# Patient Record
Sex: FEMALE | Race: WHITE | NOT HISPANIC OR LATINO
[De-identification: names, ages, dates, MRNs, and addresses within clinical notes are randomized per-mention and may not be internally consistent; named-entity substitution may affect disease eponyms.]

---

## 2017-04-19 ENCOUNTER — RESULT REVIEW (OUTPATIENT)
Age: 62
End: 2017-04-19

## 2017-05-31 ENCOUNTER — OUTPATIENT (OUTPATIENT)
Dept: OUTPATIENT SERVICES | Facility: HOSPITAL | Age: 62
LOS: 1 days | End: 2017-05-31
Payer: COMMERCIAL

## 2017-05-31 VITALS
SYSTOLIC BLOOD PRESSURE: 146 MMHG | HEART RATE: 90 BPM | DIASTOLIC BLOOD PRESSURE: 82 MMHG | HEIGHT: 60 IN | TEMPERATURE: 98 F | RESPIRATION RATE: 16 BRPM | WEIGHT: 147.05 LBS | OXYGEN SATURATION: 97 %

## 2017-05-31 DIAGNOSIS — Z90.722 ACQUIRED ABSENCE OF OVARIES, BILATERAL: Chronic | ICD-10-CM

## 2017-05-31 DIAGNOSIS — Z98.890 OTHER SPECIFIED POSTPROCEDURAL STATES: Chronic | ICD-10-CM

## 2017-05-31 DIAGNOSIS — Z90.89 ACQUIRED ABSENCE OF OTHER ORGANS: Chronic | ICD-10-CM

## 2017-05-31 DIAGNOSIS — Z90.49 ACQUIRED ABSENCE OF OTHER SPECIFIED PARTS OF DIGESTIVE TRACT: Chronic | ICD-10-CM

## 2017-05-31 DIAGNOSIS — M47.12 OTHER SPONDYLOSIS WITH MYELOPATHY, CERVICAL REGION: ICD-10-CM

## 2017-05-31 DIAGNOSIS — Z01.818 ENCOUNTER FOR OTHER PREPROCEDURAL EXAMINATION: ICD-10-CM

## 2017-05-31 DIAGNOSIS — Z90.710 ACQUIRED ABSENCE OF BOTH CERVIX AND UTERUS: Chronic | ICD-10-CM

## 2017-05-31 LAB
ANION GAP SERPL CALC-SCNC: 17 MMOL/L — SIGNIFICANT CHANGE UP (ref 5–17)
BLD GP AB SCN SERPL QL: NEGATIVE — SIGNIFICANT CHANGE UP
BUN SERPL-MCNC: 10 MG/DL — SIGNIFICANT CHANGE UP (ref 7–23)
CALCIUM SERPL-MCNC: 10.4 MG/DL — SIGNIFICANT CHANGE UP (ref 8.4–10.5)
CHLORIDE SERPL-SCNC: 95 MMOL/L — LOW (ref 96–108)
CO2 SERPL-SCNC: 23 MMOL/L — SIGNIFICANT CHANGE UP (ref 22–31)
CREAT SERPL-MCNC: 0.81 MG/DL — SIGNIFICANT CHANGE UP (ref 0.5–1.3)
GLUCOSE SERPL-MCNC: 98 MG/DL — SIGNIFICANT CHANGE UP (ref 70–99)
HCT VFR BLD CALC: 36 % — SIGNIFICANT CHANGE UP (ref 34.5–45)
HGB BLD-MCNC: 12.4 G/DL — SIGNIFICANT CHANGE UP (ref 11.5–15.5)
MCHC RBC-ENTMCNC: 30 PG — SIGNIFICANT CHANGE UP (ref 27–34)
MCHC RBC-ENTMCNC: 34.4 GM/DL — SIGNIFICANT CHANGE UP (ref 32–36)
MCV RBC AUTO: 87.2 FL — SIGNIFICANT CHANGE UP (ref 80–100)
PLATELET # BLD AUTO: 450 K/UL — HIGH (ref 150–400)
POTASSIUM SERPL-MCNC: 4.4 MMOL/L — SIGNIFICANT CHANGE UP (ref 3.5–5.3)
POTASSIUM SERPL-SCNC: 4.4 MMOL/L — SIGNIFICANT CHANGE UP (ref 3.5–5.3)
RBC # BLD: 4.13 M/UL — SIGNIFICANT CHANGE UP (ref 3.8–5.2)
RBC # FLD: 12.9 % — SIGNIFICANT CHANGE UP (ref 10.3–14.5)
RH IG SCN BLD-IMP: POSITIVE — SIGNIFICANT CHANGE UP
SODIUM SERPL-SCNC: 135 MMOL/L — SIGNIFICANT CHANGE UP (ref 135–145)
WBC # BLD: 10.04 K/UL — SIGNIFICANT CHANGE UP (ref 3.8–10.5)
WBC # FLD AUTO: 10.04 K/UL — SIGNIFICANT CHANGE UP (ref 3.8–10.5)

## 2017-05-31 PROCEDURE — 86901 BLOOD TYPING SEROLOGIC RH(D): CPT

## 2017-05-31 PROCEDURE — 36415 COLL VENOUS BLD VENIPUNCTURE: CPT

## 2017-05-31 PROCEDURE — G0463: CPT

## 2017-05-31 PROCEDURE — 80048 BASIC METABOLIC PNL TOTAL CA: CPT

## 2017-05-31 PROCEDURE — 86850 RBC ANTIBODY SCREEN: CPT

## 2017-05-31 PROCEDURE — 86900 BLOOD TYPING SEROLOGIC ABO: CPT

## 2017-05-31 PROCEDURE — 85027 COMPLETE CBC AUTOMATED: CPT

## 2017-05-31 RX ORDER — CEFAZOLIN SODIUM 1 G
2000 VIAL (EA) INJECTION ONCE
Qty: 0 | Refills: 0 | Status: DISCONTINUED | OUTPATIENT
Start: 2017-06-14 | End: 2017-06-16

## 2017-05-31 RX ORDER — TIMOLOL 0.5 %
1 DROPS OPHTHALMIC (EYE)
Qty: 0 | Refills: 0 | COMMUNITY

## 2017-05-31 NOTE — H&P PST ADULT - PAIN CHRONIC, PROFILE
h/o cervical herniated discs, cervical pain worsen over past few month, radiation to both arms and left leg/yes

## 2017-05-31 NOTE — H&P PST ADULT - HISTORY OF PRESENT ILLNESS
61 yr old female with HTN, HLD, GERD, cervical spine stenosis, cervical radiculopathy, worsening cervical pain over past few month, presents to PST for scheduled C3-7 anterior cervical discectomy  and fusion, possible C4-5 corpectomy, possible C3-7 posterior spinal  stabilization on 6/14/17.

## 2017-05-31 NOTE — H&P PST ADULT - PROBLEM SELECTOR PLAN 1
C3-7 anterior cervical discectomy  and fusion, possible C4-5 corpectomy, possible C3-7 posterior spinal  stabilization

## 2017-05-31 NOTE — H&P PST ADULT - NSANTHOSAYNRD_GEN_A_CORE
No. MURRAY screening performed.  STOP BANG Legend: 0-2 = LOW Risk; 3-4 = INTERMEDIATE Risk; 5-8 = HIGH Risk

## 2017-05-31 NOTE — H&P PST ADULT - PSH
History of appendectomy    History of arthroscopy of right knee    History of bilateral oophorectomy    History of carpal tunnel release    History of cholecystectomy    History of hysterectomy    History of tonsillectomy

## 2017-05-31 NOTE — H&P PST ADULT - PMH
History of iron deficiency anemia    History of migraine Cervical radiculopathy    Gastroesophageal reflux disease without esophagitis    Glaucoma  b/l  Herniated cervical disc    Hiatal hernia    History of diverticulitis    History of endometriosis    History of gastric ulcer    History of iron deficiency anemia    History of migraine    Hyperlipidemia    Hypertension    Osteoarthritis of cervical spine with myelopathy    Spinal stenosis in cervical region

## 2017-05-31 NOTE — H&P PST ADULT - NEUROLOGICAL COMMENTS
cervical radiculopathy, cervical pain radiating to both arms and left leg , pain progressively worsening

## 2017-05-31 NOTE — H&P PST ADULT - OTHER CARE PROVIDERS
cardiologist Nnamdi Cee follows as needed ( [facility] ) cardiologist Dr. Mannie Cee [phone], follows as needed

## 2017-06-02 ENCOUNTER — TRANSCRIPTION ENCOUNTER (OUTPATIENT)
Age: 62
End: 2017-06-02

## 2017-06-14 ENCOUNTER — TRANSCRIPTION ENCOUNTER (OUTPATIENT)
Age: 62
End: 2017-06-14

## 2017-06-14 ENCOUNTER — INPATIENT (INPATIENT)
Facility: HOSPITAL | Age: 62
LOS: 4 days | Discharge: HOME | DRG: 472 | End: 2017-06-19
Attending: NEUROLOGICAL SURGERY | Admitting: NEUROLOGICAL SURGERY
Payer: COMMERCIAL

## 2017-06-14 VITALS
HEART RATE: 84 BPM | DIASTOLIC BLOOD PRESSURE: 79 MMHG | WEIGHT: 147.05 LBS | RESPIRATION RATE: 18 BRPM | SYSTOLIC BLOOD PRESSURE: 142 MMHG | HEIGHT: 60 IN | OXYGEN SATURATION: 100 % | TEMPERATURE: 98 F

## 2017-06-14 DIAGNOSIS — Z90.710 ACQUIRED ABSENCE OF BOTH CERVIX AND UTERUS: Chronic | ICD-10-CM

## 2017-06-14 DIAGNOSIS — Z90.722 ACQUIRED ABSENCE OF OVARIES, BILATERAL: Chronic | ICD-10-CM

## 2017-06-14 DIAGNOSIS — Z90.89 ACQUIRED ABSENCE OF OTHER ORGANS: Chronic | ICD-10-CM

## 2017-06-14 DIAGNOSIS — M47.12 OTHER SPONDYLOSIS WITH MYELOPATHY, CERVICAL REGION: ICD-10-CM

## 2017-06-14 DIAGNOSIS — Z90.49 ACQUIRED ABSENCE OF OTHER SPECIFIED PARTS OF DIGESTIVE TRACT: Chronic | ICD-10-CM

## 2017-06-14 DIAGNOSIS — Z98.890 OTHER SPECIFIED POSTPROCEDURAL STATES: Chronic | ICD-10-CM

## 2017-06-14 LAB
ANION GAP SERPL CALC-SCNC: 14 MMOL/L — SIGNIFICANT CHANGE UP (ref 5–17)
BUN SERPL-MCNC: 9 MG/DL — SIGNIFICANT CHANGE UP (ref 7–23)
CALCIUM SERPL-MCNC: 8.8 MG/DL — SIGNIFICANT CHANGE UP (ref 8.4–10.5)
CHLORIDE SERPL-SCNC: 105 MMOL/L — SIGNIFICANT CHANGE UP (ref 96–108)
CO2 SERPL-SCNC: 23 MMOL/L — SIGNIFICANT CHANGE UP (ref 22–31)
CREAT SERPL-MCNC: 0.64 MG/DL — SIGNIFICANT CHANGE UP (ref 0.5–1.3)
GAS PNL BLDA: SIGNIFICANT CHANGE UP
GAS PNL BLDA: SIGNIFICANT CHANGE UP
GLUCOSE SERPL-MCNC: 138 MG/DL — HIGH (ref 70–99)
HCT VFR BLD CALC: 27.6 % — LOW (ref 34.5–45)
HGB BLD-MCNC: 9.8 G/DL — LOW (ref 11.5–15.5)
MAGNESIUM SERPL-MCNC: 2.1 MG/DL — SIGNIFICANT CHANGE UP (ref 1.6–2.6)
MCHC RBC-ENTMCNC: 32.4 PG — SIGNIFICANT CHANGE UP (ref 27–34)
MCHC RBC-ENTMCNC: 35.4 GM/DL — SIGNIFICANT CHANGE UP (ref 32–36)
MCV RBC AUTO: 91.4 FL — SIGNIFICANT CHANGE UP (ref 80–100)
PHOSPHATE SERPL-MCNC: 4.9 MG/DL — HIGH (ref 2.5–4.5)
PLATELET # BLD AUTO: 342 K/UL — SIGNIFICANT CHANGE UP (ref 150–400)
POTASSIUM SERPL-MCNC: 4.5 MMOL/L — SIGNIFICANT CHANGE UP (ref 3.5–5.3)
POTASSIUM SERPL-SCNC: 4.5 MMOL/L — SIGNIFICANT CHANGE UP (ref 3.5–5.3)
RBC # BLD: 3.02 M/UL — LOW (ref 3.8–5.2)
RBC # FLD: 11.6 % — SIGNIFICANT CHANGE UP (ref 10.3–14.5)
RH IG SCN BLD-IMP: POSITIVE — SIGNIFICANT CHANGE UP
SODIUM SERPL-SCNC: 142 MMOL/L — SIGNIFICANT CHANGE UP (ref 135–145)
WBC # BLD: 15.6 K/UL — HIGH (ref 3.8–10.5)
WBC # FLD AUTO: 15.6 K/UL — HIGH (ref 3.8–10.5)

## 2017-06-14 PROCEDURE — 99291 CRITICAL CARE FIRST HOUR: CPT | Mod: 24

## 2017-06-14 RX ORDER — ESTROGENS, CONJUGATED 0.625 MG
0.62 TABLET ORAL DAILY
Qty: 0 | Refills: 0 | Status: DISCONTINUED | OUTPATIENT
Start: 2017-06-14 | End: 2017-06-19

## 2017-06-14 RX ORDER — SERTRALINE 25 MG/1
50 TABLET, FILM COATED ORAL DAILY
Qty: 0 | Refills: 0 | Status: DISCONTINUED | OUTPATIENT
Start: 2017-06-14 | End: 2017-06-19

## 2017-06-14 RX ORDER — LABETALOL HCL 100 MG
10 TABLET ORAL ONCE
Qty: 0 | Refills: 0 | Status: COMPLETED | OUTPATIENT
Start: 2017-06-14 | End: 2017-06-14

## 2017-06-14 RX ORDER — HYDROMORPHONE HYDROCHLORIDE 2 MG/ML
0.5 INJECTION INTRAMUSCULAR; INTRAVENOUS; SUBCUTANEOUS
Qty: 0 | Refills: 0 | Status: DISCONTINUED | OUTPATIENT
Start: 2017-06-14 | End: 2017-06-16

## 2017-06-14 RX ORDER — AMLODIPINE BESYLATE 2.5 MG/1
5 TABLET ORAL AT BEDTIME
Qty: 0 | Refills: 0 | Status: DISCONTINUED | OUTPATIENT
Start: 2017-06-14 | End: 2017-06-19

## 2017-06-14 RX ORDER — DIAZEPAM 5 MG
5 TABLET ORAL EVERY 8 HOURS
Qty: 0 | Refills: 0 | Status: DISCONTINUED | OUTPATIENT
Start: 2017-06-14 | End: 2017-06-19

## 2017-06-14 RX ORDER — ACETAMINOPHEN 500 MG
650 TABLET ORAL EVERY 6 HOURS
Qty: 0 | Refills: 0 | Status: DISCONTINUED | OUTPATIENT
Start: 2017-06-14 | End: 2017-06-19

## 2017-06-14 RX ORDER — HYDROMORPHONE HYDROCHLORIDE 2 MG/ML
30 INJECTION INTRAMUSCULAR; INTRAVENOUS; SUBCUTANEOUS
Qty: 0 | Refills: 0 | Status: DISCONTINUED | OUTPATIENT
Start: 2017-06-14 | End: 2017-06-16

## 2017-06-14 RX ORDER — PANTOPRAZOLE SODIUM 20 MG/1
40 TABLET, DELAYED RELEASE ORAL
Qty: 0 | Refills: 0 | Status: DISCONTINUED | OUTPATIENT
Start: 2017-06-14 | End: 2017-06-19

## 2017-06-14 RX ORDER — DEXTROSE MONOHYDRATE, SODIUM CHLORIDE, AND POTASSIUM CHLORIDE 50; .745; 4.5 G/1000ML; G/1000ML; G/1000ML
1000 INJECTION, SOLUTION INTRAVENOUS
Qty: 0 | Refills: 0 | Status: DISCONTINUED | OUTPATIENT
Start: 2017-06-14 | End: 2017-06-15

## 2017-06-14 RX ORDER — ATORVASTATIN CALCIUM 80 MG/1
40 TABLET, FILM COATED ORAL AT BEDTIME
Qty: 0 | Refills: 0 | Status: DISCONTINUED | OUTPATIENT
Start: 2017-06-14 | End: 2017-06-19

## 2017-06-14 RX ORDER — DOCUSATE SODIUM 100 MG
100 CAPSULE ORAL THREE TIMES A DAY
Qty: 0 | Refills: 0 | Status: DISCONTINUED | OUTPATIENT
Start: 2017-06-14 | End: 2017-06-19

## 2017-06-14 RX ORDER — ONDANSETRON 8 MG/1
4 TABLET, FILM COATED ORAL EVERY 6 HOURS
Qty: 0 | Refills: 0 | Status: DISCONTINUED | OUTPATIENT
Start: 2017-06-14 | End: 2017-06-16

## 2017-06-14 RX ORDER — SODIUM CHLORIDE 9 MG/ML
3 INJECTION INTRAMUSCULAR; INTRAVENOUS; SUBCUTANEOUS EVERY 8 HOURS
Qty: 0 | Refills: 0 | Status: DISCONTINUED | OUTPATIENT
Start: 2017-06-14 | End: 2017-06-14

## 2017-06-14 RX ORDER — METHOCARBAMOL 500 MG/1
750 TABLET, FILM COATED ORAL THREE TIMES A DAY
Qty: 0 | Refills: 0 | Status: DISCONTINUED | OUTPATIENT
Start: 2017-06-14 | End: 2017-06-19

## 2017-06-14 RX ORDER — TIMOLOL 0.5 %
1 DROPS OPHTHALMIC (EYE) AT BEDTIME
Qty: 0 | Refills: 0 | Status: DISCONTINUED | OUTPATIENT
Start: 2017-06-14 | End: 2017-06-19

## 2017-06-14 RX ORDER — NALOXONE HYDROCHLORIDE 4 MG/.1ML
0.1 SPRAY NASAL
Qty: 0 | Refills: 0 | Status: DISCONTINUED | OUTPATIENT
Start: 2017-06-14 | End: 2017-06-19

## 2017-06-14 RX ORDER — SUCRALFATE 1 G
1 TABLET ORAL
Qty: 0 | Refills: 0 | Status: DISCONTINUED | OUTPATIENT
Start: 2017-06-14 | End: 2017-06-19

## 2017-06-14 RX ORDER — CEFAZOLIN SODIUM 1 G
2000 VIAL (EA) INJECTION EVERY 8 HOURS
Qty: 0 | Refills: 0 | Status: COMPLETED | OUTPATIENT
Start: 2017-06-14 | End: 2017-06-15

## 2017-06-14 RX ORDER — LOSARTAN POTASSIUM 100 MG/1
50 TABLET, FILM COATED ORAL DAILY
Qty: 0 | Refills: 0 | Status: DISCONTINUED | OUTPATIENT
Start: 2017-06-14 | End: 2017-06-19

## 2017-06-14 RX ORDER — SENNA PLUS 8.6 MG/1
2 TABLET ORAL AT BEDTIME
Qty: 0 | Refills: 0 | Status: DISCONTINUED | OUTPATIENT
Start: 2017-06-14 | End: 2017-06-19

## 2017-06-14 RX ADMIN — Medication 10 MILLIGRAM(S): at 16:55

## 2017-06-14 RX ADMIN — Medication 1 DROP(S): at 22:23

## 2017-06-14 RX ADMIN — HYDROMORPHONE HYDROCHLORIDE 30 MILLILITER(S): 2 INJECTION INTRAMUSCULAR; INTRAVENOUS; SUBCUTANEOUS at 16:56

## 2017-06-14 RX ADMIN — Medication 100 MILLIGRAM(S): at 22:23

## 2017-06-14 NOTE — BRIEF OPERATIVE NOTE - POST-OP DX
Detail Level: Detailed
Cervical stenosis of spine  06/14/2017    Active  Saumya Saavedra
Detail Level: Simple
Detail Level: Generalized

## 2017-06-14 NOTE — BRIEF OPERATIVE NOTE - PROCEDURE
Posterior cervical fusion with instrumentation  06/14/2017    Active  XSUN4  Anterior cervical discectomy between C6 and C7  06/14/2017    Active  XSUN4  Cervical corpectomy by anterior approach of additional segment following inital cervical corpectomy by anterior approach of single segment  06/14/2017    Active  XSUN4  Cervical corpectomy  06/14/2017    Active  XSUN4

## 2017-06-14 NOTE — PROGRESS NOTE ADULT - SUBJECTIVE AND OBJECTIVE BOX
NEUROCRITICAL CARE NOTE    61 yr old female with HTN, HLD, GERD, cervical spine stenosis, cervical radiculopathy, worsening cervical pain over past few month, presents to PST for scheduled C3-7 anterior cervical discectomy  and fusion, possible C4-5 corpectomy, possible C3-7 posterior spinal  stabilization on 6/14/17.     ASSESSMENT/PLAN: post-operative day 0 no complic but heavy blood loss need monitoring in ICU and numerous PMHx:  Cervical radiculopathy    Gastroesophageal reflux disease without esophagitis    Glaucoma  b/l  Herniated cervical disc    Hiatal hernia    History of diverticulitis    History of endometriosis    History of gastric ulcer    History of iron deficiency anemia    History of migraine    Hyperlipidemia    Hypertension    Osteoarthritis of cervical spine with myelopathy    Spinal stenosis in cervical region.    NEURO: Alert, attentive, fully oriented, obeys commands, fluent, cranial nerves intact, no facial asymmetry, pupils equal briskly reactive, muscle strength 5/5 in all extremities, no sensory deficit, no dysmetria.  acetaminophen for mild analgesia methocarbamol for moderate and hydromorphone for severe pain  sertraline for depression  diazepam for anxiolysis and muscle relaxation  Activity: Bedrest PT OT PM&R    PULM: normal auscultation     CV: normal auscultation   SBP goal w/ amlodipine and losartan for blood pressure control atorvastatin for cholesterol control     RENAL: intravenous fluids       GI: abdomen soft not distended not tender   Diet:  GI ulcer prophylaxis with PPI    Constipation prophylaxis with senna docusate    ENDO: estrogens from home  Goal euglycemia (glucose 120-180)    HEME/ONC:  VTE prophylaxis: intermittent pneumatic compression devices alone no anticoagulation as fresh postop    ID: afebrile cefazolin for soraya-operative antibiotic prophylaxis     SOCIAL/FAMILY:awaiting    CODE STATUS: Full Code     DISPOSITION:  ICU 24h    critical care time 35 minutes dedicated to this patient at risk of neurological deterioration or death due to risk of quadriplegia if hematoma at site    Damien Madison MD, MS, FASN NEUROCRITICAL CARE NOTE    61 yr old female with HTN, HLD, GERD, cervical spine stenosis, cervical radiculopathy, worsening cervical pain over past few month, presents to PST for scheduled C3-7 anterior cervical discectomy  and fusion, possible C4-5 corpectomy, possible C3-7 posterior spinal  stabilization on 6/14/17.     ASSESSMENT/PLAN: post-operative day 0 no complic but heavy blood loss need monitoring in ICU, risk of cervical hematoma with airway compression and numerous PMHx:  Cervical radiculopathy    Gastroesophageal reflux disease without esophagitis    Glaucoma  b/l  Herniated cervical disc    Hiatal hernia    History of diverticulitis    History of endometriosis    History of gastric ulcer    History of iron deficiency anemia    History of migraine    Hyperlipidemia    Hypertension    Osteoarthritis of cervical spine with myelopathy    Spinal stenosis in cervical region.    NEURO: Alert, attentive, fully oriented, obeys commands, fluent, cranial nerves intact, no facial asymmetry, pupils equal briskly reactive, muscle strength 5/5 in all extremities, no sensory deficit, no dysmetria. No cervical hematoma, 2 drains in.  acetaminophen for mild analgesia methocarbamol for moderate and hydromorphone PCA soon for severe pain  sertraline for depression  diazepam for anxiolysis and muscle relaxation  Activity: Bedrest PT OT PM&R    PULM: normal auscultation no airway compromise now    CV: normal auscultation   SBP goal w/ amlodipine and losartan for blood pressure control consider nicardipine if high after pain control, atorvastatin for cholesterol control     RENAL: intravenous fluids f/u chem    GI: abdomen soft not distended not tender   Diet:  GI ulcer prophylaxis with PPI    Constipation prophylaxis with senna docusate    ENDO: estrogens from home  Goal euglycemia (glucose 120-180)    HEME/ONC: f/u post-op CBC  VTE prophylaxis: intermittent pneumatic compression devices alone no anticoagulation as fresh postop    ID: afebrile cefazolin for soraya-operative antibiotic prophylaxis     SOCIAL/FAMILY:awaiting    CODE STATUS: Full Code     DISPOSITION:  ICU 24h    critical care time 35 minutes dedicated to this patient at risk of neurological deterioration or death due to risk of quadriplegia or airway compromise if hematoma develops at site    Damien Madison MD, MS, GOLD

## 2017-06-14 NOTE — BRIEF OPERATIVE NOTE - OPERATION/FINDINGS
C4-C5 corpectomy, C6-7 ACDF, C3-C7 anterior plate screw fixation, C3-C7 posterior screw-rehan fixation

## 2017-06-14 NOTE — PATIENT PROFILE ADULT. - PMH
Cervical radiculopathy    Gastroesophageal reflux disease without esophagitis    Glaucoma  b/l  Herniated cervical disc    Hiatal hernia    History of diverticulitis    History of endometriosis    History of gastric ulcer    History of iron deficiency anemia    History of migraine    Hyperlipidemia    Hypertension    Osteoarthritis of cervical spine with myelopathy    Spinal stenosis in cervical region

## 2017-06-15 DIAGNOSIS — M54.12 RADICULOPATHY, CERVICAL REGION: ICD-10-CM

## 2017-06-15 PROCEDURE — 99233 SBSQ HOSP IP/OBS HIGH 50: CPT

## 2017-06-15 RX ORDER — ENOXAPARIN SODIUM 100 MG/ML
40 INJECTION SUBCUTANEOUS
Qty: 0 | Refills: 0 | Status: DISCONTINUED | OUTPATIENT
Start: 2017-06-15 | End: 2017-06-19

## 2017-06-15 RX ORDER — METOCLOPRAMIDE HCL 10 MG
5 TABLET ORAL EVERY 4 HOURS
Qty: 0 | Refills: 0 | Status: DISCONTINUED | OUTPATIENT
Start: 2017-06-15 | End: 2017-06-19

## 2017-06-15 RX ORDER — SODIUM CHLORIDE 9 MG/ML
1000 INJECTION INTRAMUSCULAR; INTRAVENOUS; SUBCUTANEOUS
Qty: 0 | Refills: 0 | Status: DISCONTINUED | OUTPATIENT
Start: 2017-06-15 | End: 2017-06-15

## 2017-06-15 RX ADMIN — Medication 100 MILLIGRAM(S): at 06:00

## 2017-06-15 RX ADMIN — Medication 1 GRAM(S): at 18:05

## 2017-06-15 RX ADMIN — Medication 5 MILLIGRAM(S): at 14:22

## 2017-06-15 RX ADMIN — Medication 0.62 MILLIGRAM(S): at 12:50

## 2017-06-15 RX ADMIN — METHOCARBAMOL 750 MILLIGRAM(S): 500 TABLET, FILM COATED ORAL at 22:37

## 2017-06-15 RX ADMIN — Medication 1 GRAM(S): at 06:00

## 2017-06-15 RX ADMIN — ENOXAPARIN SODIUM 40 MILLIGRAM(S): 100 INJECTION SUBCUTANEOUS at 18:06

## 2017-06-15 RX ADMIN — HYDROMORPHONE HYDROCHLORIDE 0.5 MILLIGRAM(S): 2 INJECTION INTRAMUSCULAR; INTRAVENOUS; SUBCUTANEOUS at 13:06

## 2017-06-15 RX ADMIN — AMLODIPINE BESYLATE 5 MILLIGRAM(S): 2.5 TABLET ORAL at 22:36

## 2017-06-15 RX ADMIN — METHOCARBAMOL 750 MILLIGRAM(S): 500 TABLET, FILM COATED ORAL at 14:04

## 2017-06-15 RX ADMIN — Medication 5 MILLIGRAM(S): at 22:37

## 2017-06-15 RX ADMIN — Medication 1 GRAM(S): at 22:36

## 2017-06-15 RX ADMIN — Medication 5 MILLIGRAM(S): at 00:25

## 2017-06-15 RX ADMIN — SENNA PLUS 2 TABLET(S): 8.6 TABLET ORAL at 22:36

## 2017-06-15 RX ADMIN — ONDANSETRON 4 MILLIGRAM(S): 8 TABLET, FILM COATED ORAL at 19:35

## 2017-06-15 RX ADMIN — LOSARTAN POTASSIUM 50 MILLIGRAM(S): 100 TABLET, FILM COATED ORAL at 06:00

## 2017-06-15 RX ADMIN — SERTRALINE 50 MILLIGRAM(S): 25 TABLET, FILM COATED ORAL at 12:50

## 2017-06-15 RX ADMIN — Medication 100 MILLIGRAM(S): at 22:36

## 2017-06-15 RX ADMIN — PANTOPRAZOLE SODIUM 40 MILLIGRAM(S): 20 TABLET, DELAYED RELEASE ORAL at 06:00

## 2017-06-15 RX ADMIN — HYDROMORPHONE HYDROCHLORIDE 0.5 MILLIGRAM(S): 2 INJECTION INTRAMUSCULAR; INTRAVENOUS; SUBCUTANEOUS at 20:03

## 2017-06-15 RX ADMIN — Medication 100 MILLIGRAM(S): at 14:02

## 2017-06-15 RX ADMIN — Medication 1 GRAM(S): at 11:27

## 2017-06-15 RX ADMIN — PANTOPRAZOLE SODIUM 40 MILLIGRAM(S): 20 TABLET, DELAYED RELEASE ORAL at 18:05

## 2017-06-15 RX ADMIN — METHOCARBAMOL 750 MILLIGRAM(S): 500 TABLET, FILM COATED ORAL at 06:00

## 2017-06-15 NOTE — PROGRESS NOTE ADULT - SUBJECTIVE AND OBJECTIVE BOX
HPI:  61 yr old female with HTN, HLD, GERD, cervical spine stenosis, cervical radiculopathy, worsening cervical pain over past few month, presents to PST for scheduled C3-7 anterior cervical discectomy  and fusion, C4-5 corpectomy, C3-7 posterior spinal  stabilization on 6/14/17. (31 May 2017 09:50)    OVERNIGHT EVENTS:   No acute events overnight.    VITALS:  T(C): , Max: 37.1 (06-14 @ 23:00)  HR:  (78 - 102)  BP: --  ABP:  (126/63 - 172/79)  RR:  (6 - 25)  SpO2:  (93% - 100%)  Wt(kg): --    LABS:  Na: 142 (06-14 @ 22:59)  K: 4.5 (06-14 @ 22:59)  Cl: 105 (06-14 @ 22:59)  CO2: 23 (06-14 @ 22:59)  BUN: 9 (06-14 @ 22:59)  Cr: 0.64 (06-14 @ 22:59)    Hgb: 9.8 (06-14 @ 22:59)  Hct: 27.6 (06-14 @ 22:59)  WBC: 15.6 (06-14 @ 22:59)  Plt: 342 (06-14 @ 22:59)    IMAGING:   Recent imaging studies were reviewed.    MEDICATIONS:  ceFAZolin   IVPB 2000milliGRAM(s) IV Intermittent once  losartan 50milliGRAM(s) Oral daily  sertraline 50milliGRAM(s) Oral daily  atorvastatin 40milliGRAM(s) Oral at bedtime  amLODIPine   Tablet 5milliGRAM(s) Oral at bedtime  sucralfate suspension 1Gram(s) Oral Before meals and at bedtime  methocarbamol 750milliGRAM(s) Oral three times a day  pantoprazole    Tablet 40milliGRAM(s) Oral two times a day before meals  timolol 0.25% Solution 1Drop(s) Both EYES at bedtime  estrogens    conjugated 0.625milliGRAM(s) Oral daily  acetaminophen   Tablet 650milliGRAM(s) Oral every 6 hours PRN  acetaminophen   Tablet. 650milliGRAM(s) Oral every 6 hours PRN  diazepam    Tablet 5milliGRAM(s) Oral every 8 hours PRN  docusate sodium 100milliGRAM(s) Oral three times a day  senna 2Tablet(s) Oral at bedtime  HYDROmorphone PCA (1 mG/mL) 30milliLiter(s) PCA Continuous PCA Continuous  HYDROmorphone PCA (1 mG/mL) Rescue Clinician Bolus 0.5milliGRAM(s) IV Push every 15 minutes PRN  naloxone Injectable 0.1milliGRAM(s) IV Push every 3 minutes PRN  ondansetron Injectable 4milliGRAM(s) IV Push every 6 hours PRN  enoxaparin Injectable 40milliGRAM(s) SubCutaneous <User Schedule>    EXAMINATION:  General:  calm  HEENT:  MMM  Neuro:  awake, alert, oriented x 3, follows commands, moves all extremities  Cards:  RRR  Respiratory:  no respiratory distress  Adomen:  soft  Extremities:  moves all extremities  Skin:  warm/dry, incision clean/dry

## 2017-06-15 NOTE — PHYSICAL THERAPY INITIAL EVALUATION ADULT - PERTINENT HX OF CURRENT PROBLEM, REHAB EVAL
60 yo F with HTN, HLD, GERD, cervical spine stenosis, cervical radiculopathy, worsening cervical pain over past few month, presents to PST for scheduled C3-7 anterior cervical discectomy  and fusion, possible C4-5 corpectomy, possible C3-7 posterior spinal  stabilization on 6/14/17.

## 2017-06-15 NOTE — OCCUPATIONAL THERAPY INITIAL EVALUATION ADULT - PERTINENT HX OF CURRENT PROBLEM, REHAB EVAL
62 yo F with HTN, HLD, GERD, cervical spine stenosis, cervical radiculopathy, worsening cervical pain over past few month, presents to PST for scheduled C3-7 anterior cervical discectomy  and fusion, possible C4-5 corpectomy, possible C3-7 posterior spinal  stabilization on 6/14/17.

## 2017-06-15 NOTE — PROGRESS NOTE ADULT - SUBJECTIVE AND OBJECTIVE BOX
Neurosurgery Resident Note    Overnight Events: no acute events overnight. Patient has only elected to be on fluids overnight    Clinical Course: 61yof s/p C4 & C5 corpectomies, C6-7 ACDF, C3-C7 anterior plate-screw fixation, C3-C7 posterior screw-rehan fixation. POD # 1    VS: T(C): 36.6  HR: 102  BP: --  RR: 18  SpO2: 93%  Wt(kg): --    Exam:  AAOx3  5/5 throughout, no saenz, no clonus  Sensation to light touch intact throughout.  2+ reflexes throughout    HMV output: 45 cc overnight    Medications: Dilaudid PCA senna, colace,                           9.8    15.6  )-----------( 342      ( 14 Jun 2017 22:59 )             27.6     06-14    142  |  105  |  9   ----------------------------<  138<H>  4.5   |  23  |  0.64    Ca    8.8      14 Jun 2017 22:59  Phos  4.9     06-14  Mg     2.1     06-14 Neurosurgery Resident Note    Overnight Events: no acute events overnight. Patient has only elected to be on fluids overnight    Clinical Course: 61yof s/p C4 & C5 corpectomies, C6-7 ACDF, C3-C7 anterior plate-screw fixation, C3-C7 posterior screw-rehan fixation. POD # 1    VS: T(C): 36.6  HR: 102  BP: --  RR: 18  SpO2: 93%  Wt(kg): --    Exam:  AAOx3  5/5 throughout, no saenz, no clonus  Sensation to light touch intact throughout.  2+ reflexes throughout    HMV output: 70 cc overnight    Medications: Dilaudid PCA senna, colace,                           9.8    15.6  )-----------( 342      ( 14 Jun 2017 22:59 )             27.6     06-14    142  |  105  |  9   ----------------------------<  138<H>  4.5   |  23  |  0.64    Ca    8.8      14 Jun 2017 22:59  Phos  4.9     06-14  Mg     2.1     06-14

## 2017-06-15 NOTE — PHYSICAL THERAPY INITIAL EVALUATION ADULT - CRITERIA FOR SKILLED THERAPEUTIC INTERVENTIONS
therapy frequency/anticipated equipment needs at discharge/risk reduction/prevention/impairments found/rehab potential/anticipated discharge recommendation/predicted duration of therapy intervention/functional limitations in following categories

## 2017-06-15 NOTE — OCCUPATIONAL THERAPY INITIAL EVALUATION ADULT - LIVES WITH, PROFILE
children/spouse/Pt lives with her spouse, daughter, and son-in-law in a private home, 4 steps to enter with full flight to 2nd floor and unilateral handrail. Pt has a tub shower and was independent with ADLs, IADLs, driving, working prior to admission.

## 2017-06-15 NOTE — OCCUPATIONAL THERAPY INITIAL EVALUATION ADULT - DIAGNOSIS, OT EVAL
Pt with impaired strength, balance and pain impacting pt's ability to complete ADLs, IADLs and functional mobility.

## 2017-06-15 NOTE — PROGRESS NOTE ADULT - ASSESSMENT
61F p/w severe neck pain, B/L UE radicular pain, and loss of dexterity, s/p multilevel anterior cervical corpectomy and anterior & posterior fusion. Neurologically intact

## 2017-06-15 NOTE — PROGRESS NOTE ADULT - SUBJECTIVE AND OBJECTIVE BOX
HPI:  61 yr old female with HTN, HLD, GERD, cervical spine stenosis, cervical radiculopathy, worsening cervical pain over past few month, presents to PST for scheduled C3-7 anterior cervical discectomy  and fusion, possible C4-5 corpectomy, possible C3-7 posterior spinal  stabilization on 6/14/17. (31 May 2017 09:50)    s/p C4-5 corpectomy, C6-7 ACDF, C3-7 ant/post spinal stabilization, POD 1    OVERNIGHT EVENTS:  Vital Signs Last 24 Hrs  T(C): 36.7, Max: 37.1 (06-14 @ 23:00)  T(F): 98.1, Max: 98.8 (06-14 @ 23:00)  HR: 95 (78 - 102)  BP: 113/45 (113/45 - 113/45)  BP(mean): 64 (64 - 64)  RR: 12 (6 - 25)  SpO2: 98% (93% - 100%)    I&O's Detail  I & Os for 24h ending 15 Aly 2017 07:00  =============================================  IN:    sodium chloride 0.9% with potassium chloride 20 mEq/L: 750 ml    sodium chloride 0.9%: 450 ml    Solution: 100 ml    Total IN: 1300 ml  ---------------------------------------------  OUT:    Indwelling Catheter - Urethral: 925 ml    Accordian: 70 ml    Total OUT: 995 ml  ---------------------------------------------  Total NET: 305 ml    I & Os for current day (as of 15 Aly 2017 11:28)  =============================================  IN:    sodium chloride 0.9%: 225 ml    Total IN: 225 ml  ---------------------------------------------  OUT:    Indwelling Catheter - Urethral: 300 ml    Total OUT: 300 ml  ---------------------------------------------  Total NET: -75 ml    I&O's Summary  I & Os for 24h ending 15 Aly 2017 07:00  =============================================  IN: 1300 ml / OUT: 995 ml / NET: 305 ml    I & Os for current day (as of 15 Aly 2017 11:28)  =============================================  IN: 225 ml / OUT: 300 ml / NET: -75 ml      PHYSICAL EXAM:  Neurological:    Motor exam:         [] Upper extremity                 D             B          T          WE       WF      HI                                               R         5/5        5/5        5/5       5/5     5/5       5/5                                               L          5/5        5/5        5/5       5/5     5/5       5/5         [] Lower extremity                Ps          Ha        Quad    EHL        FHL                                               R        5/5        5/5        5/5       5/5         5/5                                               L         5/5        5/5       5/5       5/5          5/5                                                        [] warm well perfused; capillary refill <3 seconds     Sensation: [x] intact to light touch  [] decreased:     DTR's          [] Biceps                                                                     R         1/2                                               L         1/2          [] Triceps                                                                     R        1 /2                                                  L         1/2          [] Brachiradialis                                                        R         1/2                                               L         1 /2          [] Patellar                                                                   R         1/2                                               L         1 /2          [] Achilles                                                                   R        1 /2                                               L          1/2      Gait NT    Cardiovascular:  Respiratory:  Gastrointestinal:  Genitourinary:  Extremities:  Incision/Wound: Dressings dry    TUBES/LINES:  [] CVC  [] A-line  [] Lumbar Drain  [] Ventriculostomy  [] Other    DIET: Tolerating liquids  [] NPO  [] Mechanical  [] Tube feeds    LABS:                        9.8    15.6  )-----------( 342      ( 14 Jun 2017 22:59 )             27.6     06-14    142  |  105  |  9   ----------------------------<  138<H>  4.5   |  23  |  0.64    Ca    8.8      14 Jun 2017 22:59  Phos  4.9     06-14  Mg     2.1     06-14              CAPILLARY BLOOD GLUCOSE          Drug Levels: [] N/A    CSF Analysis: [] N/A      Allergies    No Known Allergies    Intolerances    morphine (Nausea; Vomiting)  Percocet 5/325 (Nausea; Vomiting)    MEDICATIONS:  Antibiotics:  ceFAZolin   IVPB 2000milliGRAM(s) IV Intermittent once    Neuro:  sertraline 50milliGRAM(s) Oral daily  methocarbamol 750milliGRAM(s) Oral three times a day  acetaminophen   Tablet 650milliGRAM(s) Oral every 6 hours PRN  acetaminophen   Tablet. 650milliGRAM(s) Oral every 6 hours PRN  diazepam    Tablet 5milliGRAM(s) Oral every 8 hours PRN  HYDROmorphone PCA (1 mG/mL) 30milliLiter(s) PCA Continuous PCA Continuous  HYDROmorphone PCA (1 mG/mL) Rescue Clinician Bolus 0.5milliGRAM(s) IV Push every 15 minutes PRN  ondansetron Injectable 4milliGRAM(s) IV Push every 6 hours PRN    Anticoagulation:  enoxaparin Injectable 40milliGRAM(s) SubCutaneous <User Schedule>    OTHER:  losartan 50milliGRAM(s) Oral daily  atorvastatin 40milliGRAM(s) Oral at bedtime  amLODIPine   Tablet 5milliGRAM(s) Oral at bedtime  sucralfate suspension 1Gram(s) Oral Before meals and at bedtime  pantoprazole    Tablet 40milliGRAM(s) Oral two times a day before meals  timolol 0.25% Solution 1Drop(s) Both EYES at bedtime  estrogens    conjugated 0.625milliGRAM(s) Oral daily  docusate sodium 100milliGRAM(s) Oral three times a day  senna 2Tablet(s) Oral at bedtime  naloxone Injectable 0.1milliGRAM(s) IV Push every 3 minutes PRN    IVF:    CULTURES:    RADIOLOGY & ADDITIONAL TESTS:      ASSESSMENT:  HPI:  61 yr old female with HTN, HLD, GERD, cervical spine stenosis, cervical radiculopathy, worsening cervical pain over past few month, presents to PST for scheduled C3-7 anterior cervical discectomy  and fusion, possible C4-5 corpectomy, possible C3-7 posterior spinal  stabilization on 6/14/17. (31 May 2017 09:50)    61y Female s/p    PLAN:  NEURO:  CARDIOVASCULAR:  PULMONARY:  RENAL:  GI:  HEME:  ID:  ENDO:    DVT PROPHYLAXIS:  [] Venodynes                                [] Heparin/Lovenox    FALL RISK:  [] Low Risk                                    [] Impulsive

## 2017-06-15 NOTE — OCCUPATIONAL THERAPY INITIAL EVALUATION ADULT - GENERAL OBSERVATIONS, REHAB EVAL
Pt found semi-supine in bed, +tele, +cardiac monitor, +PCA, +venodynes, +hemovac, +continous O2 monitor.

## 2017-06-15 NOTE — OCCUPATIONAL THERAPY INITIAL EVALUATION ADULT - PLANNED THERAPY INTERVENTIONS, OT EVAL
IADL retraining/bed mobility training/ADL retraining neuromuscular re-education/ADL retraining/strengthening/IADL retraining/ROM/transfer training/bed mobility training/balance training

## 2017-06-15 NOTE — OCCUPATIONAL THERAPY INITIAL EVALUATION ADULT - ADDITIONAL COMMENTS
Pt s/p C4-C5 corpectomy, C6-7 ACDF, C3-C7 anterior plate screw fixation, C3-C7 posterior screw-rehan fixation 6/14.

## 2017-06-15 NOTE — PROGRESS NOTE ADULT - SUBJECTIVE AND OBJECTIVE BOX
Visit Summary: 61y Female POD#1 s/p C4/5 corpectomy, C6-7 ACDF, and posterior C3-7 fusion. Patient is wide awake and moving everything well postoperatively w/ moderate incisional pain. She denies any swallowing or breathing difficulty and is protecting her airway well.    Overnight Events: No acute events o/n.    Exam:  T(C): 36.4, Max: 36.9 (06-14 @ 06:17)  HR: 85 (78 - 94)  BP: 142/79 (142/79 - 142/79)  RR: 20 (6 - 25)  SpO2: 98% (97% - 100%)  Wt(kg): --    PHYSICAL EXAM:    Constitutional: No Acute Distress     Neurological: AOx3, Following Commands, Moving all Extremities     Motor exam:          Upper extremity                         Delt     Bicep     Tricep    HG                                                 R         5/5        5/5        5/5       5/5                                               L          5/5        5/5        5/5       5/5          Lower extremity                        HF         KF        KE       DF         PF                                                  R        5/5        5/5        5/5       5/5         5/5                                               L         5/5        5/5       5/5       5/5          5/5                                                                           9.8    15.6  )-----------( 342      ( 14 Jun 2017 22:59 )             27.6     06-14    142  |  105  |  9   ----------------------------<  138<H>  4.5   |  23  |  0.64    Ca    8.8      14 Jun 2017 22:59  Phos  4.9     06-14  Mg     2.1     06-14        Plan:

## 2017-06-15 NOTE — PROGRESS NOTE ADULT - SUBJECTIVE AND OBJECTIVE BOX
Day 1 of Anesthesia Pain Management Service    SUBJECTIVE: I just had a lot of pain     Pain Scale Score	At rest: ___ 	With Activity: ___ 	[X ] Refer to charted pain scores    THERAPY:    [ ] IV PCA Morphine		[ ] 5 mg/mL	[ ] 1 mg/mL  [X ] IV PCA Hydromorphone	[ ] 5 mg/mL	[X ] 1 mg/mL  [ ] IV PCA Fentanyl		[ ] 50 micrograms/mL    Demand dose 0.2mg    lockout  6  minutes 0  Continuous Rate        MEDICATIONS  (STANDING):  ceFAZolin   IVPB 2000milliGRAM(s) IV Intermittent once  losartan 50milliGRAM(s) Oral daily  sertraline 50milliGRAM(s) Oral daily  atorvastatin 40milliGRAM(s) Oral at bedtime  amLODIPine   Tablet 5milliGRAM(s) Oral at bedtime  sucralfate suspension 1Gram(s) Oral Before meals and at bedtime  methocarbamol 750milliGRAM(s) Oral three times a day  pantoprazole    Tablet 40milliGRAM(s) Oral two times a day before meals  timolol 0.25% Solution 1Drop(s) Both EYES at bedtime  estrogens    conjugated 0.625milliGRAM(s) Oral daily  docusate sodium 100milliGRAM(s) Oral three times a day  senna 2Tablet(s) Oral at bedtime  HYDROmorphone PCA (1 mG/mL) 30milliLiter(s) PCA Continuous PCA Continuous  enoxaparin Injectable 40milliGRAM(s) SubCutaneous <User Schedule>    MEDICATIONS  (PRN):  acetaminophen   Tablet 650milliGRAM(s) Oral every 6 hours PRN For Temp greater than 38 C (100.4 F)  acetaminophen   Tablet. 650milliGRAM(s) Oral every 6 hours PRN Mild Pain (1 - 3)  diazepam    Tablet 5milliGRAM(s) Oral every 8 hours PRN muscle spasms  HYDROmorphone PCA (1 mG/mL) Rescue Clinician Bolus 0.5milliGRAM(s) IV Push every 15 minutes PRN for Pain Scale GREATER THAN 6  naloxone Injectable 0.1milliGRAM(s) IV Push every 3 minutes PRN For ANY of the following changes in patient status:  A. RR LESS THAN 10 breaths per minute, B. Oxygen saturation LESS THAN 90%, C. Sedation score of 6  ondansetron Injectable 4milliGRAM(s) IV Push every 6 hours PRN Nausea      OBJECTIVE:    Sedation Score:	[x ] Alert	[ ] Drowsy 	[ ] Arousable	[ ] Asleep	[ ] Unresponsive    Side Effects:	[x ] None	[ ] Nausea	[ ] Vomiting	[ ] Pruritus  		[ ] Other:    Vital Signs Last 24 Hrs  T(C): 36.7, Max: 37.1 (06-14 @ 23:00)  T(F): 98.1, Max: 98.8 (06-14 @ 23:00)  HR: 95 (78 - 102)  BP: 113/45 (113/45 - 113/45)  BP(mean): 64 (64 - 64)  RR: 12 (6 - 25)  SpO2: 98% (93% - 100%)    ASSESSMENT/ PLAN    Therapy to  be:	[x ] Continue   [ ] Discontinued   [ ] Change to prn Analgesics    Documentation and Verification of current medications:   [X] Done	[ ] Not done, not elligible    Comments: Valium prn spams. Continue PCA

## 2017-06-15 NOTE — PHYSICAL THERAPY INITIAL EVALUATION ADULT - DID THE PATIENT HAVE SURGERY?
Pt s/p C4-C5 corpectomy, C6-7 ACDF, C3-C7 anterior plate screw fixation, C3-C7 posterior screw-rehan fixation 6/14./yes

## 2017-06-15 NOTE — PHYSICAL THERAPY INITIAL EVALUATION ADULT - ADDITIONAL COMMENTS
Pt lives with her spouse, daughter, and son-in-law in a private home, 4 steps to enter with full flight to 2nd floor and unilateral handrail. PTA pt was independent in all functional mobility No AD, able to take care of herself, able to drive a car, and walk community distances.

## 2017-06-15 NOTE — PROGRESS NOTE ADULT - ASSESSMENT
Summary:     Neuro:  q4h neuro checks, no post-op imaging    CV:  -160    Pulm:  sat > 92%    Renal:  IVL    GI:  advance as tolerated  ---> Stress ulcer prophylaxis:  PPI    Heme:  monitor H/H    ---> DVT prophylaxis:  start SQL tonight, SCDs    Endo:  euglycemia    ID:  afebrile    Code status:  Full code  Disposition:  full    Time spent:  30 minutes

## 2017-06-15 NOTE — OCCUPATIONAL THERAPY INITIAL EVALUATION ADULT - ANTICIPATED DISCHARGE DISPOSITION, OT EVAL
TBD pending completion of functional evaluation Home OT to increase independence in ADLs, IADLs, and assess safety in the home environment

## 2017-06-15 NOTE — PHYSICAL THERAPY INITIAL EVALUATION ADULT - GENERAL OBSERVATIONS, REHAB EVAL
Pt encountered supine in bed, + surgical bandage at anterior cervical spine. + A line, + cardiac moitor, PCA pump PIV, hernandez

## 2017-06-16 DIAGNOSIS — M48.02 SPINAL STENOSIS, CERVICAL REGION: ICD-10-CM

## 2017-06-16 LAB
ANION GAP SERPL CALC-SCNC: 11 MMOL/L — SIGNIFICANT CHANGE UP (ref 5–17)
BUN SERPL-MCNC: 6 MG/DL — LOW (ref 7–23)
CALCIUM SERPL-MCNC: 8.9 MG/DL — SIGNIFICANT CHANGE UP (ref 8.4–10.5)
CHLORIDE SERPL-SCNC: 93 MMOL/L — LOW (ref 96–108)
CO2 SERPL-SCNC: 27 MMOL/L — SIGNIFICANT CHANGE UP (ref 22–31)
CREAT SERPL-MCNC: 0.47 MG/DL — LOW (ref 0.5–1.3)
GLUCOSE SERPL-MCNC: 190 MG/DL — HIGH (ref 70–99)
HCT VFR BLD CALC: 27.2 % — LOW (ref 34.5–45)
HGB BLD-MCNC: 9.2 G/DL — LOW (ref 11.5–15.5)
MCHC RBC-ENTMCNC: 30.7 PG — SIGNIFICANT CHANGE UP (ref 27–34)
MCHC RBC-ENTMCNC: 33.7 GM/DL — SIGNIFICANT CHANGE UP (ref 32–36)
MCV RBC AUTO: 91.2 FL — SIGNIFICANT CHANGE UP (ref 80–100)
PLATELET # BLD AUTO: 305 K/UL — SIGNIFICANT CHANGE UP (ref 150–400)
POTASSIUM SERPL-MCNC: 3.5 MMOL/L — SIGNIFICANT CHANGE UP (ref 3.5–5.3)
POTASSIUM SERPL-SCNC: 3.5 MMOL/L — SIGNIFICANT CHANGE UP (ref 3.5–5.3)
RBC # BLD: 2.98 M/UL — LOW (ref 3.8–5.2)
RBC # FLD: 11.5 % — SIGNIFICANT CHANGE UP (ref 10.3–14.5)
SODIUM SERPL-SCNC: 131 MMOL/L — LOW (ref 135–145)
WBC # BLD: 18.7 K/UL — HIGH (ref 3.8–10.5)
WBC # FLD AUTO: 18.7 K/UL — HIGH (ref 3.8–10.5)

## 2017-06-16 RX ORDER — HYDROMORPHONE HYDROCHLORIDE 2 MG/ML
4 INJECTION INTRAMUSCULAR; INTRAVENOUS; SUBCUTANEOUS EVERY 4 HOURS
Qty: 0 | Refills: 0 | Status: DISCONTINUED | OUTPATIENT
Start: 2017-06-16 | End: 2017-06-19

## 2017-06-16 RX ORDER — HYDROMORPHONE HYDROCHLORIDE 2 MG/ML
2 INJECTION INTRAMUSCULAR; INTRAVENOUS; SUBCUTANEOUS EVERY 4 HOURS
Qty: 0 | Refills: 0 | Status: DISCONTINUED | OUTPATIENT
Start: 2017-06-16 | End: 2017-06-19

## 2017-06-16 RX ORDER — HYDROMORPHONE HYDROCHLORIDE 2 MG/ML
0.5 INJECTION INTRAMUSCULAR; INTRAVENOUS; SUBCUTANEOUS
Qty: 0 | Refills: 0 | Status: DISCONTINUED | OUTPATIENT
Start: 2017-06-16 | End: 2017-06-19

## 2017-06-16 RX ADMIN — HYDROMORPHONE HYDROCHLORIDE 2 MILLIGRAM(S): 2 INJECTION INTRAMUSCULAR; INTRAVENOUS; SUBCUTANEOUS at 13:54

## 2017-06-16 RX ADMIN — ENOXAPARIN SODIUM 40 MILLIGRAM(S): 100 INJECTION SUBCUTANEOUS at 17:15

## 2017-06-16 RX ADMIN — HYDROMORPHONE HYDROCHLORIDE 0.5 MILLIGRAM(S): 2 INJECTION INTRAMUSCULAR; INTRAVENOUS; SUBCUTANEOUS at 21:26

## 2017-06-16 RX ADMIN — Medication 1 GRAM(S): at 11:43

## 2017-06-16 RX ADMIN — SERTRALINE 50 MILLIGRAM(S): 25 TABLET, FILM COATED ORAL at 11:43

## 2017-06-16 RX ADMIN — Medication 100 MILLIGRAM(S): at 14:26

## 2017-06-16 RX ADMIN — METHOCARBAMOL 750 MILLIGRAM(S): 500 TABLET, FILM COATED ORAL at 16:06

## 2017-06-16 RX ADMIN — Medication 5 MILLIGRAM(S): at 13:54

## 2017-06-16 RX ADMIN — METHOCARBAMOL 750 MILLIGRAM(S): 500 TABLET, FILM COATED ORAL at 05:37

## 2017-06-16 RX ADMIN — Medication 0.62 MILLIGRAM(S): at 11:43

## 2017-06-16 RX ADMIN — AMLODIPINE BESYLATE 5 MILLIGRAM(S): 2.5 TABLET ORAL at 21:25

## 2017-06-16 RX ADMIN — LOSARTAN POTASSIUM 50 MILLIGRAM(S): 100 TABLET, FILM COATED ORAL at 05:37

## 2017-06-16 RX ADMIN — HYDROMORPHONE HYDROCHLORIDE 4 MILLIGRAM(S): 2 INJECTION INTRAMUSCULAR; INTRAVENOUS; SUBCUTANEOUS at 12:13

## 2017-06-16 RX ADMIN — HYDROMORPHONE HYDROCHLORIDE 4 MILLIGRAM(S): 2 INJECTION INTRAMUSCULAR; INTRAVENOUS; SUBCUTANEOUS at 20:01

## 2017-06-16 RX ADMIN — PANTOPRAZOLE SODIUM 40 MILLIGRAM(S): 20 TABLET, DELAYED RELEASE ORAL at 05:36

## 2017-06-16 RX ADMIN — Medication 1 GRAM(S): at 05:36

## 2017-06-16 RX ADMIN — HYDROMORPHONE HYDROCHLORIDE 0.5 MILLIGRAM(S): 2 INJECTION INTRAMUSCULAR; INTRAVENOUS; SUBCUTANEOUS at 21:40

## 2017-06-16 RX ADMIN — HYDROMORPHONE HYDROCHLORIDE 4 MILLIGRAM(S): 2 INJECTION INTRAMUSCULAR; INTRAVENOUS; SUBCUTANEOUS at 11:43

## 2017-06-16 RX ADMIN — HYDROMORPHONE HYDROCHLORIDE 4 MILLIGRAM(S): 2 INJECTION INTRAMUSCULAR; INTRAVENOUS; SUBCUTANEOUS at 19:27

## 2017-06-16 RX ADMIN — Medication 1 GRAM(S): at 16:06

## 2017-06-16 RX ADMIN — METHOCARBAMOL 750 MILLIGRAM(S): 500 TABLET, FILM COATED ORAL at 21:25

## 2017-06-16 RX ADMIN — Medication 100 MILLIGRAM(S): at 05:37

## 2017-06-16 RX ADMIN — Medication 1 DROP(S): at 21:25

## 2017-06-16 RX ADMIN — PANTOPRAZOLE SODIUM 40 MILLIGRAM(S): 20 TABLET, DELAYED RELEASE ORAL at 16:06

## 2017-06-16 RX ADMIN — Medication 1 GRAM(S): at 21:25

## 2017-06-16 NOTE — PROGRESS NOTE ADULT - SUBJECTIVE AND OBJECTIVE BOX
Day 2 of Anesthesia Pain Management Service    SUBJECTIVE: I'm still having spasm    Pain Scale Score	At rest: ___ 	With Activity: ___ 	[x ] Refer to charted pain scores    THERAPY:    [ ] IV PCA Morphine		[ ] 5 mg/mL	[ ] 1 mg/mL  [X ] IV PCA Hydromorphone	[ ] 5 mg/mL	[X ] 1 mg/mL  [ ] IV PCA Fentanyl		[ ] 50 micrograms/mL    Demand dose 0.2mg    lockout  6  minutes 0  Continuous Rate        MEDICATIONS  (STANDING):  ceFAZolin   IVPB 2000milliGRAM(s) IV Intermittent once  losartan 50milliGRAM(s) Oral daily  sertraline 50milliGRAM(s) Oral daily  atorvastatin 40milliGRAM(s) Oral at bedtime  amLODIPine   Tablet 5milliGRAM(s) Oral at bedtime  sucralfate suspension 1Gram(s) Oral Before meals and at bedtime  methocarbamol 750milliGRAM(s) Oral three times a day  pantoprazole    Tablet 40milliGRAM(s) Oral two times a day before meals  timolol 0.25% Solution 1Drop(s) Both EYES at bedtime  estrogens    conjugated 0.625milliGRAM(s) Oral daily  docusate sodium 100milliGRAM(s) Oral three times a day  senna 2Tablet(s) Oral at bedtime  HYDROmorphone PCA (1 mG/mL) 30milliLiter(s) PCA Continuous PCA Continuous  enoxaparin Injectable 40milliGRAM(s) SubCutaneous <User Schedule>    MEDICATIONS  (PRN):  acetaminophen   Tablet 650milliGRAM(s) Oral every 6 hours PRN For Temp greater than 38 C (100.4 F)  acetaminophen   Tablet. 650milliGRAM(s) Oral every 6 hours PRN Mild Pain (1 - 3)  diazepam    Tablet 5milliGRAM(s) Oral every 8 hours PRN muscle spasms  HYDROmorphone PCA (1 mG/mL) Rescue Clinician Bolus 0.5milliGRAM(s) IV Push every 15 minutes PRN for Pain Scale GREATER THAN 6  naloxone Injectable 0.1milliGRAM(s) IV Push every 3 minutes PRN For ANY of the following changes in patient status:  A. RR LESS THAN 10 breaths per minute, B. Oxygen saturation LESS THAN 90%, C. Sedation score of 6  ondansetron Injectable 4milliGRAM(s) IV Push every 6 hours PRN Nausea  metoclopramide 5milliGRAM(s) Oral every 4 hours PRN nausea      OBJECTIVE:    Sedation Score:	[X ] Alert	[ ] Drowsy 	[ ] Arousable	[ ] Asleep	[ ] Unresponsive    Side Effects:	[ X] None	[ ] Nausea	[ ] Vomiting	[ ] Pruritus  		[ ] Other:    Vital Signs Last 24 Hrs  T(C): 36.9, Max: 37.4 (06-15 @ 15:00)  T(F): 98.5, Max: 99.4 (06-15 @ 15:00)  HR: 117 (95 - 122)  BP: 131/80 (104/72 - 142/77)  BP(mean): 68 (64 - 70)  RR: 18 (12 - 19)  SpO2: 95% (93% - 98%)    ASSESSMENT/ PLAN    Therapy to  be:	[X ] Continue   [ ] Discontinued   [ ] Change to prn Analgesics    Documentation and Verification of current medications:   [X] Done	[ ] Not done, not elligible    Comments:

## 2017-06-16 NOTE — PROGRESS NOTE ADULT - SUBJECTIVE AND OBJECTIVE BOX
SUBJECTIVE: Comfortable, without complaints. NAD    OVERNIGHT EVENTS: None    Vital Signs Last 24 Hrs  T(C): 36.9, Max: 37.4 (06-15 @ 15:00)  T(F): 98.5, Max: 99.4 (06-15 @ 15:00)  HR: 117 (104 - 122)  BP: 131/80 (104/72 - 142/77)  BP(mean): 68 (68 - 70)  RR: 18 (17 - 19)  SpO2: 95% (93% - 98%)  IVF: [ X] IVL [ ] NS+K@   DIET: [ ] Regular [ ] CCD [ ] Renal [ ] Puree [ ] Dysphagia [ ] Tube Feeds:   PCA: [X] YES [ ] NO   BOWERS: [X ] YES [ ] NO [ ] VOID   BM: [ ] YES [ ] NO     DRAINS: HMV 24cc>DC HMV now    PHYSICAL EXAM:    Constitutional: No Acute Distress     Neurological: AOx3, Following Commands, Moving all Extremities. No dysphagia or dysphonia    Motor exam:          Upper extremity                         Delt     Bicep     Tricep    HG                                                 R         5/5        5/5        5/5       5/5                                               L          5/5        5/5        5/5       5/5          Lower extremity                        HF         KF        KE       DF         PF                                                  R        5/5        5/5        5/5       5/5         5/5                                               L         5/5        5/5       5/5       5/5          5/5                                                 Sensation: [X] intact to light touch  [] decreased:     Pulmonary: Clear to Auscultation, No rales, No rhonchi, No wheezes     Cardiovascular: S1, S2, Regular rate and rhythm     Gastrointestinal: Soft, Non-tender, Non-distended     Extremities: No calf tenderness     Incision: Ant neck HMV DC at this time, + Steri-CDI, No hematoma, Post neck-CDI, Flat    LABS:                        9.2    18.7  )-----------( 305      ( 16 Jun 2017 10:16 )             27.2    06-16    131<L>  |  93<L>  |  6<L>  ----------------------------<  190<H>  3.5   |  27  |  0.47<L>    Ca    8.9      16 Jun 2017 10:16  Phos  4.9     06-14  Mg     2.1     06-14      IMAGING:     MEDICATIONS  (STANDING):  losartan 50milliGRAM(s) Oral daily  sertraline 50milliGRAM(s) Oral daily  atorvastatin 40milliGRAM(s) Oral at bedtime  amLODIPine   Tablet 5milliGRAM(s) Oral at bedtime  sucralfate suspension 1Gram(s) Oral Before meals and at bedtime  methocarbamol 750milliGRAM(s) Oral three times a day  pantoprazole    Tablet 40milliGRAM(s) Oral two times a day before meals  timolol 0.25% Solution 1Drop(s) Both EYES at bedtime  estrogens    conjugated 0.625milliGRAM(s) Oral daily  docusate sodium 100milliGRAM(s) Oral three times a day  senna 2Tablet(s) Oral at bedtime  enoxaparin Injectable 40milliGRAM(s) SubCutaneous <User Schedule>    MEDICATIONS  (PRN):  acetaminophen   Tablet 650milliGRAM(s) Oral every 6 hours PRN For Temp greater than 38 C (100.4 F)  acetaminophen   Tablet. 650milliGRAM(s) Oral every 6 hours PRN Mild Pain (1 - 3)  diazepam    Tablet 5milliGRAM(s) Oral every 8 hours PRN muscle spasms  naloxone Injectable 0.1milliGRAM(s) IV Push every 3 minutes PRN For ANY of the following changes in patient status:  A. RR LESS THAN 10 breaths per minute, B. Oxygen saturation LESS THAN 90%, C. Sedation score of 6  metoclopramide 5milliGRAM(s) Oral every 4 hours PRN nausea  HYDROmorphone   Tablet 2milliGRAM(s) Oral every 4 hours PRN Moderate Pain (4 - 6)  HYDROmorphone   Tablet 4milliGRAM(s) Oral every 4 hours PRN Severe Pain (7 - 10)  HYDROmorphone  Injectable 0.5milliGRAM(s) IV Push every 3 hours PRN breakthrough pain

## 2017-06-16 NOTE — PROGRESS NOTE ADULT - SUBJECTIVE AND OBJECTIVE BOX
Pain Management Attending Addendum    SUBJECTIVE:    Therapy:	  [x ] IV PCA	   [ ] Epidural           [ ] s/p Spinal Opoid              [ ] Postpartum infusion	  [ ] Patient controlled regional anesthesia (PCRA)    [ ] prn Analgesics    OBJECTIVE:   [x ] No new signs     [ ] Other:    Side Effects:  [x ] None			[ ] Other:    Assessment of Catheter Site:		[ x] Intact		[ ] Other:    ASSESSMENT/PLAN  [x ] Continue current therapy    [ ] Therapy changed to:    [ ] IV PCA       [ ] Epidural     [ ] prn Analgesics     [ ] post partum infusion    Comments:

## 2017-06-16 NOTE — PROGRESS NOTE ADULT - ASSESSMENT
61 yr old female with HTN, HLD, GERD, cervical spine stenosis, cervical radiculopathy, worsening cervical pain over past few month, presents to PST for scheduled C3-7 anterior cervical discectomy  and fusion, possible C4-5 corpectomy, possible C3-7 posterior spinal  stabilization on 6/14/17. (31 May 2017 09:50)    PROCEDURE: Adm 6/14 s/p C4-5 Corpectomy, ACDF C6-7, C3-7 post fusion with instrumentation     POD#2    PLAN:  Neuro: DC HMV now, DC PCA, DC Buenrostro-ck void. Leukocytosis from steroids-follow clinically. FU Hyponatremia (131) am. No C-collar needed.  Respiratory: Patient instructed to use incentive spirometer [X ] YES [ ] NO             DVT ppx: [X ] SQL [ ] SQH and Venodynes [ ] Left [ ] Right [X ] Bilateral     Discharge planning: PT/OT-p

## 2017-06-17 LAB
ANION GAP SERPL CALC-SCNC: 17 MMOL/L — SIGNIFICANT CHANGE UP (ref 5–17)
BUN SERPL-MCNC: 6 MG/DL — LOW (ref 7–23)
CALCIUM SERPL-MCNC: 9 MG/DL — SIGNIFICANT CHANGE UP (ref 8.4–10.5)
CHLORIDE SERPL-SCNC: 88 MMOL/L — LOW (ref 96–108)
CO2 SERPL-SCNC: 26 MMOL/L — SIGNIFICANT CHANGE UP (ref 22–31)
CREAT SERPL-MCNC: 0.45 MG/DL — LOW (ref 0.5–1.3)
GLUCOSE SERPL-MCNC: 109 MG/DL — HIGH (ref 70–99)
HCT VFR BLD CALC: 26.3 % — LOW (ref 34.5–45)
HGB BLD-MCNC: 9.2 G/DL — LOW (ref 11.5–15.5)
MCHC RBC-ENTMCNC: 30.6 PG — SIGNIFICANT CHANGE UP (ref 27–34)
MCHC RBC-ENTMCNC: 35 GM/DL — SIGNIFICANT CHANGE UP (ref 32–36)
MCV RBC AUTO: 87.4 FL — SIGNIFICANT CHANGE UP (ref 80–100)
PLATELET # BLD AUTO: 358 K/UL — SIGNIFICANT CHANGE UP (ref 150–400)
POTASSIUM SERPL-MCNC: 3.7 MMOL/L — SIGNIFICANT CHANGE UP (ref 3.5–5.3)
POTASSIUM SERPL-SCNC: 3.7 MMOL/L — SIGNIFICANT CHANGE UP (ref 3.5–5.3)
RBC # BLD: 3.01 M/UL — LOW (ref 3.8–5.2)
RBC # FLD: 12.6 % — SIGNIFICANT CHANGE UP (ref 10.3–14.5)
SODIUM SERPL-SCNC: 131 MMOL/L — LOW (ref 135–145)
WBC # BLD: 17.22 K/UL — HIGH (ref 3.8–10.5)
WBC # FLD AUTO: 17.22 K/UL — HIGH (ref 3.8–10.5)

## 2017-06-17 RX ORDER — SODIUM CHLORIDE 0.65 %
1 AEROSOL, SPRAY (ML) NASAL
Qty: 0 | Refills: 0 | Status: DISCONTINUED | OUTPATIENT
Start: 2017-06-17 | End: 2017-06-19

## 2017-06-17 RX ADMIN — HYDROMORPHONE HYDROCHLORIDE 4 MILLIGRAM(S): 2 INJECTION INTRAMUSCULAR; INTRAVENOUS; SUBCUTANEOUS at 04:08

## 2017-06-17 RX ADMIN — METHOCARBAMOL 750 MILLIGRAM(S): 500 TABLET, FILM COATED ORAL at 05:06

## 2017-06-17 RX ADMIN — AMLODIPINE BESYLATE 5 MILLIGRAM(S): 2.5 TABLET ORAL at 22:12

## 2017-06-17 RX ADMIN — Medication 1 DROP(S): at 22:12

## 2017-06-17 RX ADMIN — Medication 1 GRAM(S): at 22:12

## 2017-06-17 RX ADMIN — SERTRALINE 50 MILLIGRAM(S): 25 TABLET, FILM COATED ORAL at 18:12

## 2017-06-17 RX ADMIN — HYDROMORPHONE HYDROCHLORIDE 4 MILLIGRAM(S): 2 INJECTION INTRAMUSCULAR; INTRAVENOUS; SUBCUTANEOUS at 04:38

## 2017-06-17 RX ADMIN — Medication 1 GRAM(S): at 05:06

## 2017-06-17 RX ADMIN — ATORVASTATIN CALCIUM 40 MILLIGRAM(S): 80 TABLET, FILM COATED ORAL at 22:12

## 2017-06-17 RX ADMIN — METHOCARBAMOL 750 MILLIGRAM(S): 500 TABLET, FILM COATED ORAL at 22:12

## 2017-06-17 RX ADMIN — PANTOPRAZOLE SODIUM 40 MILLIGRAM(S): 20 TABLET, DELAYED RELEASE ORAL at 05:06

## 2017-06-17 RX ADMIN — ENOXAPARIN SODIUM 40 MILLIGRAM(S): 100 INJECTION SUBCUTANEOUS at 18:12

## 2017-06-17 RX ADMIN — Medication 100 MILLIGRAM(S): at 14:17

## 2017-06-17 RX ADMIN — HYDROMORPHONE HYDROCHLORIDE 4 MILLIGRAM(S): 2 INJECTION INTRAMUSCULAR; INTRAVENOUS; SUBCUTANEOUS at 22:42

## 2017-06-17 RX ADMIN — Medication 0.62 MILLIGRAM(S): at 12:32

## 2017-06-17 RX ADMIN — Medication 1 GRAM(S): at 18:13

## 2017-06-17 RX ADMIN — HYDROMORPHONE HYDROCHLORIDE 4 MILLIGRAM(S): 2 INJECTION INTRAMUSCULAR; INTRAVENOUS; SUBCUTANEOUS at 14:45

## 2017-06-17 RX ADMIN — PANTOPRAZOLE SODIUM 40 MILLIGRAM(S): 20 TABLET, DELAYED RELEASE ORAL at 18:12

## 2017-06-17 RX ADMIN — HYDROMORPHONE HYDROCHLORIDE 4 MILLIGRAM(S): 2 INJECTION INTRAMUSCULAR; INTRAVENOUS; SUBCUTANEOUS at 18:30

## 2017-06-17 RX ADMIN — Medication 5 MILLIGRAM(S): at 11:11

## 2017-06-17 RX ADMIN — HYDROMORPHONE HYDROCHLORIDE 0.5 MILLIGRAM(S): 2 INJECTION INTRAMUSCULAR; INTRAVENOUS; SUBCUTANEOUS at 11:15

## 2017-06-17 RX ADMIN — HYDROMORPHONE HYDROCHLORIDE 4 MILLIGRAM(S): 2 INJECTION INTRAMUSCULAR; INTRAVENOUS; SUBCUTANEOUS at 14:15

## 2017-06-17 RX ADMIN — HYDROMORPHONE HYDROCHLORIDE 4 MILLIGRAM(S): 2 INJECTION INTRAMUSCULAR; INTRAVENOUS; SUBCUTANEOUS at 22:12

## 2017-06-17 RX ADMIN — HYDROMORPHONE HYDROCHLORIDE 4 MILLIGRAM(S): 2 INJECTION INTRAMUSCULAR; INTRAVENOUS; SUBCUTANEOUS at 18:00

## 2017-06-17 RX ADMIN — LOSARTAN POTASSIUM 50 MILLIGRAM(S): 100 TABLET, FILM COATED ORAL at 05:06

## 2017-06-17 RX ADMIN — Medication 1 GRAM(S): at 12:38

## 2017-06-17 RX ADMIN — Medication 1 SPRAY(S): at 12:38

## 2017-06-17 RX ADMIN — HYDROMORPHONE HYDROCHLORIDE 0.5 MILLIGRAM(S): 2 INJECTION INTRAMUSCULAR; INTRAVENOUS; SUBCUTANEOUS at 11:45

## 2017-06-17 NOTE — PROGRESS NOTE ADULT - ASSESSMENT
61 yr old female with HTN, HLD, GERD, cervical spine stenosis, cervical radiculopathy, worsening cervical pain over past few month, presents to PST for scheduled C3-7 anterior cervical discectomy  and fusion, possible C4-5 corpectomy, possible C3-7 posterior spinal  stabilization on 6/14/17. (31 May 2017 09:50)    PROCEDURE: Adm 6/14 s/p C4-5 Corpectomy, ACDF C6-7, C3-7 post fusion with instrumentation     POD#3  Plan:  Neuro:  Leukocytosis from steroids-decreasing-follow clinically. Hyponatremia -stable at131 FU am. No C-collar needed    Respiratory: Patient instructed to use incentive spirometer [X ] YES [ ] NO                 DVT ppx: [X ] SQL [ ] SQH and Venodynes [ ] Left [ ] Right [X ] Bilateral    Discharge planning: PT/OT-Home Ot/PT w/RW-(script-written). DC plan for 6/18 if independent.

## 2017-06-17 NOTE — PROGRESS NOTE ADULT - SUBJECTIVE AND OBJECTIVE BOX
SUBJECTIVE: Pain better control, no complaints    OVERNIGHT EVENTS: None    Vital Signs Last 24 Hrs  T(C): 37.1, Max: 37.1 (06-17 @ 07:48)  T(F): 98.7, Max: 98.7 (06-17 @ 07:48)  HR: 112 (64 - 117)  BP: 127/7 (124/76 - 135/85)  BP(mean): --  RR: 18 (18 - 19)  SpO2: 98% (94% - 99%)  IVF: [X] IVL [ ] NS+K@   DIET: [ X] Regular [ ] CCD [ ] Renal [ ] Puree [ ] Dysphagia [ ] Tube Feeds:   PCA: [ ] YES [ X] NO   BOWERS: [ ] YES [ ] NO [X ] VOID   BM: [ ] YES [ X] NO     DRAINS: None    PHYSICAL EXAM:    Constitutional: No Acute Distress     Neurological: AOx3, Following Commands, Moving all Extremities     Motor exam:          Upper extremity                         Delt     Bicep     Tricep    HG                                                 R         5/5        5/5        5/5       5/5                                               L          5/5        5/5        5/5       5/5          Lower extremity                        HF         KF        KE       DF         PF                                                  R        5/5        5/5        5/5       5/5         5/5                                               L         5/5        5/5       5/5       5/5          5/5                                                 Sensation: [X] intact to light touch  [] decreased:     Pulmonary: Clear to Auscultation, No rales, No rhonchi, No wheezes     Cardiovascular: S1, S2, Regular rate and rhythm     Gastrointestinal: Soft, Non-tender, Non-distended     Extremities: No calf tenderness     Incision: Steri ant neck and staples CASSIDY, CDI, Flat. No dysphagia or dysphonia    LABS:                        9.2    17.22 )-----------( 358      ( 17 Jun 2017 08:33 )             26.3    06-17    131<L>  |  88<L>  |  6<L>  ----------------------------<  109<H>  3.7   |  26  |  0.45<L>    Ca    9.0      17 Jun 2017 08:18      IMAGING:    MEDICATIONS  (STANDING):  losartan 50milliGRAM(s) Oral daily  sertraline 50milliGRAM(s) Oral daily  atorvastatin 40milliGRAM(s) Oral at bedtime  amLODIPine   Tablet 5milliGRAM(s) Oral at bedtime  sucralfate suspension 1Gram(s) Oral Before meals and at bedtime  methocarbamol 750milliGRAM(s) Oral three times a day  pantoprazole    Tablet 40milliGRAM(s) Oral two times a day before meals  timolol 0.25% Solution 1Drop(s) Both EYES at bedtime  estrogens    conjugated 0.625milliGRAM(s) Oral daily  docusate sodium 100milliGRAM(s) Oral three times a day  senna 2Tablet(s) Oral at bedtime  enoxaparin Injectable 40milliGRAM(s) SubCutaneous <User Schedule>    MEDICATIONS  (PRN):  acetaminophen   Tablet 650milliGRAM(s) Oral every 6 hours PRN For Temp greater than 38 C (100.4 F)  acetaminophen   Tablet. 650milliGRAM(s) Oral every 6 hours PRN Mild Pain (1 - 3)  diazepam    Tablet 5milliGRAM(s) Oral every 8 hours PRN muscle spasms  naloxone Injectable 0.1milliGRAM(s) IV Push every 3 minutes PRN For ANY of the following changes in patient status:  A. RR LESS THAN 10 breaths per minute, B. Oxygen saturation LESS THAN 90%, C. Sedation score of 6  metoclopramide 5milliGRAM(s) Oral every 4 hours PRN nausea  HYDROmorphone   Tablet 2milliGRAM(s) Oral every 4 hours PRN Moderate Pain (4 - 6)  HYDROmorphone   Tablet 4milliGRAM(s) Oral every 4 hours PRN Severe Pain (7 - 10)  HYDROmorphone  Injectable 0.5milliGRAM(s) IV Push every 3 hours PRN breakthrough pain

## 2017-06-18 ENCOUNTER — TRANSCRIPTION ENCOUNTER (OUTPATIENT)
Age: 62
End: 2017-06-18

## 2017-06-18 LAB
ANION GAP SERPL CALC-SCNC: 13 MMOL/L — SIGNIFICANT CHANGE UP (ref 5–17)
BUN SERPL-MCNC: 10 MG/DL — SIGNIFICANT CHANGE UP (ref 7–23)
CALCIUM SERPL-MCNC: 8.8 MG/DL — SIGNIFICANT CHANGE UP (ref 8.4–10.5)
CHLORIDE SERPL-SCNC: 87 MMOL/L — LOW (ref 96–108)
CO2 SERPL-SCNC: 29 MMOL/L — SIGNIFICANT CHANGE UP (ref 22–31)
CREAT SERPL-MCNC: 0.4 MG/DL — LOW (ref 0.5–1.3)
GLUCOSE SERPL-MCNC: 114 MG/DL — HIGH (ref 70–99)
HCT VFR BLD CALC: 27.6 % — LOW (ref 34.5–45)
HGB BLD-MCNC: 9.8 G/DL — LOW (ref 11.5–15.5)
MCHC RBC-ENTMCNC: 32.1 PG — SIGNIFICANT CHANGE UP (ref 27–34)
MCHC RBC-ENTMCNC: 35.6 GM/DL — SIGNIFICANT CHANGE UP (ref 32–36)
MCV RBC AUTO: 90.1 FL — SIGNIFICANT CHANGE UP (ref 80–100)
PLATELET # BLD AUTO: 376 K/UL — SIGNIFICANT CHANGE UP (ref 150–400)
POTASSIUM SERPL-MCNC: 3.5 MMOL/L — SIGNIFICANT CHANGE UP (ref 3.5–5.3)
POTASSIUM SERPL-SCNC: 3.5 MMOL/L — SIGNIFICANT CHANGE UP (ref 3.5–5.3)
RBC # BLD: 3.06 M/UL — LOW (ref 3.8–5.2)
RBC # FLD: 11.4 % — SIGNIFICANT CHANGE UP (ref 10.3–14.5)
SODIUM SERPL-SCNC: 129 MMOL/L — LOW (ref 135–145)
WBC # BLD: 13.8 K/UL — HIGH (ref 3.8–10.5)
WBC # FLD AUTO: 13.8 K/UL — HIGH (ref 3.8–10.5)

## 2017-06-18 RX ADMIN — HYDROMORPHONE HYDROCHLORIDE 4 MILLIGRAM(S): 2 INJECTION INTRAMUSCULAR; INTRAVENOUS; SUBCUTANEOUS at 18:28

## 2017-06-18 RX ADMIN — Medication 1 GRAM(S): at 08:41

## 2017-06-18 RX ADMIN — METHOCARBAMOL 750 MILLIGRAM(S): 500 TABLET, FILM COATED ORAL at 04:43

## 2017-06-18 RX ADMIN — HYDROMORPHONE HYDROCHLORIDE 4 MILLIGRAM(S): 2 INJECTION INTRAMUSCULAR; INTRAVENOUS; SUBCUTANEOUS at 18:52

## 2017-06-18 RX ADMIN — HYDROMORPHONE HYDROCHLORIDE 0.5 MILLIGRAM(S): 2 INJECTION INTRAMUSCULAR; INTRAVENOUS; SUBCUTANEOUS at 01:57

## 2017-06-18 RX ADMIN — HYDROMORPHONE HYDROCHLORIDE 4 MILLIGRAM(S): 2 INJECTION INTRAMUSCULAR; INTRAVENOUS; SUBCUTANEOUS at 04:43

## 2017-06-18 RX ADMIN — HYDROMORPHONE HYDROCHLORIDE 4 MILLIGRAM(S): 2 INJECTION INTRAMUSCULAR; INTRAVENOUS; SUBCUTANEOUS at 08:41

## 2017-06-18 RX ADMIN — HYDROMORPHONE HYDROCHLORIDE 4 MILLIGRAM(S): 2 INJECTION INTRAMUSCULAR; INTRAVENOUS; SUBCUTANEOUS at 22:40

## 2017-06-18 RX ADMIN — ENOXAPARIN SODIUM 40 MILLIGRAM(S): 100 INJECTION SUBCUTANEOUS at 18:28

## 2017-06-18 RX ADMIN — Medication 1 GRAM(S): at 18:27

## 2017-06-18 RX ADMIN — Medication 1 GRAM(S): at 12:54

## 2017-06-18 RX ADMIN — Medication 1 DROP(S): at 22:40

## 2017-06-18 RX ADMIN — HYDROMORPHONE HYDROCHLORIDE 4 MILLIGRAM(S): 2 INJECTION INTRAMUSCULAR; INTRAVENOUS; SUBCUTANEOUS at 09:00

## 2017-06-18 RX ADMIN — Medication 0.62 MILLIGRAM(S): at 12:55

## 2017-06-18 RX ADMIN — HYDROMORPHONE HYDROCHLORIDE 4 MILLIGRAM(S): 2 INJECTION INTRAMUSCULAR; INTRAVENOUS; SUBCUTANEOUS at 05:13

## 2017-06-18 RX ADMIN — PANTOPRAZOLE SODIUM 40 MILLIGRAM(S): 20 TABLET, DELAYED RELEASE ORAL at 18:28

## 2017-06-18 RX ADMIN — Medication 100 MILLIGRAM(S): at 22:40

## 2017-06-18 RX ADMIN — Medication 1 GRAM(S): at 22:40

## 2017-06-18 RX ADMIN — LOSARTAN POTASSIUM 50 MILLIGRAM(S): 100 TABLET, FILM COATED ORAL at 04:43

## 2017-06-18 RX ADMIN — HYDROMORPHONE HYDROCHLORIDE 4 MILLIGRAM(S): 2 INJECTION INTRAMUSCULAR; INTRAVENOUS; SUBCUTANEOUS at 13:51

## 2017-06-18 RX ADMIN — METHOCARBAMOL 750 MILLIGRAM(S): 500 TABLET, FILM COATED ORAL at 18:28

## 2017-06-18 RX ADMIN — HYDROMORPHONE HYDROCHLORIDE 4 MILLIGRAM(S): 2 INJECTION INTRAMUSCULAR; INTRAVENOUS; SUBCUTANEOUS at 12:54

## 2017-06-18 RX ADMIN — Medication 100 MILLIGRAM(S): at 18:27

## 2017-06-18 RX ADMIN — HYDROMORPHONE HYDROCHLORIDE 0.5 MILLIGRAM(S): 2 INJECTION INTRAMUSCULAR; INTRAVENOUS; SUBCUTANEOUS at 02:12

## 2017-06-18 RX ADMIN — PANTOPRAZOLE SODIUM 40 MILLIGRAM(S): 20 TABLET, DELAYED RELEASE ORAL at 04:42

## 2017-06-18 RX ADMIN — METHOCARBAMOL 750 MILLIGRAM(S): 500 TABLET, FILM COATED ORAL at 22:40

## 2017-06-18 RX ADMIN — SERTRALINE 50 MILLIGRAM(S): 25 TABLET, FILM COATED ORAL at 12:55

## 2017-06-18 NOTE — PROGRESS NOTE ADULT - SUBJECTIVE AND OBJECTIVE BOX
SUBJECTIVE: Much better today-more awake and active. NAD. Wants to be DC home Mon as she will have more help at home then    OVERNIGHT EVENTS: None    Vital Signs Last 24 Hrs  T(C): 36.6, Max: 37.2 (06-17 @ 16:00)  T(F): 97.9, Max: 98.9 (06-17 @ 16:00)  HR: 104 (93 - 120)  BP: 137/81 (104/67 - 141/76)  BP(mean): --  RR: 18 (18 - 18)  SpO2: 96% (96% - 98%)  IVF: [X ] IVL [ ] NS+K@   DIET: [X ] Regular [ ] CCD [ ] Renal [ ] Puree [ ] Dysphagia [ ] Tube Feeds:   PCA: [ ] YES [X ] NO   BOWERS: [ ] YES [ ] NO [X ] VOID   BM: [ ] YES [ ] NO     DRAINS: DC 6/16    PHYSICAL EXAM:    Constitutional: No Acute Distress     Neurological: AOx3, Following Commands, Moving all Extremities. No dysphagia or dysphonia    Motor exam:          Upper extremity                         Delt     Bicep     Tricep    HG                                                 R         5/5        5/5        5/5       5/5                                               L          5/5        5/5        5/5       5/5          Lower extremity                        HF         KF        KE       DF         PF                                                  R        5/5        5/5        5/5       5/5         5/5                                               L         5/5        5/5       5/5       5/5          5/5                                                 Sensation: [X] intact to light touch  [] decreased:     Pulmonary: Clear to Auscultation, No rales, No rhonchi, No wheezes     Cardiovascular: S1, S2, Regular rate and rhythm     Gastrointestinal: Soft, Non-tender, Non-distended     Extremities: No calf tenderness     Incision: Steri Ant-CDI, flat, Post neck-staples, CDI, Flat    LABS:                        9.8    13.8  )-----------( 376      ( 18 Jun 2017 06:10 )             27.6    06-18    129<L>  |  87<L>  |  10  ----------------------------<  114<H>  3.5   |  29  |  0.40<L>    Ca    8.8      18 Jun 2017 06:10      IMAGING:    MEDICATIONS  (STANDING):  losartan 50milliGRAM(s) Oral daily  sertraline 50milliGRAM(s) Oral daily  atorvastatin 40milliGRAM(s) Oral at bedtime  amLODIPine   Tablet 5milliGRAM(s) Oral at bedtime  sucralfate suspension 1Gram(s) Oral Before meals and at bedtime  methocarbamol 750milliGRAM(s) Oral three times a day  pantoprazole    Tablet 40milliGRAM(s) Oral two times a day before meals  timolol 0.25% Solution 1Drop(s) Both EYES at bedtime  estrogens    conjugated 0.625milliGRAM(s) Oral daily  docusate sodium 100milliGRAM(s) Oral three times a day  senna 2Tablet(s) Oral at bedtime  enoxaparin Injectable 40milliGRAM(s) SubCutaneous <User Schedule>    MEDICATIONS  (PRN):  acetaminophen   Tablet 650milliGRAM(s) Oral every 6 hours PRN For Temp greater than 38 C (100.4 F)  acetaminophen   Tablet. 650milliGRAM(s) Oral every 6 hours PRN Mild Pain (1 - 3)  diazepam    Tablet 5milliGRAM(s) Oral every 8 hours PRN muscle spasms  naloxone Injectable 0.1milliGRAM(s) IV Push every 3 minutes PRN For ANY of the following changes in patient status:  A. RR LESS THAN 10 breaths per minute, B. Oxygen saturation LESS THAN 90%, C. Sedation score of 6  metoclopramide 5milliGRAM(s) Oral every 4 hours PRN nausea  HYDROmorphone   Tablet 2milliGRAM(s) Oral every 4 hours PRN Moderate Pain (4 - 6)  HYDROmorphone   Tablet 4milliGRAM(s) Oral every 4 hours PRN Severe Pain (7 - 10)  HYDROmorphone  Injectable 0.5milliGRAM(s) IV Push every 3 hours PRN breakthrough pain  sodium chloride 0.65% Nasal 1Spray(s) Both Nostrils every 3 hours PRN Nasal Congestion

## 2017-06-18 NOTE — DISCHARGE NOTE ADULT - NS AS ACTIVITY OBS
Bathing allowed/Do not drive or operate machinery/Showering allowed/No Heavy lifting/straining/Walking-Outdoors allowed/Do not make important decisions/Stairs allowed/Walking-Indoors allowed

## 2017-06-18 NOTE — DISCHARGE NOTE ADULT - CARE PLAN
Principal Discharge DX:	Spinal stenosis in cervical region  Goal:	Strengthening  Instructions for follow-up, activity and diet:	Call Neurosurgery Dr SAMI Mcgovern for appointment in 1 week for wound check and staple removal.  Followup with your Private MD in 1-2 weeks for hyponatremia  Return to Emergency Department or contact your Neurosurgeon if any changes in mental status, weakness, numbness or tingling of extremities; difficulty swallowing; drainage or redness of wound, fever; pain in legs; difficulty urinating or constipation.  Donot restart your Aspirin or take any Motrin/NSAIDS until checking with your Neurosurgeon.  Instructions for follow-up, activity and diet:	No strenous activity. No heavy lifting. Do not return to work until cleared by physician. No driving until cleared by physician.  You may shower on the 3rd day after you surgery.  No soaking in tub,  Donot remove steri strips. Donot apply any ointements to incision.  Instructions for follow-up, activity and diet:	No strenous activity. No heavy lifting. Do not return to work until cleared by physician. No driving until cleared by physician.  You may shower on the 3rd day after you surgery.  No soaking in tub,  Donot remove steri strips. Donot apply any ointements to incision. Principal Discharge DX:	Spinal stenosis in cervical region  Goal:	Strengthening  Instructions for follow-up, activity and diet:	Call Neurosurgery Dr SAMI Mcgovern for appointment in 1 week for wound check and staple removal.  Followup with your Private MD in 1-2 weeks for [id number] on 6/19  Return to Emergency Department or contact your Neurosurgeon if any changes in mental status, weakness, numbness or tingling of extremities; difficulty swallowing; drainage or redness of wound, fever; pain in legs; difficulty urinating or constipation.  Donot restart your Aspirin or take any Motrin/NSAIDS until checking with your Neurosurgeon.  Instructions for follow-up, activity and diet:	No strenous activity. No heavy lifting. Do not return to work until cleared by physician. No driving until cleared by physician.  You may shower on the 3rd day after you surgery.  No soaking in tub,  Donot remove steri strips. Donot apply any ointements to incision. Principal Discharge DX:	Spinal stenosis in cervical region  Goal:	Strengthening  Instructions for follow-up, activity and diet:	Call Neurosurgery Dr SAMI Mcgovern for appointment in 1 week for wound check and staple removal.  Followup with your Private MD in 1-2 weeks for [id number] on 6/19  Return to Emergency Department or contact your Neurosurgeon if any changes in mental status, weakness, numbness or tingling of extremities; difficulty swallowing; drainage or redness of wound, fever; pain in legs; difficulty urinating or constipation.  Donot restart your Aspirin or take any Motrin/NSAIDS until checking with your Neurosurgeon.  Instructions for follow-up, activity and diet:	No strenous activity. No heavy lifting. Do not return to work until cleared by physician. No driving until cleared by physician.  You may shower on the 3rd day after you surgery.  No soaking in tub,  Donot remove steri strips. Donot apply any ointements to incision.

## 2017-06-18 NOTE — DISCHARGE NOTE ADULT - REASON FOR ADMISSION
cervical pain and radiculopathy.  61 yr old female with HTN, HLD, GERD, cervical spine stenosis, cervical radiculopathy, worsening cervical pain over past few month, presents to PST for scheduled C3-7 anterior cervical discectomy  and fusion, possible C4-5 corpectomy, possible C3-7 posterior spinal  stabilization on 6/14/17.

## 2017-06-18 NOTE — PROGRESS NOTE ADULT - ASSESSMENT
61 yr old female with HTN, HLD, GERD, cervical spine stenosis, cervical radiculopathy, worsening cervical pain over past few month, presents to PST for scheduled C3-7 anterior cervical discectomy  and fusion, possible C4-5 corpectomy, possible C3-7 posterior spinal  stabilization on 6/14/17. (31 May 2017 09:     PROCEDURE: Adm 6/14 s/p C4-5 Corpectomy, ACDF C6-7, C3-7 post fusion with instrumentation     POD#4    PLAN:  Neuro: Leukocytosis from steroids-decreasing-follow clinically. Hyponatremia 129<t131< Fluid Restrict 800cc/d, FU BMP am. No C-collar needed  Respiratory: Patient instructed to use incentive spirometer [X ] YES [ ] NO:               DVT ppx: [X ] SQL [ ] SQH and Venodynes [ ] Left [ ] Right [ X] Bilateral  Discharge planning: PT/OT-Home PT/OT w/RW-(script-written). DC plan for 6/19 as pt will have more help at home then. 61 yr old female with HTN, HLD, GERD, cervical spine stenosis, cervical radiculopathy, worsening cervical pain over past few month, presents to PST for scheduled C3-7 anterior cervical discectomy  and fusion, possible C4-5 corpectomy, possible C3-7 posterior spinal  stabilization on 6/14/17. (31 May 2017 09:     PROCEDURE: Adm 6/14 s/p C4-5 Corpectomy, ACDF C6-7, C3-7 post fusion with instrumentation     POD#4    PLAN:  Neuro: Leukocytosis from steroids-decreasing-follow clinically. Hyponatremia 129<131< Fluid Restrict 800cc/d, FU BMP am. No C-collar needed  Respiratory: Patient instructed to use incentive spirometer [X ] YES [ ] NO:               DVT ppx: [X ] SQL [ ] SQH and Venodynes [ ] Left [ ] Right [ X] Bilateral  Discharge planning: PT/OT-Home PT/OT w/RW-(script-written). DC plan for 6/19 as pt will have more help at home then.

## 2017-06-18 NOTE — DISCHARGE NOTE ADULT - PLAN OF CARE
Strengthening Call Neurosurgery Dr SAMI Mcgovern for appointment in 1 week for wound check and staple removal.  Followup with your Private MD in 1-2 weeks for hyponatremia  Return to Emergency Department or contact your Neurosurgeon if any changes in mental status, weakness, numbness or tingling of extremities; difficulty swallowing; drainage or redness of wound, fever; pain in legs; difficulty urinating or constipation.  Donot restart your Aspirin or take any Motrin/NSAIDS until checking with your Neurosurgeon. No strenous activity. No heavy lifting. Do not return to work until cleared by physician. No driving until cleared by physician.  You may shower on the 3rd day after you surgery.  No soaking in tub,  Donot remove steri strips. Donot apply any ointements to incision. Call Neurosurgery Dr SAMI Mcgovern for appointment in 1 week for wound check and staple removal.  Followup with your Private MD in 1-2 weeks for [id number] on 6/19  Return to Emergency Department or contact your Neurosurgeon if any changes in mental status, weakness, numbness or tingling of extremities; difficulty swallowing; drainage or redness of wound, fever; pain in legs; difficulty urinating or constipation.  Donot restart your Aspirin or take any Motrin/NSAIDS until checking with your Neurosurgeon.

## 2017-06-18 NOTE — DISCHARGE NOTE ADULT - MEDICATION SUMMARY - MEDICATIONS TO STOP TAKING
I will STOP taking the medications listed below when I get home from the hospital:    Vicodin ES 7.5 mg-300 mg oral tablet  -- 1 tab(s) by mouth once a day (at bedtime), As Needed

## 2017-06-18 NOTE — DISCHARGE NOTE ADULT - HOME CARE AGENCY
[place] [phone] for visiting RN & home P/T    Services to start after patient is d/c home.

## 2017-06-18 NOTE — DISCHARGE NOTE ADULT - CARE PROVIDER_API CALL
David Mcgovern (MD), Neurological Surgery  [address] [place] [address]  Phone: [phone]  Fax: [phone]

## 2017-06-18 NOTE — DISCHARGE NOTE ADULT - PATIENT PORTAL LINK FT
“You can access the [facility] Patient Portal, offered by [facility], by registering with the following website: [email or web address]”

## 2017-06-18 NOTE — DISCHARGE NOTE ADULT - MEDICATION SUMMARY - MEDICATIONS TO TAKE
I will START or STAY ON the medications listed below when I get home from the hospital:    Rolling Walker  -- S/P C4-5 Corpectomy, ACDF C6-7, C3-7 Post Fusion  -- Indication: For aid in walking    Medrol 4 mg oral tablet  -- Medrol Dose Pkg #1 Take as directed  -- It is very important that you take or use this exactly as directed.  Do not skip doses or discontinue unless directed by your doctor.  Obtain medical advice before taking any non-prescription drugs as some may affect the action of this medication.  Take with food or milk.    -- Indication: For Swelling in neck    HYDROmorphone 2 mg oral tablet  -- 1 to 2tab(s) by mouth every 4 hours, As needed, Moderate to severe Pain MDD:5  -- Indication: For pain    Avapro 150 mg oral tablet  -- 1 tab(s) by mouth once a day  -- Indication: For HTN    Zoloft 50 mg oral tablet  -- 2 tab(s) by mouth once a day (at bedtime)  -- Indication: For undefined    Lipitor 40 mg oral tablet  -- 1 tab(s) by mouth once a day (at bedtime)  -- Indication: For HLD    Norvasc 5 mg oral tablet  -- 1 tab(s) by mouth once a day (at bedtime)  -- Indication: For HTN    docusate sodium 100 mg oral capsule  -- 1 cap(s) by mouth 3 times a day  -- Indication: For Stool softener    senna oral tablet  -- 2 tab(s) by mouth once a day (at bedtime)  -- Indication: For Constipation    Carafate 1 g/10 mL oral suspension  -- 10 milliliter(s) by mouth 4 times a day (before meals and at bedtime)  -- Indication: For Gastroesophageal reflux disease without esophagitis    Robaxin-750 oral tablet  -- 1 tab(s) by mouth 3 times a day  -- Indication: For muscle relaxer    timolol ophthalmic  --  to each affected eye once a day (at bedtime) both eyes   -- Indication: For Glaucoma    NexIUM 40 mg oral delayed release capsule  -- 1 cap(s) by mouth 2 times a day  -- Indication: For Gastroesophageal reflux disease without esophagitis    Premarin 0.625 mg oral tablet  -- 1 tab(s) by mouth once a day  -- Indication: For Hormones

## 2017-06-18 NOTE — DISCHARGE NOTE ADULT - HOSPITAL COURSE
The patient was seen in PST on 5/31/2017 and admitted via SDA on 6/14/2017 and taken to the OR this same day.  Postop course was uneventful.  The patient was on Ancef, SQL and PCA Dilaudid for routine postop management. Her Sodium on 6/17 131 and FU on 6/18 was 129 and the pt was fluid restricted. FU Na on 6/19 AM.     The patient was evaluated by PT/OT and recommended Home PT/OT w/RW..   She was subsequently DC on 6/19/2017 in stable condition. The patient was seen in PST on 5/31/2017 and admitted via SDA on 6/14/2017 and taken to the OR this same day.  Postop course was uneventful.  The patient was on Ancef, SQL and PCA Dilaudid for routine postop management. Her Sodium on 6/17 131 and FU on 6/18 was 129 and the pt was fluid restricted. FU Na on 6/19 =130.     The patient was evaluated by PT/OT and recommended Home PT/OT w/RW..   She was subsequently DC on 6/19/2017 in stable condition.

## 2017-06-19 VITALS
HEART RATE: 91 BPM | OXYGEN SATURATION: 95 % | DIASTOLIC BLOOD PRESSURE: 83 MMHG | TEMPERATURE: 98 F | RESPIRATION RATE: 18 BRPM | SYSTOLIC BLOOD PRESSURE: 146 MMHG

## 2017-06-19 LAB
ANION GAP SERPL CALC-SCNC: 13 MMOL/L — SIGNIFICANT CHANGE UP (ref 5–17)
BUN SERPL-MCNC: 10 MG/DL — SIGNIFICANT CHANGE UP (ref 7–23)
CALCIUM SERPL-MCNC: 9.2 MG/DL — SIGNIFICANT CHANGE UP (ref 8.4–10.5)
CHLORIDE SERPL-SCNC: 88 MMOL/L — LOW (ref 96–108)
CO2 SERPL-SCNC: 29 MMOL/L — SIGNIFICANT CHANGE UP (ref 22–31)
CREAT SERPL-MCNC: 0.45 MG/DL — LOW (ref 0.5–1.3)
GLUCOSE SERPL-MCNC: 107 MG/DL — HIGH (ref 70–99)
POTASSIUM SERPL-MCNC: 3.1 MMOL/L — LOW (ref 3.5–5.3)
POTASSIUM SERPL-SCNC: 3.1 MMOL/L — LOW (ref 3.5–5.3)
SODIUM SERPL-SCNC: 130 MMOL/L — LOW (ref 135–145)

## 2017-06-19 PROCEDURE — C1889: CPT

## 2017-06-19 PROCEDURE — 97535 SELF CARE MNGMENT TRAINING: CPT

## 2017-06-19 PROCEDURE — 85027 COMPLETE CBC AUTOMATED: CPT

## 2017-06-19 PROCEDURE — 82330 ASSAY OF CALCIUM: CPT

## 2017-06-19 PROCEDURE — 84295 ASSAY OF SERUM SODIUM: CPT

## 2017-06-19 PROCEDURE — 76000 FLUOROSCOPY <1 HR PHYS/QHP: CPT

## 2017-06-19 PROCEDURE — 86900 BLOOD TYPING SEROLOGIC ABO: CPT

## 2017-06-19 PROCEDURE — 82435 ASSAY OF BLOOD CHLORIDE: CPT

## 2017-06-19 PROCEDURE — 97530 THERAPEUTIC ACTIVITIES: CPT

## 2017-06-19 PROCEDURE — 85014 HEMATOCRIT: CPT

## 2017-06-19 PROCEDURE — 72125 CT NECK SPINE W/O DYE: CPT | Mod: 26

## 2017-06-19 PROCEDURE — 84132 ASSAY OF SERUM POTASSIUM: CPT

## 2017-06-19 PROCEDURE — 82947 ASSAY GLUCOSE BLOOD QUANT: CPT

## 2017-06-19 PROCEDURE — 97165 OT EVAL LOW COMPLEX 30 MIN: CPT

## 2017-06-19 PROCEDURE — 86901 BLOOD TYPING SEROLOGIC RH(D): CPT

## 2017-06-19 PROCEDURE — 82803 BLOOD GASES ANY COMBINATION: CPT

## 2017-06-19 PROCEDURE — 97162 PT EVAL MOD COMPLEX 30 MIN: CPT

## 2017-06-19 PROCEDURE — 83605 ASSAY OF LACTIC ACID: CPT

## 2017-06-19 PROCEDURE — 80048 BASIC METABOLIC PNL TOTAL CA: CPT

## 2017-06-19 PROCEDURE — 97116 GAIT TRAINING THERAPY: CPT

## 2017-06-19 PROCEDURE — 84100 ASSAY OF PHOSPHORUS: CPT

## 2017-06-19 PROCEDURE — 82565 ASSAY OF CREATININE: CPT

## 2017-06-19 PROCEDURE — C1769: CPT

## 2017-06-19 PROCEDURE — C1713: CPT

## 2017-06-19 PROCEDURE — 72125 CT NECK SPINE W/O DYE: CPT

## 2017-06-19 PROCEDURE — 83735 ASSAY OF MAGNESIUM: CPT

## 2017-06-19 RX ORDER — HYDROMORPHONE HYDROCHLORIDE 2 MG/ML
1 INJECTION INTRAMUSCULAR; INTRAVENOUS; SUBCUTANEOUS
Qty: 42 | Refills: 0 | OUTPATIENT
Start: 2017-06-19 | End: 2017-06-26

## 2017-06-19 RX ORDER — SENNA PLUS 8.6 MG/1
2 TABLET ORAL
Qty: 0 | Refills: 0 | COMMUNITY
Start: 2017-06-19

## 2017-06-19 RX ORDER — DOCUSATE SODIUM 100 MG
1 CAPSULE ORAL
Qty: 0 | Refills: 0 | COMMUNITY
Start: 2017-06-19

## 2017-06-19 RX ORDER — POTASSIUM CHLORIDE 20 MEQ
20 PACKET (EA) ORAL
Qty: 0 | Refills: 0 | Status: COMPLETED | OUTPATIENT
Start: 2017-06-19 | End: 2017-06-19

## 2017-06-19 RX ORDER — DEXAMETHASONE 0.5 MG/5ML
4 ELIXIR ORAL EVERY 6 HOURS
Qty: 0 | Refills: 0 | Status: DISCONTINUED | OUTPATIENT
Start: 2017-06-19 | End: 2017-06-19

## 2017-06-19 RX ADMIN — Medication 4 MILLIGRAM(S): at 12:42

## 2017-06-19 RX ADMIN — Medication 0.62 MILLIGRAM(S): at 12:42

## 2017-06-19 RX ADMIN — HYDROMORPHONE HYDROCHLORIDE 4 MILLIGRAM(S): 2 INJECTION INTRAMUSCULAR; INTRAVENOUS; SUBCUTANEOUS at 00:00

## 2017-06-19 RX ADMIN — HYDROMORPHONE HYDROCHLORIDE 4 MILLIGRAM(S): 2 INJECTION INTRAMUSCULAR; INTRAVENOUS; SUBCUTANEOUS at 11:00

## 2017-06-19 RX ADMIN — HYDROMORPHONE HYDROCHLORIDE 4 MILLIGRAM(S): 2 INJECTION INTRAMUSCULAR; INTRAVENOUS; SUBCUTANEOUS at 05:15

## 2017-06-19 RX ADMIN — HYDROMORPHONE HYDROCHLORIDE 4 MILLIGRAM(S): 2 INJECTION INTRAMUSCULAR; INTRAVENOUS; SUBCUTANEOUS at 04:13

## 2017-06-19 RX ADMIN — Medication 1 GRAM(S): at 10:25

## 2017-06-19 RX ADMIN — Medication 1 GRAM(S): at 12:42

## 2017-06-19 RX ADMIN — SERTRALINE 50 MILLIGRAM(S): 25 TABLET, FILM COATED ORAL at 12:42

## 2017-06-19 RX ADMIN — HYDROMORPHONE HYDROCHLORIDE 4 MILLIGRAM(S): 2 INJECTION INTRAMUSCULAR; INTRAVENOUS; SUBCUTANEOUS at 10:25

## 2017-06-19 RX ADMIN — METHOCARBAMOL 750 MILLIGRAM(S): 500 TABLET, FILM COATED ORAL at 05:37

## 2017-06-19 RX ADMIN — LOSARTAN POTASSIUM 50 MILLIGRAM(S): 100 TABLET, FILM COATED ORAL at 05:37

## 2017-06-19 RX ADMIN — Medication 20 MILLIEQUIVALENT(S): at 12:42

## 2017-06-19 RX ADMIN — METHOCARBAMOL 750 MILLIGRAM(S): 500 TABLET, FILM COATED ORAL at 12:43

## 2017-06-19 RX ADMIN — Medication 100 MILLIGRAM(S): at 05:37

## 2017-06-19 RX ADMIN — PANTOPRAZOLE SODIUM 40 MILLIGRAM(S): 20 TABLET, DELAYED RELEASE ORAL at 05:37

## 2017-06-19 RX ADMIN — Medication 20 MILLIEQUIVALENT(S): at 10:25

## 2017-06-19 NOTE — PROGRESS NOTE ADULT - SUBJECTIVE AND OBJECTIVE BOX
HPI:  61 yr old female with HTN, HLD, GERD, cervical spine stenosis, cervical radiculopathy, worsening cervical pain over past few month, presents to PST for scheduled C3-7 anterior cervical discectomy  and fusion, possible C4-5 corpectomy, possible C3-7 posterior spinal  stabilization on 6/14/17. (31 May 2017 09:50)    Pt having mild dysphagia this morning.    OVERNIGHT EVENTS:  Vital Signs Last 24 Hrs  T(C): 36.8, Max: 36.8 (06-18 @ 23:56)  T(F): 98.2, Max: 98.2 (06-18 @ 23:56)  HR: 102 (88 - 106)  BP: 150/69 (102/67 - 150/69)  BP(mean): --  RR: 18 (18 - 18)  SpO2: 95% (95% - 97%)    I&O's Detail    I & Os for current day (as of 19 Jun 2017 07:51)  =============================================  IN:    Oral Fluid: 270 ml    Total IN: 270 ml  ---------------------------------------------  OUT:    Total OUT: 0 ml  ---------------------------------------------  Total NET: 270 ml    I&O's Summary    I & Os for current day (as of 19 Jun 2017 07:51)  =============================================  IN: 270 ml / OUT: 0 ml / NET: 270 ml      PHYSICAL EXAM:  Neurological:    Motor exam:         [] Upper extremity                 D             B          T          WE       WF      HI                                               R         5/5        5/5        5/5       5/5     5/5       5/5                                               L          5/5        5/5        5/5       5/5     5/5       5/5         [] Lower extremity                Ps          Ha        Quad    EHL        FHL                                               R        5/5        5/5        5/5       5/5         5/5                                               L         5/5        5/5       5/5       5/5          5/5                                                        [] warm well perfused; capillary refill <3 seconds     Sensation: [x] intact to light touch  [] decreased:     Cardiovascular:  Respiratory:  Gastrointestinal:  Genitourinary:  Extremities:  Incision/Wound: Incision clean and dry. No dysphonia. Fullness at incisional site.    TUBES/LINES:  [] CVC  [] A-line  [] Lumbar Drain  [] Ventriculostomy  [] Other    DIET: Tolerating liquids and apple sauce.  [] NPO  [] Mechanical  [] Tube feeds    LABS:                        9.8    13.8  )-----------( 376      ( 18 Jun 2017 06:10 )             27.6     06-19    130<L>  |  88<L>  |  10  ----------------------------<  107<H>  3.1<L>   |  29  |  0.45<L>    Ca    9.2      19 Jun 2017 06:26              CAPILLARY BLOOD GLUCOSE          Drug Levels: [] N/A    CSF Analysis: [] N/A      Allergies    No Known Allergies    Intolerances    morphine (Nausea; Vomiting)  Percocet 5/325 (Nausea; Vomiting)    MEDICATIONS:  Antibiotics:    Neuro:  sertraline 50milliGRAM(s) Oral daily  methocarbamol 750milliGRAM(s) Oral three times a day  acetaminophen   Tablet 650milliGRAM(s) Oral every 6 hours PRN  acetaminophen   Tablet. 650milliGRAM(s) Oral every 6 hours PRN  diazepam    Tablet 5milliGRAM(s) Oral every 8 hours PRN  HYDROmorphone   Tablet 2milliGRAM(s) Oral every 4 hours PRN  HYDROmorphone   Tablet 4milliGRAM(s) Oral every 4 hours PRN  HYDROmorphone  Injectable 0.5milliGRAM(s) IV Push every 3 hours PRN    Anticoagulation:  enoxaparin Injectable 40milliGRAM(s) SubCutaneous <User Schedule>    OTHER:  losartan 50milliGRAM(s) Oral daily  atorvastatin 40milliGRAM(s) Oral at bedtime  amLODIPine   Tablet 5milliGRAM(s) Oral at bedtime  sucralfate suspension 1Gram(s) Oral Before meals and at bedtime  pantoprazole    Tablet 40milliGRAM(s) Oral two times a day before meals  timolol 0.25% Solution 1Drop(s) Both EYES at bedtime  estrogens    conjugated 0.625milliGRAM(s) Oral daily  docusate sodium 100milliGRAM(s) Oral three times a day  senna 2Tablet(s) Oral at bedtime  naloxone Injectable 0.1milliGRAM(s) IV Push every 3 minutes PRN  metoclopramide 5milliGRAM(s) Oral every 4 hours PRN  sodium chloride 0.65% Nasal 1Spray(s) Both Nostrils every 3 hours PRN    IVF:    CULTURES:    RADIOLOGY & ADDITIONAL TESTS:      ASSESSMENT:  HPI:  61 yr old female with HTN, HLD, GERD, cervical spine stenosis, cervical radiculopathy, worsening cervical pain over past few month, presents to PST for scheduled C3-7 anterior cervical discectomy  and fusion, possible C4-5 corpectomy, possible C3-7 posterior spinal  stabilization on 6/14/17. (31 May 2017 09:50)    61y Female s/p C4-5 corpectomy, C6-7 ACDF, C3-7 ant/post stabilization, POD 5    PLAN:  NEURO:  CARDIOVASCULAR:  PULMONARY:  RENAL:  GI:  HEME:  ID:  ENDO:    DVT PROPHYLAXIS:  [] Venodynes                                [] Heparin/Lovenox    FALL RISK:  [] Low Risk                                    [] Impulsive

## 2017-06-19 NOTE — PROGRESS NOTE ADULT - SUBJECTIVE AND OBJECTIVE BOX
SUBJECTIVE: Some swelling over anterior incision and sl diff swallowing pills    OVERNIGHT EVENTS: Diffulty swallowing pills    Vital Signs Last 24 Hrs  T(C): 36.8, Max: 36.8 (06-18 @ 23:56)  T(F): 98.2, Max: 98.2 (06-18 @ 23:56)  HR: 102 (88 - 106)  BP: 150/69 (102/67 - 150/69)  BP(mean): --  RR: 18 (18 - 18)  SpO2: 95% (95% - 97%)  IVF: [X ] IVL [ ] NS+K@   DIET: [X] Regular [ ] CCD [ ] Renal [ ] Puree [ ] Dysphagia [ ] Tube Feeds:   PCA: [ ] YES [X ] NO   BOWERS: [ ] YES [ ] NO [X ] VOID   BM: [ ] YES [X ] NO     DRAINS: none    PHYSICAL EXAM:    Constitutional: No Acute Distress     Neurological: AOx3, Following Commands, Moving all Extremities     Motor exam:          Upper extremity                         Delt     Bicep     Tricep    HG                                                 R         5/5        5/5        5/5       5/5                                               L          5/5        5/5        5/5       5/5          Lower extremity                        HF         KF        KE       DF         PF                                                  R        5/5        5/5        5/5       5/5         5/5                                               L         5/5        5/5       5/5       5/5          5/5                                                 Sensation: [] intact to light touch  [] decreased:     Pulmonary: Clear to Auscultation, No rales, No rhonchi, No wheezes     Cardiovascular: S1, S2, Regular rate and rhythm     Gastrointestinal: Soft, Non-tender, Non-distended     Extremities: No calf tenderness     Incision:     LABS:                        9.8    13.8  )-----------( 376      ( 18 Jun 2017 06:10 )             27.6    06-19    130<L>  |  88<L>  |  10  ----------------------------<  107<H>  3.1<L>   |  29  |  0.45<L>    Ca    9.2      19 Jun 2017 06:26      IMAGING:    MEDICATIONS  (STANDING):  losartan 50milliGRAM(s) Oral daily  sertraline 50milliGRAM(s) Oral daily  atorvastatin 40milliGRAM(s) Oral at bedtime  amLODIPine   Tablet 5milliGRAM(s) Oral at bedtime  sucralfate suspension 1Gram(s) Oral Before meals and at bedtime  methocarbamol 750milliGRAM(s) Oral three times a day  pantoprazole    Tablet 40milliGRAM(s) Oral two times a day before meals  timolol 0.25% Solution 1Drop(s) Both EYES at bedtime  estrogens    conjugated 0.625milliGRAM(s) Oral daily  docusate sodium 100milliGRAM(s) Oral three times a day  senna 2Tablet(s) Oral at bedtime  enoxaparin Injectable 40milliGRAM(s) SubCutaneous <User Schedule>  dexamethasone  Injectable 4milliGRAM(s) IV Push every 6 hours  potassium chloride    Tablet ER 20milliEquivalent(s) Oral every 2 hours    MEDICATIONS  (PRN):  acetaminophen   Tablet 650milliGRAM(s) Oral every 6 hours PRN For Temp greater than 38 C (100.4 F)  acetaminophen   Tablet. 650milliGRAM(s) Oral every 6 hours PRN Mild Pain (1 - 3)  diazepam    Tablet 5milliGRAM(s) Oral every 8 hours PRN muscle spasms  naloxone Injectable 0.1milliGRAM(s) IV Push every 3 minutes PRN For ANY of the following changes in patient status:  A. RR LESS THAN 10 breaths per minute, B. Oxygen saturation LESS THAN 90%, C. Sedation score of 6  metoclopramide 5milliGRAM(s) Oral every 4 hours PRN nausea  HYDROmorphone   Tablet 2milliGRAM(s) Oral every 4 hours PRN Moderate Pain (4 - 6)  HYDROmorphone   Tablet 4milliGRAM(s) Oral every 4 hours PRN Severe Pain (7 - 10)  HYDROmorphone  Injectable 0.5milliGRAM(s) IV Push every 3 hours PRN breakthrough pain  sodium chloride 0.65% Nasal 1Spray(s) Both Nostrils every 3 hours PRN Nasal Congestion SUBJECTIVE: Some swelling over anterior incision and sl diff swallowing pills    OVERNIGHT EVENTS: Diffulty swallowing pills    Vital Signs Last 24 Hrs  T(C): 36.8, Max: 36.8 (06-18 @ 23:56)  T(F): 98.2, Max: 98.2 (06-18 @ 23:56)  HR: 102 (88 - 106)  BP: 150/69 (102/67 - 150/69)  BP(mean): --  RR: 18 (18 - 18)  SpO2: 95% (95% - 97%)  IVF: [X ] IVL [ ] NS+K@   DIET: [X] Regular [ ] CCD [ ] Renal [ ] Puree [ ] Dysphagia [ ] Tube Feeds:   PCA: [ ] YES [X ] NO   BOWERS: [ ] YES [ ] NO [X ] VOID   BM: [ ] YES [X ] NO     DRAINS: none    PHYSICAL EXAM:    Constitutional: No Acute Distress     Neurological: AOx3, Following Commands, Moving all Extremities     Motor exam:          Upper extremity                         Delt     Bicep     Tricep    HG                                                 R         5/5        5/5        5/5       5/5                                               L          5/5        5/5        5/5       5/5          Lower extremity                        HF         KF        KE       DF         PF                                                  R        5/5        5/5        5/5       5/5         5/5                                               L         5/5        5/5       5/5       5/5          5/5                                                 Sensation: [x] intact to light touch  [] decreased:     Pulmonary: Clear to Auscultation, No rales, No rhonchi, No wheezes     Cardiovascular: S1, S2, Regular rate and rhythm     Gastrointestinal: Soft, Non-tender, Non-distended     Extremities: No calf tenderness     Incision:     LABS:                        9.8    13.8  )-----------( 376      ( 18 Jun 2017 06:10 )             27.6    06-19    130<L>  |  88<L>  |  10  ----------------------------<  107<H>  3.1<L>   |  29  |  0.45<L>    Ca    9.2      19 Jun 2017 06:26      IMAGING:    MEDICATIONS  (STANDING):  losartan 50milliGRAM(s) Oral daily  sertraline 50milliGRAM(s) Oral daily  atorvastatin 40milliGRAM(s) Oral at bedtime  amLODIPine   Tablet 5milliGRAM(s) Oral at bedtime  sucralfate suspension 1Gram(s) Oral Before meals and at bedtime  methocarbamol 750milliGRAM(s) Oral three times a day  pantoprazole    Tablet 40milliGRAM(s) Oral two times a day before meals  timolol 0.25% Solution 1Drop(s) Both EYES at bedtime  estrogens    conjugated 0.625milliGRAM(s) Oral daily  docusate sodium 100milliGRAM(s) Oral three times a day  senna 2Tablet(s) Oral at bedtime  enoxaparin Injectable 40milliGRAM(s) SubCutaneous <User Schedule>  dexamethasone  Injectable 4milliGRAM(s) IV Push every 6 hours  potassium chloride    Tablet ER 20milliEquivalent(s) Oral every 2 hours    MEDICATIONS  (PRN):  acetaminophen   Tablet 650milliGRAM(s) Oral every 6 hours PRN For Temp greater than 38 C (100.4 F)  acetaminophen   Tablet. 650milliGRAM(s) Oral every 6 hours PRN Mild Pain (1 - 3)  diazepam    Tablet 5milliGRAM(s) Oral every 8 hours PRN muscle spasms  naloxone Injectable 0.1milliGRAM(s) IV Push every 3 minutes PRN For ANY of the following changes in patient status:  A. RR LESS THAN 10 breaths per minute, B. Oxygen saturation LESS THAN 90%, C. Sedation score of 6  metoclopramide 5milliGRAM(s) Oral every 4 hours PRN nausea  HYDROmorphone   Tablet 2milliGRAM(s) Oral every 4 hours PRN Moderate Pain (4 - 6)  HYDROmorphone   Tablet 4milliGRAM(s) Oral every 4 hours PRN Severe Pain (7 - 10)  HYDROmorphone  Injectable 0.5milliGRAM(s) IV Push every 3 hours PRN breakthrough pain  sodium chloride 0.65% Nasal 1Spray(s) Both Nostrils every 3 hours PRN Nasal Congestion SUBJECTIVE: Some swelling over anterior incision and sl diff swallowing pills    OVERNIGHT EVENTS: Diffulty swallowing pills    Vital Signs Last 24 Hrs  T(C): 36.8, Max: 36.8 ( @ 23:56)  T(F): 98.2, Max: 98.2 ( @ 23:56)  HR: 102 (88 - 106)  BP: 150/69 (102/67 - 150/69)  BP(mean): --  RR: 18 (18 - 18)  SpO2: 95% (95% - 97%)  IVF: [X ] IVL [ ] NS+K@   DIET: [X] Regular [ ] CCD [ ] Renal [ ] Puree [ ] Dysphagia [ ] Tube Feeds:   PCA: [ ] YES [X ] NO   BOWERS: [ ] YES [ ] NO [X ] VOID   BM: [ ] YES [X ] NO     DRAINS: none    PHYSICAL EXAM:    Constitutional: No Acute Distress     Neurological: AOx3, Following Commands, Moving all Extremities     Motor exam:          Upper extremity                         Delt     Bicep     Tricep    HG                                                 R         5/5        5/5        5/5       5/5                                               L          5/5        5/5        5/5       5/5          Lower extremity                        HF         KF        KE       DF         PF                                                  R        5/5        5/5        5/5       5/5         5/5                                               L         5/5        5/5       5/5       5/5          5/5                                                 Sensation: [x] intact to light touch  [] decreased:     Pulmonary: Clear to Auscultation, No rales, No rhonchi, No wheezes     Cardiovascular: S1, S2, Regular rate and rhythm     Gastrointestinal: Soft, Non-tender, Non-distended     Extremities: No calf tenderness     Incision:     LABS:                        9.8    13.8  )-----------( 376      ( 2017 06:10 )             27.6        130<L>  |  88<L>  |  10  ----------------------------<  107<H>  3.1<L>   |  29  |  0.45<L>    Ca    9.2      2017 06:26      IMAGIN/19 CT C-spine: Anterior and posterior fusion C3-T1 with C4-5 corpectomy. No evidence for   large epidural hematoma. Study is limited by extensive metallic artifact.    MEDICATIONS  (STANDING):  losartan 50milliGRAM(s) Oral daily  sertraline 50milliGRAM(s) Oral daily  atorvastatin 40milliGRAM(s) Oral at bedtime  amLODIPine   Tablet 5milliGRAM(s) Oral at bedtime  sucralfate suspension 1Gram(s) Oral Before meals and at bedtime  methocarbamol 750milliGRAM(s) Oral three times a day  pantoprazole    Tablet 40milliGRAM(s) Oral two times a day before meals  timolol 0.25% Solution 1Drop(s) Both EYES at bedtime  estrogens    conjugated 0.625milliGRAM(s) Oral daily  docusate sodium 100milliGRAM(s) Oral three times a day  senna 2Tablet(s) Oral at bedtime  enoxaparin Injectable 40milliGRAM(s) SubCutaneous <User Schedule>  dexamethasone  Injectable 4milliGRAM(s) IV Push every 6 hours  potassium chloride    Tablet ER 20milliEquivalent(s) Oral every 2 hours    MEDICATIONS  (PRN):  acetaminophen   Tablet 650milliGRAM(s) Oral every 6 hours PRN For Temp greater than 38 C (100.4 F)  acetaminophen   Tablet. 650milliGRAM(s) Oral every 6 hours PRN Mild Pain (1 - 3)  diazepam    Tablet 5milliGRAM(s) Oral every 8 hours PRN muscle spasms  naloxone Injectable 0.1milliGRAM(s) IV Push every 3 minutes PRN For ANY of the following changes in patient status:  A. RR LESS THAN 10 breaths per minute, B. Oxygen saturation LESS THAN 90%, C. Sedation score of 6  metoclopramide 5milliGRAM(s) Oral every 4 hours PRN nausea  HYDROmorphone   Tablet 2milliGRAM(s) Oral every 4 hours PRN Moderate Pain (4 - 6)  HYDROmorphone   Tablet 4milliGRAM(s) Oral every 4 hours PRN Severe Pain (7 - 10)  HYDROmorphone  Injectable 0.5milliGRAM(s) IV Push every 3 hours PRN breakthrough pain  sodium chloride 0.65% Nasal 1Spray(s) Both Nostrils every 3 hours PRN Nasal Congestion

## 2017-06-19 NOTE — PROGRESS NOTE ADULT - ATTENDING COMMENTS
Pt doing well. Improved strength / sensation / dexterity in hands.  Continue Hemovac.  Advance diet and activity. PT consult.  May transfer to floor.
Will obtain CT of neck. Consider brief course of steroids after CT.
Doing well.  Continue hemovacs.  PT consult.  May transfer to floor.
Doing well. Ambulating w/ PT/RW, including up/down stairs. Possible DC home tomorrow.
Pt doing well. Anticipate DC home 6/19/17.
Pt doing well. Tolerating PO's. Hemovac DC'ed. Advance activity.
Doing well. Working w/ PT. Amb w/ RW. Likely DC home tomorrow.

## 2017-06-19 NOTE — PROGRESS NOTE ADULT - ASSESSMENT
61 yr old female with HTN, HLD, GERD, cervical spine stenosis, cervical radiculopathy, worsening cervical pain over past few month, presents to PST for scheduled C3-7 anterior cervical discectomy  and fusion, possible C4-5 corpectomy, possible C3-7 posterior spinal  stabilization on 6/14/17. (31 May 2017 09:50)    PROCEDURE: Adm 6/14 s/p C4-5 Corpectomy, ACDF C6-7, C3-7 post fusion with instrumentation     POD#5    PLAN:  Neuro: Leukocytosis from steroids-decreasing-follow clinically. Hyponatremia 130<129<131< Fluid Restrict 800cc/d, Supp Hypokalemia x2 doses. CT Neck to eval diffulty swallowing. Start decadron 4mg IV Q6hr.  DC on medrol dose pk today if CT OK. No C-collar needed  Respiratory: Patient instructed to use incentive spirometer [X ] YES [ ] NO:               DVT ppx: [X ] SQL [ ] SQH and Venodynes [ ] Left [ ] Right [ X] Bilateral  Discharge planning: PT/OT-Home PT/OT w/RW-(script-written).

## 2017-06-19 NOTE — PROGRESS NOTE ADULT - PROVIDER SPECIALTY LIST ADULT
Anesthesia
NSICU
NSICU
Neurosurgery
Anesthesia

## 2017-09-12 ENCOUNTER — OUTPATIENT (OUTPATIENT)
Dept: OUTPATIENT SERVICES | Facility: HOSPITAL | Age: 62
LOS: 1 days | End: 2017-09-12
Payer: COMMERCIAL

## 2017-09-12 VITALS
WEIGHT: 147.93 LBS | OXYGEN SATURATION: 97 % | HEART RATE: 86 BPM | DIASTOLIC BLOOD PRESSURE: 84 MMHG | SYSTOLIC BLOOD PRESSURE: 130 MMHG | TEMPERATURE: 98 F | RESPIRATION RATE: 16 BRPM | HEIGHT: 60 IN

## 2017-09-12 DIAGNOSIS — Z90.89 ACQUIRED ABSENCE OF OTHER ORGANS: Chronic | ICD-10-CM

## 2017-09-12 DIAGNOSIS — M47.12 OTHER SPONDYLOSIS WITH MYELOPATHY, CERVICAL REGION: ICD-10-CM

## 2017-09-12 DIAGNOSIS — Z98.890 OTHER SPECIFIED POSTPROCEDURAL STATES: Chronic | ICD-10-CM

## 2017-09-12 DIAGNOSIS — M54.12 RADICULOPATHY, CERVICAL REGION: ICD-10-CM

## 2017-09-12 DIAGNOSIS — Z01.818 ENCOUNTER FOR OTHER PREPROCEDURAL EXAMINATION: ICD-10-CM

## 2017-09-12 DIAGNOSIS — Z90.49 ACQUIRED ABSENCE OF OTHER SPECIFIED PARTS OF DIGESTIVE TRACT: Chronic | ICD-10-CM

## 2017-09-12 DIAGNOSIS — Z90.710 ACQUIRED ABSENCE OF BOTH CERVIX AND UTERUS: Chronic | ICD-10-CM

## 2017-09-12 DIAGNOSIS — Z90.722 ACQUIRED ABSENCE OF OVARIES, BILATERAL: Chronic | ICD-10-CM

## 2017-09-12 LAB
ANION GAP SERPL CALC-SCNC: 16 MMOL/L — SIGNIFICANT CHANGE UP (ref 5–17)
BLD GP AB SCN SERPL QL: NEGATIVE — SIGNIFICANT CHANGE UP
BUN SERPL-MCNC: 9 MG/DL — SIGNIFICANT CHANGE UP (ref 7–23)
CALCIUM SERPL-MCNC: 10.3 MG/DL — SIGNIFICANT CHANGE UP (ref 8.4–10.5)
CHLORIDE SERPL-SCNC: 100 MMOL/L — SIGNIFICANT CHANGE UP (ref 96–108)
CO2 SERPL-SCNC: 23 MMOL/L — SIGNIFICANT CHANGE UP (ref 22–31)
CREAT SERPL-MCNC: 0.68 MG/DL — SIGNIFICANT CHANGE UP (ref 0.5–1.3)
GLUCOSE SERPL-MCNC: 91 MG/DL — SIGNIFICANT CHANGE UP (ref 70–99)
HCT VFR BLD CALC: 34.3 % — LOW (ref 34.5–45)
HGB BLD-MCNC: 11.4 G/DL — LOW (ref 11.5–15.5)
MCHC RBC-ENTMCNC: 28.1 PG — SIGNIFICANT CHANGE UP (ref 27–34)
MCHC RBC-ENTMCNC: 33.2 GM/DL — SIGNIFICANT CHANGE UP (ref 32–36)
MCV RBC AUTO: 84.5 FL — SIGNIFICANT CHANGE UP (ref 80–100)
PLATELET # BLD AUTO: 398 K/UL — SIGNIFICANT CHANGE UP (ref 150–400)
POTASSIUM SERPL-MCNC: 4.5 MMOL/L — SIGNIFICANT CHANGE UP (ref 3.5–5.3)
POTASSIUM SERPL-SCNC: 4.5 MMOL/L — SIGNIFICANT CHANGE UP (ref 3.5–5.3)
RBC # BLD: 4.06 M/UL — SIGNIFICANT CHANGE UP (ref 3.8–5.2)
RBC # FLD: 12.9 % — SIGNIFICANT CHANGE UP (ref 10.3–14.5)
RH IG SCN BLD-IMP: POSITIVE — SIGNIFICANT CHANGE UP
SODIUM SERPL-SCNC: 139 MMOL/L — SIGNIFICANT CHANGE UP (ref 135–145)
WBC # BLD: 7.9 K/UL — SIGNIFICANT CHANGE UP (ref 3.8–10.5)
WBC # FLD AUTO: 7.9 K/UL — SIGNIFICANT CHANGE UP (ref 3.8–10.5)

## 2017-09-12 PROCEDURE — 80048 BASIC METABOLIC PNL TOTAL CA: CPT

## 2017-09-12 PROCEDURE — 86900 BLOOD TYPING SEROLOGIC ABO: CPT

## 2017-09-12 PROCEDURE — 86901 BLOOD TYPING SEROLOGIC RH(D): CPT

## 2017-09-12 PROCEDURE — G0463: CPT

## 2017-09-12 PROCEDURE — 85027 COMPLETE CBC AUTOMATED: CPT

## 2017-09-12 PROCEDURE — 86850 RBC ANTIBODY SCREEN: CPT

## 2017-09-12 RX ORDER — CYCLOBENZAPRINE HYDROCHLORIDE 10 MG/1
1 TABLET, FILM COATED ORAL
Qty: 0 | Refills: 0 | COMMUNITY

## 2017-09-12 RX ORDER — SERTRALINE 25 MG/1
2 TABLET, FILM COATED ORAL
Qty: 0 | Refills: 0 | COMMUNITY

## 2017-09-12 NOTE — H&P PST ADULT - PROBLEM SELECTOR PLAN 1
Revision bilateral C7 lateral mass screws   PST instructions provided, soap given, patient verbalized understanding. Revision bilateral C7 lateral mass screws   PST instructions provided, soap given, patient verbalized understanding.  PCP note in the chart.

## 2017-09-12 NOTE — H&P PST ADULT - HISTORY OF PRESENT ILLNESS
61 yr old female with HTN, HLD, GERD, cervical spine stenosis, cervical radiculopathy, worsening cervical pain over past few month, presents to PST for scheduled C3-7 anterior cervical discectomy  and fusion, possible C4-5 corpectomy, possible C3-7 posterior spinal  stabilization on 6/14/17. 61 yr old female with HTN, HLD, GERD, cervical spine stenosis, underwent cervical spine surgery 7/17/17, reports severe neck pain a month after procedure, electrical pain shooting down the right arm and presents to PST for scheduled  revision b/l C7 lateral mass screws on 9/18/17. Denies fever, chills, pain 8/10.

## 2017-09-12 NOTE — H&P PST ADULT - NEGATIVE NEUROLOGICAL SYMPTOMS
no difficulty walking/no headache/no loss of consciousness/no syncope/no tremors/no vertigo/no confusion

## 2017-09-12 NOTE — H&P PST ADULT - ACTIVITY
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

## 2017-09-12 NOTE — H&P PST ADULT - OTHER CARE PROVIDERS
cardiologist Dr. Mannie Cee [phone], follows as needed cardiologist Dr. Mannie Cee [phone], last seen a week ago

## 2017-09-12 NOTE — H&P PST ADULT - REASON FOR ADMISSION
cervical pain and radiculopathy, one of the screws whent throught het bone , pins, electrical shots , shortly after month after revision of cervical spine fusion

## 2017-09-12 NOTE — H&P PST ADULT - HEALTH CARE MAINTENANCE
Flu vaccine - fall 2016  endoscopy 4/2017 Flu vaccine - fall 2016  endoscopy 4/2017  Regular medical follow up

## 2017-09-12 NOTE — H&P PST ADULT - ANESTHESIA, PREVIOUS REACTION, PROFILE
usually holding BP meds prior procedures/hypotension hypotension/usually holding BP meds prior procedures, due to prior episode of anesthesia  related  hypotension

## 2017-09-12 NOTE — H&P PST ADULT - PSH
History of appendectomy    History of arthroscopy of right knee    History of bilateral oophorectomy    History of carpal tunnel release    History of cholecystectomy    History of hysterectomy    History of tonsillectomy History of appendectomy    History of arthroscopy of right knee    History of bilateral oophorectomy    History of carpal tunnel release    History of cholecystectomy    History of hysterectomy    History of tonsillectomy    S/P spinal surgery  7/17/17 - cervical region with hardware

## 2017-09-18 ENCOUNTER — TRANSCRIPTION ENCOUNTER (OUTPATIENT)
Age: 62
End: 2017-09-18

## 2017-09-18 ENCOUNTER — INPATIENT (INPATIENT)
Facility: HOSPITAL | Age: 62
LOS: 2 days | Discharge: HOME | DRG: 497 | End: 2017-09-21
Attending: NEUROLOGICAL SURGERY | Admitting: NEUROLOGICAL SURGERY
Payer: COMMERCIAL

## 2017-09-18 VITALS
SYSTOLIC BLOOD PRESSURE: 114 MMHG | RESPIRATION RATE: 20 BRPM | WEIGHT: 147.93 LBS | DIASTOLIC BLOOD PRESSURE: 78 MMHG | TEMPERATURE: 98 F | HEART RATE: 98 BPM | OXYGEN SATURATION: 98 %

## 2017-09-18 DIAGNOSIS — M47.12 OTHER SPONDYLOSIS WITH MYELOPATHY, CERVICAL REGION: ICD-10-CM

## 2017-09-18 DIAGNOSIS — Z90.89 ACQUIRED ABSENCE OF OTHER ORGANS: Chronic | ICD-10-CM

## 2017-09-18 DIAGNOSIS — Z90.49 ACQUIRED ABSENCE OF OTHER SPECIFIED PARTS OF DIGESTIVE TRACT: Chronic | ICD-10-CM

## 2017-09-18 DIAGNOSIS — Z90.722 ACQUIRED ABSENCE OF OVARIES, BILATERAL: Chronic | ICD-10-CM

## 2017-09-18 DIAGNOSIS — Z98.890 OTHER SPECIFIED POSTPROCEDURAL STATES: Chronic | ICD-10-CM

## 2017-09-18 DIAGNOSIS — Z90.710 ACQUIRED ABSENCE OF BOTH CERVIX AND UTERUS: Chronic | ICD-10-CM

## 2017-09-18 LAB
ANION GAP SERPL CALC-SCNC: 14 MMOL/L — SIGNIFICANT CHANGE UP (ref 5–17)
BASOPHILS # BLD AUTO: 0 K/UL — SIGNIFICANT CHANGE UP (ref 0–0.2)
BASOPHILS NFR BLD AUTO: 0.1 % — SIGNIFICANT CHANGE UP (ref 0–2)
BUN SERPL-MCNC: 10 MG/DL — SIGNIFICANT CHANGE UP (ref 7–23)
CALCIUM SERPL-MCNC: 8.3 MG/DL — LOW (ref 8.4–10.5)
CHLORIDE SERPL-SCNC: 103 MMOL/L — SIGNIFICANT CHANGE UP (ref 96–108)
CO2 SERPL-SCNC: 22 MMOL/L — SIGNIFICANT CHANGE UP (ref 22–31)
CREAT SERPL-MCNC: 0.69 MG/DL — SIGNIFICANT CHANGE UP (ref 0.5–1.3)
EOSINOPHIL # BLD AUTO: 0 K/UL — SIGNIFICANT CHANGE UP (ref 0–0.5)
EOSINOPHIL NFR BLD AUTO: 0 % — SIGNIFICANT CHANGE UP (ref 0–6)
GLUCOSE SERPL-MCNC: 162 MG/DL — HIGH (ref 70–99)
HCT VFR BLD CALC: 31.6 % — LOW (ref 34.5–45)
HGB BLD-MCNC: 11.2 G/DL — LOW (ref 11.5–15.5)
LYMPHOCYTES # BLD AUTO: 1.4 K/UL — SIGNIFICANT CHANGE UP (ref 1–3.3)
LYMPHOCYTES # BLD AUTO: 8.5 % — LOW (ref 13–44)
MCHC RBC-ENTMCNC: 30.9 PG — SIGNIFICANT CHANGE UP (ref 27–34)
MCHC RBC-ENTMCNC: 35.3 GM/DL — SIGNIFICANT CHANGE UP (ref 32–36)
MCV RBC AUTO: 87.4 FL — SIGNIFICANT CHANGE UP (ref 80–100)
MONOCYTES # BLD AUTO: 0.1 K/UL — SIGNIFICANT CHANGE UP (ref 0–0.9)
MONOCYTES NFR BLD AUTO: 0.8 % — LOW (ref 2–14)
NEUTROPHILS # BLD AUTO: 14.6 K/UL — HIGH (ref 1.8–7.4)
NEUTROPHILS NFR BLD AUTO: 90.7 % — HIGH (ref 43–77)
PLATELET # BLD AUTO: 323 K/UL — SIGNIFICANT CHANGE UP (ref 150–400)
POTASSIUM SERPL-MCNC: 4.1 MMOL/L — SIGNIFICANT CHANGE UP (ref 3.5–5.3)
POTASSIUM SERPL-SCNC: 4.1 MMOL/L — SIGNIFICANT CHANGE UP (ref 3.5–5.3)
RBC # BLD: 3.61 M/UL — LOW (ref 3.8–5.2)
RBC # FLD: 12.1 % — SIGNIFICANT CHANGE UP (ref 10.3–14.5)
SODIUM SERPL-SCNC: 139 MMOL/L — SIGNIFICANT CHANGE UP (ref 135–145)
WBC # BLD: 16.1 K/UL — HIGH (ref 3.8–10.5)
WBC # FLD AUTO: 16.1 K/UL — HIGH (ref 3.8–10.5)

## 2017-09-18 RX ORDER — PANTOPRAZOLE SODIUM 20 MG/1
40 TABLET, DELAYED RELEASE ORAL
Qty: 0 | Refills: 0 | Status: DISCONTINUED | OUTPATIENT
Start: 2017-09-18 | End: 2017-09-21

## 2017-09-18 RX ORDER — GABAPENTIN 400 MG/1
300 CAPSULE ORAL THREE TIMES A DAY
Qty: 0 | Refills: 0 | Status: DISCONTINUED | OUTPATIENT
Start: 2017-09-18 | End: 2017-09-19

## 2017-09-18 RX ORDER — ENOXAPARIN SODIUM 100 MG/ML
40 INJECTION SUBCUTANEOUS EVERY 24 HOURS
Qty: 0 | Refills: 0 | Status: DISCONTINUED | OUTPATIENT
Start: 2017-09-19 | End: 2017-09-21

## 2017-09-18 RX ORDER — DOCUSATE SODIUM 100 MG
100 CAPSULE ORAL THREE TIMES A DAY
Qty: 0 | Refills: 0 | Status: DISCONTINUED | OUTPATIENT
Start: 2017-09-18 | End: 2017-09-21

## 2017-09-18 RX ORDER — ACETAMINOPHEN 500 MG
650 TABLET ORAL EVERY 6 HOURS
Qty: 0 | Refills: 0 | Status: DISCONTINUED | OUTPATIENT
Start: 2017-09-18 | End: 2017-09-21

## 2017-09-18 RX ORDER — HYDROMORPHONE HYDROCHLORIDE 2 MG/ML
2 INJECTION INTRAMUSCULAR; INTRAVENOUS; SUBCUTANEOUS EVERY 6 HOURS
Qty: 0 | Refills: 0 | Status: DISCONTINUED | OUTPATIENT
Start: 2017-09-18 | End: 2017-09-21

## 2017-09-18 RX ORDER — DEXTROSE MONOHYDRATE, SODIUM CHLORIDE, AND POTASSIUM CHLORIDE 50; .745; 4.5 G/1000ML; G/1000ML; G/1000ML
1000 INJECTION, SOLUTION INTRAVENOUS
Qty: 0 | Refills: 0 | Status: DISCONTINUED | OUTPATIENT
Start: 2017-09-18 | End: 2017-09-18

## 2017-09-18 RX ORDER — SENNA PLUS 8.6 MG/1
2 TABLET ORAL AT BEDTIME
Qty: 0 | Refills: 0 | Status: DISCONTINUED | OUTPATIENT
Start: 2017-09-18 | End: 2017-09-21

## 2017-09-18 RX ORDER — METHOCARBAMOL 500 MG/1
750 TABLET, FILM COATED ORAL THREE TIMES A DAY
Qty: 0 | Refills: 0 | Status: DISCONTINUED | OUTPATIENT
Start: 2017-09-18 | End: 2017-09-21

## 2017-09-18 RX ORDER — HYDROMORPHONE HYDROCHLORIDE 2 MG/ML
0.5 INJECTION INTRAMUSCULAR; INTRAVENOUS; SUBCUTANEOUS
Qty: 0 | Refills: 0 | Status: DISCONTINUED | OUTPATIENT
Start: 2017-09-18 | End: 2017-09-19

## 2017-09-18 RX ORDER — TIMOLOL 0.5 %
1 DROPS OPHTHALMIC (EYE) DAILY
Qty: 0 | Refills: 0 | Status: DISCONTINUED | OUTPATIENT
Start: 2017-09-18 | End: 2017-09-21

## 2017-09-18 RX ORDER — AMLODIPINE BESYLATE 2.5 MG/1
5 TABLET ORAL AT BEDTIME
Qty: 0 | Refills: 0 | Status: DISCONTINUED | OUTPATIENT
Start: 2017-09-18 | End: 2017-09-21

## 2017-09-18 RX ORDER — CYCLOBENZAPRINE HYDROCHLORIDE 10 MG/1
10 TABLET, FILM COATED ORAL THREE TIMES A DAY
Qty: 0 | Refills: 0 | Status: DISCONTINUED | OUTPATIENT
Start: 2017-09-18 | End: 2017-09-21

## 2017-09-18 RX ORDER — SERTRALINE 25 MG/1
50 TABLET, FILM COATED ORAL DAILY
Qty: 0 | Refills: 0 | Status: DISCONTINUED | OUTPATIENT
Start: 2017-09-18 | End: 2017-09-21

## 2017-09-18 RX ORDER — ESTROGENS, CONJUGATED 0.625 MG
0.62 TABLET ORAL DAILY
Qty: 0 | Refills: 0 | Status: DISCONTINUED | OUTPATIENT
Start: 2017-09-18 | End: 2017-09-21

## 2017-09-18 RX ORDER — ONDANSETRON 8 MG/1
4 TABLET, FILM COATED ORAL EVERY 4 HOURS
Qty: 0 | Refills: 0 | Status: DISCONTINUED | OUTPATIENT
Start: 2017-09-18 | End: 2017-09-21

## 2017-09-18 RX ORDER — DEXTROSE MONOHYDRATE, SODIUM CHLORIDE, AND POTASSIUM CHLORIDE 50; .745; 4.5 G/1000ML; G/1000ML; G/1000ML
1000 INJECTION, SOLUTION INTRAVENOUS
Qty: 0 | Refills: 0 | Status: DISCONTINUED | OUTPATIENT
Start: 2017-09-18 | End: 2017-09-20

## 2017-09-18 RX ORDER — ATORVASTATIN CALCIUM 80 MG/1
40 TABLET, FILM COATED ORAL AT BEDTIME
Qty: 0 | Refills: 0 | Status: DISCONTINUED | OUTPATIENT
Start: 2017-09-18 | End: 2017-09-21

## 2017-09-18 RX ORDER — SUCRALFATE 1 G
1 TABLET ORAL
Qty: 0 | Refills: 0 | Status: DISCONTINUED | OUTPATIENT
Start: 2017-09-18 | End: 2017-09-21

## 2017-09-18 RX ORDER — HYDROMORPHONE HYDROCHLORIDE 2 MG/ML
4 INJECTION INTRAMUSCULAR; INTRAVENOUS; SUBCUTANEOUS EVERY 4 HOURS
Qty: 0 | Refills: 0 | Status: DISCONTINUED | OUTPATIENT
Start: 2017-09-18 | End: 2017-09-21

## 2017-09-18 RX ORDER — LOSARTAN POTASSIUM 100 MG/1
50 TABLET, FILM COATED ORAL DAILY
Qty: 0 | Refills: 0 | Status: DISCONTINUED | OUTPATIENT
Start: 2017-09-18 | End: 2017-09-21

## 2017-09-18 RX ADMIN — DEXTROSE MONOHYDRATE, SODIUM CHLORIDE, AND POTASSIUM CHLORIDE 75 MILLILITER(S): 50; .745; 4.5 INJECTION, SOLUTION INTRAVENOUS at 19:35

## 2017-09-18 RX ADMIN — HYDROMORPHONE HYDROCHLORIDE 0.5 MILLIGRAM(S): 2 INJECTION INTRAMUSCULAR; INTRAVENOUS; SUBCUTANEOUS at 19:45

## 2017-09-18 RX ADMIN — ATORVASTATIN CALCIUM 40 MILLIGRAM(S): 80 TABLET, FILM COATED ORAL at 22:30

## 2017-09-18 RX ADMIN — HYDROMORPHONE HYDROCHLORIDE 0.5 MILLIGRAM(S): 2 INJECTION INTRAMUSCULAR; INTRAVENOUS; SUBCUTANEOUS at 19:31

## 2017-09-18 RX ADMIN — HYDROMORPHONE HYDROCHLORIDE 0.5 MILLIGRAM(S): 2 INJECTION INTRAMUSCULAR; INTRAVENOUS; SUBCUTANEOUS at 16:15

## 2017-09-18 RX ADMIN — HYDROMORPHONE HYDROCHLORIDE 0.5 MILLIGRAM(S): 2 INJECTION INTRAMUSCULAR; INTRAVENOUS; SUBCUTANEOUS at 16:30

## 2017-09-18 RX ADMIN — GABAPENTIN 300 MILLIGRAM(S): 400 CAPSULE ORAL at 22:30

## 2017-09-18 NOTE — PROGRESS NOTE ADULT - ASSESSMENT
61F s/p removal of C7 lateral mass screws.   - PACU until 8PM and then transfer to Floor  - Pain control  - PT  - Advance diet as tolerated

## 2017-09-18 NOTE — PROGRESS NOTE ADULT - SUBJECTIVE AND OBJECTIVE BOX
Patient seen and examined at bedside.    T(C): 36.5 (09-18-17 @ 17:00), Max: 36.8 (09-18-17 @ 09:55)  HR: 109 (09-18-17 @ 19:00) (98 - 115)  BP: 134/69 (09-18-17 @ 19:00) (114/78 - 171/67)  RR: 16 (09-18-17 @ 19:00) (14 - 20)  SpO2: 97% (09-18-17 @ 19:00) (96% - 100%)  Wt(kg): --    Exam:    Awake, Alert, AOX3  5/5 throughout, no drift, no hoffmans, no clonus   SILT

## 2017-09-19 LAB
ANION GAP SERPL CALC-SCNC: 13 MMOL/L — SIGNIFICANT CHANGE UP (ref 5–17)
BASOPHILS # BLD AUTO: 0.03 K/UL — SIGNIFICANT CHANGE UP (ref 0–0.2)
BASOPHILS NFR BLD AUTO: 0.2 % — SIGNIFICANT CHANGE UP (ref 0–2)
BUN SERPL-MCNC: 8 MG/DL — SIGNIFICANT CHANGE UP (ref 7–23)
CALCIUM SERPL-MCNC: 8.7 MG/DL — SIGNIFICANT CHANGE UP (ref 8.4–10.5)
CHLORIDE SERPL-SCNC: 106 MMOL/L — SIGNIFICANT CHANGE UP (ref 96–108)
CO2 SERPL-SCNC: 23 MMOL/L — SIGNIFICANT CHANGE UP (ref 22–31)
CREAT SERPL-MCNC: 0.62 MG/DL — SIGNIFICANT CHANGE UP (ref 0.5–1.3)
EOSINOPHIL # BLD AUTO: 0 K/UL — SIGNIFICANT CHANGE UP (ref 0–0.5)
EOSINOPHIL NFR BLD AUTO: 0 % — SIGNIFICANT CHANGE UP (ref 0–6)
GLUCOSE SERPL-MCNC: 103 MG/DL — HIGH (ref 70–99)
HCT VFR BLD CALC: 29.4 % — LOW (ref 34.5–45)
HGB BLD-MCNC: 9.3 G/DL — LOW (ref 11.5–15.5)
IMM GRANULOCYTES NFR BLD AUTO: 0.3 % — SIGNIFICANT CHANGE UP (ref 0–1.5)
LYMPHOCYTES # BLD AUTO: 17.7 % — SIGNIFICANT CHANGE UP (ref 13–44)
LYMPHOCYTES # BLD AUTO: 2.56 K/UL — SIGNIFICANT CHANGE UP (ref 1–3.3)
MCHC RBC-ENTMCNC: 27.9 PG — SIGNIFICANT CHANGE UP (ref 27–34)
MCHC RBC-ENTMCNC: 31.6 GM/DL — LOW (ref 32–36)
MCV RBC AUTO: 88.3 FL — SIGNIFICANT CHANGE UP (ref 80–100)
MONOCYTES # BLD AUTO: 1.14 K/UL — HIGH (ref 0–0.9)
MONOCYTES NFR BLD AUTO: 7.9 % — SIGNIFICANT CHANGE UP (ref 2–14)
NEUTROPHILS # BLD AUTO: 10.71 K/UL — HIGH (ref 1.8–7.4)
NEUTROPHILS NFR BLD AUTO: 73.9 % — SIGNIFICANT CHANGE UP (ref 43–77)
PLATELET # BLD AUTO: 309 K/UL — SIGNIFICANT CHANGE UP (ref 150–400)
POTASSIUM SERPL-MCNC: 5.6 MMOL/L — HIGH (ref 3.5–5.3)
POTASSIUM SERPL-SCNC: 5.6 MMOL/L — HIGH (ref 3.5–5.3)
RBC # BLD: 3.33 M/UL — LOW (ref 3.8–5.2)
RBC # FLD: 13.3 % — SIGNIFICANT CHANGE UP (ref 10.3–14.5)
SODIUM SERPL-SCNC: 142 MMOL/L — SIGNIFICANT CHANGE UP (ref 135–145)
WBC # BLD: 14.48 K/UL — HIGH (ref 3.8–10.5)
WBC # FLD AUTO: 14.48 K/UL — HIGH (ref 3.8–10.5)

## 2017-09-19 RX ORDER — ACETAMINOPHEN 500 MG
1000 TABLET ORAL ONCE
Qty: 0 | Refills: 0 | Status: COMPLETED | OUTPATIENT
Start: 2017-09-19 | End: 2017-09-19

## 2017-09-19 RX ORDER — GABAPENTIN 400 MG/1
600 CAPSULE ORAL THREE TIMES A DAY
Qty: 0 | Refills: 0 | Status: DISCONTINUED | OUTPATIENT
Start: 2017-09-19 | End: 2017-09-21

## 2017-09-19 RX ADMIN — METHOCARBAMOL 750 MILLIGRAM(S): 500 TABLET, FILM COATED ORAL at 09:37

## 2017-09-19 RX ADMIN — HYDROMORPHONE HYDROCHLORIDE 4 MILLIGRAM(S): 2 INJECTION INTRAMUSCULAR; INTRAVENOUS; SUBCUTANEOUS at 23:07

## 2017-09-19 RX ADMIN — Medication 100 MILLIGRAM(S): at 13:18

## 2017-09-19 RX ADMIN — SERTRALINE 50 MILLIGRAM(S): 25 TABLET, FILM COATED ORAL at 23:09

## 2017-09-19 RX ADMIN — AMLODIPINE BESYLATE 5 MILLIGRAM(S): 2.5 TABLET ORAL at 23:06

## 2017-09-19 RX ADMIN — CYCLOBENZAPRINE HYDROCHLORIDE 10 MILLIGRAM(S): 10 TABLET, FILM COATED ORAL at 17:59

## 2017-09-19 RX ADMIN — Medication 1 GRAM(S): at 06:07

## 2017-09-19 RX ADMIN — Medication 1 GRAM(S): at 12:41

## 2017-09-19 RX ADMIN — HYDROMORPHONE HYDROCHLORIDE 4 MILLIGRAM(S): 2 INJECTION INTRAMUSCULAR; INTRAVENOUS; SUBCUTANEOUS at 13:20

## 2017-09-19 RX ADMIN — HYDROMORPHONE HYDROCHLORIDE 4 MILLIGRAM(S): 2 INJECTION INTRAMUSCULAR; INTRAVENOUS; SUBCUTANEOUS at 03:10

## 2017-09-19 RX ADMIN — CYCLOBENZAPRINE HYDROCHLORIDE 10 MILLIGRAM(S): 10 TABLET, FILM COATED ORAL at 23:07

## 2017-09-19 RX ADMIN — HYDROMORPHONE HYDROCHLORIDE 4 MILLIGRAM(S): 2 INJECTION INTRAMUSCULAR; INTRAVENOUS; SUBCUTANEOUS at 18:30

## 2017-09-19 RX ADMIN — Medication 1 DROP(S): at 23:08

## 2017-09-19 RX ADMIN — HYDROMORPHONE HYDROCHLORIDE 4 MILLIGRAM(S): 2 INJECTION INTRAMUSCULAR; INTRAVENOUS; SUBCUTANEOUS at 09:37

## 2017-09-19 RX ADMIN — HYDROMORPHONE HYDROCHLORIDE 4 MILLIGRAM(S): 2 INJECTION INTRAMUSCULAR; INTRAVENOUS; SUBCUTANEOUS at 13:50

## 2017-09-19 RX ADMIN — SENNA PLUS 2 TABLET(S): 8.6 TABLET ORAL at 23:06

## 2017-09-19 RX ADMIN — HYDROMORPHONE HYDROCHLORIDE 4 MILLIGRAM(S): 2 INJECTION INTRAMUSCULAR; INTRAVENOUS; SUBCUTANEOUS at 17:59

## 2017-09-19 RX ADMIN — Medication 100 MILLIGRAM(S): at 23:06

## 2017-09-19 RX ADMIN — Medication 100 MILLIGRAM(S): at 06:07

## 2017-09-19 RX ADMIN — HYDROMORPHONE HYDROCHLORIDE 4 MILLIGRAM(S): 2 INJECTION INTRAMUSCULAR; INTRAVENOUS; SUBCUTANEOUS at 02:53

## 2017-09-19 RX ADMIN — Medication 0.62 MILLIGRAM(S): at 12:41

## 2017-09-19 RX ADMIN — LOSARTAN POTASSIUM 50 MILLIGRAM(S): 100 TABLET, FILM COATED ORAL at 06:07

## 2017-09-19 RX ADMIN — GABAPENTIN 300 MILLIGRAM(S): 400 CAPSULE ORAL at 06:07

## 2017-09-19 RX ADMIN — CYCLOBENZAPRINE HYDROCHLORIDE 10 MILLIGRAM(S): 10 TABLET, FILM COATED ORAL at 06:07

## 2017-09-19 RX ADMIN — GABAPENTIN 300 MILLIGRAM(S): 400 CAPSULE ORAL at 13:18

## 2017-09-19 RX ADMIN — HYDROMORPHONE HYDROCHLORIDE 4 MILLIGRAM(S): 2 INJECTION INTRAMUSCULAR; INTRAVENOUS; SUBCUTANEOUS at 10:10

## 2017-09-19 RX ADMIN — ENOXAPARIN SODIUM 40 MILLIGRAM(S): 100 INJECTION SUBCUTANEOUS at 18:00

## 2017-09-19 RX ADMIN — Medication 1000 MILLIGRAM(S): at 20:30

## 2017-09-19 RX ADMIN — ATORVASTATIN CALCIUM 40 MILLIGRAM(S): 80 TABLET, FILM COATED ORAL at 23:06

## 2017-09-19 RX ADMIN — PANTOPRAZOLE SODIUM 40 MILLIGRAM(S): 20 TABLET, DELAYED RELEASE ORAL at 06:07

## 2017-09-19 RX ADMIN — HYDROMORPHONE HYDROCHLORIDE 4 MILLIGRAM(S): 2 INJECTION INTRAMUSCULAR; INTRAVENOUS; SUBCUTANEOUS at 23:38

## 2017-09-19 RX ADMIN — Medication 400 MILLIGRAM(S): at 20:04

## 2017-09-19 RX ADMIN — GABAPENTIN 600 MILLIGRAM(S): 400 CAPSULE ORAL at 23:07

## 2017-09-19 RX ADMIN — Medication 1 GRAM(S): at 23:09

## 2017-09-19 NOTE — PHYSICAL THERAPY INITIAL EVALUATION ADULT - ACTIVE RANGE OF MOTION EXAMINATION, REHAB EVAL
B shoulder flex to 150 due to pain/bilateral upper extremity Active ROM was WFL (within functional limits)/bilateral  lower extremity Active ROM was WFL (within functional limits)

## 2017-09-19 NOTE — PROGRESS NOTE ADULT - SUBJECTIVE AND OBJECTIVE BOX
HPI:    OVERNIGHT EVENTS:  Vital Signs Last 24 Hrs  T(C): 36.6 (19 Sep 2017 05:14), Max: 36.8 (18 Sep 2017 09:55)  T(F): 97.8 (19 Sep 2017 05:14), Max: 98.2 (18 Sep 2017 09:55)  HR: 86 (19 Sep 2017 05:14) (84 - 115)  BP: 105/66 (19 Sep 2017 05:14) (97/55 - 171/67)  BP(mean): 80 (18 Sep 2017 22:12) (64 - 109)  RR: 16 (19 Sep 2017 05:14) (14 - 20)  SpO2: 97% (19 Sep 2017 05:14) (95% - 100%)    I&O's Detail    18 Sep 2017 07:01  -  19 Sep 2017 07:00  --------------------------------------------------------  IN:    Oral Fluid: 470 mL    sodium chloride 0.45% with potassium chloride 20 mEq/L: 150 mL    sodium chloride 0.9% with potassium chloride 20 mEq/L: 375 mL  Total IN: 995 mL    OUT:    Indwelling Catheter - Urethral: 990 mL  Total OUT: 990 mL    Total NET: 5 mL        I&O's Summary    18 Sep 2017 07:01  -  19 Sep 2017 07:00  --------------------------------------------------------  IN: 995 mL / OUT: 990 mL / NET: 5 mL        PHYSICAL EXAM:  Neurological:    Motor exam:         [x] Upper extremity                 D             B          T          WE       WF      HI                                               R         5/5        5/5        5/5       5/5     5/5       5/5                                               L          5/5        5/5        5/5       5/5     5/5       5/5         [x] Lower extremity                Ps          Ha        Quad    EHL        FHL                                               R        5/5        5/5        5/5       5/5         5/5                                               L         5/5        5/5       5/5       5/5          5/5                                                        [] warm well perfused; capillary refill <3 seconds     Sensation: [x] intact to light touch  [] decreased:     Gait NT    Cardiovascular:  Respiratory:  Gastrointestinal:  Genitourinary:  Extremities:  Incision/Wound: Clean and dry    TUBES/LINES:  [] CVC  [] A-line  [] Lumbar Drain  [] Ventriculostomy  [] Other    DIET:  [] NPO  [] Mechanical  [] Tube feeds    LABS:                        11.2   16.1  )-----------( 323      ( 18 Sep 2017 18:17 )             31.6     09-18    139  |  103  |  10  ----------------------------<  162<H>  4.1   |  22  |  0.69    Ca    8.3<L>      18 Sep 2017 18:17              CAPILLARY BLOOD GLUCOSE              Drug Levels: [] N/A    CSF Analysis: [] N/A      Allergies    No Known Allergies    Intolerances    morphine (Nausea; Vomiting)  Percocet 5/325 (Nausea; Vomiting)    MEDICATIONS:  Antibiotics:    Neuro:  gabapentin 300 milliGRAM(s) Oral three times a day  sertraline 50 milliGRAM(s) Oral daily  cyclobenzaprine 10 milliGRAM(s) Oral three times a day PRN  methocarbamol 750 milliGRAM(s) Oral three times a day PRN  acetaminophen   Tablet 650 milliGRAM(s) Oral every 6 hours PRN  acetaminophen   Tablet. 650 milliGRAM(s) Oral every 6 hours PRN  HYDROmorphone   Tablet 2 milliGRAM(s) Oral every 6 hours PRN  ondansetron   Disintegrating Tablet 4 milliGRAM(s) Oral every 4 hours PRN  HYDROmorphone   Tablet 4 milliGRAM(s) Oral every 4 hours PRN    Anticoagulation:  enoxaparin Injectable 40 milliGRAM(s) SubCutaneous every 24 hours    OTHER:  losartan 50 milliGRAM(s) Oral daily  atorvastatin 40 milliGRAM(s) Oral at bedtime  amLODIPine   Tablet 5 milliGRAM(s) Oral at bedtime  docusate sodium 100 milliGRAM(s) Oral three times a day  sucralfate suspension 1 Gram(s) Oral Before meals and at bedtime  timolol 0.25% Solution 1 Drop(s) Both EYES daily  pantoprazole    Tablet 40 milliGRAM(s) Oral before breakfast  estrogens    conjugated 0.625 milliGRAM(s) Oral daily  senna 2 Tablet(s) Oral at bedtime    IVF:  sodium chloride 0.9% with potassium chloride 20 mEq/L 1000 milliLiter(s) IV Continuous <Continuous>    CULTURES:    RADIOLOGY & ADDITIONAL TESTS:      ASSESSMENT:  HPI:    61y Female s/p    PLAN:  NEURO:  CARDIOVASCULAR:  PULMONARY:  RENAL:  GI:  HEME:  ID:  ENDO:    DVT PROPHYLAXIS:  [] Venodynes                                [] Heparin/Lovenox    FALL RISK:  [] Low Risk                                    [] Impulsive

## 2017-09-19 NOTE — PHYSICAL THERAPY INITIAL EVALUATION ADULT - PERTINENT HX OF CURRENT PROBLEM, REHAB EVAL
Pt is a 61 y.o female hx hiatal hernia, endometriosis, anemia, gastric ulcer, HTN, migraines, C/S sx has been having increased BUE radiculopathy. Pt now presents s/p sx as noted above.

## 2017-09-19 NOTE — PROGRESS NOTE ADULT - ASSESSMENT
62 y/o female PMHx of HTN, HLD p/w b/l hand pain underwent bilateral C7 removal of lateral screws on 9/18/17.     - Buenrostro d'aspen -> TOV pending.   - Con't gabapentin,cyclobenzaprine, methocarbamol, acetaminophen, HYDROmorphone for pain control   - Con't Sertraline 50 qd   - Con't losartan 50 milliGRAM(s) Oral daily and amLODIPine   Tablet 5 milliGRAM(s) for HTN   - Con't atorvastatin 40 milliGRAM(s) Oral at bedtime for HLD   - Con't GI/ DVT ppx  - PT: pending.     Will discuss with attending Dr. Shaniqua Raya PAC  [phone]

## 2017-09-19 NOTE — PROGRESS NOTE ADULT - SUBJECTIVE AND OBJECTIVE BOX
SUBJECTIVE/OBJECTIVE:    SUBJECTIVE: Patient seen and examined at bedside. No acute overnight events. No complaints this morning. PT pending. Buenrostro to be d'aspen -> TOV pending.     Vital Signs Last 24 Hrs  T(C): 36.7 (19 Sep 2017 08:57), Max: 36.7 (19 Sep 2017 08:57)  T(F): 98 (19 Sep 2017 08:57), Max: 98 (19 Sep 2017 08:57)  HR: 96 (19 Sep 2017 08:57) (84 - 115)  BP: 110/60 (19 Sep 2017 08:57) (97/55 - 171/67)  BP(mean): 80 (18 Sep 2017 22:12) (64 - 109)  RR: 18 (19 Sep 2017 08:57) (14 - 19)  SpO2: 96% (19 Sep 2017 08:57) (95% - 100%)    DRAINS: None    PHYSICAL EXAM:    Constitutional: No Acute Distress     Neurological: AAO x3, speech clear EOMI, following commands, no drift, strength 5/5 throughout Sensationintact to light touch    Pulmonary: Clear to Auscultation, No rales, No rhonchi, No wheezes     Cardiovascular: S1, S2, Regular rate and rhythm     Gastrointestinal: Soft, Non-tender, Non-distended     Extremities: No calf tenderness     Incision: c/d/i     LABS:                        9.3    14.48 )-----------( 309      ( 19 Sep 2017 08:51 )             29.4    09-19    142  |  106  |  8   ----------------------------<  103<H>  5.6<H>   |  23  |  0.62    Ca    8.7      19 Sep 2017 08:41        MEDICATIONS:  Antibiotics:    Neuro:  gabapentin 300 milliGRAM(s) Oral three times a day  sertraline 50 milliGRAM(s) Oral daily  cyclobenzaprine 10 milliGRAM(s) Oral three times a day PRN Muscle Spasm  methocarbamol 750 milliGRAM(s) Oral three times a day PRN spasm  acetaminophen   Tablet 650 milliGRAM(s) Oral every 6 hours PRN For Temp greater than 38 C (100.4 F)  acetaminophen   Tablet. 650 milliGRAM(s) Oral every 6 hours PRN Mild Pain (1 - 3)  HYDROmorphone   Tablet 2 milliGRAM(s) Oral every 6 hours PRN Moderate Pain  ondansetron   Disintegrating Tablet 4 milliGRAM(s) Oral every 4 hours PRN Nausea  HYDROmorphone   Tablet 4 milliGRAM(s) Oral every 4 hours PRN Severe Pain (7 - 10)    Cardiac:  losartan 50 milliGRAM(s) Oral daily  amLODIPine   Tablet 5 milliGRAM(s) Oral at bedtime      GI/:  docusate sodium 100 milliGRAM(s) Oral three times a day  sucralfate suspension 1 Gram(s) Oral Before meals and at bedtime  pantoprazole    Tablet 40 milliGRAM(s) Oral before breakfast  senna 2 Tablet(s) Oral at bedtime    Other:   atorvastatin 40 milliGRAM(s) Oral at bedtime  timolol 0.25% Solution 1 Drop(s) Both EYES daily  estrogens    conjugated 0.625 milliGRAM(s) Oral daily  enoxaparin Injectable 40 milliGRAM(s) SubCutaneous every 24 hours  sodium chloride 0.9% with potassium chloride 20 mEq/L 1000 milliLiter(s) IV Continuous <Continuous>    DIET: [x] Regular     IMAGING:

## 2017-09-19 NOTE — PHYSICAL THERAPY INITIAL EVALUATION ADULT - GAIT DEVIATIONS NOTED, PT EVAL
decreased asia/decreased velocity of limb motion/decreased stride length/decreased weight-shifting ability

## 2017-09-19 NOTE — PHYSICAL THERAPY INITIAL EVALUATION ADULT - ADDITIONAL COMMENTS
PTA pt lived in pvt home with  and daughter (both work full time), 4 steps to enter no HR, flight to bedroom +HR, pt has RW from prior sx, was not using AD.

## 2017-09-20 LAB
ANION GAP SERPL CALC-SCNC: 12 MMOL/L — SIGNIFICANT CHANGE UP (ref 5–17)
BASOPHILS # BLD AUTO: 0.05 K/UL — SIGNIFICANT CHANGE UP (ref 0–0.2)
BASOPHILS NFR BLD AUTO: 0.5 % — SIGNIFICANT CHANGE UP (ref 0–2)
BUN SERPL-MCNC: 5 MG/DL — LOW (ref 7–23)
CALCIUM SERPL-MCNC: 9.1 MG/DL — SIGNIFICANT CHANGE UP (ref 8.4–10.5)
CHLORIDE SERPL-SCNC: 102 MMOL/L — SIGNIFICANT CHANGE UP (ref 96–108)
CO2 SERPL-SCNC: 27 MMOL/L — SIGNIFICANT CHANGE UP (ref 22–31)
CREAT SERPL-MCNC: 0.91 MG/DL — SIGNIFICANT CHANGE UP (ref 0.5–1.3)
EOSINOPHIL # BLD AUTO: 0.25 K/UL — SIGNIFICANT CHANGE UP (ref 0–0.5)
EOSINOPHIL NFR BLD AUTO: 2.5 % — SIGNIFICANT CHANGE UP (ref 0–6)
GLUCOSE SERPL-MCNC: 106 MG/DL — HIGH (ref 70–99)
HCT VFR BLD CALC: 31.6 % — LOW (ref 34.5–45)
HGB BLD-MCNC: 10.5 G/DL — LOW (ref 11.5–15.5)
IMM GRANULOCYTES NFR BLD AUTO: 0.2 % — SIGNIFICANT CHANGE UP (ref 0–1.5)
LYMPHOCYTES # BLD AUTO: 3.82 K/UL — HIGH (ref 1–3.3)
LYMPHOCYTES # BLD AUTO: 37.7 % — SIGNIFICANT CHANGE UP (ref 13–44)
MCHC RBC-ENTMCNC: 29.2 PG — SIGNIFICANT CHANGE UP (ref 27–34)
MCHC RBC-ENTMCNC: 33.2 GM/DL — SIGNIFICANT CHANGE UP (ref 32–36)
MCV RBC AUTO: 88 FL — SIGNIFICANT CHANGE UP (ref 80–100)
MONOCYTES # BLD AUTO: 0.78 K/UL — SIGNIFICANT CHANGE UP (ref 0–0.9)
MONOCYTES NFR BLD AUTO: 7.7 % — SIGNIFICANT CHANGE UP (ref 2–14)
NEUTROPHILS # BLD AUTO: 5.2 K/UL — SIGNIFICANT CHANGE UP (ref 1.8–7.4)
NEUTROPHILS NFR BLD AUTO: 51.4 % — SIGNIFICANT CHANGE UP (ref 43–77)
PLATELET # BLD AUTO: 307 K/UL — SIGNIFICANT CHANGE UP (ref 150–400)
POTASSIUM SERPL-MCNC: 4.9 MMOL/L — SIGNIFICANT CHANGE UP (ref 3.5–5.3)
POTASSIUM SERPL-SCNC: 4.9 MMOL/L — SIGNIFICANT CHANGE UP (ref 3.5–5.3)
RBC # BLD: 3.59 M/UL — LOW (ref 3.8–5.2)
RBC # FLD: 13.7 % — SIGNIFICANT CHANGE UP (ref 10.3–14.5)
SODIUM SERPL-SCNC: 141 MMOL/L — SIGNIFICANT CHANGE UP (ref 135–145)
WBC # BLD: 10.12 K/UL — SIGNIFICANT CHANGE UP (ref 3.8–10.5)
WBC # FLD AUTO: 10.12 K/UL — SIGNIFICANT CHANGE UP (ref 3.8–10.5)

## 2017-09-20 RX ORDER — INFLUENZA VIRUS VACCINE 15; 15; 15; 15 UG/.5ML; UG/.5ML; UG/.5ML; UG/.5ML
0.5 SUSPENSION INTRAMUSCULAR ONCE
Qty: 0 | Refills: 0 | Status: COMPLETED | OUTPATIENT
Start: 2017-09-20 | End: 2017-09-21

## 2017-09-20 RX ADMIN — Medication 100 MILLIGRAM(S): at 13:16

## 2017-09-20 RX ADMIN — Medication 1 GRAM(S): at 21:47

## 2017-09-20 RX ADMIN — Medication 100 MILLIGRAM(S): at 05:58

## 2017-09-20 RX ADMIN — ATORVASTATIN CALCIUM 40 MILLIGRAM(S): 80 TABLET, FILM COATED ORAL at 21:47

## 2017-09-20 RX ADMIN — HYDROMORPHONE HYDROCHLORIDE 4 MILLIGRAM(S): 2 INJECTION INTRAMUSCULAR; INTRAVENOUS; SUBCUTANEOUS at 17:31

## 2017-09-20 RX ADMIN — GABAPENTIN 600 MILLIGRAM(S): 400 CAPSULE ORAL at 21:47

## 2017-09-20 RX ADMIN — GABAPENTIN 600 MILLIGRAM(S): 400 CAPSULE ORAL at 05:58

## 2017-09-20 RX ADMIN — Medication 1 DROP(S): at 21:47

## 2017-09-20 RX ADMIN — GABAPENTIN 600 MILLIGRAM(S): 400 CAPSULE ORAL at 13:16

## 2017-09-20 RX ADMIN — HYDROMORPHONE HYDROCHLORIDE 4 MILLIGRAM(S): 2 INJECTION INTRAMUSCULAR; INTRAVENOUS; SUBCUTANEOUS at 22:18

## 2017-09-20 RX ADMIN — HYDROMORPHONE HYDROCHLORIDE 4 MILLIGRAM(S): 2 INJECTION INTRAMUSCULAR; INTRAVENOUS; SUBCUTANEOUS at 21:48

## 2017-09-20 RX ADMIN — HYDROMORPHONE HYDROCHLORIDE 4 MILLIGRAM(S): 2 INJECTION INTRAMUSCULAR; INTRAVENOUS; SUBCUTANEOUS at 03:24

## 2017-09-20 RX ADMIN — HYDROMORPHONE HYDROCHLORIDE 4 MILLIGRAM(S): 2 INJECTION INTRAMUSCULAR; INTRAVENOUS; SUBCUTANEOUS at 04:00

## 2017-09-20 RX ADMIN — Medication 1 GRAM(S): at 05:58

## 2017-09-20 RX ADMIN — HYDROMORPHONE HYDROCHLORIDE 4 MILLIGRAM(S): 2 INJECTION INTRAMUSCULAR; INTRAVENOUS; SUBCUTANEOUS at 08:07

## 2017-09-20 RX ADMIN — HYDROMORPHONE HYDROCHLORIDE 4 MILLIGRAM(S): 2 INJECTION INTRAMUSCULAR; INTRAVENOUS; SUBCUTANEOUS at 17:01

## 2017-09-20 RX ADMIN — LOSARTAN POTASSIUM 50 MILLIGRAM(S): 100 TABLET, FILM COATED ORAL at 05:58

## 2017-09-20 RX ADMIN — Medication 100 MILLIGRAM(S): at 21:48

## 2017-09-20 RX ADMIN — ENOXAPARIN SODIUM 40 MILLIGRAM(S): 100 INJECTION SUBCUTANEOUS at 17:01

## 2017-09-20 RX ADMIN — Medication 1 GRAM(S): at 17:01

## 2017-09-20 RX ADMIN — Medication 0.62 MILLIGRAM(S): at 13:16

## 2017-09-20 RX ADMIN — SENNA PLUS 2 TABLET(S): 8.6 TABLET ORAL at 21:47

## 2017-09-20 RX ADMIN — HYDROMORPHONE HYDROCHLORIDE 4 MILLIGRAM(S): 2 INJECTION INTRAMUSCULAR; INTRAVENOUS; SUBCUTANEOUS at 13:37

## 2017-09-20 RX ADMIN — HYDROMORPHONE HYDROCHLORIDE 4 MILLIGRAM(S): 2 INJECTION INTRAMUSCULAR; INTRAVENOUS; SUBCUTANEOUS at 08:37

## 2017-09-20 RX ADMIN — Medication 1 GRAM(S): at 13:16

## 2017-09-20 RX ADMIN — HYDROMORPHONE HYDROCHLORIDE 4 MILLIGRAM(S): 2 INJECTION INTRAMUSCULAR; INTRAVENOUS; SUBCUTANEOUS at 13:07

## 2017-09-20 RX ADMIN — SERTRALINE 50 MILLIGRAM(S): 25 TABLET, FILM COATED ORAL at 21:48

## 2017-09-20 RX ADMIN — PANTOPRAZOLE SODIUM 40 MILLIGRAM(S): 20 TABLET, DELAYED RELEASE ORAL at 05:58

## 2017-09-20 NOTE — PROGRESS NOTE ADULT - SUBJECTIVE AND OBJECTIVE BOX
SUBJECTIVE: Pt seen and examined, reports posterior neck pain    OVERNIGHT EVENTS: none    Vital Signs Last 24 Hrs  T(C): 36.7 (20 Sep 2017 08:48), Max: 36.7 (19 Sep 2017 13:42)  T(F): 98 (20 Sep 2017 08:48), Max: 98.1 (19 Sep 2017 13:42)  HR: 90 (20 Sep 2017 08:48) (89 - 114)  BP: 120/65 (20 Sep 2017 08:48) (120/65 - 129/63)  BP(mean): --  RR: 18 (20 Sep 2017 08:48) (18 - 18)  SpO2: 96% (20 Sep 2017 08:48) (95% - 98%)    PHYSICAL EXAM:    General: No Acute Distress     Neurological: Awake, alert oriented to person, place and time, Following Commands, Speech Fluent, Moving all extremities, Muscle Strength normal in all four extremities, No Drift, Sensation to Light Touch Intact    Pulmonary: Clear to Auscultation, No Rales, No Rhonchi, No Wheezes     Cardiovascular: S1, S2, Regular Rate and Rhythm     Gastrointestinal: Soft, Nontender, Nondistended     Extremities: No calf tenderness     Incision: c/d/i    LABS:                        10.5   10.12 )-----------( 307      ( 20 Sep 2017 09:06 )             31.6    09-20    141  |  102  |  5<L>  ----------------------------<  106<H>  4.9   |  27  |  0.91    Ca    9.1      20 Sep 2017 09:06          09-19 @ 07:01 - 09-20 @ 07:00  --------------------------------------------------------  IN: 1110 mL / OUT: 2900 mL / NET: -1790 mL    09-20 @ 07:01  - 09-20 @ 12:12  --------------------------------------------------------  IN: 260 mL / OUT: 0 mL / NET: 260 mL      DRAINS: none    MEDICATIONS:  Antibiotics:    Neuro:  sertraline 50 milliGRAM(s) Oral daily  cyclobenzaprine 10 milliGRAM(s) Oral three times a day PRN Muscle Spasm  methocarbamol 750 milliGRAM(s) Oral three times a day PRN spasm  acetaminophen   Tablet 650 milliGRAM(s) Oral every 6 hours PRN For Temp greater than 38 C (100.4 F)  acetaminophen   Tablet. 650 milliGRAM(s) Oral every 6 hours PRN Mild Pain (1 - 3)  HYDROmorphone   Tablet 2 milliGRAM(s) Oral every 6 hours PRN Moderate Pain  ondansetron   Disintegrating Tablet 4 milliGRAM(s) Oral every 4 hours PRN Nausea  HYDROmorphone   Tablet 4 milliGRAM(s) Oral every 4 hours PRN Severe Pain (7 - 10)  gabapentin 600 milliGRAM(s) Oral three times a day    Cardiac:  losartan 50 milliGRAM(s) Oral daily  amLODIPine   Tablet 5 milliGRAM(s) Oral at bedtime    Pulm:    GI/:  docusate sodium 100 milliGRAM(s) Oral three times a day  sucralfate suspension 1 Gram(s) Oral Before meals and at bedtime  pantoprazole    Tablet 40 milliGRAM(s) Oral before breakfast  senna 2 Tablet(s) Oral at bedtime    Other:   atorvastatin 40 milliGRAM(s) Oral at bedtime  timolol 0.25% Solution 1 Drop(s) Both EYES daily  estrogens    conjugated 0.625 milliGRAM(s) Oral daily  enoxaparin Injectable 40 milliGRAM(s) SubCutaneous every 24 hours  sodium chloride 0.9% with potassium chloride 20 mEq/L 1000 milliLiter(s) IV Continuous <Continuous>    DIET: [x] Regular [] CCD [] Renal [] Puree [] Dysphagia [] Tube Feeds:     IMAGING:

## 2017-09-20 NOTE — PROGRESS NOTE ADULT - ASSESSMENT
61 yr old female with HTN, HLD, GERD, cervical spine stenosis, underwent cervical spine surgery 7/17/17, reports severe neck pain a month after procedure, electrical pain shooting down the right arm and presents to PST for scheduled  revision b/l C7 lateral mass screws on 9/18/17. Denies fever, chills, pain 8/10.      PROCEDURE: 9/18/17 s/p bilateral removal C7 lateral mass screws      POD#2    PLAN:  Neuro: Pain control- continue dilaudid prn, gabapentin, flexeril prn spasm,   -depression- continue sertraline  Respiratory: Encouraged incentive spirometry  CV:HTN- continue losartan, amlodipine  DVT ppx: venodynes while in bed, SQ lovenox  PT/OT: outpatient PT      Spectralink # [id number]

## 2017-09-20 NOTE — PROGRESS NOTE ADULT - SUBJECTIVE AND OBJECTIVE BOX
HPI:    OVERNIGHT EVENTS:  Vital Signs Last 24 Hrs  T(C): 36.7 (20 Sep 2017 08:48), Max: 36.7 (19 Sep 2017 13:42)  T(F): 98 (20 Sep 2017 08:48), Max: 98.1 (19 Sep 2017 13:42)  HR: 90 (20 Sep 2017 08:48) (89 - 114)  BP: 120/65 (20 Sep 2017 08:48) (120/65 - 129/63)  BP(mean): --  RR: 18 (20 Sep 2017 08:48) (18 - 18)  SpO2: 96% (20 Sep 2017 08:48) (95% - 98%)    I&O's Detail    19 Sep 2017 07:01  -  20 Sep 2017 07:00  --------------------------------------------------------  IN:    Oral Fluid: 600 mL    sodium chloride 0.9% with potassium chloride 20 mEq/L: 510 mL  Total IN: 1110 mL    OUT:    Indwelling Catheter - Urethral: 2000 mL    Voided: 900 mL  Total OUT: 2900 mL    Total NET: -1790 mL      20 Sep 2017 07:01  -  20 Sep 2017 10:07  --------------------------------------------------------  IN:    Oral Fluid: 260 mL  Total IN: 260 mL    OUT:  Total OUT: 0 mL    Total NET: 260 mL        I&O's Summary    19 Sep 2017 07:01  -  20 Sep 2017 07:00  --------------------------------------------------------  IN: 1110 mL / OUT: 2900 mL / NET: -1790 mL    20 Sep 2017 07:01  -  20 Sep 2017 10:07  --------------------------------------------------------  IN: 260 mL / OUT: 0 mL / NET: 260 mL        PHYSICAL EXAM:  Neurological:    Motor exam:         [x] Upper extremity                 D             B          T          WE       WF      HI                                               R         5/5        5/5        5/5       5/5     5/5       5/5                                               L          5/5        5/5        5/5       5/5     5/5       5/5         [x] Lower extremity                Ps          Ha        Quad    EHL        FHL                                               R        5/5        5/5        5/5       5/5         5/5                                               L         5/5        5/5       5/5       5/5          5/5                                                        [] warm well perfused; capillary refill <3 seconds     Sensation: [x] intact to light touch  [] decreased:     Gait NT    Cardiovascular:  Respiratory:  Gastrointestinal:  Genitourinary:  Extremities:  Incision/Wound: Clean and dry    TUBES/LINES:  [] CVC  [] A-line  [] Lumbar Drain  [] Ventriculostomy  [] Other    DIET:  [] NPO  [] Mechanical  [] Tube feeds    LABS:                        10.5   10.12 )-----------( 307      ( 20 Sep 2017 09:06 )             31.6     09-20    141  |  102  |  5<L>  ----------------------------<  106<H>  4.9   |  27  |  0.91    Ca    9.1      20 Sep 2017 09:06              CAPILLARY BLOOD GLUCOSE              Drug Levels: [] N/A    CSF Analysis: [] N/A      Allergies    No Known Allergies    Intolerances    morphine (Nausea; Vomiting)  Percocet 5/325 (Nausea; Vomiting)    MEDICATIONS:  Antibiotics:    Neuro:  sertraline 50 milliGRAM(s) Oral daily  cyclobenzaprine 10 milliGRAM(s) Oral three times a day PRN  methocarbamol 750 milliGRAM(s) Oral three times a day PRN  acetaminophen   Tablet 650 milliGRAM(s) Oral every 6 hours PRN  acetaminophen   Tablet. 650 milliGRAM(s) Oral every 6 hours PRN  HYDROmorphone   Tablet 2 milliGRAM(s) Oral every 6 hours PRN  ondansetron   Disintegrating Tablet 4 milliGRAM(s) Oral every 4 hours PRN  HYDROmorphone   Tablet 4 milliGRAM(s) Oral every 4 hours PRN  gabapentin 600 milliGRAM(s) Oral three times a day    Anticoagulation:  enoxaparin Injectable 40 milliGRAM(s) SubCutaneous every 24 hours    OTHER:  losartan 50 milliGRAM(s) Oral daily  atorvastatin 40 milliGRAM(s) Oral at bedtime  amLODIPine   Tablet 5 milliGRAM(s) Oral at bedtime  docusate sodium 100 milliGRAM(s) Oral three times a day  sucralfate suspension 1 Gram(s) Oral Before meals and at bedtime  timolol 0.25% Solution 1 Drop(s) Both EYES daily  pantoprazole    Tablet 40 milliGRAM(s) Oral before breakfast  estrogens    conjugated 0.625 milliGRAM(s) Oral daily  senna 2 Tablet(s) Oral at bedtime    IVF:  sodium chloride 0.9% with potassium chloride 20 mEq/L 1000 milliLiter(s) IV Continuous <Continuous>    CULTURES:    RADIOLOGY & ADDITIONAL TESTS:      ASSESSMENT:  HPI:    61y Female s/p    PLAN:  NEURO:  CARDIOVASCULAR:  PULMONARY:  RENAL:  GI:  HEME:  ID:  ENDO:    DVT PROPHYLAXIS:  [x] Venodynes                                [x] Heparin/Lovenox    FALL RISK:  [] Low Risk                                    [] Impulsive

## 2017-09-21 ENCOUNTER — TRANSCRIPTION ENCOUNTER (OUTPATIENT)
Age: 62
End: 2017-09-21

## 2017-09-21 VITALS
RESPIRATION RATE: 18 BRPM | TEMPERATURE: 98 F | HEART RATE: 117 BPM | SYSTOLIC BLOOD PRESSURE: 101 MMHG | OXYGEN SATURATION: 95 % | DIASTOLIC BLOOD PRESSURE: 60 MMHG

## 2017-09-21 DIAGNOSIS — M47.12 OTHER SPONDYLOSIS WITH MYELOPATHY, CERVICAL REGION: ICD-10-CM

## 2017-09-21 LAB
ANION GAP SERPL CALC-SCNC: 13 MMOL/L — SIGNIFICANT CHANGE UP (ref 5–17)
BASOPHILS # BLD AUTO: 0.08 K/UL — SIGNIFICANT CHANGE UP (ref 0–0.2)
BASOPHILS NFR BLD AUTO: 0.9 % — SIGNIFICANT CHANGE UP (ref 0–2)
BUN SERPL-MCNC: 7 MG/DL — SIGNIFICANT CHANGE UP (ref 7–23)
CALCIUM SERPL-MCNC: 9.9 MG/DL — SIGNIFICANT CHANGE UP (ref 8.4–10.5)
CHLORIDE SERPL-SCNC: 101 MMOL/L — SIGNIFICANT CHANGE UP (ref 96–108)
CO2 SERPL-SCNC: 27 MMOL/L — SIGNIFICANT CHANGE UP (ref 22–31)
CREAT SERPL-MCNC: 0.73 MG/DL — SIGNIFICANT CHANGE UP (ref 0.5–1.3)
EOSINOPHIL # BLD AUTO: 0.54 K/UL — HIGH (ref 0–0.5)
EOSINOPHIL NFR BLD AUTO: 6 % — SIGNIFICANT CHANGE UP (ref 0–6)
GLUCOSE SERPL-MCNC: 114 MG/DL — HIGH (ref 70–99)
HCT VFR BLD CALC: 29.3 % — LOW (ref 34.5–45)
HGB BLD-MCNC: 9.8 G/DL — LOW (ref 11.5–15.5)
LYMPHOCYTES # BLD AUTO: 3.23 K/UL — SIGNIFICANT CHANGE UP (ref 1–3.3)
LYMPHOCYTES # BLD AUTO: 36.2 % — SIGNIFICANT CHANGE UP (ref 13–44)
MCHC RBC-ENTMCNC: 28.7 PG — SIGNIFICANT CHANGE UP (ref 27–34)
MCHC RBC-ENTMCNC: 33.4 GM/DL — SIGNIFICANT CHANGE UP (ref 32–36)
MCV RBC AUTO: 85.7 FL — SIGNIFICANT CHANGE UP (ref 80–100)
MONOCYTES # BLD AUTO: 0.23 K/UL — SIGNIFICANT CHANGE UP (ref 0–0.9)
MONOCYTES NFR BLD AUTO: 2.6 % — SIGNIFICANT CHANGE UP (ref 2–14)
NEUTROPHILS # BLD AUTO: 4.77 K/UL — SIGNIFICANT CHANGE UP (ref 1.8–7.4)
NEUTROPHILS NFR BLD AUTO: 53.4 % — SIGNIFICANT CHANGE UP (ref 43–77)
PLATELET # BLD AUTO: 313 K/UL — SIGNIFICANT CHANGE UP (ref 150–400)
POTASSIUM SERPL-MCNC: 4.7 MMOL/L — SIGNIFICANT CHANGE UP (ref 3.5–5.3)
POTASSIUM SERPL-SCNC: 4.7 MMOL/L — SIGNIFICANT CHANGE UP (ref 3.5–5.3)
RBC # BLD: 3.42 M/UL — LOW (ref 3.8–5.2)
RBC # FLD: 13.1 % — SIGNIFICANT CHANGE UP (ref 10.3–14.5)
SODIUM SERPL-SCNC: 141 MMOL/L — SIGNIFICANT CHANGE UP (ref 135–145)
WBC # BLD: 8.93 K/UL — SIGNIFICANT CHANGE UP (ref 3.8–10.5)
WBC # FLD AUTO: 8.93 K/UL — SIGNIFICANT CHANGE UP (ref 3.8–10.5)

## 2017-09-21 PROCEDURE — 80048 BASIC METABOLIC PNL TOTAL CA: CPT

## 2017-09-21 PROCEDURE — 76000 FLUOROSCOPY <1 HR PHYS/QHP: CPT

## 2017-09-21 PROCEDURE — 97161 PT EVAL LOW COMPLEX 20 MIN: CPT

## 2017-09-21 PROCEDURE — 90686 IIV4 VACC NO PRSV 0.5 ML IM: CPT

## 2017-09-21 PROCEDURE — 85027 COMPLETE CBC AUTOMATED: CPT

## 2017-09-21 PROCEDURE — C1713: CPT

## 2017-09-21 PROCEDURE — C1889: CPT

## 2017-09-21 PROCEDURE — C1769: CPT

## 2017-09-21 PROCEDURE — 97116 GAIT TRAINING THERAPY: CPT

## 2017-09-21 RX ORDER — GABAPENTIN 400 MG/1
1 CAPSULE ORAL
Qty: 0 | Refills: 0 | COMMUNITY

## 2017-09-21 RX ORDER — HYDROMORPHONE HYDROCHLORIDE 2 MG/ML
2 INJECTION INTRAMUSCULAR; INTRAVENOUS; SUBCUTANEOUS
Qty: 60 | Refills: 0 | OUTPATIENT
Start: 2017-09-21 | End: 2017-09-26

## 2017-09-21 RX ORDER — SUCRALFATE 1 G
10 TABLET ORAL
Qty: 0 | Refills: 0 | DISCHARGE
Start: 2017-09-21

## 2017-09-21 RX ORDER — GABAPENTIN 400 MG/1
1 CAPSULE ORAL
Qty: 0 | Refills: 0 | COMMUNITY
Start: 2017-09-21

## 2017-09-21 RX ORDER — ATORVASTATIN CALCIUM 80 MG/1
1 TABLET, FILM COATED ORAL
Qty: 0 | Refills: 0 | COMMUNITY

## 2017-09-21 RX ORDER — SENNA PLUS 8.6 MG/1
2 TABLET ORAL
Qty: 0 | Refills: 0 | COMMUNITY
Start: 2017-09-21

## 2017-09-21 RX ORDER — CYCLOBENZAPRINE HYDROCHLORIDE 10 MG/1
1 TABLET, FILM COATED ORAL
Qty: 45 | Refills: 0 | OUTPATIENT
Start: 2017-09-21 | End: 2017-10-06

## 2017-09-21 RX ORDER — ACETAMINOPHEN 500 MG
2 TABLET ORAL
Qty: 0 | Refills: 0 | DISCHARGE
Start: 2017-09-21

## 2017-09-21 RX ORDER — AMLODIPINE BESYLATE 2.5 MG/1
1 TABLET ORAL
Qty: 0 | Refills: 0 | COMMUNITY

## 2017-09-21 RX ORDER — ATORVASTATIN CALCIUM 80 MG/1
1 TABLET, FILM COATED ORAL
Qty: 0 | Refills: 0 | DISCHARGE
Start: 2017-09-21

## 2017-09-21 RX ORDER — METHOCARBAMOL 500 MG/1
1 TABLET, FILM COATED ORAL
Qty: 0 | Refills: 0 | COMMUNITY

## 2017-09-21 RX ORDER — ESTROGENS, CONJUGATED 0.625 MG
1 TABLET ORAL
Qty: 0 | Refills: 0 | COMMUNITY

## 2017-09-21 RX ORDER — AMLODIPINE BESYLATE 2.5 MG/1
1 TABLET ORAL
Qty: 0 | Refills: 0 | DISCHARGE
Start: 2017-09-21

## 2017-09-21 RX ORDER — ACETAMINOPHEN 500 MG
2 TABLET ORAL
Qty: 0 | Refills: 0 | COMMUNITY
Start: 2017-09-21

## 2017-09-21 RX ORDER — ESTROGENS, CONJUGATED 0.625 MG
1 TABLET ORAL
Qty: 0 | Refills: 0 | DISCHARGE
Start: 2017-09-21

## 2017-09-21 RX ORDER — DOCUSATE SODIUM 100 MG
1 CAPSULE ORAL
Qty: 0 | Refills: 0 | COMMUNITY
Start: 2017-09-21

## 2017-09-21 RX ORDER — SERTRALINE 25 MG/1
1 TABLET, FILM COATED ORAL
Qty: 0 | Refills: 0 | COMMUNITY

## 2017-09-21 RX ORDER — TIMOLOL 0.5 %
1 DROPS OPHTHALMIC (EYE)
Qty: 0 | Refills: 0 | DISCHARGE
Start: 2017-09-21

## 2017-09-21 RX ORDER — METHOCARBAMOL 500 MG/1
1 TABLET, FILM COATED ORAL
Qty: 0 | Refills: 0 | COMMUNITY
Start: 2017-09-21

## 2017-09-21 RX ORDER — TIMOLOL 0.5 %
0 DROPS OPHTHALMIC (EYE)
Qty: 0 | Refills: 0 | COMMUNITY

## 2017-09-21 RX ORDER — SERTRALINE 25 MG/1
1 TABLET, FILM COATED ORAL
Qty: 0 | Refills: 0 | DISCHARGE
Start: 2017-09-21

## 2017-09-21 RX ORDER — SUCRALFATE 1 G
10 TABLET ORAL
Qty: 0 | Refills: 0 | COMMUNITY

## 2017-09-21 RX ORDER — CYCLOBENZAPRINE HYDROCHLORIDE 10 MG/1
1 TABLET, FILM COATED ORAL
Qty: 0 | Refills: 0 | COMMUNITY

## 2017-09-21 RX ADMIN — METHOCARBAMOL 750 MILLIGRAM(S): 500 TABLET, FILM COATED ORAL at 11:47

## 2017-09-21 RX ADMIN — LOSARTAN POTASSIUM 50 MILLIGRAM(S): 100 TABLET, FILM COATED ORAL at 06:25

## 2017-09-21 RX ADMIN — PANTOPRAZOLE SODIUM 40 MILLIGRAM(S): 20 TABLET, DELAYED RELEASE ORAL at 06:25

## 2017-09-21 RX ADMIN — HYDROMORPHONE HYDROCHLORIDE 4 MILLIGRAM(S): 2 INJECTION INTRAMUSCULAR; INTRAVENOUS; SUBCUTANEOUS at 10:37

## 2017-09-21 RX ADMIN — Medication 1 GRAM(S): at 11:23

## 2017-09-21 RX ADMIN — Medication 0.62 MILLIGRAM(S): at 11:23

## 2017-09-21 RX ADMIN — HYDROMORPHONE HYDROCHLORIDE 4 MILLIGRAM(S): 2 INJECTION INTRAMUSCULAR; INTRAVENOUS; SUBCUTANEOUS at 03:15

## 2017-09-21 RX ADMIN — Medication 1 GRAM(S): at 06:25

## 2017-09-21 RX ADMIN — HYDROMORPHONE HYDROCHLORIDE 4 MILLIGRAM(S): 2 INJECTION INTRAMUSCULAR; INTRAVENOUS; SUBCUTANEOUS at 06:25

## 2017-09-21 RX ADMIN — GABAPENTIN 600 MILLIGRAM(S): 400 CAPSULE ORAL at 06:25

## 2017-09-21 RX ADMIN — HYDROMORPHONE HYDROCHLORIDE 4 MILLIGRAM(S): 2 INJECTION INTRAMUSCULAR; INTRAVENOUS; SUBCUTANEOUS at 10:12

## 2017-09-21 RX ADMIN — Medication 100 MILLIGRAM(S): at 06:25

## 2017-09-21 RX ADMIN — HYDROMORPHONE HYDROCHLORIDE 4 MILLIGRAM(S): 2 INJECTION INTRAMUSCULAR; INTRAVENOUS; SUBCUTANEOUS at 06:55

## 2017-09-21 RX ADMIN — INFLUENZA VIRUS VACCINE 0.5 MILLILITER(S): 15; 15; 15; 15 SUSPENSION INTRAMUSCULAR at 11:24

## 2017-09-21 RX ADMIN — HYDROMORPHONE HYDROCHLORIDE 4 MILLIGRAM(S): 2 INJECTION INTRAMUSCULAR; INTRAVENOUS; SUBCUTANEOUS at 02:44

## 2017-09-21 NOTE — DISCHARGE NOTE ADULT - CARE PLAN
Principal Discharge DX:	Osteoarthritis of cervical spine with myelopathy  Goal:	Removal of bilateral C7 Lateral mass screws  Instructions for follow-up, activity and diet:	Regular  Activity as tolerated

## 2017-09-21 NOTE — DISCHARGE NOTE ADULT - HOSPITAL COURSE
61 yr old female with HTN, HLD, GERD, cervical spine stenosis, underwent cervical spine surgery 7/17/17, reports severe neck pain a month after procedure, electrical pain shooting down the right arm. Patient underwent  revision b/l C7 lateral mass screws removal on 9/18/17. Post-operative course was non complicated. Patient feels better. She was evaluated by physical therapy and out patient treament was recommended

## 2017-09-21 NOTE — DISCHARGE NOTE ADULT - MEDICATION SUMMARY - MEDICATIONS TO TAKE
I will START or STAY ON the medications listed below when I get home from the hospital:    Vicodin 5 mg-300 mg oral tablet  -- 1 tab(s) by mouth every 6 hours  -- Indication: For Other spondylosis with myelopathy, cervical region    acetaminophen 325 mg oral tablet  -- 2 tab(s) by mouth every 6 hours, As needed, For Temp greater than 38 C (100.4 F)  -- Indication: For Fever    acetaminophen 325 mg oral tablet  -- 2 tab(s) by mouth every 6 hours, As needed, Mild Pain (1 - 3)  -- Indication: For Mild pain    HYDROmorphone 2 mg oral tablet  -- 2 tab(s) by mouth every 4 to 6 hours, As Needed -Moderate Pain  to Severe pain MDD:12  -- Indication: For Moderate to savere pain    Avapro 150 mg oral tablet  -- 1 tab(s) by mouth once a day  -- Indication: For HTH    gabapentin 600 mg oral tablet  -- 1 tab(s) by mouth 3 times a day  -- Indication: For NEUROPATHIC PAIN    sertraline 50 mg oral tablet  -- 1 tab(s) by mouth once a day  -- Indication: For Antidepressant    atorvastatin 40 mg oral tablet  -- 1 tab(s) by mouth once a day (at bedtime)  -- Indication: For Hyperlipidemia    amLODIPine 5 mg oral tablet  -- 1 tab(s) by mouth once a day (at bedtime)  -- Indication: For HTN    docusate sodium 100 mg oral capsule  -- 1 cap(s) by mouth 3 times a day  -- Indication: For Stool softener    senna oral tablet  -- 2 tab(s) by mouth once a day (at bedtime)  -- Indication: For Stool softener    sucralfate 1 g/10 mL oral suspension  -- 10 milliliter(s) by mouth 4 times a day (before meals and at bedtime)  -- Indication: For Gastritis    methocarbamol 750 mg oral tablet  -- 1 tab(s) by mouth 3 times a day, As needed, spasm  -- Indication: For Muscles relaxant    cyclobenzaprine 10 mg oral tablet  -- 1 tab(s) by mouth 3 times a day, As needed, Muscle Spasm MDD:3  -- Indication: For Muscles relaxant    timolol maleate 0.25% ophthalmic solution  -- 1 drop(s) to each affected eye once a day  -- Indication: For Ophthalmic gtt    NexIUM 40 mg oral delayed release capsule  -- 1 cap(s) by mouth 2 times a day  -- Indication: For Gastritis    conjugated estrogens 0.625 mg oral tablet  -- 1 tab(s) by mouth once a day  -- Indication: For Hormone replacement

## 2017-09-21 NOTE — PROGRESS NOTE ADULT - SUBJECTIVE AND OBJECTIVE BOX
Post-op day 3 s/p bilateral removal of C7 lateral mass screws    Overnight event: None     Vital Signs Last 24 Hrs  T(C): 36.7 (21 Sep 2017 09:00), Max: 36.9 (20 Sep 2017 13:35)  T(F): 98 (21 Sep 2017 09:00), Max: 98.5 (20 Sep 2017 13:35)  HR: 117 (21 Sep 2017 09:00) (84 - 117)  BP: 101/60 (21 Sep 2017 09:00) (101/60 - 138/76)  BP(mean): --  RR: 18 (21 Sep 2017 09:00) (18 - 18)  SpO2: 95% (21 Sep 2017 09:00) (95% - 98%)                          9.8    8.93  )-----------( 313      ( 21 Sep 2017 08:29 )             29.3    09-21    141  |  101  |  7   ----------------------------<  114<H>  4.7   |  27  |  0.73    Ca    9.9      21 Sep 2017 08:16       Stroke Core Measures      DRAIN OUTPUT:     NEUROIMAGING:     PHYSICAL EXAM:    General: No Acute Distress     Neurological: Awake, alert oriented x3, Moving all extremities, Muscle Strength normal in all four extremities, No Drift, Sensation to Light Touch Intact    Pulmonary: Clear to Auscultation, No Rales, No Rhonchi, No Wheezes     Cardiovascular: S1, S2, Regular Rate and Rhythm     Gastrointestinal: Soft, Nontender, Nondistended     Extremities: No calf tenderness     Incision: posterior cervical incision clean and intact    MEDICATIONS:   Antibiotics:    Neuro:  sertraline 50 milliGRAM(s) Oral daily  cyclobenzaprine 10 milliGRAM(s) Oral three times a day PRN Muscle Spasm  methocarbamol 750 milliGRAM(s) Oral three times a day PRN spasm  acetaminophen   Tablet 650 milliGRAM(s) Oral every 6 hours PRN For Temp greater than 38 C (100.4 F)  acetaminophen   Tablet. 650 milliGRAM(s) Oral every 6 hours PRN Mild Pain (1 - 3)  HYDROmorphone   Tablet 2 milliGRAM(s) Oral every 6 hours PRN Moderate Pain  ondansetron   Disintegrating Tablet 4 milliGRAM(s) Oral every 4 hours PRN Nausea  HYDROmorphone   Tablet 4 milliGRAM(s) Oral every 4 hours PRN Severe Pain (7 - 10)  gabapentin 600 milliGRAM(s) Oral three times a day    Anticoagulation:  enoxaparin Injectable 40 milliGRAM(s) SubCutaneous every 24 hours    Cardiology:  losartan 50 milliGRAM(s) Oral daily  amLODIPine   Tablet 5 milliGRAM(s) Oral at bedtime    Endo:   atorvastatin 40 milliGRAM(s) Oral at bedtime  estrogens    conjugated 0.625 milliGRAM(s) Oral daily    Pulm:    GI/:  docusate sodium 100 milliGRAM(s) Oral three times a day  sucralfate suspension 1 Gram(s) Oral Before meals and at bedtime  pantoprazole    Tablet 40 milliGRAM(s) Oral before breakfast  senna 2 Tablet(s) Oral at bedtime    Other:  timolol 0.25% Solution 1 Drop(s) Both EYES daily  influenza   Vaccine 0.5 milliLiter(s) IntraMuscular once

## 2017-09-21 NOTE — PROGRESS NOTE ADULT - PROBLEM SELECTOR PLAN 1
Post-op day 3 s/p bilateral removal of C7 lateral mass screws  Neurologically stable  Will d/c home today

## 2017-09-21 NOTE — DISCHARGE NOTE ADULT - PATIENT PORTAL LINK FT
“You can access the [facility] Patient Portal, offered by [facility], by registering with the following website: [email or web address]”

## 2017-09-21 NOTE — DISCHARGE NOTE ADULT - CARE PROVIDER_API CALL
David Mcgovern (MD), Neurological Surgery  [address] [place] [address]  Phone: [phone]  Fax: [phone]

## 2017-09-21 NOTE — DISCHARGE NOTE ADULT - REASON FOR ADMISSION
61 yr old female with HTN, HLD, GERD, cervical spine stenosis, underwent cervical spine surgery 7/17/17, reports severe neck pain a month after procedure, electrical pain shooting down the right arm and presents to PST for scheduled  revision b/l C7 lateral mass screws on 9/18/17. Denies fever, chills, pain 8/1

## 2017-10-21 RX ADMIN — HYDROMORPHONE HYDROCHLORIDE 1 MILLIGRAM(S): 2 INJECTION INTRAMUSCULAR; INTRAVENOUS; SUBCUTANEOUS at 22:46

## 2017-10-28 ENCOUNTER — INPATIENT (INPATIENT)
Facility: HOSPITAL | Age: 62
LOS: 10 days | Discharge: HOME | DRG: 30 | End: 2017-11-08
Attending: NEUROLOGICAL SURGERY | Admitting: EMERGENCY MEDICINE
Payer: COMMERCIAL

## 2017-10-28 VITALS
TEMPERATURE: 99 F | RESPIRATION RATE: 18 BRPM | OXYGEN SATURATION: 99 % | SYSTOLIC BLOOD PRESSURE: 131 MMHG | HEART RATE: 113 BPM | DIASTOLIC BLOOD PRESSURE: 58 MMHG

## 2017-10-28 DIAGNOSIS — Z98.890 OTHER SPECIFIED POSTPROCEDURAL STATES: Chronic | ICD-10-CM

## 2017-10-28 DIAGNOSIS — Z90.722 ACQUIRED ABSENCE OF OVARIES, BILATERAL: Chronic | ICD-10-CM

## 2017-10-28 DIAGNOSIS — M54.2 CERVICALGIA: ICD-10-CM

## 2017-10-28 DIAGNOSIS — Z90.89 ACQUIRED ABSENCE OF OTHER ORGANS: Chronic | ICD-10-CM

## 2017-10-28 DIAGNOSIS — Z90.49 ACQUIRED ABSENCE OF OTHER SPECIFIED PARTS OF DIGESTIVE TRACT: Chronic | ICD-10-CM

## 2017-10-28 DIAGNOSIS — Z90.710 ACQUIRED ABSENCE OF BOTH CERVIX AND UTERUS: Chronic | ICD-10-CM

## 2017-10-28 LAB
ALBUMIN SERPL ELPH-MCNC: 4.2 G/DL — SIGNIFICANT CHANGE UP (ref 3.3–5)
ALP SERPL-CCNC: 81 U/L — SIGNIFICANT CHANGE UP (ref 40–120)
ALT FLD-CCNC: 14 U/L RC — SIGNIFICANT CHANGE UP (ref 10–45)
ANION GAP SERPL CALC-SCNC: 11 MMOL/L — SIGNIFICANT CHANGE UP (ref 5–17)
APTT BLD: 27.6 SEC — SIGNIFICANT CHANGE UP (ref 27.5–37.4)
AST SERPL-CCNC: 20 U/L — SIGNIFICANT CHANGE UP (ref 10–40)
BASOPHILS # BLD AUTO: 0.1 K/UL — SIGNIFICANT CHANGE UP (ref 0–0.2)
BASOPHILS NFR BLD AUTO: 0.9 % — SIGNIFICANT CHANGE UP (ref 0–2)
BILIRUB SERPL-MCNC: <0.1 MG/DL — LOW (ref 0.2–1.2)
BUN SERPL-MCNC: 7 MG/DL — SIGNIFICANT CHANGE UP (ref 7–23)
CALCIUM SERPL-MCNC: 9.3 MG/DL — SIGNIFICANT CHANGE UP (ref 8.4–10.5)
CHLORIDE SERPL-SCNC: 101 MMOL/L — SIGNIFICANT CHANGE UP (ref 96–108)
CO2 SERPL-SCNC: 25 MMOL/L — SIGNIFICANT CHANGE UP (ref 22–31)
CREAT SERPL-MCNC: 0.69 MG/DL — SIGNIFICANT CHANGE UP (ref 0.5–1.3)
EOSINOPHIL # BLD AUTO: 0.2 K/UL — SIGNIFICANT CHANGE UP (ref 0–0.5)
EOSINOPHIL NFR BLD AUTO: 2.8 % — SIGNIFICANT CHANGE UP (ref 0–6)
GLUCOSE SERPL-MCNC: 117 MG/DL — HIGH (ref 70–99)
HCT VFR BLD CALC: 34.1 % — LOW (ref 34.5–45)
HGB BLD-MCNC: 11.1 G/DL — LOW (ref 11.5–15.5)
INR BLD: 1.03 RATIO — SIGNIFICANT CHANGE UP (ref 0.88–1.16)
LYMPHOCYTES # BLD AUTO: 3 K/UL — SIGNIFICANT CHANGE UP (ref 1–3.3)
LYMPHOCYTES # BLD AUTO: 37.2 % — SIGNIFICANT CHANGE UP (ref 13–44)
MCHC RBC-ENTMCNC: 27.9 PG — SIGNIFICANT CHANGE UP (ref 27–34)
MCHC RBC-ENTMCNC: 32.4 GM/DL — SIGNIFICANT CHANGE UP (ref 32–36)
MCV RBC AUTO: 86.2 FL — SIGNIFICANT CHANGE UP (ref 80–100)
MONOCYTES # BLD AUTO: 0.6 K/UL — SIGNIFICANT CHANGE UP (ref 0–0.9)
MONOCYTES NFR BLD AUTO: 7.3 % — SIGNIFICANT CHANGE UP (ref 2–14)
NEUTROPHILS # BLD AUTO: 4.2 K/UL — SIGNIFICANT CHANGE UP (ref 1.8–7.4)
NEUTROPHILS NFR BLD AUTO: 51.8 % — SIGNIFICANT CHANGE UP (ref 43–77)
PLATELET # BLD AUTO: 405 K/UL — HIGH (ref 150–400)
POTASSIUM SERPL-MCNC: 4.3 MMOL/L — SIGNIFICANT CHANGE UP (ref 3.5–5.3)
POTASSIUM SERPL-SCNC: 4.3 MMOL/L — SIGNIFICANT CHANGE UP (ref 3.5–5.3)
PROT SERPL-MCNC: 7.6 G/DL — SIGNIFICANT CHANGE UP (ref 6–8.3)
PROTHROM AB SERPL-ACNC: 11.2 SEC — SIGNIFICANT CHANGE UP (ref 9.8–12.7)
RBC # BLD: 3.96 M/UL — SIGNIFICANT CHANGE UP (ref 3.8–5.2)
RBC # FLD: 12.7 % — SIGNIFICANT CHANGE UP (ref 10.3–14.5)
SODIUM SERPL-SCNC: 137 MMOL/L — SIGNIFICANT CHANGE UP (ref 135–145)
WBC # BLD: 8.2 K/UL — SIGNIFICANT CHANGE UP (ref 3.8–10.5)
WBC # FLD AUTO: 8.2 K/UL — SIGNIFICANT CHANGE UP (ref 3.8–10.5)

## 2017-10-28 PROCEDURE — 99285 EMERGENCY DEPT VISIT HI MDM: CPT

## 2017-10-28 PROCEDURE — 72125 CT NECK SPINE W/O DYE: CPT | Mod: 26

## 2017-10-28 RX ORDER — AMLODIPINE BESYLATE 2.5 MG/1
5 TABLET ORAL AT BEDTIME
Qty: 0 | Refills: 0 | Status: DISCONTINUED | OUTPATIENT
Start: 2017-10-28 | End: 2017-11-01

## 2017-10-28 RX ORDER — ESTROGENS, CONJUGATED 0.625 MG
0.62 TABLET ORAL DAILY
Qty: 0 | Refills: 0 | Status: DISCONTINUED | OUTPATIENT
Start: 2017-10-28 | End: 2017-11-04

## 2017-10-28 RX ORDER — DEXAMETHASONE 0.5 MG/5ML
10 ELIXIR ORAL ONCE
Qty: 0 | Refills: 0 | Status: COMPLETED | OUTPATIENT
Start: 2017-10-28 | End: 2017-10-28

## 2017-10-28 RX ORDER — DIAZEPAM 5 MG
5 TABLET ORAL ONCE
Qty: 0 | Refills: 0 | Status: DISCONTINUED | OUTPATIENT
Start: 2017-10-28 | End: 2017-10-28

## 2017-10-28 RX ORDER — HYDROMORPHONE HYDROCHLORIDE 2 MG/ML
0.5 INJECTION INTRAMUSCULAR; INTRAVENOUS; SUBCUTANEOUS ONCE
Qty: 0 | Refills: 0 | Status: DISCONTINUED | OUTPATIENT
Start: 2017-10-28 | End: 2017-10-28

## 2017-10-28 RX ORDER — SUCRALFATE 1 G
1 TABLET ORAL
Qty: 0 | Refills: 0 | Status: DISCONTINUED | OUTPATIENT
Start: 2017-10-28 | End: 2017-11-04

## 2017-10-28 RX ORDER — SERTRALINE 25 MG/1
50 TABLET, FILM COATED ORAL DAILY
Qty: 0 | Refills: 0 | Status: DISCONTINUED | OUTPATIENT
Start: 2017-10-28 | End: 2017-11-04

## 2017-10-28 RX ORDER — ACETAMINOPHEN 500 MG
1000 TABLET ORAL ONCE
Qty: 0 | Refills: 0 | Status: COMPLETED | OUTPATIENT
Start: 2017-10-28 | End: 2017-10-28

## 2017-10-28 RX ORDER — METHOCARBAMOL 500 MG/1
750 TABLET, FILM COATED ORAL THREE TIMES A DAY
Qty: 0 | Refills: 0 | Status: DISCONTINUED | OUTPATIENT
Start: 2017-10-28 | End: 2017-11-04

## 2017-10-28 RX ORDER — SENNA PLUS 8.6 MG/1
2 TABLET ORAL AT BEDTIME
Qty: 0 | Refills: 0 | Status: DISCONTINUED | OUTPATIENT
Start: 2017-10-28 | End: 2017-11-04

## 2017-10-28 RX ORDER — CYCLOBENZAPRINE HYDROCHLORIDE 10 MG/1
10 TABLET, FILM COATED ORAL THREE TIMES A DAY
Qty: 0 | Refills: 0 | Status: DISCONTINUED | OUTPATIENT
Start: 2017-10-28 | End: 2017-11-04

## 2017-10-28 RX ORDER — DOCUSATE SODIUM 100 MG
100 CAPSULE ORAL THREE TIMES A DAY
Qty: 0 | Refills: 0 | Status: DISCONTINUED | OUTPATIENT
Start: 2017-10-28 | End: 2017-11-04

## 2017-10-28 RX ORDER — HYDROMORPHONE HYDROCHLORIDE 2 MG/ML
2 INJECTION INTRAMUSCULAR; INTRAVENOUS; SUBCUTANEOUS EVERY 4 HOURS
Qty: 0 | Refills: 0 | Status: DISCONTINUED | OUTPATIENT
Start: 2017-10-28 | End: 2017-11-04

## 2017-10-28 RX ORDER — LOSARTAN POTASSIUM 100 MG/1
50 TABLET, FILM COATED ORAL DAILY
Qty: 0 | Refills: 0 | Status: DISCONTINUED | OUTPATIENT
Start: 2017-10-28 | End: 2017-11-01

## 2017-10-28 RX ORDER — TIMOLOL 0.5 %
1 DROPS OPHTHALMIC (EYE) DAILY
Qty: 0 | Refills: 0 | Status: DISCONTINUED | OUTPATIENT
Start: 2017-10-28 | End: 2017-11-04

## 2017-10-28 RX ORDER — GABAPENTIN 400 MG/1
600 CAPSULE ORAL THREE TIMES A DAY
Qty: 0 | Refills: 0 | Status: DISCONTINUED | OUTPATIENT
Start: 2017-10-28 | End: 2017-11-04

## 2017-10-28 RX ORDER — PANTOPRAZOLE SODIUM 20 MG/1
40 TABLET, DELAYED RELEASE ORAL
Qty: 0 | Refills: 0 | Status: DISCONTINUED | OUTPATIENT
Start: 2017-10-28 | End: 2017-11-04

## 2017-10-28 RX ORDER — ATORVASTATIN CALCIUM 80 MG/1
40 TABLET, FILM COATED ORAL AT BEDTIME
Qty: 0 | Refills: 0 | Status: DISCONTINUED | OUTPATIENT
Start: 2017-10-28 | End: 2017-11-04

## 2017-10-28 RX ADMIN — HYDROMORPHONE HYDROCHLORIDE 0.5 MILLIGRAM(S): 2 INJECTION INTRAMUSCULAR; INTRAVENOUS; SUBCUTANEOUS at 20:33

## 2017-10-28 RX ADMIN — Medication 400 MILLIGRAM(S): at 23:25

## 2017-10-28 RX ADMIN — HYDROMORPHONE HYDROCHLORIDE 0.5 MILLIGRAM(S): 2 INJECTION INTRAMUSCULAR; INTRAVENOUS; SUBCUTANEOUS at 21:35

## 2017-10-28 RX ADMIN — Medication 5 MILLIGRAM(S): at 20:34

## 2017-10-28 RX ADMIN — HYDROMORPHONE HYDROCHLORIDE 0.5 MILLIGRAM(S): 2 INJECTION INTRAMUSCULAR; INTRAVENOUS; SUBCUTANEOUS at 18:29

## 2017-10-28 RX ADMIN — Medication 102 MILLIGRAM(S): at 21:32

## 2017-10-28 RX ADMIN — HYDROMORPHONE HYDROCHLORIDE 0.5 MILLIGRAM(S): 2 INJECTION INTRAMUSCULAR; INTRAVENOUS; SUBCUTANEOUS at 19:36

## 2017-10-28 NOTE — ED PROVIDER NOTE - OBJECTIVE STATEMENT
Patient is 61 y F with PMH GERD, hld, htn, herniated cervical disc s/p surgery, PUD presenting with left neck pain radiating down left arm for 3 -4 days upon waking, gradually worsening, a/w numbness and weakness in the LUE. Before injury had been doing laundry and had a new refrigerator delivered, may have aggravated neck. Had revision of c spine surgery 9/18. No saddle anesthesia, urinary/bowel incontinence, fevers, or unintentional weight loss. Called surgeons office, was told to go to ED.    PMD: Austyn Zavala  Surg: David Mcgovern   ROS: Denies fever, palpitations, chills, recent sickness, HA, vision changes, cough, SOB, chest pain, abdominal pain, n/v/d/c, dysuria, hematuria, rash, new joint aches, sick contacts, and recent travel.

## 2017-10-28 NOTE — ED PROVIDER NOTE - MEDICAL DECISION MAKING DETAILS
Increased pain with LUE weakness on exam, concern for cervical radiculopathy vs cord compression, could also be muscle spasm. Plan: pain control, nsg consult, labs, ct c spine

## 2017-10-28 NOTE — ED ADULT NURSE NOTE - PSH
History of appendectomy    History of arthroscopy of right knee    History of bilateral oophorectomy    History of carpal tunnel release    History of cholecystectomy    History of hysterectomy    History of tonsillectomy    S/P spinal surgery  7/17/17 - cervical region with hardware

## 2017-10-28 NOTE — ED ADULT NURSE NOTE - OBJECTIVE STATEMENT
60 y/o female presenting to ED c/o back pain radiating down her left arm since Wednesday morning.  Pt states she is s/p cervical spinal surgery in June with a revision in September. Pt. states pain is sharp, and "spasm" like with numbness and tingling down her left arm. Denies chest pain, nausea, vomiting, weakness, dizziness, diarrhea, fevers, chills. Pt states she feels short of breath when she has spasms. A&Ox4 gross neuro intact, lungs clear and equal bilateral, s1s2 heart sounds heard, pulses x4, abdomen soft nontender nondistended, skin intact. Pt has healed surgical incisions on cervical spine, and left side of neck. Safety and comfort maintained. Daughter at the bedside.

## 2017-10-28 NOTE — ED PROVIDER NOTE - PHYSICAL EXAMINATION
Gen: NAD, AOx3  Head: NCAT  HEENT: PERRL, oral mucosa moist, normal conjunctiva  Lung: CTAB, no respiratory distress  CV: rrr, no murmurs, Normal perfusion  Abd: soft, NTND, no CVA tenderness  MSK: No edema, no visible deformities, entire spine without midline tenderness and with full ROM, +left sided muscular neck tenderness, no stepoffs or masses  Neuro: No focal neurologic deficits, CN intact, motor LUE 4/5 RUE 5/5 RLE 5/5 LLE 5/5, and sensation intact, no cerebellar signs   Skin: No rash   Psych: normal affect

## 2017-10-28 NOTE — ED PROVIDER NOTE - ATTENDING CONTRIBUTION TO CARE
ATTENDING MD:  Kyle SHERIFF, personally have seen and examined this patient.  I have discussed all aspects of care with the resident physician. Resident note reviewed and agree on plan of care and except where noted.  See HPI, PE, and MDM for details.    GEN: uncomfortable, AOx4  HEENT: NCAT, pupils equal round and reactive to light, extra-occular movements are intact, mucus membranes are moist, oropharynx is within normal limits, well-healed anterior and posterior neck surgical scars  CHEST: nontender, lungs clear to auscultation bilaterally, equal breath sounds bilaterally  CV: normal rate, regular rhythm, 2+ radial pulses bilaterally  ABD: soft, nontneder  SKIN; no rash  MSK: no midline neck tenderness, L-trap tenderness and L-upper arm diffuse tenderness, no swelling, no assymetry, full neck ROM but with pain  NEURO: CNII-XII intact, 5/5 strenght in UEs and UEs, sensation intact to light touch in UEs and UEs, coordination intact  PSYCH: appropriate affect    MDM: acute on chronic pain with radiation down arm, no objective deficits, neurosurg consult. dispo per neurosurgery.

## 2017-10-28 NOTE — ED PROVIDER NOTE - PROGRESS NOTE DETAILS
paged neuro surgery to discuss the case -[facility] neurosurgery says no obvious finding son CT will admit for MRI. Expect <48h stay.

## 2017-10-29 LAB
CK MB BLD-MCNC: 4.1 % — HIGH (ref 0–3.5)
CK MB CFR SERPL CALC: 1.2 NG/ML — SIGNIFICANT CHANGE UP (ref 0–3.8)
CK MB CFR SERPL CALC: 1.6 NG/ML — SIGNIFICANT CHANGE UP (ref 0–3.8)
CK SERPL-CCNC: 34 U/L — SIGNIFICANT CHANGE UP (ref 25–170)
CK SERPL-CCNC: 39 U/L — SIGNIFICANT CHANGE UP (ref 25–170)
TROPONIN T SERPL-MCNC: <0.01 NG/ML — SIGNIFICANT CHANGE UP (ref 0–0.06)
TROPONIN T SERPL-MCNC: <0.01 NG/ML — SIGNIFICANT CHANGE UP (ref 0–0.06)

## 2017-10-29 PROCEDURE — 71550 MRI CHEST W/O DYE: CPT | Mod: 26,LT

## 2017-10-29 PROCEDURE — 93010 ELECTROCARDIOGRAM REPORT: CPT

## 2017-10-29 PROCEDURE — 72141 MRI NECK SPINE W/O DYE: CPT | Mod: 26

## 2017-10-29 RX ORDER — DEXAMETHASONE 0.5 MG/5ML
4 ELIXIR ORAL EVERY 6 HOURS
Qty: 0 | Refills: 0 | Status: DISCONTINUED | OUTPATIENT
Start: 2017-10-29 | End: 2017-11-04

## 2017-10-29 RX ORDER — ONDANSETRON 8 MG/1
4 TABLET, FILM COATED ORAL EVERY 6 HOURS
Qty: 0 | Refills: 0 | Status: DISCONTINUED | OUTPATIENT
Start: 2017-10-29 | End: 2017-11-04

## 2017-10-29 RX ORDER — ENOXAPARIN SODIUM 100 MG/ML
40 INJECTION SUBCUTANEOUS AT BEDTIME
Qty: 0 | Refills: 0 | Status: DISCONTINUED | OUTPATIENT
Start: 2017-10-29 | End: 2017-11-03

## 2017-10-29 RX ORDER — HYDROMORPHONE HYDROCHLORIDE 2 MG/ML
0.5 INJECTION INTRAMUSCULAR; INTRAVENOUS; SUBCUTANEOUS
Qty: 0 | Refills: 0 | Status: DISCONTINUED | OUTPATIENT
Start: 2017-10-29 | End: 2017-10-30

## 2017-10-29 RX ORDER — PANTOPRAZOLE SODIUM 20 MG/1
40 TABLET, DELAYED RELEASE ORAL DAILY
Qty: 0 | Refills: 0 | Status: DISCONTINUED | OUTPATIENT
Start: 2017-10-29 | End: 2017-11-04

## 2017-10-29 RX ADMIN — HYDROMORPHONE HYDROCHLORIDE 2 MILLIGRAM(S): 2 INJECTION INTRAMUSCULAR; INTRAVENOUS; SUBCUTANEOUS at 13:40

## 2017-10-29 RX ADMIN — HYDROMORPHONE HYDROCHLORIDE 2 MILLIGRAM(S): 2 INJECTION INTRAMUSCULAR; INTRAVENOUS; SUBCUTANEOUS at 06:54

## 2017-10-29 RX ADMIN — AMLODIPINE BESYLATE 5 MILLIGRAM(S): 2.5 TABLET ORAL at 22:05

## 2017-10-29 RX ADMIN — Medication 100 MILLIGRAM(S): at 06:56

## 2017-10-29 RX ADMIN — GABAPENTIN 600 MILLIGRAM(S): 400 CAPSULE ORAL at 22:05

## 2017-10-29 RX ADMIN — HYDROMORPHONE HYDROCHLORIDE 0.5 MILLIGRAM(S): 2 INJECTION INTRAMUSCULAR; INTRAVENOUS; SUBCUTANEOUS at 05:00

## 2017-10-29 RX ADMIN — Medication 1 GRAM(S): at 17:18

## 2017-10-29 RX ADMIN — Medication 100 MILLIGRAM(S): at 13:10

## 2017-10-29 RX ADMIN — Medication 1 GRAM(S): at 13:10

## 2017-10-29 RX ADMIN — SENNA PLUS 2 TABLET(S): 8.6 TABLET ORAL at 22:05

## 2017-10-29 RX ADMIN — HYDROMORPHONE HYDROCHLORIDE 2 MILLIGRAM(S): 2 INJECTION INTRAMUSCULAR; INTRAVENOUS; SUBCUTANEOUS at 22:05

## 2017-10-29 RX ADMIN — Medication 1 GRAM(S): at 06:56

## 2017-10-29 RX ADMIN — Medication 4 MILLIGRAM(S): at 23:30

## 2017-10-29 RX ADMIN — HYDROMORPHONE HYDROCHLORIDE 2 MILLIGRAM(S): 2 INJECTION INTRAMUSCULAR; INTRAVENOUS; SUBCUTANEOUS at 13:10

## 2017-10-29 RX ADMIN — SERTRALINE 50 MILLIGRAM(S): 25 TABLET, FILM COATED ORAL at 22:05

## 2017-10-29 RX ADMIN — HYDROMORPHONE HYDROCHLORIDE 2 MILLIGRAM(S): 2 INJECTION INTRAMUSCULAR; INTRAVENOUS; SUBCUTANEOUS at 17:50

## 2017-10-29 RX ADMIN — GABAPENTIN 600 MILLIGRAM(S): 400 CAPSULE ORAL at 13:10

## 2017-10-29 RX ADMIN — Medication 0.62 MILLIGRAM(S): at 13:10

## 2017-10-29 RX ADMIN — HYDROMORPHONE HYDROCHLORIDE 2 MILLIGRAM(S): 2 INJECTION INTRAMUSCULAR; INTRAVENOUS; SUBCUTANEOUS at 17:18

## 2017-10-29 RX ADMIN — Medication 4 MILLIGRAM(S): at 06:54

## 2017-10-29 RX ADMIN — Medication 4 MILLIGRAM(S): at 13:10

## 2017-10-29 RX ADMIN — PANTOPRAZOLE SODIUM 40 MILLIGRAM(S): 20 TABLET, DELAYED RELEASE ORAL at 13:10

## 2017-10-29 RX ADMIN — LOSARTAN POTASSIUM 50 MILLIGRAM(S): 100 TABLET, FILM COATED ORAL at 06:54

## 2017-10-29 RX ADMIN — Medication 1 GRAM(S): at 22:05

## 2017-10-29 RX ADMIN — HYDROMORPHONE HYDROCHLORIDE 2 MILLIGRAM(S): 2 INJECTION INTRAMUSCULAR; INTRAVENOUS; SUBCUTANEOUS at 22:35

## 2017-10-29 RX ADMIN — Medication 4 MILLIGRAM(S): at 17:18

## 2017-10-29 RX ADMIN — ENOXAPARIN SODIUM 40 MILLIGRAM(S): 100 INJECTION SUBCUTANEOUS at 22:05

## 2017-10-29 RX ADMIN — CYCLOBENZAPRINE HYDROCHLORIDE 10 MILLIGRAM(S): 10 TABLET, FILM COATED ORAL at 10:12

## 2017-10-29 RX ADMIN — HYDROMORPHONE HYDROCHLORIDE 0.5 MILLIGRAM(S): 2 INJECTION INTRAMUSCULAR; INTRAVENOUS; SUBCUTANEOUS at 04:23

## 2017-10-29 RX ADMIN — GABAPENTIN 600 MILLIGRAM(S): 400 CAPSULE ORAL at 06:54

## 2017-10-29 RX ADMIN — ATORVASTATIN CALCIUM 40 MILLIGRAM(S): 80 TABLET, FILM COATED ORAL at 22:05

## 2017-10-29 RX ADMIN — CYCLOBENZAPRINE HYDROCHLORIDE 10 MILLIGRAM(S): 10 TABLET, FILM COATED ORAL at 23:30

## 2017-10-29 RX ADMIN — HYDROMORPHONE HYDROCHLORIDE 2 MILLIGRAM(S): 2 INJECTION INTRAMUSCULAR; INTRAVENOUS; SUBCUTANEOUS at 01:06

## 2017-10-29 RX ADMIN — Medication 1 DROP(S): at 13:10

## 2017-10-29 RX ADMIN — Medication 100 MILLIGRAM(S): at 22:05

## 2017-10-29 NOTE — H&P ADULT - HISTORY OF PRESENT ILLNESS
Emma Patel is a 61-year old woman who had a C4-C5 corpectomy, C6-C7 ACDF, C3-C7 anterior stabilization and fusion, and C2-C7 posterior stabilization and fusion on 06/14/2017. On 09/18/2017 she had the bilateral C7 screws removed for radicular pain and evidence that the screws were compressing the bilateral C7 nerve roots on her imaging.    Today she presents with worsening pain and weakness in her left upper extremity that started on Wednesday. She denies any trauma and has not heard any pops or clicks. She is having painful spasms in the left shoulder and pain in the scapula as well. CT cervical spine shows that her hardware is in good placement. Emma Patel is a 61-year old woman who had a C4-C5 corpectomy, C6-C7 ACDF, C3-C7 anterior stabilization and fusion, and C2-C7 posterior stabilization and fusion on 06/14/2017. On 09/18/2017 she had the bilateral C7 screws removed for radicular pain and evidence that the screws were compressing the bilateral C7 nerve roots on her imaging.    Today she presents with worsening pain and weakness in her left upper extremity that started on Wednesday. She denies any trauma and has not heard any pops or clicks. She is having painful spasms in the left shoulder and pain in the scapula as well. She has intermittent sharp, stabbing pains from the elbow down to the lateral left hand (C8 and T1 dermatomes).    CT cervical spine shows that her hardware is in good placement. Emma Patel is a 61-year old woman who had a C4-C5 corpectomy, C6-C7 ACDF, C3-C7 anterior stabilization and fusion, and C2-C7 posterior stabilization and fusion on 06/14/2017. On 09/18/2017 she had the bilateral C7 screws removed for radicular pain and evidence that the screws were compressing the bilateral C7 nerve roots on her imaging.    Today she presents with acute onset of pain in her left upper extremity that started on 10/25/17. On 10/28/17, she started to develop weakness in the entire left arm. She denies any trauma and has not heard any pops or clicks. She is having painful spasms in the left shoulder and pain in the scapula as well. She has intermittent sharp, stabbing pains from the elbow down to the lateral left hand (C8 and T1 dermatomes).    CT cervical spine shows that her hardware is in good placement.

## 2017-10-29 NOTE — CONSULT NOTE ADULT - ASSESSMENT
atypical chest pain   likely neurologic / neuropathic  obtain ekg and cardiac enzymes     HTN  stable    HLD  on statin

## 2017-10-29 NOTE — PROGRESS NOTE ADULT - ASSESSMENT
HPI:  Patient is a 61 year old female with prior C4-C5 corpectomy, C6-C7 ACDF, and C3-C7 anterior stabilization/fusion, and C2-C7 posterior stabilization/fusion on 6/14/2017.  Returned for removal of bilateral C7 screws for radicular pain on 9/18/2017.  Now presents with acute onset of left upper extremity/shoulder pain.  Admitted for further management.      PROCEDURE: s/p C4-C5 corpectomy, C6-C7 ACDF, and C3-C7 anterior stabilization/fusion, and C2-C7 posterior stabilization/fusion on 6/14/2017, s/p removal of bilateral C7 screws on 9/18/2017  POD#[id number], #[id number]    PLAN:  -mri brachial plexus and mri cervical spine pending  -lovenox and SCDs for DVT prophylaxis  -continue decadron  -dilaudid for pain control  -robaxin and flexeril for muscle spasms  -senna and colace for bowel regimen  -ekg reviewed w/ cardiology, no acute or concerning changes as per Dr. Patiño  -trend cardiac enzymes x3, negative x1  -regular diet  -out of bed with assistance  -pt eval pending  -am labs    Spectra #[id number]

## 2017-10-29 NOTE — H&P ADULT - ATTENDING COMMENTS
Pt seen and examined. Onset of left shoulder pain 4 days ago and left arm weakness 2 days ago. PEx notable for left shoulder tenderness to palpation, 4/5 weakness involving left deltoid, biceps, triceps, and HI (L C5-C8 dermatomes). Pt feels she may have had an upper respiratory infection after her recent cervical spine surgery.  CT Cervical spine demonstrates post-op changes.    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 Will obtain MRI of Cervical spine and left brachial plexus. Pt on IV steroids.

## 2017-10-29 NOTE — H&P ADULT - NSHPLABSRESULTS_GEN_ALL_CORE
CT Cervical Spine  IMPRESSION:  Status  post  anterior  and  posterior  fusion  from  C3  through  T1  with  colectomy  is  at  C4/C5.  No  evidence  of  a  periprosthetic  fracture  or  traumatic  malalignment.  Suboptimal  assessment  of  the  intraspinal  canal  contents  due  to  streak  artifact.  Consider  MRI  for  further  evaluation  if  patient's  symptoms  persist  or  worsen.

## 2017-10-29 NOTE — H&P ADULT - ASSESSMENT
61-year old woman s/p front-back cervical spine surgery with instrumentation, hardware, now w/ sudden onset debilitating LEFT shoulder pain of unclear etiology. Hardware in good placement on the CT.   -will get MRI cervical spine and MRI brachial plexus. Concern for Parsonage-Wallace syndrome  -IV decadron   -d/w Dr. Mcgovern. 61-year old woman s/p front-back cervical spine surgery with instrumentation, hardware, now w/ sudden onset debilitating LEFT shoulder pain of unclear etiology. Hardware in good placement on the CT.   -will get MRI cervical spine and MRI brachial plexus. Concern for Parsonage-Wallace syndrome  -IV decadron   -Pain control  -d/w Dr. Mcgovern who is in agreement w/ the plan

## 2017-10-29 NOTE — H&P ADULT - NSHPPHYSICALEXAM_GEN_ALL_CORE
AAOx3   FC  PERRL, EOMI, face symmetric, tongue midline  RUE 5/5  LUE cannot raise shoulder past 90 degrees, shoulder shrug 4-/5, remainder of LUE 4/5   Lowers 5/5  sensation intact to light touch throughout except in L distal fingertips, decreased, baseline  Shoulder is TTP on the LEFT   No hyperreflexia   No Eugene's sign or Babinski sign present   No ankle clonus   Incisions clean, dry, intact, well healed

## 2017-10-30 LAB
ANION GAP SERPL CALC-SCNC: 13 MMOL/L — SIGNIFICANT CHANGE UP (ref 5–17)
BUN SERPL-MCNC: 13 MG/DL — SIGNIFICANT CHANGE UP (ref 7–23)
CALCIUM SERPL-MCNC: 9.9 MG/DL — SIGNIFICANT CHANGE UP (ref 8.4–10.5)
CHLORIDE SERPL-SCNC: 100 MMOL/L — SIGNIFICANT CHANGE UP (ref 96–108)
CK MB BLD-MCNC: 5.1 % — HIGH (ref 0–3.5)
CK MB CFR SERPL CALC: 2.3 NG/ML — SIGNIFICANT CHANGE UP (ref 0–3.8)
CK SERPL-CCNC: 45 U/L — SIGNIFICANT CHANGE UP (ref 25–170)
CO2 SERPL-SCNC: 24 MMOL/L — SIGNIFICANT CHANGE UP (ref 22–31)
CREAT SERPL-MCNC: 0.69 MG/DL — SIGNIFICANT CHANGE UP (ref 0.5–1.3)
GLUCOSE SERPL-MCNC: 130 MG/DL — HIGH (ref 70–99)
HCT VFR BLD CALC: 32.4 % — LOW (ref 34.5–45)
HGB BLD-MCNC: 11.2 G/DL — LOW (ref 11.5–15.5)
MCHC RBC-ENTMCNC: 29.6 PG — SIGNIFICANT CHANGE UP (ref 27–34)
MCHC RBC-ENTMCNC: 34.5 GM/DL — SIGNIFICANT CHANGE UP (ref 32–36)
MCV RBC AUTO: 85.8 FL — SIGNIFICANT CHANGE UP (ref 80–100)
PLATELET # BLD AUTO: 413 K/UL — HIGH (ref 150–400)
POTASSIUM SERPL-MCNC: 4.4 MMOL/L — SIGNIFICANT CHANGE UP (ref 3.5–5.3)
POTASSIUM SERPL-SCNC: 4.4 MMOL/L — SIGNIFICANT CHANGE UP (ref 3.5–5.3)
RBC # BLD: 3.78 M/UL — LOW (ref 3.8–5.2)
RBC # FLD: 12.6 % — SIGNIFICANT CHANGE UP (ref 10.3–14.5)
SODIUM SERPL-SCNC: 137 MMOL/L — SIGNIFICANT CHANGE UP (ref 135–145)
TROPONIN T SERPL-MCNC: <0.01 NG/ML — SIGNIFICANT CHANGE UP (ref 0–0.06)
WBC # BLD: 12.4 K/UL — HIGH (ref 3.8–10.5)
WBC # FLD AUTO: 12.4 K/UL — HIGH (ref 3.8–10.5)

## 2017-10-30 RX ADMIN — Medication 100 MILLIGRAM(S): at 06:13

## 2017-10-30 RX ADMIN — GABAPENTIN 600 MILLIGRAM(S): 400 CAPSULE ORAL at 21:41

## 2017-10-30 RX ADMIN — ENOXAPARIN SODIUM 40 MILLIGRAM(S): 100 INJECTION SUBCUTANEOUS at 21:41

## 2017-10-30 RX ADMIN — HYDROMORPHONE HYDROCHLORIDE 2 MILLIGRAM(S): 2 INJECTION INTRAMUSCULAR; INTRAVENOUS; SUBCUTANEOUS at 22:10

## 2017-10-30 RX ADMIN — Medication 1 GRAM(S): at 17:38

## 2017-10-30 RX ADMIN — HYDROMORPHONE HYDROCHLORIDE 2 MILLIGRAM(S): 2 INJECTION INTRAMUSCULAR; INTRAVENOUS; SUBCUTANEOUS at 21:41

## 2017-10-30 RX ADMIN — SENNA PLUS 2 TABLET(S): 8.6 TABLET ORAL at 21:41

## 2017-10-30 RX ADMIN — HYDROMORPHONE HYDROCHLORIDE 2 MILLIGRAM(S): 2 INJECTION INTRAMUSCULAR; INTRAVENOUS; SUBCUTANEOUS at 18:08

## 2017-10-30 RX ADMIN — Medication 0.62 MILLIGRAM(S): at 12:02

## 2017-10-30 RX ADMIN — PANTOPRAZOLE SODIUM 40 MILLIGRAM(S): 20 TABLET, DELAYED RELEASE ORAL at 06:13

## 2017-10-30 RX ADMIN — HYDROMORPHONE HYDROCHLORIDE 0.5 MILLIGRAM(S): 2 INJECTION INTRAMUSCULAR; INTRAVENOUS; SUBCUTANEOUS at 01:15

## 2017-10-30 RX ADMIN — LOSARTAN POTASSIUM 50 MILLIGRAM(S): 100 TABLET, FILM COATED ORAL at 06:13

## 2017-10-30 RX ADMIN — Medication 4 MILLIGRAM(S): at 12:02

## 2017-10-30 RX ADMIN — HYDROMORPHONE HYDROCHLORIDE 2 MILLIGRAM(S): 2 INJECTION INTRAMUSCULAR; INTRAVENOUS; SUBCUTANEOUS at 17:38

## 2017-10-30 RX ADMIN — SERTRALINE 50 MILLIGRAM(S): 25 TABLET, FILM COATED ORAL at 21:41

## 2017-10-30 RX ADMIN — Medication 1 DROP(S): at 12:02

## 2017-10-30 RX ADMIN — Medication 4 MILLIGRAM(S): at 17:38

## 2017-10-30 RX ADMIN — AMLODIPINE BESYLATE 5 MILLIGRAM(S): 2.5 TABLET ORAL at 21:41

## 2017-10-30 RX ADMIN — HYDROMORPHONE HYDROCHLORIDE 2 MILLIGRAM(S): 2 INJECTION INTRAMUSCULAR; INTRAVENOUS; SUBCUTANEOUS at 02:43

## 2017-10-30 RX ADMIN — GABAPENTIN 600 MILLIGRAM(S): 400 CAPSULE ORAL at 06:13

## 2017-10-30 RX ADMIN — HYDROMORPHONE HYDROCHLORIDE 0.5 MILLIGRAM(S): 2 INJECTION INTRAMUSCULAR; INTRAVENOUS; SUBCUTANEOUS at 08:51

## 2017-10-30 RX ADMIN — METHOCARBAMOL 750 MILLIGRAM(S): 500 TABLET, FILM COATED ORAL at 14:48

## 2017-10-30 RX ADMIN — HYDROMORPHONE HYDROCHLORIDE 0.5 MILLIGRAM(S): 2 INJECTION INTRAMUSCULAR; INTRAVENOUS; SUBCUTANEOUS at 09:12

## 2017-10-30 RX ADMIN — Medication 1 GRAM(S): at 12:02

## 2017-10-30 RX ADMIN — Medication 100 MILLIGRAM(S): at 14:49

## 2017-10-30 RX ADMIN — ATORVASTATIN CALCIUM 40 MILLIGRAM(S): 80 TABLET, FILM COATED ORAL at 21:41

## 2017-10-30 RX ADMIN — Medication 4 MILLIGRAM(S): at 23:18

## 2017-10-30 RX ADMIN — HYDROMORPHONE HYDROCHLORIDE 2 MILLIGRAM(S): 2 INJECTION INTRAMUSCULAR; INTRAVENOUS; SUBCUTANEOUS at 03:15

## 2017-10-30 RX ADMIN — HYDROMORPHONE HYDROCHLORIDE 0.5 MILLIGRAM(S): 2 INJECTION INTRAMUSCULAR; INTRAVENOUS; SUBCUTANEOUS at 00:43

## 2017-10-30 RX ADMIN — Medication 1 GRAM(S): at 06:13

## 2017-10-30 RX ADMIN — Medication 100 MILLIGRAM(S): at 21:41

## 2017-10-30 RX ADMIN — Medication 1 GRAM(S): at 21:41

## 2017-10-30 RX ADMIN — HYDROMORPHONE HYDROCHLORIDE 2 MILLIGRAM(S): 2 INJECTION INTRAMUSCULAR; INTRAVENOUS; SUBCUTANEOUS at 07:36

## 2017-10-30 RX ADMIN — Medication 4 MILLIGRAM(S): at 06:13

## 2017-10-30 RX ADMIN — HYDROMORPHONE HYDROCHLORIDE 2 MILLIGRAM(S): 2 INJECTION INTRAMUSCULAR; INTRAVENOUS; SUBCUTANEOUS at 12:40

## 2017-10-30 RX ADMIN — GABAPENTIN 600 MILLIGRAM(S): 400 CAPSULE ORAL at 14:49

## 2017-10-30 RX ADMIN — CYCLOBENZAPRINE HYDROCHLORIDE 10 MILLIGRAM(S): 10 TABLET, FILM COATED ORAL at 12:11

## 2017-10-30 RX ADMIN — HYDROMORPHONE HYDROCHLORIDE 2 MILLIGRAM(S): 2 INJECTION INTRAMUSCULAR; INTRAVENOUS; SUBCUTANEOUS at 07:02

## 2017-10-30 RX ADMIN — HYDROMORPHONE HYDROCHLORIDE 2 MILLIGRAM(S): 2 INJECTION INTRAMUSCULAR; INTRAVENOUS; SUBCUTANEOUS at 12:10

## 2017-10-30 NOTE — PROGRESS NOTE ADULT - ASSESSMENT
atypical chest pain   likely neurologic / neuropathic  neg cardiac enzymes     HTN  stable    HLD  on statin

## 2017-10-30 NOTE — PHYSICAL THERAPY INITIAL EVALUATION ADULT - PERTINENT HX OF CURRENT PROBLEM, REHAB EVAL
Pt is a 61-year old woman who had a C4-C5 corpectomy, C6-C7 ACDF, C3-C7 anterior stabilization and fusion, and C2-C7 posterior stabilization and fusion on 06/14/2017. On 09/18/2017 she had the bilateral C7 screws removed for radicular pain and evidence that the screws were compressing the bilateral C7 nerve roots on her imaging.

## 2017-10-30 NOTE — PROGRESS NOTE ADULT - ASSESSMENT
HPI:  Patient is a 61 year old female with prior C4-C5 corpectomy, C6-C7 ACDF, and C3-C7 anterior stabilization/fusion, and C2-C7 posterior stabilization/fusion on 6/14/2017.  Returned for removal of bilateral C7 screws for radicular pain on 9/18/2017.  Now presents with acute onset of left upper extremity/shoulder pain.  Admitted for further management.      PROCEDURE: s/p C4-C5 corpectomy, C6-C7 ACDF, and C3-C7 anterior stabilization/fusion, and C2-C7 posterior stabilization/fusion on 6/14/2017, s/p removal of bilateral C7 screws on 9/18/2017  POD#[id number], #42    PLAN:    -continue [id number]   -dilaudid for pain control  -robaxin and flexeril for muscle spasms  -senna and colace for bowel regimen  -Pt has some atypical chest pain previously (EKG and ce neg x3). Dr. Patiño following. Continue to monitor   -DVT ppx: venodynes and sql   -PT: home with outpt PT     -Will discuss above with Dr. Mcgovern      Spectra #[id number]

## 2017-10-31 RX ORDER — HYDROMORPHONE HYDROCHLORIDE 2 MG/ML
4 INJECTION INTRAMUSCULAR; INTRAVENOUS; SUBCUTANEOUS EVERY 6 HOURS
Qty: 0 | Refills: 0 | Status: DISCONTINUED | OUTPATIENT
Start: 2017-10-31 | End: 2017-11-01

## 2017-10-31 RX ORDER — HYDROMORPHONE HYDROCHLORIDE 2 MG/ML
1 INJECTION INTRAMUSCULAR; INTRAVENOUS; SUBCUTANEOUS EVERY 4 HOURS
Qty: 0 | Refills: 0 | Status: DISCONTINUED | OUTPATIENT
Start: 2017-10-31 | End: 2017-11-04

## 2017-10-31 RX ADMIN — PANTOPRAZOLE SODIUM 40 MILLIGRAM(S): 20 TABLET, DELAYED RELEASE ORAL at 05:59

## 2017-10-31 RX ADMIN — SENNA PLUS 2 TABLET(S): 8.6 TABLET ORAL at 22:17

## 2017-10-31 RX ADMIN — Medication 1 GRAM(S): at 22:17

## 2017-10-31 RX ADMIN — Medication 1 GRAM(S): at 17:29

## 2017-10-31 RX ADMIN — HYDROMORPHONE HYDROCHLORIDE 4 MILLIGRAM(S): 2 INJECTION INTRAMUSCULAR; INTRAVENOUS; SUBCUTANEOUS at 20:04

## 2017-10-31 RX ADMIN — CYCLOBENZAPRINE HYDROCHLORIDE 10 MILLIGRAM(S): 10 TABLET, FILM COATED ORAL at 22:17

## 2017-10-31 RX ADMIN — Medication 4 MILLIGRAM(S): at 17:29

## 2017-10-31 RX ADMIN — GABAPENTIN 600 MILLIGRAM(S): 400 CAPSULE ORAL at 22:17

## 2017-10-31 RX ADMIN — HYDROMORPHONE HYDROCHLORIDE 4 MILLIGRAM(S): 2 INJECTION INTRAMUSCULAR; INTRAVENOUS; SUBCUTANEOUS at 13:04

## 2017-10-31 RX ADMIN — PANTOPRAZOLE SODIUM 40 MILLIGRAM(S): 20 TABLET, DELAYED RELEASE ORAL at 12:55

## 2017-10-31 RX ADMIN — Medication 1 GRAM(S): at 05:59

## 2017-10-31 RX ADMIN — Medication 1 GRAM(S): at 12:55

## 2017-10-31 RX ADMIN — HYDROMORPHONE HYDROCHLORIDE 2 MILLIGRAM(S): 2 INJECTION INTRAMUSCULAR; INTRAVENOUS; SUBCUTANEOUS at 02:26

## 2017-10-31 RX ADMIN — GABAPENTIN 600 MILLIGRAM(S): 400 CAPSULE ORAL at 05:59

## 2017-10-31 RX ADMIN — GABAPENTIN 600 MILLIGRAM(S): 400 CAPSULE ORAL at 13:06

## 2017-10-31 RX ADMIN — Medication 100 MILLIGRAM(S): at 05:59

## 2017-10-31 RX ADMIN — HYDROMORPHONE HYDROCHLORIDE 4 MILLIGRAM(S): 2 INJECTION INTRAMUSCULAR; INTRAVENOUS; SUBCUTANEOUS at 20:34

## 2017-10-31 RX ADMIN — HYDROMORPHONE HYDROCHLORIDE 2 MILLIGRAM(S): 2 INJECTION INTRAMUSCULAR; INTRAVENOUS; SUBCUTANEOUS at 08:34

## 2017-10-31 RX ADMIN — HYDROMORPHONE HYDROCHLORIDE 1 MILLIGRAM(S): 2 INJECTION INTRAMUSCULAR; INTRAVENOUS; SUBCUTANEOUS at 05:00

## 2017-10-31 RX ADMIN — CYCLOBENZAPRINE HYDROCHLORIDE 10 MILLIGRAM(S): 10 TABLET, FILM COATED ORAL at 08:38

## 2017-10-31 RX ADMIN — HYDROMORPHONE HYDROCHLORIDE 2 MILLIGRAM(S): 2 INJECTION INTRAMUSCULAR; INTRAVENOUS; SUBCUTANEOUS at 03:00

## 2017-10-31 RX ADMIN — SERTRALINE 50 MILLIGRAM(S): 25 TABLET, FILM COATED ORAL at 22:17

## 2017-10-31 RX ADMIN — ENOXAPARIN SODIUM 40 MILLIGRAM(S): 100 INJECTION SUBCUTANEOUS at 22:22

## 2017-10-31 RX ADMIN — Medication 4 MILLIGRAM(S): at 12:55

## 2017-10-31 RX ADMIN — HYDROMORPHONE HYDROCHLORIDE 4 MILLIGRAM(S): 2 INJECTION INTRAMUSCULAR; INTRAVENOUS; SUBCUTANEOUS at 15:46

## 2017-10-31 RX ADMIN — HYDROMORPHONE HYDROCHLORIDE 1 MILLIGRAM(S): 2 INJECTION INTRAMUSCULAR; INTRAVENOUS; SUBCUTANEOUS at 22:16

## 2017-10-31 RX ADMIN — HYDROMORPHONE HYDROCHLORIDE 1 MILLIGRAM(S): 2 INJECTION INTRAMUSCULAR; INTRAVENOUS; SUBCUTANEOUS at 04:24

## 2017-10-31 RX ADMIN — LOSARTAN POTASSIUM 50 MILLIGRAM(S): 100 TABLET, FILM COATED ORAL at 05:59

## 2017-10-31 RX ADMIN — Medication 25 MILLIGRAM(S): at 17:29

## 2017-10-31 RX ADMIN — HYDROMORPHONE HYDROCHLORIDE 2 MILLIGRAM(S): 2 INJECTION INTRAMUSCULAR; INTRAVENOUS; SUBCUTANEOUS at 06:34

## 2017-10-31 RX ADMIN — ATORVASTATIN CALCIUM 40 MILLIGRAM(S): 80 TABLET, FILM COATED ORAL at 22:17

## 2017-10-31 RX ADMIN — Medication 100 MILLIGRAM(S): at 13:06

## 2017-10-31 RX ADMIN — AMLODIPINE BESYLATE 5 MILLIGRAM(S): 2.5 TABLET ORAL at 22:17

## 2017-10-31 RX ADMIN — Medication 100 MILLIGRAM(S): at 22:17

## 2017-10-31 RX ADMIN — Medication 0.62 MILLIGRAM(S): at 12:55

## 2017-10-31 RX ADMIN — CYCLOBENZAPRINE HYDROCHLORIDE 10 MILLIGRAM(S): 10 TABLET, FILM COATED ORAL at 01:21

## 2017-10-31 RX ADMIN — METHOCARBAMOL 750 MILLIGRAM(S): 500 TABLET, FILM COATED ORAL at 20:27

## 2017-10-31 RX ADMIN — Medication 1 DROP(S): at 12:54

## 2017-10-31 RX ADMIN — Medication 4 MILLIGRAM(S): at 05:59

## 2017-10-31 NOTE — PROGRESS NOTE ADULT - ASSESSMENT
HPI:  Patient is a 61 year old female with prior C4-C5 corpectomy, C6-C7 ACDF, and C3-C7 anterior stabilization/fusion, and C2-C7 posterior stabilization/fusion on 6/14/2017.  Returned for removal of bilateral C7 screws for radicular pain on 9/18/2017.  Now presents with acute onset of left upper extremity/shoulder pain.  Admitted for further management.      PROCEDURE: s/p C4-C5 corpectomy, C6-C7 ACDF, and C3-C7 anterior stabilization/fusion, and C2-C7 posterior stabilization/fusion on 6/14/2017, s/p removal of bilateral C7 screws on 9/18/2017  POD#[id number], #43    PLAN:    -Neuro recommendations: Decadron 4 q6, Add lyrica 25 mg BID, Continue gabapentin 600 TID, Flexeril 10 TID, Topical heat for muscle spasm  Avoidance compressive postures at left elbow regarding superimposed left ulnar, Outpatient EMG  -Neuroradiology addendum suggestive of left C7-T1 HNP. Plans as per Dr. Mcgovern   -dilaudid for pain control  -robaxin and flexeril for muscle spasms  -senna and colace for bowel regimen  -Pt has some atypical chest pain previously (EKG and ce neg x3). Dr. Patiño following. Continue to monitor   -DVT ppx: venodynes and sql   -PT: home with outpt PT     -Will discuss above with Dr. Mcgovern      Spectra #[id number]

## 2017-10-31 NOTE — PROGRESS NOTE ADULT - ASSESSMENT
atypical chest pain   likely neurologic / neuropathic  neg cardiac enzymes     HTN  elevated  ? pain related  for now cont current meds  will follow up clinically     HLD  on statin

## 2017-10-31 NOTE — CONSULT NOTE ADULT - ASSESSMENT
A/P: 61 year old woman 61 year old woman with past medical history cervical disc disease, C4-C5 corpectomy, C6-C7 ACDF, C3-C7 anterior stabilization and fusion, and C2-C7 posterior stabilization and fusion on 06/14/2017. On 09/18/2017 she had bilateral C7 screws removed for radicular pain and evidence screws were compressing bilateral C7 nerve roots. Reports she was gradually feeling better but over the past 3 days has developed severe left sided posterior scapular, lateral neck pain radiating to her anterior chest, medial aspect of the left arm with sharp pains from the elbow to lateral left hand. Describes weakness in the arm and numbness in the 4-5th left digits.    Presentation and exam suggest left C7-8 radiculopathy, doubt brachial plexopathy    MRI of the cervical spine discloses post operative changes limiting evaluation for significant foraminal stenosis.   Reviewed with neuroradiology  ?small fragment, far lateral left disc herniation C7-T1  Brachial plexus MRI is negative.     Recc:  Continue pain control  Decadron 4 q6  Add lyrica 25 mg BID (daughter says gabapentin caused delirum in past)  Continue gabapentin 600 TID  Flexeril 10 TID  Topical heat for muscle spasm  Avoidance compressive postures at left elbow regarding superimposed left ulnar  Outpatient EMG  PT  Will follow.    Plan reviewed with pt and daughter by phone, who agree.

## 2017-11-01 DIAGNOSIS — M50.20 OTHER CERVICAL DISC DISPLACEMENT, UNSPECIFIED CERVICAL REGION: ICD-10-CM

## 2017-11-01 DIAGNOSIS — M54.2 CERVICALGIA: ICD-10-CM

## 2017-11-01 LAB
GLUCOSE BLDC GLUCOMTR-MCNC: 144 MG/DL — HIGH (ref 70–99)
GLUCOSE BLDC GLUCOMTR-MCNC: 161 MG/DL — HIGH (ref 70–99)

## 2017-11-01 PROCEDURE — 99254 IP/OBS CNSLTJ NEW/EST MOD 60: CPT | Mod: 25

## 2017-11-01 PROCEDURE — 31575 DIAGNOSTIC LARYNGOSCOPY: CPT

## 2017-11-01 RX ORDER — HYDROMORPHONE HYDROCHLORIDE 2 MG/ML
4 INJECTION INTRAMUSCULAR; INTRAVENOUS; SUBCUTANEOUS EVERY 4 HOURS
Qty: 0 | Refills: 0 | Status: DISCONTINUED | OUTPATIENT
Start: 2017-11-01 | End: 2017-11-04

## 2017-11-01 RX ORDER — AMLODIPINE BESYLATE 2.5 MG/1
10 TABLET ORAL AT BEDTIME
Qty: 0 | Refills: 0 | Status: DISCONTINUED | OUTPATIENT
Start: 2017-11-01 | End: 2017-11-04

## 2017-11-01 RX ORDER — INSULIN LISPRO 100/ML
VIAL (ML) SUBCUTANEOUS
Qty: 0 | Refills: 0 | Status: DISCONTINUED | OUTPATIENT
Start: 2017-11-01 | End: 2017-11-04

## 2017-11-01 RX ORDER — LOSARTAN POTASSIUM 100 MG/1
100 TABLET, FILM COATED ORAL DAILY
Qty: 0 | Refills: 0 | Status: DISCONTINUED | OUTPATIENT
Start: 2017-11-01 | End: 2017-11-04

## 2017-11-01 RX ADMIN — HYDROMORPHONE HYDROCHLORIDE 1 MILLIGRAM(S): 2 INJECTION INTRAMUSCULAR; INTRAVENOUS; SUBCUTANEOUS at 13:29

## 2017-11-01 RX ADMIN — Medication 100 MILLIGRAM(S): at 21:32

## 2017-11-01 RX ADMIN — LOSARTAN POTASSIUM 100 MILLIGRAM(S): 100 TABLET, FILM COATED ORAL at 12:23

## 2017-11-01 RX ADMIN — CYCLOBENZAPRINE HYDROCHLORIDE 10 MILLIGRAM(S): 10 TABLET, FILM COATED ORAL at 18:24

## 2017-11-01 RX ADMIN — Medication 4 MILLIGRAM(S): at 12:21

## 2017-11-01 RX ADMIN — GABAPENTIN 600 MILLIGRAM(S): 400 CAPSULE ORAL at 21:31

## 2017-11-01 RX ADMIN — Medication 1 GRAM(S): at 12:21

## 2017-11-01 RX ADMIN — ATORVASTATIN CALCIUM 40 MILLIGRAM(S): 80 TABLET, FILM COATED ORAL at 21:31

## 2017-11-01 RX ADMIN — METHOCARBAMOL 750 MILLIGRAM(S): 500 TABLET, FILM COATED ORAL at 12:26

## 2017-11-01 RX ADMIN — Medication 4 MILLIGRAM(S): at 05:47

## 2017-11-01 RX ADMIN — HYDROMORPHONE HYDROCHLORIDE 4 MILLIGRAM(S): 2 INJECTION INTRAMUSCULAR; INTRAVENOUS; SUBCUTANEOUS at 05:50

## 2017-11-01 RX ADMIN — Medication 100 MILLIGRAM(S): at 13:34

## 2017-11-01 RX ADMIN — Medication 25 MILLIGRAM(S): at 05:50

## 2017-11-01 RX ADMIN — Medication 2: at 22:25

## 2017-11-01 RX ADMIN — SERTRALINE 50 MILLIGRAM(S): 25 TABLET, FILM COATED ORAL at 21:31

## 2017-11-01 RX ADMIN — ENOXAPARIN SODIUM 40 MILLIGRAM(S): 100 INJECTION SUBCUTANEOUS at 21:32

## 2017-11-01 RX ADMIN — HYDROMORPHONE HYDROCHLORIDE 4 MILLIGRAM(S): 2 INJECTION INTRAMUSCULAR; INTRAVENOUS; SUBCUTANEOUS at 15:43

## 2017-11-01 RX ADMIN — Medication 1 GRAM(S): at 17:15

## 2017-11-01 RX ADMIN — GABAPENTIN 600 MILLIGRAM(S): 400 CAPSULE ORAL at 05:49

## 2017-11-01 RX ADMIN — Medication 25 MILLIGRAM(S): at 17:15

## 2017-11-01 RX ADMIN — HYDROMORPHONE HYDROCHLORIDE 4 MILLIGRAM(S): 2 INJECTION INTRAMUSCULAR; INTRAVENOUS; SUBCUTANEOUS at 06:20

## 2017-11-01 RX ADMIN — Medication 4 MILLIGRAM(S): at 17:15

## 2017-11-01 RX ADMIN — Medication 1 DROP(S): at 12:21

## 2017-11-01 RX ADMIN — AMLODIPINE BESYLATE 10 MILLIGRAM(S): 2.5 TABLET ORAL at 21:31

## 2017-11-01 RX ADMIN — SENNA PLUS 2 TABLET(S): 8.6 TABLET ORAL at 21:31

## 2017-11-01 RX ADMIN — LOSARTAN POTASSIUM 50 MILLIGRAM(S): 100 TABLET, FILM COATED ORAL at 05:49

## 2017-11-01 RX ADMIN — Medication 4 MILLIGRAM(S): at 00:40

## 2017-11-01 RX ADMIN — Medication 100 MILLIGRAM(S): at 05:49

## 2017-11-01 RX ADMIN — HYDROMORPHONE HYDROCHLORIDE 1 MILLIGRAM(S): 2 INJECTION INTRAMUSCULAR; INTRAVENOUS; SUBCUTANEOUS at 13:45

## 2017-11-01 RX ADMIN — GABAPENTIN 600 MILLIGRAM(S): 400 CAPSULE ORAL at 13:34

## 2017-11-01 RX ADMIN — Medication 1 GRAM(S): at 21:31

## 2017-11-01 RX ADMIN — HYDROMORPHONE HYDROCHLORIDE 4 MILLIGRAM(S): 2 INJECTION INTRAMUSCULAR; INTRAVENOUS; SUBCUTANEOUS at 20:44

## 2017-11-01 RX ADMIN — Medication 0.62 MILLIGRAM(S): at 12:21

## 2017-11-01 RX ADMIN — PANTOPRAZOLE SODIUM 40 MILLIGRAM(S): 20 TABLET, DELAYED RELEASE ORAL at 05:49

## 2017-11-01 RX ADMIN — PANTOPRAZOLE SODIUM 40 MILLIGRAM(S): 20 TABLET, DELAYED RELEASE ORAL at 12:21

## 2017-11-01 RX ADMIN — HYDROMORPHONE HYDROCHLORIDE 4 MILLIGRAM(S): 2 INJECTION INTRAMUSCULAR; INTRAVENOUS; SUBCUTANEOUS at 21:15

## 2017-11-01 RX ADMIN — HYDROMORPHONE HYDROCHLORIDE 4 MILLIGRAM(S): 2 INJECTION INTRAMUSCULAR; INTRAVENOUS; SUBCUTANEOUS at 16:15

## 2017-11-01 RX ADMIN — Medication 1 GRAM(S): at 05:47

## 2017-11-01 NOTE — CONSULT NOTE ADULT - ASSESSMENT
61y s/p spine surgery x3 with no ENT complaints at this time. Indirect laryngoscopy was b/l vocal cord mobility and base of tongue fullness. Pt is cleared for approach on either side. Since the patient is not maskable, glidescope vs LMA vs fiberoptic may be used. 61y s/p spine surgery x3 with with dysphagia post op since improved. Indirect laryngoscopy was b/l vocal cord mobility and base of tongue fullness. Pt is cleared for approach on either side. Since the patient is not maskable per history may want to consider awake fiberoptic. pt would likely not be a complicated glidescope intubation	.

## 2017-11-01 NOTE — CONSULT NOTE ADULT - SUBJECTIVE AND OBJECTIVE BOX
CHIEF COMPLAINT:Patient is a 61y old  Female who presents with a chief complaint of worsening left shoulder pain and arm weakness (29 Oct 2017 02:44)      HISTORY OF PRESENT ILLNESS:  this is a pleasant woman with history as below s/p cervical spine surgery presented with left arm weakness , pain radiating from shoulder neck down to arm and chest  no exacerbating factors  no sob  no dizziness     PAST MEDICAL & SURGICAL HISTORY:  Cervical radiculopathy  Glaucoma: b/l  Gastroesophageal reflux disease without esophagitis  Hyperlipidemia  Osteoarthritis of cervical spine with myelopathy  Spinal stenosis in cervical region  Herniated cervical disc  History of endometriosis  Hiatal hernia  History of diverticulitis  History of gastric ulcer  Hypertension  History of iron deficiency anemia  History of migraine  S/P spinal surgery: 7/17/17 - cervical region with hardware  History of tonsillectomy  History of arthroscopy of right knee  History of carpal tunnel release  History of cholecystectomy  History of appendectomy  History of bilateral oophorectomy  History of hysterectomy          MEDICATIONS:  amLODIPine   Tablet 5 milliGRAM(s) Oral at bedtime  enoxaparin Injectable 40 milliGRAM(s) SubCutaneous at bedtime  losartan 50 milliGRAM(s) Oral daily        cyclobenzaprine 10 milliGRAM(s) Oral three times a day PRN  gabapentin 600 milliGRAM(s) Oral three times a day  HYDROmorphone   Tablet 2 milliGRAM(s) Oral every 4 hours PRN  HYDROmorphone  Injectable 0.5 milliGRAM(s) IV Push every 1 hour PRN  methocarbamol 750 milliGRAM(s) Oral three times a day PRN  ondansetron   Disintegrating Tablet 4 milliGRAM(s) Oral every 6 hours PRN  sertraline 50 milliGRAM(s) Oral daily    docusate sodium 100 milliGRAM(s) Oral three times a day  pantoprazole    Tablet 40 milliGRAM(s) Oral daily  pantoprazole    Tablet 40 milliGRAM(s) Oral before breakfast  senna 2 Tablet(s) Oral at bedtime  sucralfate suspension 1 Gram(s) Oral Before meals and at bedtime    atorvastatin 40 milliGRAM(s) Oral at bedtime  dexamethasone  Injectable 4 milliGRAM(s) IV Push every 6 hours  estrogens    conjugated 0.625 milliGRAM(s) Oral daily    timolol 0.25% Solution 1 Drop(s) Both EYES daily      FAMILY HISTORY:  Family history of kidney disease (Grandparent)      Non-contributory    SOCIAL HISTORY:    No tobacco, drugs or etoh    Allergies    No Known Allergies    Intolerances    morphine (Nausea; Vomiting)  Percocet 5/325 (Nausea; Vomiting)  	    REVIEW OF SYSTEMS:  as above  The rest of the 14 points ROS reviewed and except above they are unremarkable.        PHYSICAL EXAM:  T(C): 36.6 (10-29-17 @ 05:04), Max: 37.1 (10-28-17 @ 16:39)  HR: 85 (10-29-17 @ 01:02) (85 - 113)  BP: 118/69 (10-29-17 @ 05:04) (116/69 - 135/90)  RR: 18 (10-29-17 @ 05:04) (18 - 18)  SpO2: 95% (10-29-17 @ 05:04) (95% - 100%)  Wt(kg): --  I&O's Summary      Appearance: Normal	  HEENT:   Normal oral mucosa, PERRL, EOMI	  Cardiovascular: Normal S1 S2,    Murmur:   Neck: JVP normal  Respiratory: Lungs clear to auscultation  Gastrointestinal:  Soft, Non-tender, + BS	  Skin: normal   Neuro: No gross deficits.   Psychiatry:  Mood & affect appropriate  Ext: No edema    LABS/DATA:    TELEMETRY: 	    ECG:  	   	  CARDIAC MARKERS:  Troponin T, Serum: <0.01 ng/mL (10-29 @ 10:16)                                  11.1   8.2   )-----------( 405      ( 28 Oct 2017 18:21 )             34.1     10-28    137  |  101  |  7   ----------------------------<  117<H>  4.3   |  25  |  0.69    Ca    9.3      28 Oct 2017 18:21    TPro  7.6  /  Alb  4.2  /  TBili  <0.1<L>  /  DBili  x   /  AST  20  /  ALT  14  /  AlkPhos  81  10-28    proBNP:   Lipid Profile:   HgA1c:   TSH:
CC: upper airway eval	    HPI:  Pt is a 61-year old woman who had a C4-C5 corpectomy, C6-C7 ACDF, C3-C7 anterior stabilization and fusion, and C2-C7 posterior stabilization and fusion on 06/14/2017. On 09/18/2017 she had the bilateral C7 screws removed for radicular pain and evidence that the screws were compressing the bilateral C7 nerve roots on her imaging. ENT was consulted for upper airway eval. Pt states that she had difficulty swallowing for a month after her first surgery but her swallow was fine after her last two surgeries. She denies any voice change, shortness of breath, sore throat, cough, fever/chills.     PAST MEDICAL & SURGICAL HISTORY:  Cervical radiculopathy  Glaucoma: b/l  Gastroesophageal reflux disease without esophagitis  Hyperlipidemia  Osteoarthritis of cervical spine with myelopathy  Spinal stenosis in cervical region  Herniated cervical disc  History of endometriosis  Hiatal hernia  History of diverticulitis  History of gastric ulcer  Hypertension  History of iron deficiency anemia  History of migraine  S/P spinal surgery: 7/17/17 - cervical region with hardware  History of tonsillectomy  History of arthroscopy of right knee  History of carpal tunnel release  History of cholecystectomy  History of appendectomy  History of bilateral oophorectomy  History of hysterectomy    Allergies    No Known Allergies    Intolerances    morphine (Nausea; Vomiting)  Percocet 5/325 (Nausea; Vomiting)    MEDICATIONS  (STANDING):  amLODIPine   Tablet 10 milliGRAM(s) Oral at bedtime  atorvastatin 40 milliGRAM(s) Oral at bedtime  dexamethasone  Injectable 4 milliGRAM(s) IV Push every 6 hours  docusate sodium 100 milliGRAM(s) Oral three times a day  enoxaparin Injectable 40 milliGRAM(s) SubCutaneous at bedtime  estrogens    conjugated 0.625 milliGRAM(s) Oral daily  gabapentin 600 milliGRAM(s) Oral three times a day  insulin lispro (HumaLOG) corrective regimen sliding scale   SubCutaneous Before meals and at bedtime  losartan 100 milliGRAM(s) Oral daily  pantoprazole    Tablet 40 milliGRAM(s) Oral daily  pantoprazole    Tablet 40 milliGRAM(s) Oral before breakfast  pregabalin 25 milliGRAM(s) Oral two times a day  senna 2 Tablet(s) Oral at bedtime  sertraline 50 milliGRAM(s) Oral daily  sucralfate suspension 1 Gram(s) Oral Before meals and at bedtime  timolol 0.25% Solution 1 Drop(s) Both EYES daily    MEDICATIONS  (PRN):  cyclobenzaprine 10 milliGRAM(s) Oral three times a day PRN Muscle Spasm  HYDROmorphone   Tablet 2 milliGRAM(s) Oral every 4 hours PRN Moderate Pain  HYDROmorphone   Tablet 4 milliGRAM(s) Oral every 6 hours PRN Severe Pain (7 - 10)  HYDROmorphone  Injectable 1 milliGRAM(s) IV Push every 4 hours PRN breakthrough  methocarbamol 750 milliGRAM(s) Oral three times a day PRN spasm  ondansetron   Disintegrating Tablet 4 milliGRAM(s) Oral every 6 hours PRN Nausea      Social History: no tobacco no etoh     ROS: ENT, GI, , CV, Pulm, Neuro, Psych, MS, Heme, Endo, Constitutional; all negative except as noted in HPI    Vital Signs Last 24 Hrs  T(C): 36.7 (01 Nov 2017 16:30), Max: 36.9 (31 Oct 2017 21:40)  T(F): 98.1 (01 Nov 2017 16:30), Max: 98.5 (31 Oct 2017 21:40)  HR: 106 (01 Nov 2017 16:30) (91 - 106)  BP: 155/95 (01 Nov 2017 16:30) (155/95 - 178/93)  BP(mean): --  RR: 18 (01 Nov 2017 16:30) (18 - 18)  SpO2: 98% (01 Nov 2017 16:30) (95% - 98%)              PHYSICAL EXAM:  Gen: NAD, well-developed  Head: Normocephalic, Atraumatic  Face: no edema/erythema/fluctuance, parotid glands soft without mass  Eyes: PERRL, EOMI, no scleral injection  Nose: Nares bilaterally patent, no discharge  Mouth: No Stridor / Drooling / Trismus.  Mucosa moist, tongue/uvula midline, oropharynx clear  Neck: Flat, supple, no lymphadenopathy, trachea midline, no masses  Resp: breathing easily, no stridor  CV: no peripheral edema/cyanosis    Fiberoptic Indirect laryngoscopy:  (With Scope #2) Base of tongue fullness.  No bleeding in nasal pharynx or Oral pharynx.  No foreign body or pooling of secretions.  No glottic / supraglottic swelling.  Vocal cords mobile in intact b/l.  Airway patent.
HPI: 61 year old woman with past medical history cervical disc disease, C4-C5 corpectomy, C6-C7 ACDF, C3-C7 anterior stabilization and fusion, and C2-C7 posterior stabilization and fusion on 06/14/2017. On 09/18/2017 she had bilateral C7 screws removed for radicular pain and evidence screws were compressing bilateral C7 nerve roots. Reports she was gradually feeling better but over the past 3 days has developed severe left sided posterior scapular, lateral neck pain radiating to her anterior chest, medial aspect of the left arm with sharp pains from the elbow to lateral left hand. Describes weakness in the arm and numbness in the 4-5th left digits.    MRI of the cervical spine discloses post operative changes limited evaluation but no clear foraminal stenosis. Brachial plexus MRI is negative. She is currently on steroids, dilaudid, gabapentin 600 TID and flexeril 10 mg TID.     Medications:  amLODIPine   Tablet 5 milliGRAM(s) Oral at bedtime  atorvastatin 40 milliGRAM(s) Oral at bedtime  cyclobenzaprine 10 milliGRAM(s) Oral three times a day PRN  dexamethasone  Injectable 4 milliGRAM(s) IV Push every 6 hours  docusate sodium 100 milliGRAM(s) Oral three times a day  enoxaparin Injectable 40 milliGRAM(s) SubCutaneous at bedtime  estrogens    conjugated 0.625 milliGRAM(s) Oral daily  gabapentin 600 milliGRAM(s) Oral three times a day  HYDROmorphone   Tablet 2 milliGRAM(s) Oral every 4 hours PRN  HYDROmorphone   Tablet 4 milliGRAM(s) Oral every 6 hours PRN  HYDROmorphone  Injectable 1 milliGRAM(s) IV Push every 4 hours PRN  losartan 50 milliGRAM(s) Oral daily  methocarbamol 750 milliGRAM(s) Oral three times a day PRN  ondansetron   Disintegrating Tablet 4 milliGRAM(s) Oral every 6 hours PRN  pantoprazole    Tablet 40 milliGRAM(s) Oral daily  pantoprazole    Tablet 40 milliGRAM(s) Oral before breakfast  senna 2 Tablet(s) Oral at bedtime  sertraline 50 milliGRAM(s) Oral daily  sucralfate suspension 1 Gram(s) Oral Before meals and at bedtime  timolol 0.25% Solution 1 Drop(s) Both EYES daily    REVIEW OF SYSTEMS: as per HPI    Allergies  No Known Allergies  Intolerances  morphine (Nausea; Vomiting)  Percocet 5/325 (Nausea; Vomiting)    MEDICATIONS  (STANDING):  amLODIPine   Tablet 5 milliGRAM(s) Oral at bedtime  atorvastatin 40 milliGRAM(s) Oral at bedtime  dexamethasone  Injectable 4 milliGRAM(s) IV Push every 6 hours  docusate sodium 100 milliGRAM(s) Oral three times a day  enoxaparin Injectable 40 milliGRAM(s) SubCutaneous at bedtime  estrogens    conjugated 0.625 milliGRAM(s) Oral daily  gabapentin 600 milliGRAM(s) Oral three times a day  losartan 50 milliGRAM(s) Oral daily  pantoprazole    Tablet 40 milliGRAM(s) Oral daily  pantoprazole    Tablet 40 milliGRAM(s) Oral before breakfast  pregabalin 25 milliGRAM(s) Oral two times a day  senna 2 Tablet(s) Oral at bedtime  sertraline 50 milliGRAM(s) Oral daily  sucralfate suspension 1 Gram(s) Oral Before meals and at bedtime  timolol 0.25% Solution 1 Drop(s) Both EYES daily    MEDICATIONS  (PRN):  cyclobenzaprine 10 milliGRAM(s) Oral three times a day PRN Muscle Spasm  HYDROmorphone   Tablet 2 milliGRAM(s) Oral every 4 hours PRN Moderate Pain  HYDROmorphone   Tablet 4 milliGRAM(s) Oral every 6 hours PRN Severe Pain (7 - 10)  HYDROmorphone  Injectable 1 milliGRAM(s) IV Push every 4 hours PRN breakthrough  methocarbamol 750 milliGRAM(s) Oral three times a day PRN spasm  ondansetron   Disintegrating Tablet 4 milliGRAM(s) Oral every 6 hours PRN Nausea    PAST MEDICAL & SURGICAL HISTORY:  Cervical radiculopathy  Glaucoma: b/l  Gastroesophageal reflux disease without esophagitis  Hyperlipidemia  Osteoarthritis of cervical spine with myelopathy  Spinal stenosis in cervical region  Herniated cervical disc  History of endometriosis  Hiatal hernia  History of diverticulitis  History of gastric ulcer  Hypertension  History of iron deficiency anemia  History of migraine  S/P spinal surgery: 7/17/17 - cervical region with hardware  History of tonsillectomy  History of arthroscopy of right knee  History of carpal tunnel release  History of cholecystectomy  History of appendectomy  History of bilateral oophorectomy  History of hysterectomy    Social Hx: No toxic habits.    FAMILY HISTORY:  Family history of kidney disease (Grandparent)    Advanced care planning paperwork in chart    Vitals:  Vital Signs Last 24 Hrs  T(C): 36.6 (31 Oct 2017 13:39), Max: 36.8 (31 Oct 2017 00:26)  T(F): 97.9 (31 Oct 2017 13:39), Max: 98.3 (31 Oct 2017 00:26)  HR: 90 (31 Oct 2017 13:39) (84 - 99)  BP: 150/75 (31 Oct 2017 13:39) (123/65 - 156/77)  BP(mean): --  RR: 18 (31 Oct 2017 13:39) (18 - 18)  SpO2: 96% (31 Oct 2017 13:39) (95% - 99%)    NEUROLOGICAL EXAM:    General: marked left cervical paraspinal.     Mental status: Awake, alert, and in no apparent distress. Oriented to person, place and time. Language function is normal. Recent memory, digit span and concentration were normal.     Cranial Nerves: Pupils were equal, round, reactive to light. Extraocular movements were intact. Visual field were full. Fundoscopic exam was deferred. Facial sensation was intact to light touch. There was no facial asymmetry. The palate was upgoing symmetrically and tongue was midline. Hearing acuity was intact to finger rub AU. Shoulder shrug was full bilaterally    Motor exam: Bulk and tone were normal. Strength was 5/5, right UE and B LE. Left UE pain limited giveway proximally but no clear weakness, distally finger abduction, extensor 4+ otherwise 5/5.     Reflexes: 2+ in the bilateral upper extremities apart from depressed left biceps, 2+ in the bilateral lower extremities. Toes were downgoing bilaterally.     Sensation: Intact to light touch, temperature. +left tinel's at elbow.    Coordination: left FTN limited by pain.    Gait: Narrow base and steady. 	    < from: MRI Cervical Spine w/o Cont (10.29.17 @ 16:18) >    Comparison is made with the prior CT of 6/19/2017.    No significant interval change is identified.    There has been an anterior corpectomy of C4 and C5 with an interposition   metallic graft extending from C3 through C6. Additionally there is been   an anterior discectomy at C6-7. Anterior metallic plating extends from C3   through C7 and there are screws in the C3, C6 and C7 vertebral bodies.   Posteriorly there are posterior element screws, connecting rods and bony   fusion mass. There is no cord compression. All osteophyte is present on   the left at C4-5 with narrowing of the spinal canal. There is mild volume   loss of the cord on the left at this level. No disc herniations or spinal   stenosis are identified.    IMPRESSION: No change from 6/19/2017. DAVON is anterior cervical discectomy   and corpectomy with postoperative changes extending from C3 through C7.   There is mild volume loss of the cord on the left at the C4-5 level to an   osteophyte and prior cord compression. There is no current cord   compression.    Labs:  CBC Full  -  ( 30 Oct 2017 06:36 )  WBC Count : 12.4 K/uL  Hemoglobin : 11.2 g/dL  Hematocrit : 32.4 %  Platelet Count - Automated : 413 K/uL  Mean Cell Volume : 85.8 fl  Mean Cell Hemoglobin : 29.6 pg  Mean Cell Hemoglobin Concentration : 34.5 gm/dL  Auto Neutrophil # : x  Auto Lymphocyte # : x  Auto Monocyte # : x  Auto Eosinophil # : x  Auto Basophil # : x  Auto Neutrophil % : x  Auto Lymphocyte % : x  Auto Monocyte % : x  Auto Eosinophil % : x  Auto Basophil % : x    137  |  100  |  13  ----------------------------<  130<H>  4.4   |  24  |  0.69    Ca    9.9      30 Oct 2017 06:36

## 2017-11-01 NOTE — PROGRESS NOTE ADULT - ASSESSMENT
atypical chest pain   likely neurologic / neuropathic  neg cardiac enzymes     HTN  elevated  ? pain related  increase norvasc and losartan ( Done )    HLD  on statin

## 2017-11-01 NOTE — PROGRESS NOTE ADULT - ASSESSMENT
A/P: 61 year old woman 61 year old woman with past medical history cervical disc disease, C4-C5 corpectomy, C6-C7 ACDF, C3-C7 anterior stabilization and fusion, and C2-C7 posterior stabilization and fusion on 06/14/2017. On 09/18/2017 she had bilateral C7 screws removed for radicular pain and evidence screws were compressing bilateral C7 nerve roots. Reports she was gradually feeling better but over the past 3 days has developed severe left sided posterior scapular, lateral neck pain radiating to her anterior chest, medial aspect of the left arm with sharp pains from the elbow to lateral left hand. Describes weakness in the arm and numbness in the 4-5th left digits.    Presentation and exam indicate left C8 radiculopathy, doubt brachial plexopathy    MRI of the cervical spine discloses post operative changes limiting evaluation for significant foraminal stenosis.   Reviewed with neuroradiology  ?small fragment, far lateral left disc herniation C7-T1  Brachial plexus MRI is negative.     Recc:  follow up Dr. Mcgovern input re ? far lateral left disc at C7/T1 with C8 radiculopathy clinical presentation  Continue pain control  Decadron 4 q6  Add lyrica 25 mg BID (daughter says gabapentin caused delirum in past)  Continue gabapentin 600 TID  Flexeril 10 TID  Topical heat for muscle spasm  Avoidance compressive postures at left elbow regarding superimposed left ulnar  eventual Outpatient EMG  PT  Will follow.

## 2017-11-01 NOTE — PROGRESS NOTE ADULT - ASSESSMENT
Emma Patel is a 61-year old woman who had a C4-C5 corpectomy, C6-C7 ACDF, C3-C7 anterior stabilization and fusion, and C2-C7 posterior stabilization and fusion on 06/14/2017. On 09/18/2017 she had the bilateral C7 screws removed for radicular pain and evidence that the screws were compressing the bilateral C7 nerve roots on her imaging.    Today she presents with acute onset of pain in her left upper extremity that started on 10/25/17. On 10/28/17, she started to develop weakness in the entire left arm. She denies any trauma and has not heard any pops or clicks. She is having painful spasms in the left shoulder and pain in the scapula as well. She has intermittent sharp, stabbing pains from the elbow down to the lateral left hand (C8 and T1 dermatomes).    CT cervical spine shows that her hardware is in good placement. (29 Oct 2017 02:44)    PROCEDURE:  Adm 10/28 with LUE shoulder pain and weakness found to have C7-T1 disc herniation.  POD#n/a    PLAN:  Neuro: Cont Decadron 4 Q6h, Pain and BP control. Leukocytosis from steroids.  Preop for OR Saturday.  ENT cons called to check Vocal Cords-FU.    Neuro/Dr Malone-Reviewed with neuroradiology?small fragment, far lateral left disc herniation C7-T1. Brachial plexus MRI is negative.   follow up Dr. Mcgovern input re ? far lateral left disc at C7/T1 with C8 radiculopathy clinical presentation. Add lyrica 25 mg BID (daughter says gabapentin caused delirum in past)  Continue gabapentin 600 TID. Flexeril 10 TID, Topical heat for muscle spasm. Avoidance compressive postures at left elbow regarding superimposed left ulnar, eventual Outpatient EMG    Cardio/Dr Patiño-atypical chest pain likely neurologic / neuropathic, neg cardiac enzymes. HTN elevated, ? pain related. increase norvasc and losartan ( Done ). HLD-on statin     Respiratory: Patient instructed to use incentive spirometer [ ] YES [ X] NO                 DVT ppx: [ X] SQL [ ] SQH and Venodynes [ ] Left [ ] Right [ X] Bilateral    Discharge planning: PT-outpt PT, PT FU postop.

## 2017-11-02 LAB
GLUCOSE BLDC GLUCOMTR-MCNC: 118 MG/DL — HIGH (ref 70–99)
GLUCOSE BLDC GLUCOMTR-MCNC: 124 MG/DL — HIGH (ref 70–99)
GLUCOSE BLDC GLUCOMTR-MCNC: 143 MG/DL — HIGH (ref 70–99)
GLUCOSE BLDC GLUCOMTR-MCNC: 147 MG/DL — HIGH (ref 70–99)

## 2017-11-02 RX ADMIN — ATORVASTATIN CALCIUM 40 MILLIGRAM(S): 80 TABLET, FILM COATED ORAL at 22:24

## 2017-11-02 RX ADMIN — Medication 4 MILLIGRAM(S): at 23:52

## 2017-11-02 RX ADMIN — Medication 1 GRAM(S): at 17:40

## 2017-11-02 RX ADMIN — Medication 100 MILLIGRAM(S): at 22:24

## 2017-11-02 RX ADMIN — Medication 25 MILLIGRAM(S): at 17:39

## 2017-11-02 RX ADMIN — Medication 1 GRAM(S): at 06:06

## 2017-11-02 RX ADMIN — PANTOPRAZOLE SODIUM 40 MILLIGRAM(S): 20 TABLET, DELAYED RELEASE ORAL at 06:06

## 2017-11-02 RX ADMIN — CYCLOBENZAPRINE HYDROCHLORIDE 10 MILLIGRAM(S): 10 TABLET, FILM COATED ORAL at 11:28

## 2017-11-02 RX ADMIN — Medication 1 DROP(S): at 11:28

## 2017-11-02 RX ADMIN — HYDROMORPHONE HYDROCHLORIDE 4 MILLIGRAM(S): 2 INJECTION INTRAMUSCULAR; INTRAVENOUS; SUBCUTANEOUS at 14:20

## 2017-11-02 RX ADMIN — Medication 4 MILLIGRAM(S): at 11:28

## 2017-11-02 RX ADMIN — Medication 100 MILLIGRAM(S): at 06:06

## 2017-11-02 RX ADMIN — HYDROMORPHONE HYDROCHLORIDE 4 MILLIGRAM(S): 2 INJECTION INTRAMUSCULAR; INTRAVENOUS; SUBCUTANEOUS at 10:14

## 2017-11-02 RX ADMIN — LOSARTAN POTASSIUM 100 MILLIGRAM(S): 100 TABLET, FILM COATED ORAL at 06:06

## 2017-11-02 RX ADMIN — ENOXAPARIN SODIUM 40 MILLIGRAM(S): 100 INJECTION SUBCUTANEOUS at 22:24

## 2017-11-02 RX ADMIN — Medication 25 MILLIGRAM(S): at 06:06

## 2017-11-02 RX ADMIN — PANTOPRAZOLE SODIUM 40 MILLIGRAM(S): 20 TABLET, DELAYED RELEASE ORAL at 11:28

## 2017-11-02 RX ADMIN — GABAPENTIN 600 MILLIGRAM(S): 400 CAPSULE ORAL at 13:13

## 2017-11-02 RX ADMIN — HYDROMORPHONE HYDROCHLORIDE 4 MILLIGRAM(S): 2 INJECTION INTRAMUSCULAR; INTRAVENOUS; SUBCUTANEOUS at 06:06

## 2017-11-02 RX ADMIN — METHOCARBAMOL 750 MILLIGRAM(S): 500 TABLET, FILM COATED ORAL at 13:13

## 2017-11-02 RX ADMIN — HYDROMORPHONE HYDROCHLORIDE 4 MILLIGRAM(S): 2 INJECTION INTRAMUSCULAR; INTRAVENOUS; SUBCUTANEOUS at 06:36

## 2017-11-02 RX ADMIN — HYDROMORPHONE HYDROCHLORIDE 4 MILLIGRAM(S): 2 INJECTION INTRAMUSCULAR; INTRAVENOUS; SUBCUTANEOUS at 22:23

## 2017-11-02 RX ADMIN — GABAPENTIN 600 MILLIGRAM(S): 400 CAPSULE ORAL at 06:06

## 2017-11-02 RX ADMIN — Medication 1 GRAM(S): at 22:24

## 2017-11-02 RX ADMIN — AMLODIPINE BESYLATE 10 MILLIGRAM(S): 2.5 TABLET ORAL at 22:24

## 2017-11-02 RX ADMIN — Medication 4 MILLIGRAM(S): at 06:06

## 2017-11-02 RX ADMIN — Medication 0.62 MILLIGRAM(S): at 11:28

## 2017-11-02 RX ADMIN — Medication 4 MILLIGRAM(S): at 17:40

## 2017-11-02 RX ADMIN — HYDROMORPHONE HYDROCHLORIDE 4 MILLIGRAM(S): 2 INJECTION INTRAMUSCULAR; INTRAVENOUS; SUBCUTANEOUS at 14:50

## 2017-11-02 RX ADMIN — HYDROMORPHONE HYDROCHLORIDE 4 MILLIGRAM(S): 2 INJECTION INTRAMUSCULAR; INTRAVENOUS; SUBCUTANEOUS at 01:20

## 2017-11-02 RX ADMIN — Medication 1 GRAM(S): at 11:28

## 2017-11-02 RX ADMIN — GABAPENTIN 600 MILLIGRAM(S): 400 CAPSULE ORAL at 22:24

## 2017-11-02 RX ADMIN — SERTRALINE 50 MILLIGRAM(S): 25 TABLET, FILM COATED ORAL at 22:24

## 2017-11-02 RX ADMIN — HYDROMORPHONE HYDROCHLORIDE 4 MILLIGRAM(S): 2 INJECTION INTRAMUSCULAR; INTRAVENOUS; SUBCUTANEOUS at 01:50

## 2017-11-02 RX ADMIN — Medication 100 MILLIGRAM(S): at 13:13

## 2017-11-02 RX ADMIN — HYDROMORPHONE HYDROCHLORIDE 4 MILLIGRAM(S): 2 INJECTION INTRAMUSCULAR; INTRAVENOUS; SUBCUTANEOUS at 22:53

## 2017-11-02 RX ADMIN — HYDROMORPHONE HYDROCHLORIDE 4 MILLIGRAM(S): 2 INJECTION INTRAMUSCULAR; INTRAVENOUS; SUBCUTANEOUS at 18:01

## 2017-11-02 RX ADMIN — Medication 4 MILLIGRAM(S): at 01:20

## 2017-11-02 RX ADMIN — HYDROMORPHONE HYDROCHLORIDE 4 MILLIGRAM(S): 2 INJECTION INTRAMUSCULAR; INTRAVENOUS; SUBCUTANEOUS at 18:30

## 2017-11-02 RX ADMIN — HYDROMORPHONE HYDROCHLORIDE 4 MILLIGRAM(S): 2 INJECTION INTRAMUSCULAR; INTRAVENOUS; SUBCUTANEOUS at 10:45

## 2017-11-02 RX ADMIN — SENNA PLUS 2 TABLET(S): 8.6 TABLET ORAL at 22:24

## 2017-11-02 NOTE — PROGRESS NOTE ADULT - ASSESSMENT
atypical chest pain   likely neurologic / neuropathic  neg cardiac enzymes     HTN  better controlled     HLD  on statin

## 2017-11-02 NOTE — PROGRESS NOTE ADULT - ASSESSMENT
Emma Patel is a 61-year old woman who had a C4-C5 corpectomy, C6-C7 ACDF, C3-C7 anterior stabilization and fusion, and C2-C7 posterior stabilization and fusion on 06/14/2017. On 09/18/2017 she had the bilateral C7 screws removed for radicular pain and evidence that the screws were compressing the bilateral C7 nerve roots on her imaging. She presents with acute onset of pain in her left upper extremity that started on 10/25/17. Presentation and exam indicate left C8 radiculopathy, doubt brachial plexopathy    ?small fragment, far lateral left disc herniation C7-T1 on cervical spine MRI  neurologically stable on steroids  Continue pain control  Decadron 4 q6  Add lyrica 25 mg BID (daughter says gabapentin caused delirum in past)  Continue gabapentin 600 TID  Flexeril 10 TID  Topical heat for muscle spasm  Pre op for saturday  Neurology and cardiology following  ENT saw and cleared her for intubation  DVT prophylaxis: venodynes/SQL

## 2017-11-02 NOTE — PROGRESS NOTE ADULT - ASSESSMENT
A/P: 61 year old woman 61 year old woman with past medical history cervical disc disease, C4-C5 corpectomy, C6-C7 ACDF, C3-C7 anterior stabilization and fusion, and C2-C7 posterior stabilization and fusion on 06/14/2017. On 09/18/2017 she had bilateral C7 screws removed for radicular pain and evidence screws were compressing bilateral C7 nerve roots. Reports she was gradually feeling better but over the past 3 days has developed severe left sided posterior scapular, lateral neck pain radiating to her anterior chest, medial aspect of the left arm with sharp pains from the elbow to lateral left hand. Describes weakness in the arm and numbness in the 4-5th left digits.    Presentation and exam indicate left C8 radiculopathy secondary to lateral left disc at C7/T1  Brachial plexus MRI is negative.     Recc:    Planned for left C7-T1 ACDF with Dr. Mcgovern saturday  Continue pain control  Decadron 4 q6  lyrica 25 mg BID (daughter says gabapentin caused delirum in past)  gabapentin 600 TID  Flexeril 10 TID  On dilaudid prn  Topical heat for muscle spasm  Avoidance compressive postures at left elbow regarding superimposed left ulnar  Eventual outpatient EMG  Will follow.    Reviewed plan with pt.

## 2017-11-03 LAB
ANION GAP SERPL CALC-SCNC: 17 MMOL/L — SIGNIFICANT CHANGE UP (ref 5–17)
APPEARANCE UR: CLEAR — SIGNIFICANT CHANGE UP
APTT BLD: 24.4 SEC — LOW (ref 27.5–37.4)
BILIRUB UR-MCNC: NEGATIVE — SIGNIFICANT CHANGE UP
BLD GP AB SCN SERPL QL: NEGATIVE — SIGNIFICANT CHANGE UP
BUN SERPL-MCNC: 16 MG/DL — SIGNIFICANT CHANGE UP (ref 7–23)
CALCIUM SERPL-MCNC: 9.8 MG/DL — SIGNIFICANT CHANGE UP (ref 8.4–10.5)
CHLORIDE SERPL-SCNC: 97 MMOL/L — SIGNIFICANT CHANGE UP (ref 96–108)
CO2 SERPL-SCNC: 22 MMOL/L — SIGNIFICANT CHANGE UP (ref 22–31)
COLOR SPEC: YELLOW — SIGNIFICANT CHANGE UP
CREAT SERPL-MCNC: 0.78 MG/DL — SIGNIFICANT CHANGE UP (ref 0.5–1.3)
DIFF PNL FLD: NEGATIVE — SIGNIFICANT CHANGE UP
GLUCOSE BLDC GLUCOMTR-MCNC: 102 MG/DL — HIGH (ref 70–99)
GLUCOSE BLDC GLUCOMTR-MCNC: 146 MG/DL — HIGH (ref 70–99)
GLUCOSE BLDC GLUCOMTR-MCNC: 155 MG/DL — HIGH (ref 70–99)
GLUCOSE BLDC GLUCOMTR-MCNC: 163 MG/DL — HIGH (ref 70–99)
GLUCOSE SERPL-MCNC: 117 MG/DL — HIGH (ref 70–99)
GLUCOSE UR QL: NEGATIVE MG/DL — SIGNIFICANT CHANGE UP
HCG UR QL: NEGATIVE — SIGNIFICANT CHANGE UP
HCT VFR BLD CALC: 34.6 % — SIGNIFICANT CHANGE UP (ref 34.5–45)
HGB BLD-MCNC: 11.5 G/DL — SIGNIFICANT CHANGE UP (ref 11.5–15.5)
INR BLD: 0.98 RATIO — SIGNIFICANT CHANGE UP (ref 0.88–1.16)
KETONES UR-MCNC: NEGATIVE — SIGNIFICANT CHANGE UP
LEUKOCYTE ESTERASE UR-ACNC: NEGATIVE — SIGNIFICANT CHANGE UP
MCHC RBC-ENTMCNC: 27.6 PG — SIGNIFICANT CHANGE UP (ref 27–34)
MCHC RBC-ENTMCNC: 33.2 GM/DL — SIGNIFICANT CHANGE UP (ref 32–36)
MCV RBC AUTO: 83.2 FL — SIGNIFICANT CHANGE UP (ref 80–100)
NITRITE UR-MCNC: NEGATIVE — SIGNIFICANT CHANGE UP
PH UR: 6.5 — SIGNIFICANT CHANGE UP (ref 5–8)
PLATELET # BLD AUTO: 551 K/UL — HIGH (ref 150–400)
POTASSIUM SERPL-MCNC: 4.4 MMOL/L — SIGNIFICANT CHANGE UP (ref 3.5–5.3)
POTASSIUM SERPL-SCNC: 4.4 MMOL/L — SIGNIFICANT CHANGE UP (ref 3.5–5.3)
PROT UR-MCNC: NEGATIVE MG/DL — SIGNIFICANT CHANGE UP
PROTHROM AB SERPL-ACNC: 10.6 SEC — SIGNIFICANT CHANGE UP (ref 9.8–12.7)
RBC # BLD: 4.16 M/UL — SIGNIFICANT CHANGE UP (ref 3.8–5.2)
RBC # FLD: 13.8 % — SIGNIFICANT CHANGE UP (ref 10.3–14.5)
RH IG SCN BLD-IMP: POSITIVE — SIGNIFICANT CHANGE UP
SODIUM SERPL-SCNC: 136 MMOL/L — SIGNIFICANT CHANGE UP (ref 135–145)
SP GR SPEC: 1.01 — LOW (ref 1.01–1.02)
UROBILINOGEN FLD QL: NEGATIVE MG/DL — SIGNIFICANT CHANGE UP
WBC # BLD: 18.44 K/UL — HIGH (ref 3.8–10.5)
WBC # FLD AUTO: 18.44 K/UL — HIGH (ref 3.8–10.5)

## 2017-11-03 RX ADMIN — Medication 4 MILLIGRAM(S): at 05:44

## 2017-11-03 RX ADMIN — PANTOPRAZOLE SODIUM 40 MILLIGRAM(S): 20 TABLET, DELAYED RELEASE ORAL at 11:49

## 2017-11-03 RX ADMIN — Medication 1 GRAM(S): at 16:13

## 2017-11-03 RX ADMIN — Medication 25 MILLIGRAM(S): at 17:54

## 2017-11-03 RX ADMIN — HYDROMORPHONE HYDROCHLORIDE 4 MILLIGRAM(S): 2 INJECTION INTRAMUSCULAR; INTRAVENOUS; SUBCUTANEOUS at 23:00

## 2017-11-03 RX ADMIN — CYCLOBENZAPRINE HYDROCHLORIDE 10 MILLIGRAM(S): 10 TABLET, FILM COATED ORAL at 22:22

## 2017-11-03 RX ADMIN — Medication 1 DROP(S): at 11:48

## 2017-11-03 RX ADMIN — AMLODIPINE BESYLATE 10 MILLIGRAM(S): 2.5 TABLET ORAL at 22:11

## 2017-11-03 RX ADMIN — Medication 4 MILLIGRAM(S): at 12:00

## 2017-11-03 RX ADMIN — HYDROMORPHONE HYDROCHLORIDE 4 MILLIGRAM(S): 2 INJECTION INTRAMUSCULAR; INTRAVENOUS; SUBCUTANEOUS at 02:56

## 2017-11-03 RX ADMIN — Medication 100 MILLIGRAM(S): at 14:03

## 2017-11-03 RX ADMIN — LOSARTAN POTASSIUM 100 MILLIGRAM(S): 100 TABLET, FILM COATED ORAL at 05:44

## 2017-11-03 RX ADMIN — GABAPENTIN 600 MILLIGRAM(S): 400 CAPSULE ORAL at 22:11

## 2017-11-03 RX ADMIN — Medication 4 MILLIGRAM(S): at 17:55

## 2017-11-03 RX ADMIN — PANTOPRAZOLE SODIUM 40 MILLIGRAM(S): 20 TABLET, DELAYED RELEASE ORAL at 05:44

## 2017-11-03 RX ADMIN — Medication 1 GRAM(S): at 22:11

## 2017-11-03 RX ADMIN — HYDROMORPHONE HYDROCHLORIDE 4 MILLIGRAM(S): 2 INJECTION INTRAMUSCULAR; INTRAVENOUS; SUBCUTANEOUS at 12:30

## 2017-11-03 RX ADMIN — HYDROMORPHONE HYDROCHLORIDE 4 MILLIGRAM(S): 2 INJECTION INTRAMUSCULAR; INTRAVENOUS; SUBCUTANEOUS at 13:00

## 2017-11-03 RX ADMIN — HYDROMORPHONE HYDROCHLORIDE 4 MILLIGRAM(S): 2 INJECTION INTRAMUSCULAR; INTRAVENOUS; SUBCUTANEOUS at 02:26

## 2017-11-03 RX ADMIN — SERTRALINE 50 MILLIGRAM(S): 25 TABLET, FILM COATED ORAL at 22:11

## 2017-11-03 RX ADMIN — Medication 100 MILLIGRAM(S): at 22:11

## 2017-11-03 RX ADMIN — HYDROMORPHONE HYDROCHLORIDE 4 MILLIGRAM(S): 2 INJECTION INTRAMUSCULAR; INTRAVENOUS; SUBCUTANEOUS at 22:22

## 2017-11-03 RX ADMIN — Medication 2: at 08:15

## 2017-11-03 RX ADMIN — Medication 2: at 22:15

## 2017-11-03 RX ADMIN — GABAPENTIN 600 MILLIGRAM(S): 400 CAPSULE ORAL at 14:03

## 2017-11-03 RX ADMIN — HYDROMORPHONE HYDROCHLORIDE 4 MILLIGRAM(S): 2 INJECTION INTRAMUSCULAR; INTRAVENOUS; SUBCUTANEOUS at 18:25

## 2017-11-03 RX ADMIN — METHOCARBAMOL 750 MILLIGRAM(S): 500 TABLET, FILM COATED ORAL at 09:39

## 2017-11-03 RX ADMIN — SENNA PLUS 2 TABLET(S): 8.6 TABLET ORAL at 22:11

## 2017-11-03 RX ADMIN — Medication 1 GRAM(S): at 10:58

## 2017-11-03 RX ADMIN — Medication 100 MILLIGRAM(S): at 05:44

## 2017-11-03 RX ADMIN — ATORVASTATIN CALCIUM 40 MILLIGRAM(S): 80 TABLET, FILM COATED ORAL at 22:11

## 2017-11-03 RX ADMIN — Medication 25 MILLIGRAM(S): at 05:44

## 2017-11-03 RX ADMIN — HYDROMORPHONE HYDROCHLORIDE 4 MILLIGRAM(S): 2 INJECTION INTRAMUSCULAR; INTRAVENOUS; SUBCUTANEOUS at 17:55

## 2017-11-03 RX ADMIN — HYDROMORPHONE HYDROCHLORIDE 4 MILLIGRAM(S): 2 INJECTION INTRAMUSCULAR; INTRAVENOUS; SUBCUTANEOUS at 08:20

## 2017-11-03 RX ADMIN — Medication 1 GRAM(S): at 05:44

## 2017-11-03 RX ADMIN — GABAPENTIN 600 MILLIGRAM(S): 400 CAPSULE ORAL at 05:46

## 2017-11-03 RX ADMIN — Medication 0.62 MILLIGRAM(S): at 11:49

## 2017-11-03 RX ADMIN — HYDROMORPHONE HYDROCHLORIDE 4 MILLIGRAM(S): 2 INJECTION INTRAMUSCULAR; INTRAVENOUS; SUBCUTANEOUS at 08:50

## 2017-11-03 NOTE — PROGRESS NOTE ADULT - ASSESSMENT
A 61-year old woman who had a C4-C5 corpectomy, C6-C7 ACDF, C3-C7 anterior stabilization and fusion, and C2-C7 posterior stabilization and fusion on 06/14/2017 now with cervical pain and LUE weakness  ?small fragment, far lateral left disc herniation C7-T1 on cervical spine MRI  Neurologically stable on steroids  Continue pain control  Decadron 4 q6  Pre-op in am  Npo after MN  SQL on hold

## 2017-11-03 NOTE — DIETITIAN INITIAL EVALUATION ADULT. - ENERGY NEEDS
Ht: 60“, Wt: 150 lbs, BMI: 29.3kg/m2, IBW: 100 lbs (+/-10%), 150% of IBW  Pertinent Information: Pt S/P front-back cervical spine surgery with instrumentation, hardware, now admitted after sudden onset debilitating LEFT shoulder pain, Plan for C7-T1 ACDF 11/4.   no edema or pressure injury

## 2017-11-03 NOTE — DIETITIAN INITIAL EVALUATION ADULT. - FACTORS AFF FOOD INTAKE
plan for C7-T1 ACDF tomorrow, pt reports when having previous cervical surgery her po intake decreased after- will continue to monitor

## 2017-11-03 NOTE — DIETITIAN INITIAL EVALUATION ADULT. - OTHER INFO
Nutrition assessment for length of stay. Pt reports having good po intake current but notes after previous surgery her po intake decreased. Pt declined supplements at this time, RD left business card with pt and pt is aware RD and supplements remains available upon request. No GI distress at this time. Pt reports having a bowel movement this morning. No chewing or swallowing difficulties at this time. No known food allergies.

## 2017-11-03 NOTE — DIETITIAN INITIAL EVALUATION ADULT. - NS AS NUTRI INTERV MEALS SNACK
Encourage po intake with nutrient dense foods. Provide food preferences as able. Monitor weight, lab values, po intake and GI tolerance. RD to remain available for further nutrition interventions as indicated./General/healthful diet

## 2017-11-03 NOTE — PROGRESS NOTE ADULT - ASSESSMENT
atypical chest pain   likely neurologic / neuropathic  neg cardiac enzymes     HTN  better controlled     HLD  on statin     plan for OR tomorrow  CV stable

## 2017-11-03 NOTE — DIETITIAN INITIAL EVALUATION ADULT. - ORAL INTAKE PTA
Typical intake: cereal, banana or yogurt for breakfast; yogurt or apple for lunch, pt reports being used to not having much for lunch as she used to work though it when she was working; grilled chicken, baked potato with sour cream or other home cooked meals for dinner. Pt reports taking MVI, Vitamin D, Biotin, and Vitamin B12 PTA./good

## 2017-11-03 NOTE — DIETITIAN INITIAL EVALUATION ADULT. - ADHERENCE
Pt reports following an unrestricted diet PTA. Pt expresses interest in following a diet but states preferring to wait until she goes home as she previously had decreased po intake after her last surgery.

## 2017-11-04 ENCOUNTER — TRANSCRIPTION ENCOUNTER (OUTPATIENT)
Age: 62
End: 2017-11-04

## 2017-11-04 LAB
ANION GAP SERPL CALC-SCNC: 12 MMOL/L — SIGNIFICANT CHANGE UP (ref 5–17)
ANION GAP SERPL CALC-SCNC: 12 MMOL/L — SIGNIFICANT CHANGE UP (ref 5–17)
BUN SERPL-MCNC: 15 MG/DL — SIGNIFICANT CHANGE UP (ref 7–23)
BUN SERPL-MCNC: 16 MG/DL — SIGNIFICANT CHANGE UP (ref 7–23)
CALCIUM SERPL-MCNC: 8.1 MG/DL — LOW (ref 8.4–10.5)
CALCIUM SERPL-MCNC: 8.7 MG/DL — SIGNIFICANT CHANGE UP (ref 8.4–10.5)
CHLORIDE SERPL-SCNC: 100 MMOL/L — SIGNIFICANT CHANGE UP (ref 96–108)
CHLORIDE SERPL-SCNC: 102 MMOL/L — SIGNIFICANT CHANGE UP (ref 96–108)
CO2 SERPL-SCNC: 23 MMOL/L — SIGNIFICANT CHANGE UP (ref 22–31)
CO2 SERPL-SCNC: 24 MMOL/L — SIGNIFICANT CHANGE UP (ref 22–31)
CREAT SERPL-MCNC: 0.67 MG/DL — SIGNIFICANT CHANGE UP (ref 0.5–1.3)
CREAT SERPL-MCNC: 0.74 MG/DL — SIGNIFICANT CHANGE UP (ref 0.5–1.3)
GLUCOSE BLDC GLUCOMTR-MCNC: 111 MG/DL — HIGH (ref 70–99)
GLUCOSE BLDC GLUCOMTR-MCNC: 121 MG/DL — HIGH (ref 70–99)
GLUCOSE SERPL-MCNC: 141 MG/DL — HIGH (ref 70–99)
GLUCOSE SERPL-MCNC: 95 MG/DL — SIGNIFICANT CHANGE UP (ref 70–99)
HCT VFR BLD CALC: 29.1 % — LOW (ref 34.5–45)
HCT VFR BLD CALC: 31.7 % — LOW (ref 34.5–45)
HGB BLD-MCNC: 10 G/DL — LOW (ref 11.5–15.5)
HGB BLD-MCNC: 10.4 G/DL — LOW (ref 11.5–15.5)
MAGNESIUM SERPL-MCNC: 2.1 MG/DL — SIGNIFICANT CHANGE UP (ref 1.6–2.6)
MCHC RBC-ENTMCNC: 28.5 PG — SIGNIFICANT CHANGE UP (ref 27–34)
MCHC RBC-ENTMCNC: 29.6 PG — SIGNIFICANT CHANGE UP (ref 27–34)
MCHC RBC-ENTMCNC: 32.9 GM/DL — SIGNIFICANT CHANGE UP (ref 32–36)
MCHC RBC-ENTMCNC: 34.5 GM/DL — SIGNIFICANT CHANGE UP (ref 32–36)
MCV RBC AUTO: 85.9 FL — SIGNIFICANT CHANGE UP (ref 80–100)
MCV RBC AUTO: 86.6 FL — SIGNIFICANT CHANGE UP (ref 80–100)
PHOSPHATE SERPL-MCNC: 4.6 MG/DL — HIGH (ref 2.5–4.5)
PLATELET # BLD AUTO: 391 K/UL — SIGNIFICANT CHANGE UP (ref 150–400)
PLATELET # BLD AUTO: 439 K/UL — HIGH (ref 150–400)
POTASSIUM SERPL-MCNC: 4.1 MMOL/L — SIGNIFICANT CHANGE UP (ref 3.5–5.3)
POTASSIUM SERPL-MCNC: 4.2 MMOL/L — SIGNIFICANT CHANGE UP (ref 3.5–5.3)
POTASSIUM SERPL-SCNC: 4.1 MMOL/L — SIGNIFICANT CHANGE UP (ref 3.5–5.3)
POTASSIUM SERPL-SCNC: 4.2 MMOL/L — SIGNIFICANT CHANGE UP (ref 3.5–5.3)
RBC # BLD: 3.39 M/UL — LOW (ref 3.8–5.2)
RBC # BLD: 3.66 M/UL — LOW (ref 3.8–5.2)
RBC # FLD: 12.6 % — SIGNIFICANT CHANGE UP (ref 10.3–14.5)
RBC # FLD: 12.7 % — SIGNIFICANT CHANGE UP (ref 10.3–14.5)
SODIUM SERPL-SCNC: 136 MMOL/L — SIGNIFICANT CHANGE UP (ref 135–145)
SODIUM SERPL-SCNC: 137 MMOL/L — SIGNIFICANT CHANGE UP (ref 135–145)
WBC # BLD: 15.7 K/UL — HIGH (ref 3.8–10.5)
WBC # BLD: 17.7 K/UL — HIGH (ref 3.8–10.5)
WBC # FLD AUTO: 15.7 K/UL — HIGH (ref 3.8–10.5)
WBC # FLD AUTO: 17.7 K/UL — HIGH (ref 3.8–10.5)

## 2017-11-04 PROCEDURE — 99291 CRITICAL CARE FIRST HOUR: CPT

## 2017-11-04 RX ORDER — CEFAZOLIN SODIUM 1 G
2000 VIAL (EA) INJECTION EVERY 8 HOURS
Qty: 0 | Refills: 0 | Status: COMPLETED | OUTPATIENT
Start: 2017-11-04 | End: 2017-11-05

## 2017-11-04 RX ORDER — SUCRALFATE 1 G
1 TABLET ORAL
Qty: 0 | Refills: 0 | Status: DISCONTINUED | OUTPATIENT
Start: 2017-11-04 | End: 2017-11-08

## 2017-11-04 RX ORDER — DEXTROSE MONOHYDRATE, SODIUM CHLORIDE, AND POTASSIUM CHLORIDE 50; .745; 4.5 G/1000ML; G/1000ML; G/1000ML
1000 INJECTION, SOLUTION INTRAVENOUS
Qty: 0 | Refills: 0 | Status: DISCONTINUED | OUTPATIENT
Start: 2017-11-04 | End: 2017-11-05

## 2017-11-04 RX ORDER — INSULIN LISPRO 100/ML
VIAL (ML) SUBCUTANEOUS
Qty: 0 | Refills: 0 | Status: DISCONTINUED | OUTPATIENT
Start: 2017-11-04 | End: 2017-11-08

## 2017-11-04 RX ORDER — HYDROMORPHONE HYDROCHLORIDE 2 MG/ML
0.5 INJECTION INTRAMUSCULAR; INTRAVENOUS; SUBCUTANEOUS
Qty: 0 | Refills: 0 | Status: DISCONTINUED | OUTPATIENT
Start: 2017-11-04 | End: 2017-11-04

## 2017-11-04 RX ORDER — GABAPENTIN 400 MG/1
600 CAPSULE ORAL THREE TIMES A DAY
Qty: 0 | Refills: 0 | Status: DISCONTINUED | OUTPATIENT
Start: 2017-11-04 | End: 2017-11-06

## 2017-11-04 RX ORDER — LOSARTAN POTASSIUM 100 MG/1
100 TABLET, FILM COATED ORAL DAILY
Qty: 0 | Refills: 0 | Status: DISCONTINUED | OUTPATIENT
Start: 2017-11-04 | End: 2017-11-08

## 2017-11-04 RX ORDER — ONDANSETRON 8 MG/1
4 TABLET, FILM COATED ORAL EVERY 6 HOURS
Qty: 0 | Refills: 0 | Status: DISCONTINUED | OUTPATIENT
Start: 2017-11-04 | End: 2017-11-08

## 2017-11-04 RX ORDER — DOCUSATE SODIUM 100 MG
100 CAPSULE ORAL THREE TIMES A DAY
Qty: 0 | Refills: 0 | Status: DISCONTINUED | OUTPATIENT
Start: 2017-11-04 | End: 2017-11-08

## 2017-11-04 RX ORDER — SERTRALINE 25 MG/1
50 TABLET, FILM COATED ORAL DAILY
Qty: 0 | Refills: 0 | Status: DISCONTINUED | OUTPATIENT
Start: 2017-11-04 | End: 2017-11-08

## 2017-11-04 RX ORDER — ACETAMINOPHEN 500 MG
1000 TABLET ORAL ONCE
Qty: 0 | Refills: 0 | Status: DISCONTINUED | OUTPATIENT
Start: 2017-11-04 | End: 2017-11-08

## 2017-11-04 RX ORDER — DEXTROSE MONOHYDRATE, SODIUM CHLORIDE, AND POTASSIUM CHLORIDE 50; .745; 4.5 G/1000ML; G/1000ML; G/1000ML
1000 INJECTION, SOLUTION INTRAVENOUS
Qty: 0 | Refills: 0 | Status: DISCONTINUED | OUTPATIENT
Start: 2017-11-04 | End: 2017-11-04

## 2017-11-04 RX ORDER — ATORVASTATIN CALCIUM 80 MG/1
40 TABLET, FILM COATED ORAL AT BEDTIME
Qty: 0 | Refills: 0 | Status: DISCONTINUED | OUTPATIENT
Start: 2017-11-04 | End: 2017-11-08

## 2017-11-04 RX ORDER — CYCLOBENZAPRINE HYDROCHLORIDE 10 MG/1
10 TABLET, FILM COATED ORAL THREE TIMES A DAY
Qty: 0 | Refills: 0 | Status: DISCONTINUED | OUTPATIENT
Start: 2017-11-04 | End: 2017-11-08

## 2017-11-04 RX ORDER — PANTOPRAZOLE SODIUM 20 MG/1
40 TABLET, DELAYED RELEASE ORAL
Qty: 0 | Refills: 0 | Status: DISCONTINUED | OUTPATIENT
Start: 2017-11-04 | End: 2017-11-08

## 2017-11-04 RX ORDER — SENNA PLUS 8.6 MG/1
2 TABLET ORAL AT BEDTIME
Qty: 0 | Refills: 0 | Status: DISCONTINUED | OUTPATIENT
Start: 2017-11-04 | End: 2017-11-08

## 2017-11-04 RX ORDER — AMLODIPINE BESYLATE 2.5 MG/1
10 TABLET ORAL AT BEDTIME
Qty: 0 | Refills: 0 | Status: DISCONTINUED | OUTPATIENT
Start: 2017-11-04 | End: 2017-11-05

## 2017-11-04 RX ORDER — ONDANSETRON 8 MG/1
4 TABLET, FILM COATED ORAL
Qty: 0 | Refills: 0 | Status: DISCONTINUED | OUTPATIENT
Start: 2017-11-04 | End: 2017-11-08

## 2017-11-04 RX ORDER — METHOCARBAMOL 500 MG/1
750 TABLET, FILM COATED ORAL THREE TIMES A DAY
Qty: 0 | Refills: 0 | Status: DISCONTINUED | OUTPATIENT
Start: 2017-11-04 | End: 2017-11-08

## 2017-11-04 RX ORDER — PANTOPRAZOLE SODIUM 20 MG/1
40 TABLET, DELAYED RELEASE ORAL DAILY
Qty: 0 | Refills: 0 | Status: DISCONTINUED | OUTPATIENT
Start: 2017-11-04 | End: 2017-11-04

## 2017-11-04 RX ORDER — TIMOLOL 0.5 %
1 DROPS OPHTHALMIC (EYE) DAILY
Qty: 0 | Refills: 0 | Status: DISCONTINUED | OUTPATIENT
Start: 2017-11-04 | End: 2017-11-08

## 2017-11-04 RX ORDER — ESTROGENS, CONJUGATED 0.625 MG
0.62 TABLET ORAL DAILY
Qty: 0 | Refills: 0 | Status: DISCONTINUED | OUTPATIENT
Start: 2017-11-04 | End: 2017-11-08

## 2017-11-04 RX ORDER — HYDROMORPHONE HYDROCHLORIDE 2 MG/ML
0.5 INJECTION INTRAMUSCULAR; INTRAVENOUS; SUBCUTANEOUS ONCE
Qty: 0 | Refills: 0 | Status: DISCONTINUED | OUTPATIENT
Start: 2017-11-04 | End: 2017-11-04

## 2017-11-04 RX ORDER — ACETAMINOPHEN 500 MG
1000 TABLET ORAL ONCE
Qty: 0 | Refills: 0 | Status: COMPLETED | OUTPATIENT
Start: 2017-11-04 | End: 2017-11-04

## 2017-11-04 RX ADMIN — Medication 100 MILLIGRAM(S): at 21:47

## 2017-11-04 RX ADMIN — GABAPENTIN 600 MILLIGRAM(S): 400 CAPSULE ORAL at 05:58

## 2017-11-04 RX ADMIN — PANTOPRAZOLE SODIUM 40 MILLIGRAM(S): 20 TABLET, DELAYED RELEASE ORAL at 05:58

## 2017-11-04 RX ADMIN — SENNA PLUS 2 TABLET(S): 8.6 TABLET ORAL at 21:47

## 2017-11-04 RX ADMIN — HYDROMORPHONE HYDROCHLORIDE 4 MILLIGRAM(S): 2 INJECTION INTRAMUSCULAR; INTRAVENOUS; SUBCUTANEOUS at 03:52

## 2017-11-04 RX ADMIN — AMLODIPINE BESYLATE 10 MILLIGRAM(S): 2.5 TABLET ORAL at 21:47

## 2017-11-04 RX ADMIN — CYCLOBENZAPRINE HYDROCHLORIDE 10 MILLIGRAM(S): 10 TABLET, FILM COATED ORAL at 14:50

## 2017-11-04 RX ADMIN — Medication 25 MILLIGRAM(S): at 23:07

## 2017-11-04 RX ADMIN — Medication 0.62 MILLIGRAM(S): at 17:08

## 2017-11-04 RX ADMIN — HYDROMORPHONE HYDROCHLORIDE 0.5 MILLIGRAM(S): 2 INJECTION INTRAMUSCULAR; INTRAVENOUS; SUBCUTANEOUS at 15:30

## 2017-11-04 RX ADMIN — LOSARTAN POTASSIUM 100 MILLIGRAM(S): 100 TABLET, FILM COATED ORAL at 05:57

## 2017-11-04 RX ADMIN — LOSARTAN POTASSIUM 100 MILLIGRAM(S): 100 TABLET, FILM COATED ORAL at 21:47

## 2017-11-04 RX ADMIN — DEXTROSE MONOHYDRATE, SODIUM CHLORIDE, AND POTASSIUM CHLORIDE 75 MILLILITER(S): 50; .745; 4.5 INJECTION, SOLUTION INTRAVENOUS at 21:47

## 2017-11-04 RX ADMIN — DEXTROSE MONOHYDRATE, SODIUM CHLORIDE, AND POTASSIUM CHLORIDE 75 MILLILITER(S): 50; .745; 4.5 INJECTION, SOLUTION INTRAVENOUS at 03:52

## 2017-11-04 RX ADMIN — Medication 1 DROP(S): at 14:57

## 2017-11-04 RX ADMIN — Medication 25 MILLIGRAM(S): at 05:57

## 2017-11-04 RX ADMIN — HYDROMORPHONE HYDROCHLORIDE 0.5 MILLIGRAM(S): 2 INJECTION INTRAMUSCULAR; INTRAVENOUS; SUBCUTANEOUS at 19:08

## 2017-11-04 RX ADMIN — Medication 4 MILLIGRAM(S): at 00:44

## 2017-11-04 RX ADMIN — Medication 400 MILLIGRAM(S): at 23:00

## 2017-11-04 RX ADMIN — GABAPENTIN 600 MILLIGRAM(S): 400 CAPSULE ORAL at 21:49

## 2017-11-04 RX ADMIN — GABAPENTIN 600 MILLIGRAM(S): 400 CAPSULE ORAL at 14:45

## 2017-11-04 RX ADMIN — Medication 1 GRAM(S): at 05:58

## 2017-11-04 RX ADMIN — HYDROMORPHONE HYDROCHLORIDE 4 MILLIGRAM(S): 2 INJECTION INTRAMUSCULAR; INTRAVENOUS; SUBCUTANEOUS at 04:20

## 2017-11-04 RX ADMIN — Medication 4 MILLIGRAM(S): at 05:58

## 2017-11-04 RX ADMIN — Medication 1 GRAM(S): at 23:07

## 2017-11-04 RX ADMIN — DEXTROSE MONOHYDRATE, SODIUM CHLORIDE, AND POTASSIUM CHLORIDE 75 MILLILITER(S): 50; .745; 4.5 INJECTION, SOLUTION INTRAVENOUS at 14:43

## 2017-11-04 RX ADMIN — Medication 1000 MILLIGRAM(S): at 23:30

## 2017-11-04 RX ADMIN — SERTRALINE 50 MILLIGRAM(S): 25 TABLET, FILM COATED ORAL at 21:47

## 2017-11-04 RX ADMIN — HYDROMORPHONE HYDROCHLORIDE 0.5 MILLIGRAM(S): 2 INJECTION INTRAMUSCULAR; INTRAVENOUS; SUBCUTANEOUS at 15:10

## 2017-11-04 RX ADMIN — CYCLOBENZAPRINE HYDROCHLORIDE 10 MILLIGRAM(S): 10 TABLET, FILM COATED ORAL at 20:40

## 2017-11-04 RX ADMIN — Medication 100 MILLIGRAM(S): at 14:43

## 2017-11-04 RX ADMIN — Medication 100 MILLIGRAM(S): at 05:58

## 2017-11-04 RX ADMIN — ATORVASTATIN CALCIUM 40 MILLIGRAM(S): 80 TABLET, FILM COATED ORAL at 21:47

## 2017-11-04 NOTE — PROGRESS NOTE ADULT - ASSESSMENT
ASSESSMENT/PLAN:  60 yo F s/p C7-T1 ACDF 11/4, POD0.  H/o 06/2017 C4-C5 corpectomy, C6-C7 ACDF, C3-C7 anterior stabilization and fusion, and C2-C7 posterior stabilization and fusion on 06/14/2017; bilateral C7 screws removed for radicular pain 09/2017.    NEURO:  -neurochecks q1hr  -pain control with Tylenol 1 gr IV q 6 hrs   -restart home Sertriline  Activity: [x] mobilize as tolerated [] Bedrest [] PT [] OT [] PMNR    PULM:  wean to RA.    CV:  SBP goal <150.  Restart home Amlodipine Losartan and Atorvastatin.    RENAL:  Fluids: IVF 70 ml/h while NPO.    GI:  Diet: Dysphagia screening, advance diet as tolerated.  GI prophylaxis [] not indicated [] PPI [x] other: on Nexium at home - resume PPI   Bowel regimen [] colace [x] senna [] other:    ENDO:   Goal euglycemia (-180)    HEME/ONC:  VTE prophylaxis: [x] SCDs [] chemoprophylaxis [x] hold chemoprophylaxis due to: fresh postop [] high risk of DVT/PE on admission due to:    ID: afebrile.    MISC: none,    SOCIAL/FAMILY:  [] awaiting [x] updated at bedside [] family meeting    CODE STATUS:  [x] Full Code [] DNR [] DNI [] Palliative/Comfort Care    DISPOSITION:  [x ICU [] Stroke Unit [] Floor [] EMU [] RCU [] PCU    [x] Patient is at high risk of neurologic deterioration/death due to: bleeding.    Time spent: 35 critical care minutes    Contact: [phone]

## 2017-11-05 LAB
GLUCOSE BLDC GLUCOMTR-MCNC: 126 MG/DL — HIGH (ref 70–99)
GLUCOSE BLDC GLUCOMTR-MCNC: 143 MG/DL — HIGH (ref 70–99)
GLUCOSE BLDC GLUCOMTR-MCNC: 183 MG/DL — HIGH (ref 70–99)
GLUCOSE BLDC GLUCOMTR-MCNC: 92 MG/DL — SIGNIFICANT CHANGE UP (ref 70–99)

## 2017-11-05 PROCEDURE — 99233 SBSQ HOSP IP/OBS HIGH 50: CPT

## 2017-11-05 RX ORDER — DEXAMETHASONE 0.5 MG/5ML
4 ELIXIR ORAL EVERY 6 HOURS
Qty: 0 | Refills: 0 | Status: COMPLETED | OUTPATIENT
Start: 2017-11-05 | End: 2017-11-07

## 2017-11-05 RX ORDER — ENOXAPARIN SODIUM 100 MG/ML
40 INJECTION SUBCUTANEOUS
Qty: 0 | Refills: 0 | Status: DISCONTINUED | OUTPATIENT
Start: 2017-11-05 | End: 2017-11-08

## 2017-11-05 RX ORDER — HYDROMORPHONE HYDROCHLORIDE 2 MG/ML
1 INJECTION INTRAMUSCULAR; INTRAVENOUS; SUBCUTANEOUS EVERY 4 HOURS
Qty: 0 | Refills: 0 | Status: DISCONTINUED | OUTPATIENT
Start: 2017-11-05 | End: 2017-11-06

## 2017-11-05 RX ORDER — HYDROMORPHONE HYDROCHLORIDE 2 MG/ML
2 INJECTION INTRAMUSCULAR; INTRAVENOUS; SUBCUTANEOUS EVERY 4 HOURS
Qty: 0 | Refills: 0 | Status: DISCONTINUED | OUTPATIENT
Start: 2017-11-05 | End: 2017-11-08

## 2017-11-05 RX ORDER — DEXTROSE MONOHYDRATE, SODIUM CHLORIDE, AND POTASSIUM CHLORIDE 50; .745; 4.5 G/1000ML; G/1000ML; G/1000ML
1000 INJECTION, SOLUTION INTRAVENOUS
Qty: 0 | Refills: 0 | Status: DISCONTINUED | OUTPATIENT
Start: 2017-11-05 | End: 2017-11-06

## 2017-11-05 RX ORDER — SODIUM CHLORIDE 9 MG/ML
500 INJECTION INTRAMUSCULAR; INTRAVENOUS; SUBCUTANEOUS ONCE
Qty: 0 | Refills: 0 | Status: COMPLETED | OUTPATIENT
Start: 2017-11-05 | End: 2017-11-05

## 2017-11-05 RX ORDER — SODIUM CHLORIDE 9 MG/ML
1000 INJECTION INTRAMUSCULAR; INTRAVENOUS; SUBCUTANEOUS ONCE
Qty: 0 | Refills: 0 | Status: COMPLETED | OUTPATIENT
Start: 2017-11-05 | End: 2017-11-05

## 2017-11-05 RX ORDER — HYDROMORPHONE HYDROCHLORIDE 2 MG/ML
0.5 INJECTION INTRAMUSCULAR; INTRAVENOUS; SUBCUTANEOUS ONCE
Qty: 0 | Refills: 0 | Status: DISCONTINUED | OUTPATIENT
Start: 2017-11-05 | End: 2017-11-05

## 2017-11-05 RX ORDER — AMLODIPINE BESYLATE 2.5 MG/1
5 TABLET ORAL AT BEDTIME
Qty: 0 | Refills: 0 | Status: DISCONTINUED | OUTPATIENT
Start: 2017-11-05 | End: 2017-11-08

## 2017-11-05 RX ORDER — AMLODIPINE BESYLATE 2.5 MG/1
5 TABLET ORAL AT BEDTIME
Qty: 0 | Refills: 0 | Status: DISCONTINUED | OUTPATIENT
Start: 2017-11-05 | End: 2017-11-05

## 2017-11-05 RX ADMIN — METHOCARBAMOL 750 MILLIGRAM(S): 500 TABLET, FILM COATED ORAL at 16:12

## 2017-11-05 RX ADMIN — CYCLOBENZAPRINE HYDROCHLORIDE 10 MILLIGRAM(S): 10 TABLET, FILM COATED ORAL at 01:32

## 2017-11-05 RX ADMIN — AMLODIPINE BESYLATE 5 MILLIGRAM(S): 2.5 TABLET ORAL at 21:13

## 2017-11-05 RX ADMIN — SODIUM CHLORIDE 2000 MILLILITER(S): 9 INJECTION INTRAMUSCULAR; INTRAVENOUS; SUBCUTANEOUS at 11:14

## 2017-11-05 RX ADMIN — CYCLOBENZAPRINE HYDROCHLORIDE 10 MILLIGRAM(S): 10 TABLET, FILM COATED ORAL at 11:30

## 2017-11-05 RX ADMIN — Medication 2: at 21:53

## 2017-11-05 RX ADMIN — Medication 100 MILLIGRAM(S): at 21:07

## 2017-11-05 RX ADMIN — ATORVASTATIN CALCIUM 40 MILLIGRAM(S): 80 TABLET, FILM COATED ORAL at 21:07

## 2017-11-05 RX ADMIN — HYDROMORPHONE HYDROCHLORIDE 1 MILLIGRAM(S): 2 INJECTION INTRAMUSCULAR; INTRAVENOUS; SUBCUTANEOUS at 14:30

## 2017-11-05 RX ADMIN — Medication 4 MILLIGRAM(S): at 11:59

## 2017-11-05 RX ADMIN — Medication 1 GRAM(S): at 13:10

## 2017-11-05 RX ADMIN — SERTRALINE 50 MILLIGRAM(S): 25 TABLET, FILM COATED ORAL at 20:08

## 2017-11-05 RX ADMIN — HYDROMORPHONE HYDROCHLORIDE 1 MILLIGRAM(S): 2 INJECTION INTRAMUSCULAR; INTRAVENOUS; SUBCUTANEOUS at 14:15

## 2017-11-05 RX ADMIN — HYDROMORPHONE HYDROCHLORIDE 1 MILLIGRAM(S): 2 INJECTION INTRAMUSCULAR; INTRAVENOUS; SUBCUTANEOUS at 08:17

## 2017-11-05 RX ADMIN — PANTOPRAZOLE SODIUM 40 MILLIGRAM(S): 20 TABLET, DELAYED RELEASE ORAL at 06:13

## 2017-11-05 RX ADMIN — HYDROMORPHONE HYDROCHLORIDE 2 MILLIGRAM(S): 2 INJECTION INTRAMUSCULAR; INTRAVENOUS; SUBCUTANEOUS at 18:11

## 2017-11-05 RX ADMIN — Medication 4 MILLIGRAM(S): at 18:19

## 2017-11-05 RX ADMIN — CYCLOBENZAPRINE HYDROCHLORIDE 10 MILLIGRAM(S): 10 TABLET, FILM COATED ORAL at 20:08

## 2017-11-05 RX ADMIN — ENOXAPARIN SODIUM 40 MILLIGRAM(S): 100 INJECTION SUBCUTANEOUS at 18:20

## 2017-11-05 RX ADMIN — GABAPENTIN 600 MILLIGRAM(S): 400 CAPSULE ORAL at 05:09

## 2017-11-05 RX ADMIN — SODIUM CHLORIDE 2000 MILLILITER(S): 9 INJECTION INTRAMUSCULAR; INTRAVENOUS; SUBCUTANEOUS at 15:30

## 2017-11-05 RX ADMIN — Medication 1 GRAM(S): at 21:52

## 2017-11-05 RX ADMIN — Medication 100 MILLIGRAM(S): at 05:09

## 2017-11-05 RX ADMIN — METHOCARBAMOL 750 MILLIGRAM(S): 500 TABLET, FILM COATED ORAL at 08:17

## 2017-11-05 RX ADMIN — Medication 1 DROP(S): at 13:10

## 2017-11-05 RX ADMIN — Medication 100 MILLIGRAM(S): at 05:10

## 2017-11-05 RX ADMIN — HYDROMORPHONE HYDROCHLORIDE 0.5 MILLIGRAM(S): 2 INJECTION INTRAMUSCULAR; INTRAVENOUS; SUBCUTANEOUS at 02:00

## 2017-11-05 RX ADMIN — SENNA PLUS 2 TABLET(S): 8.6 TABLET ORAL at 21:07

## 2017-11-05 RX ADMIN — Medication 1 GRAM(S): at 06:14

## 2017-11-05 RX ADMIN — HYDROMORPHONE HYDROCHLORIDE 2 MILLIGRAM(S): 2 INJECTION INTRAMUSCULAR; INTRAVENOUS; SUBCUTANEOUS at 12:15

## 2017-11-05 RX ADMIN — DEXTROSE MONOHYDRATE, SODIUM CHLORIDE, AND POTASSIUM CHLORIDE 75 MILLILITER(S): 50; .745; 4.5 INJECTION, SOLUTION INTRAVENOUS at 14:10

## 2017-11-05 RX ADMIN — HYDROMORPHONE HYDROCHLORIDE 2 MILLIGRAM(S): 2 INJECTION INTRAMUSCULAR; INTRAVENOUS; SUBCUTANEOUS at 22:18

## 2017-11-05 RX ADMIN — GABAPENTIN 600 MILLIGRAM(S): 400 CAPSULE ORAL at 21:07

## 2017-11-05 RX ADMIN — HYDROMORPHONE HYDROCHLORIDE 0.5 MILLIGRAM(S): 2 INJECTION INTRAMUSCULAR; INTRAVENOUS; SUBCUTANEOUS at 01:45

## 2017-11-05 RX ADMIN — Medication 1 GRAM(S): at 18:20

## 2017-11-05 RX ADMIN — HYDROMORPHONE HYDROCHLORIDE 1 MILLIGRAM(S): 2 INJECTION INTRAMUSCULAR; INTRAVENOUS; SUBCUTANEOUS at 08:32

## 2017-11-05 RX ADMIN — Medication 0.62 MILLIGRAM(S): at 13:11

## 2017-11-05 RX ADMIN — Medication 4 MILLIGRAM(S): at 23:24

## 2017-11-05 RX ADMIN — HYDROMORPHONE HYDROCHLORIDE 2 MILLIGRAM(S): 2 INJECTION INTRAMUSCULAR; INTRAVENOUS; SUBCUTANEOUS at 11:59

## 2017-11-05 RX ADMIN — HYDROMORPHONE HYDROCHLORIDE 2 MILLIGRAM(S): 2 INJECTION INTRAMUSCULAR; INTRAVENOUS; SUBCUTANEOUS at 16:11

## 2017-11-05 RX ADMIN — HYDROMORPHONE HYDROCHLORIDE 1 MILLIGRAM(S): 2 INJECTION INTRAMUSCULAR; INTRAVENOUS; SUBCUTANEOUS at 18:20

## 2017-11-05 RX ADMIN — Medication 25 MILLIGRAM(S): at 18:19

## 2017-11-05 RX ADMIN — LOSARTAN POTASSIUM 100 MILLIGRAM(S): 100 TABLET, FILM COATED ORAL at 05:09

## 2017-11-05 RX ADMIN — Medication 25 MILLIGRAM(S): at 05:10

## 2017-11-05 RX ADMIN — HYDROMORPHONE HYDROCHLORIDE 2 MILLIGRAM(S): 2 INJECTION INTRAMUSCULAR; INTRAVENOUS; SUBCUTANEOUS at 21:07

## 2017-11-05 RX ADMIN — Medication 100 MILLIGRAM(S): at 14:10

## 2017-11-05 RX ADMIN — GABAPENTIN 600 MILLIGRAM(S): 400 CAPSULE ORAL at 14:10

## 2017-11-05 NOTE — PROGRESS NOTE ADULT - ASSESSMENT
ASSESSMENT/PLAN:  62 yo F s/p C7-T1 ACDF 11/4, POD #1.  H/o 06/2017 C4-C5 corpectomy, C6-C7 ACDF, C3-C7 anterior stabilization and fusion, and C2-C7 posterior stabilization and fusion on 06/14/2017; bilateral C7 screws removed for radicular pain 09/2017.  PT and OT eval / dressing dry     NEURO:  -neurochecks q4hr  -pain control with Tylenol 1 gr IV q 6 hrs   -restart home Sertriline  Activity: [x] mobilize as tolerated     PULM dysphonia- decadron taper per NSG   wean to RA.    CV:  SBP goal <150. Episode hypotension- bolus NS at 500cc   Restart home Amlodipine to 5mg q day ( home dose 10mg  qady) ;  Losartan and Atorvastatin.    RENAL:  Fluids: IVF 70 ml/h x24 hrs then D/C .    GI:  Diet: Soft mech diet   GI prophylaxis carafate    Bowel regimen [] colace [x] senna [] other:    ENDO:   Goal euglycemia (-180)    HEME/ONC:  VTE prophylaxis: [x] SCDs [X] chemoprophylaxis lovenox 40 mg qhs    ID: afebrile.    MISC: none,    SOCIAL/FAMILY:   [x] updated at bedside   CODE STATUS:  [x] Full Code     DISPOSITION:   [x] Floor [] EMU [] RCU [] PCU    [x] Patient is not at high risk of neurologic deterioration/death   Time spent: 35 critical care minutes    Contact: [phone]

## 2017-11-06 LAB
ANION GAP SERPL CALC-SCNC: 15 MMOL/L — SIGNIFICANT CHANGE UP (ref 5–17)
BUN SERPL-MCNC: 10 MG/DL — SIGNIFICANT CHANGE UP (ref 7–23)
CALCIUM SERPL-MCNC: 9.2 MG/DL — SIGNIFICANT CHANGE UP (ref 8.4–10.5)
CHLORIDE SERPL-SCNC: 99 MMOL/L — SIGNIFICANT CHANGE UP (ref 96–108)
CO2 SERPL-SCNC: 22 MMOL/L — SIGNIFICANT CHANGE UP (ref 22–31)
CREAT SERPL-MCNC: 0.6 MG/DL — SIGNIFICANT CHANGE UP (ref 0.5–1.3)
GLUCOSE BLDC GLUCOMTR-MCNC: 143 MG/DL — HIGH (ref 70–99)
GLUCOSE BLDC GLUCOMTR-MCNC: 146 MG/DL — HIGH (ref 70–99)
GLUCOSE BLDC GLUCOMTR-MCNC: 151 MG/DL — HIGH (ref 70–99)
GLUCOSE BLDC GLUCOMTR-MCNC: 212 MG/DL — HIGH (ref 70–99)
GLUCOSE SERPL-MCNC: 216 MG/DL — HIGH (ref 70–99)
HCT VFR BLD CALC: 34.9 % — SIGNIFICANT CHANGE UP (ref 34.5–45)
HGB BLD-MCNC: 11.6 G/DL — SIGNIFICANT CHANGE UP (ref 11.5–15.5)
MCHC RBC-ENTMCNC: 29.1 PG — SIGNIFICANT CHANGE UP (ref 27–34)
MCHC RBC-ENTMCNC: 33.4 GM/DL — SIGNIFICANT CHANGE UP (ref 32–36)
MCV RBC AUTO: 87.2 FL — SIGNIFICANT CHANGE UP (ref 80–100)
NT-PROBNP SERPL-SCNC: 48 PG/ML — SIGNIFICANT CHANGE UP (ref 0–300)
PLATELET # BLD AUTO: 477 K/UL — HIGH (ref 150–400)
POTASSIUM SERPL-MCNC: 3.8 MMOL/L — SIGNIFICANT CHANGE UP (ref 3.5–5.3)
POTASSIUM SERPL-SCNC: 3.8 MMOL/L — SIGNIFICANT CHANGE UP (ref 3.5–5.3)
RBC # BLD: 4 M/UL — SIGNIFICANT CHANGE UP (ref 3.8–5.2)
RBC # FLD: 12.4 % — SIGNIFICANT CHANGE UP (ref 10.3–14.5)
SODIUM SERPL-SCNC: 136 MMOL/L — SIGNIFICANT CHANGE UP (ref 135–145)
WBC # BLD: 17.5 K/UL — HIGH (ref 3.8–10.5)
WBC # FLD AUTO: 17.5 K/UL — HIGH (ref 3.8–10.5)

## 2017-11-06 PROCEDURE — 71010: CPT | Mod: 26

## 2017-11-06 RX ORDER — GABAPENTIN 400 MG/1
400 CAPSULE ORAL
Qty: 0 | Refills: 0 | Status: DISCONTINUED | OUTPATIENT
Start: 2017-11-06 | End: 2017-11-07

## 2017-11-06 RX ADMIN — AMLODIPINE BESYLATE 5 MILLIGRAM(S): 2.5 TABLET ORAL at 21:13

## 2017-11-06 RX ADMIN — Medication 4 MILLIGRAM(S): at 11:21

## 2017-11-06 RX ADMIN — HYDROMORPHONE HYDROCHLORIDE 2 MILLIGRAM(S): 2 INJECTION INTRAMUSCULAR; INTRAVENOUS; SUBCUTANEOUS at 07:00

## 2017-11-06 RX ADMIN — Medication 25 MILLIGRAM(S): at 17:56

## 2017-11-06 RX ADMIN — Medication 0.62 MILLIGRAM(S): at 11:21

## 2017-11-06 RX ADMIN — LOSARTAN POTASSIUM 100 MILLIGRAM(S): 100 TABLET, FILM COATED ORAL at 05:39

## 2017-11-06 RX ADMIN — Medication 2: at 08:37

## 2017-11-06 RX ADMIN — Medication 25 MILLIGRAM(S): at 05:38

## 2017-11-06 RX ADMIN — HYDROMORPHONE HYDROCHLORIDE 2 MILLIGRAM(S): 2 INJECTION INTRAMUSCULAR; INTRAVENOUS; SUBCUTANEOUS at 17:55

## 2017-11-06 RX ADMIN — GABAPENTIN 600 MILLIGRAM(S): 400 CAPSULE ORAL at 05:41

## 2017-11-06 RX ADMIN — Medication 1 DROP(S): at 13:08

## 2017-11-06 RX ADMIN — Medication 4 MILLIGRAM(S): at 17:56

## 2017-11-06 RX ADMIN — HYDROMORPHONE HYDROCHLORIDE 2 MILLIGRAM(S): 2 INJECTION INTRAMUSCULAR; INTRAVENOUS; SUBCUTANEOUS at 11:21

## 2017-11-06 RX ADMIN — HYDROMORPHONE HYDROCHLORIDE 2 MILLIGRAM(S): 2 INJECTION INTRAMUSCULAR; INTRAVENOUS; SUBCUTANEOUS at 02:05

## 2017-11-06 RX ADMIN — Medication 4 MILLIGRAM(S): at 05:39

## 2017-11-06 RX ADMIN — ENOXAPARIN SODIUM 40 MILLIGRAM(S): 100 INJECTION SUBCUTANEOUS at 17:56

## 2017-11-06 RX ADMIN — Medication 1 GRAM(S): at 11:21

## 2017-11-06 RX ADMIN — HYDROMORPHONE HYDROCHLORIDE 2 MILLIGRAM(S): 2 INJECTION INTRAMUSCULAR; INTRAVENOUS; SUBCUTANEOUS at 05:39

## 2017-11-06 RX ADMIN — Medication 1 GRAM(S): at 17:56

## 2017-11-06 RX ADMIN — GABAPENTIN 600 MILLIGRAM(S): 400 CAPSULE ORAL at 13:08

## 2017-11-06 RX ADMIN — Medication 1 GRAM(S): at 05:39

## 2017-11-06 RX ADMIN — PANTOPRAZOLE SODIUM 40 MILLIGRAM(S): 20 TABLET, DELAYED RELEASE ORAL at 05:38

## 2017-11-06 RX ADMIN — Medication 100 MILLIGRAM(S): at 13:08

## 2017-11-06 RX ADMIN — Medication 100 MILLIGRAM(S): at 21:13

## 2017-11-06 RX ADMIN — Medication 1 GRAM(S): at 21:13

## 2017-11-06 RX ADMIN — Medication 100 MILLIGRAM(S): at 05:39

## 2017-11-06 RX ADMIN — SENNA PLUS 2 TABLET(S): 8.6 TABLET ORAL at 21:13

## 2017-11-06 RX ADMIN — HYDROMORPHONE HYDROCHLORIDE 2 MILLIGRAM(S): 2 INJECTION INTRAMUSCULAR; INTRAVENOUS; SUBCUTANEOUS at 18:23

## 2017-11-06 RX ADMIN — SERTRALINE 50 MILLIGRAM(S): 25 TABLET, FILM COATED ORAL at 20:20

## 2017-11-06 RX ADMIN — ATORVASTATIN CALCIUM 40 MILLIGRAM(S): 80 TABLET, FILM COATED ORAL at 21:13

## 2017-11-06 RX ADMIN — GABAPENTIN 400 MILLIGRAM(S): 400 CAPSULE ORAL at 17:56

## 2017-11-06 RX ADMIN — HYDROMORPHONE HYDROCHLORIDE 2 MILLIGRAM(S): 2 INJECTION INTRAMUSCULAR; INTRAVENOUS; SUBCUTANEOUS at 01:28

## 2017-11-06 RX ADMIN — HYDROMORPHONE HYDROCHLORIDE 2 MILLIGRAM(S): 2 INJECTION INTRAMUSCULAR; INTRAVENOUS; SUBCUTANEOUS at 11:50

## 2017-11-06 NOTE — PHYSICAL THERAPY INITIAL EVALUATION ADULT - PERTINENT HX OF CURRENT PROBLEM, REHAB EVAL
Pt is a 61-year old woman who had a C4-C5 corpectomy, C6-C7 ACDF, C3-C7 anterior stabilization and fusion, and C2-C7 posterior stabilization and fusion on 06/14/2017. On 09/18/2017 she had the bilateral C7 screws removed for radicular pain and evidence that the screws were compressing the bilateral C7 nerve roots on her imaging. Pt is a 61-year old woman who had a C4-C5 corpectomy, C6-C7 ACDF, C3-C7 anterior stabilization and fusion, and C2-C7 posterior stabilization and fusion on 06/14/2017. On 09/18/2017 she had the bilateral C7 screws removed for radicular pain and evidence that the screws were compressing the bilateral C7 nerve roots on her imaging. *Addendum: pt s/p ACDF 11/4

## 2017-11-06 NOTE — PHYSICAL THERAPY INITIAL EVALUATION ADULT - ADDITIONAL COMMENTS
Prior to admission patient was completely independent. She lives in a house with 4 steps to enter and 13 to get to the second floor.
Prior to admission patient was completely independent. She lives in a house with 4 steps to enter and 13 to get to the second floor.  and daughter available to assist as needed

## 2017-11-06 NOTE — PHYSICAL THERAPY INITIAL EVALUATION ADULT - GENERAL OBSERVATIONS, REHAB EVAL
Pt received in bed aox4. Pt in good spirits.
pt received semi-supine in bed, NAD, agreeable to PT session, dtr at bedside

## 2017-11-06 NOTE — PHYSICAL THERAPY INITIAL EVALUATION ADULT - ACTIVE RANGE OF MOTION EXAMINATION, REHAB EVAL
LUE/deficits as listed below
bilateral upper extremity Active ROM was WFL (within functional limits)/bilateral  lower extremity Active ROM was WFL (within functional limits)

## 2017-11-06 NOTE — PROGRESS NOTE ADULT - ASSESSMENT
HPI: Emma Patel is a 61-year old woman who had a C4-C5 corpectomy, C6-C7 ACDF, C3-C7 anterior stabilization and fusion, and C2-C7 posterior stabilization and fusion on 06/14/2017. On 09/18/2017 she had the bilateral C7 screws removed for radicular pain and evidence that the screws were compressing the bilateral C7 nerve roots on her imaging. Today she presents with acute onset of pain in her left upper extremity that started on 10/25/17. On 10/28/17, she started to develop weakness in the entire left arm. She denies any trauma and has not heard any pops or clicks. She is having painful spasms in the left shoulder and pain in the scapula as well. She has intermittent sharp, stabbing pains from the elbow down to the lateral left hand (C8 and T1 dermatomes). CT cervical spine shows that her hardware is in good placement. (29 Oct 2017 02:44)    PROCEDURE:  Admitted 10/28 with left arm radiculopathy; 11/4 s/p C7-T1 ACDF - POD#2    PLAN:  -Pain control with PO Dilaudid PRN; Flexeril and Robaxin PRN for muscle spasm  -Decadron 4q6 x 48 hrs; Continue mechanical soft diet; will advance as tolerated  -Continue Neurontin 600 TID, Lyrica for neuropathy  -Continue Norvasc, Losartan with hold parameters for hx of HTN; BP now well controlled  -Given shortness of breath today, will IVL and get a CXR to r/o fluid overload. Will check pro BNP & TTE.  -Leukocytosis may be secondary to steroids; will f/u cbc tomorrow and monitor for signs of infection  -Continue colace, senna for bowel regimen  -Continue protonix for hx of GERD, gastric ulcer  -Encouraged mobilization  -Encouraged incentive spirometer and encouraged patient on proper use  -DVT ppx: SQL, bilateral venodynes  -Dispo: outpatient PT  -[id number]

## 2017-11-06 NOTE — PROGRESS NOTE ADULT - ASSESSMENT
A/P: 61 year old woman 61 year old woman with past medical history cervical disc disease, C4-C5 corpectomy, C6-C7 ACDF, C3-C7 anterior stabilization and fusion, and C2-C7 posterior stabilization and fusion on 06/14/2017. On 09/18/2017 she had bilateral C7 screws removed for radicular pain and evidence screws were compressing bilateral C7 nerve roots. Reports she was gradually feeling better but over the past 3 days has developed severe left sided posterior scapular, lateral neck pain radiating to her anterior chest, medial aspect of the left arm with sharp pains from the elbow to lateral left hand. Describes weakness in the arm and numbness in the 4-5th left digits.    Presentation and exam indicate left C8 radiculopathy secondary to lateral left disc at C7/T1  Brachial plexus MRI is negative.   Sx much improved after ADCF  Pain control as per NS team  can try to taper off lyrica  d/w pt at bedside  reconsult prn

## 2017-11-06 NOTE — PHYSICAL THERAPY INITIAL EVALUATION ADULT - GAIT DEVIATIONS NOTED, PT EVAL
decreased velocity of limb motion/decreased step length/decreased asia/decreased weight-shifting ability/decreased stride length

## 2017-11-06 NOTE — PROGRESS NOTE ADULT - ASSESSMENT
sob   likely mutlifactorial  due to neck edema   and now likely is congested  obtain cxr  check pro BNP  DC IVF  echo    HTN  stable

## 2017-11-06 NOTE — PHYSICAL THERAPY INITIAL EVALUATION ADULT - DISCHARGE DISPOSITION, PT EVAL
home/outpatient PT
outpatient PT/home w/ assist/Assist from  and daughter as needed. As per pt, they are available to assist as needed

## 2017-11-07 ENCOUNTER — TRANSCRIPTION ENCOUNTER (OUTPATIENT)
Age: 62
End: 2017-11-07

## 2017-11-07 LAB
GLUCOSE BLDC GLUCOMTR-MCNC: 124 MG/DL — HIGH (ref 70–99)
GLUCOSE BLDC GLUCOMTR-MCNC: 144 MG/DL — HIGH (ref 70–99)
GLUCOSE BLDC GLUCOMTR-MCNC: 159 MG/DL — HIGH (ref 70–99)
GLUCOSE BLDC GLUCOMTR-MCNC: 162 MG/DL — HIGH (ref 70–99)

## 2017-11-07 RX ORDER — GABAPENTIN 400 MG/1
1 CAPSULE ORAL
Qty: 0 | Refills: 0 | COMMUNITY
Start: 2017-11-07

## 2017-11-07 RX ORDER — GABAPENTIN 400 MG/1
300 CAPSULE ORAL
Qty: 0 | Refills: 0 | Status: DISCONTINUED | OUTPATIENT
Start: 2017-11-07 | End: 2017-11-08

## 2017-11-07 RX ORDER — HYDROMORPHONE HYDROCHLORIDE 2 MG/ML
1 INJECTION INTRAMUSCULAR; INTRAVENOUS; SUBCUTANEOUS
Qty: 42 | Refills: 0 | OUTPATIENT
Start: 2017-11-07 | End: 2017-11-14

## 2017-11-07 RX ORDER — GABAPENTIN 400 MG/1
1 CAPSULE ORAL
Qty: 60 | Refills: 0 | OUTPATIENT
Start: 2017-11-07 | End: 2017-12-07

## 2017-11-07 RX ADMIN — Medication 1 GRAM(S): at 17:15

## 2017-11-07 RX ADMIN — SENNA PLUS 2 TABLET(S): 8.6 TABLET ORAL at 21:50

## 2017-11-07 RX ADMIN — HYDROMORPHONE HYDROCHLORIDE 2 MILLIGRAM(S): 2 INJECTION INTRAMUSCULAR; INTRAVENOUS; SUBCUTANEOUS at 04:57

## 2017-11-07 RX ADMIN — PANTOPRAZOLE SODIUM 40 MILLIGRAM(S): 20 TABLET, DELAYED RELEASE ORAL at 06:06

## 2017-11-07 RX ADMIN — Medication 4 MILLIGRAM(S): at 01:00

## 2017-11-07 RX ADMIN — GABAPENTIN 300 MILLIGRAM(S): 400 CAPSULE ORAL at 18:57

## 2017-11-07 RX ADMIN — ATORVASTATIN CALCIUM 40 MILLIGRAM(S): 80 TABLET, FILM COATED ORAL at 21:50

## 2017-11-07 RX ADMIN — LOSARTAN POTASSIUM 100 MILLIGRAM(S): 100 TABLET, FILM COATED ORAL at 06:06

## 2017-11-07 RX ADMIN — HYDROMORPHONE HYDROCHLORIDE 2 MILLIGRAM(S): 2 INJECTION INTRAMUSCULAR; INTRAVENOUS; SUBCUTANEOUS at 20:00

## 2017-11-07 RX ADMIN — HYDROMORPHONE HYDROCHLORIDE 2 MILLIGRAM(S): 2 INJECTION INTRAMUSCULAR; INTRAVENOUS; SUBCUTANEOUS at 21:00

## 2017-11-07 RX ADMIN — ENOXAPARIN SODIUM 40 MILLIGRAM(S): 100 INJECTION SUBCUTANEOUS at 17:15

## 2017-11-07 RX ADMIN — Medication 4 MILLIGRAM(S): at 06:06

## 2017-11-07 RX ADMIN — Medication 25 MILLIGRAM(S): at 17:15

## 2017-11-07 RX ADMIN — Medication 2: at 12:14

## 2017-11-07 RX ADMIN — Medication 1 GRAM(S): at 06:06

## 2017-11-07 RX ADMIN — GABAPENTIN 400 MILLIGRAM(S): 400 CAPSULE ORAL at 06:06

## 2017-11-07 RX ADMIN — Medication 2: at 22:26

## 2017-11-07 RX ADMIN — Medication 100 MILLIGRAM(S): at 21:50

## 2017-11-07 RX ADMIN — Medication 100 MILLIGRAM(S): at 06:06

## 2017-11-07 RX ADMIN — CYCLOBENZAPRINE HYDROCHLORIDE 10 MILLIGRAM(S): 10 TABLET, FILM COATED ORAL at 12:17

## 2017-11-07 RX ADMIN — Medication 1 GRAM(S): at 21:50

## 2017-11-07 RX ADMIN — Medication 1 GRAM(S): at 12:18

## 2017-11-07 RX ADMIN — Medication 25 MILLIGRAM(S): at 06:06

## 2017-11-07 RX ADMIN — SERTRALINE 50 MILLIGRAM(S): 25 TABLET, FILM COATED ORAL at 21:50

## 2017-11-07 RX ADMIN — HYDROMORPHONE HYDROCHLORIDE 2 MILLIGRAM(S): 2 INJECTION INTRAMUSCULAR; INTRAVENOUS; SUBCUTANEOUS at 05:30

## 2017-11-07 RX ADMIN — Medication 1 DROP(S): at 12:17

## 2017-11-07 RX ADMIN — HYDROMORPHONE HYDROCHLORIDE 2 MILLIGRAM(S): 2 INJECTION INTRAMUSCULAR; INTRAVENOUS; SUBCUTANEOUS at 01:30

## 2017-11-07 RX ADMIN — AMLODIPINE BESYLATE 5 MILLIGRAM(S): 2.5 TABLET ORAL at 21:50

## 2017-11-07 RX ADMIN — CYCLOBENZAPRINE HYDROCHLORIDE 10 MILLIGRAM(S): 10 TABLET, FILM COATED ORAL at 22:26

## 2017-11-07 RX ADMIN — Medication 0.62 MILLIGRAM(S): at 12:17

## 2017-11-07 RX ADMIN — HYDROMORPHONE HYDROCHLORIDE 2 MILLIGRAM(S): 2 INJECTION INTRAMUSCULAR; INTRAVENOUS; SUBCUTANEOUS at 01:00

## 2017-11-07 NOTE — DISCHARGE NOTE ADULT - MEDICATION SUMMARY - MEDICATIONS TO TAKE
I will START or STAY ON the medications listed below when I get home from the hospital:    acetaminophen 325 mg oral tablet  -- 2 tab(s) by mouth every 6 hours, As needed, Mild Pain (1 - 3)  -- Indication: For Pain    HYDROmorphone 2 mg oral tablet  -- 1 tab(s) by mouth every 4 hours, As needed, Severe Pain (7 - 10)  -- Indication: For Pain    Avapro 150 mg oral tablet  -- 1 tab(s) by mouth once a day  -- Indication: For Hypertension    pregabalin 25 mg oral capsule  -- 1 cap(s) by mouth 2 times a day MDD:2 tabs  -- Indication: For Pain    gabapentin 300 mg oral capsule  -- 1 cap(s) by mouth 2 times a day  -- Indication: For Pain    sertraline 50 mg oral tablet  -- 1 tab(s) by mouth once a day  -- Indication: For Depression    atorvastatin 40 mg oral tablet  -- 1 tab(s) by mouth once a day (at bedtime)  -- Indication: For High cholesterol    amLODIPine 5 mg oral tablet  -- 1 tab(s) by mouth once a day (at bedtime)  -- Indication: For Hypertension    docusate sodium 100 mg oral capsule  -- 1 cap(s) by mouth 3 times a day  -- Indication: For Constipation     senna oral tablet  -- 2 tab(s) by mouth once a day (at bedtime)  -- Indication: For Constipation    sucralfate 1 g/10 mL oral suspension  -- 10 milliliter(s) by mouth 4 times a day (before meals and at bedtime)  -- Indication: For GERD    methocarbamol 750 mg oral tablet  -- 1 tab(s) by mouth 3 times a day, As needed, spasm  -- Indication: For Muscle spasm    cyclobenzaprine 10 mg oral tablet  -- 1 tab(s) by mouth 3 times a day, As needed, Muscle Spasm MDD:3  -- Indication: For Muscle Spasm    timolol maleate 0.25% ophthalmic solution  -- 1 drop(s) to each affected eye once a day  -- Indication: For Home eye drops    NexIUM 40 mg oral delayed release capsule  -- 1 cap(s) by mouth 2 times a day  -- Indication: For GERD    conjugated estrogens 0.625 mg oral tablet  -- 1 tab(s) by mouth once a day  -- Indication: For Home medication

## 2017-11-07 NOTE — DISCHARGE NOTE ADULT - PROVIDER TOKENS
KEMI:'[id number]' TOKEN:'[id number]',TOKEN:'[id number]' TOKEN:'[id number]',TOKEN:'[id number]',TOKEN:'[id number]'

## 2017-11-07 NOTE — DISCHARGE NOTE ADULT - ADDITIONAL INSTRUCTIONS
Please follow up with Dr. Mcgovern within 1 weeks  Please follow up with your primary care doctor within 1 week

## 2017-11-07 NOTE — DISCHARGE NOTE ADULT - HOSPITAL COURSE
62 yo female with history of multiple prior surgeries to her cervical spine was admitted for increase shoulder pain.  Patient was taken for a C7-T1 ACDF on 11/4.  Post op the patient was having hypophonia and dysphagia as a result of post operative edema.  She was started on steroids for the edema and was evaluated by the speech and swallow department.  After an MBS she was cleared for a soft diet with thickened liquids. Post op she had some hypotension that was treated with fluid boluses. During her course she also complained of SOB, and was seen by cardiology.  A chest xray was negative and the SOB resolved without treatment. She was cleared by PT for discharge home.  She was evaluated by the neurosurgeon today and medically cleared for discharge home.

## 2017-11-07 NOTE — SWALLOW BEDSIDE ASSESSMENT ADULT - SLP GENERAL OBSERVATIONS
Pt awake, alert, able to follow commands and answer questions appropriately, fluent verbal output. Pt presents with dysphonia notable for hoarse/breathy vocal quality.

## 2017-11-07 NOTE — DISCHARGE NOTE ADULT - CONDITIONS AT DISCHARGE
pt stable VSS, pt ambulates well, pt urinates w/o any difficulties, pt no c/ chino any pain, pt  received the informations how to swallow food

## 2017-11-07 NOTE — DISCHARGE NOTE ADULT - CARE PROVIDER_API CALL
David Mcgovern (MD), Neurological Surgery  [address] [place] [address]  Phone: [phone]  Fax: [phone] David Mcgovern), Neurological Surgery  [address] [facility]  [address]  [place] [address]  Phone: [phone]  Fax: [phone]    Mady Walker), Neurology  [address] [facility] [address]  [place] [address]  Phone: [phone]  Fax: [phone] David Mcgovern), Neurological Surgery  [address] [facility]  [address]  [place] [address]  Phone: [phone]  Fax: [phone]    Mady Walker), Neurology  [address] [facility]  [address]  [place] [address]  Phone: [phone]  Fax: [phone]    Sanjuanita Ye), Otolaryngology  [address] [place] [address]  [address]  [place] [address]  Phone: [phone]  Fax: [phone]

## 2017-11-07 NOTE — PROGRESS NOTE ADULT - ASSESSMENT
Admitted 10/28 with left arm radiculopathy; 11/4 s/p C7-T1 ACDF     PROCEDURE:  C7-T1 ACDF  POD#3    PLAN:  Neuro:   -Continue pain medications as patient has good pain relief.  -OOB and ambulating independently   -Decadron for 48H started yesterday for post operative edema    Respiratory:   -SOB yesterday has resolved today.  CXR was clear.  -Incentive spirometry and frequent ambulation encouraged.    CV:  -Awaiting echo given SOB yesterday.  Will not hold up discharge however if echo not done, given that SOB has resolved  -Dr. Patiño following.  -Continue antihypertensives.  SBP has been within range of 110-150.    Endocrine:   -No active issues    Heme/Onc:               -DVT ppx with SQL, SCD's while in bed and frequent ambulation     Renal:   -IVL,   -Voiding spontaneously    ID:   -Leukocytosis in the setting of steroids.  Afebrile and no signs of infection.    GI:   -Tolerating liquids.  Awaiting official speech and swallow  -Bowel regiment of colace and senna    Discharge planning:   -Home pending speech and swallow clearance Admitted 10/28 with left arm radiculopathy; 11/4 s/p C7-T1 ACDF     PROCEDURE:  C7-T1 ACDF  POD#3    PLAN:  Neuro:   -Continue pain medications as patient has good pain relief.  -OOB and ambulating independently   -Decadron for 48H started ON11/5 for post operative edema, will end today    Respiratory:   -SOB yesterday has resolved today.  CXR was clear.  -Incentive spirometry and frequent ambulation encouraged.    CV:  -Awaiting echo given SOB yesterday.  Will not hold up discharge however if echo not done, given that SOB has resolved  -Dr. Patiño following.  -Continue antihypertensives.  SBP has been within range of 110-150.    Endocrine:   -No active issues    Heme/Onc:               -DVT ppx with SQL, SCD's while in bed and frequent ambulation     Renal:   -IVL,   -Voiding spontaneously    ID:   -Leukocytosis in the setting of steroids.  Afebrile and no signs of infection.    GI:   -Tolerating liquids.  Awaiting official speech and swallow  -Bowel regiment of colace and senna    Discharge planning:   -Home pending speech and swallow clearance

## 2017-11-07 NOTE — SWALLOW BEDSIDE ASSESSMENT ADULT - PHARYNGEAL PHASE
Throat clear post oral intake/Decreased laryngeal elevation/Delayed throat clear post oral intake/Delayed cough post oral intake/swallow x4-7 per bolus

## 2017-11-07 NOTE — SWALLOW BEDSIDE ASSESSMENT ADULT - COMMENTS
Admitted 10/28 with left arm radiculopathy; 11/4 s/p C7-T1 ACDF   11/5: Episode of hypotension when OOB; received fluid bolus and started on maintenance fluids.   11/6: c/o SOB. CXR: clear. Per NSx: Her voice is hoarse but states she is currently tolerating a soft diet.   Per Neuro: On 09/18/2017 she had bilateral C7 screws removed for radicular pain and evidence screws were compressing bilateral C7 nerve roots. Reports she was gradually feeling better but over past 3 days has developed severe L sided posterior scapular, lateral neck pain radiating to her anterior chest, medial aspect of L arm with sharp pains from elbow to lateral L hand. Describes weakness in arm and numbness in 4-5th left digits. Presentation and exam indicate L C8 radiculopathy 2/2 lateral L disc at C7/T1. Brachial plexus MRI is negative. Sx much improved after ADCF.  Per NSx 11/7: Continues to have a hoarse voice- awaiting speech and swallow.  No longer complains of SOB. Decadron for 48H started ON11/5 for post operative edema, will end today. Leukocytosis in the setting of steroids.  Afebrile and no signs of infection. Tolerating liquids.  Awaiting official speech and swallow. Discharge planning: Home pending speech and swallow clearance

## 2017-11-07 NOTE — SWALLOW BEDSIDE ASSESSMENT ADULT - SWALLOW EVAL: DIAGNOSIS
Pt presents with evidence of 1) oropharyngeal dysphagia notable for s/s laryngeal penetration/aspiration with most conservative consistencies, 2) dysphonia.

## 2017-11-07 NOTE — SWALLOW BEDSIDE ASSESSMENT ADULT - SWALLOW EVAL: RECOMMENDED DIET
NPO, with non-oral nutrition/hydration/medications is recommended based upon the findings of this exam. However, Pt reports that she would prefer to continue on current dysphagia diet pending findings of objective assessment.

## 2017-11-07 NOTE — DISCHARGE NOTE ADULT - INSTRUCTIONS
Dysphagia 2 diet: mechanical soft with thickened fluids Dysphagia 2 with Nectar-thick liquids with Use of Small Single Sips for liquids and Partial Head Flexion across textures.     1) ALL PO via Partial Head Flexion, 2) Crush meds  3) Small single bites and sips at slow rate, 4) Successive swallows for oral and pharyngeal clearance, 5) Periodic throat clears to facilitate laryngeal clearance, 6) Aspiration precautions.

## 2017-11-07 NOTE — DISCHARGE NOTE ADULT - PLAN OF CARE
Return to prior functional status Please keep incision clean and dry, do not submerge wound in water for prolonged periods of time, pat dry after showering, and do not use any creams or ointments to incision.  please do not engage in strenuous activity, heavy lifting, drive, or return to work or school until cleared by surgeon.   Return to ER immediately for any of the following: fever, bleeding, new onset numbness/tingling/weakness, nausea and/or vomiting or difficulty swallowing.  Please make a follow up appointment to see Dr. Mcgovern within one week of discharge. Please keep incision clean and dry, do not submerge wound in water for prolonged periods of time, pat dry after showering, and do not use any creams or ointments to incision.  Please do not engage in strenuous activity, heavy lifting, drive, or return to work or school until cleared by surgeon.   Return to ER immediately for any of the following: fever, bleeding, new onset numbness/tingling/weakness, nausea and/or vomiting or difficulty swallowing or coughing while eating.  Please make a follow up appointment to see Dr. Mcgovern within one week of discharge. Patient developed dysphagia post op, which is a normal complication of the surgery she had secondary to swelling.  She was evaluated by the speech department and was cleared for discharge home with a soft diet with thickened liquids.  Any further swallowing difficulties please call Dr. Mcgovern immediately. You were seen by Dr. Walker while you were admitted for your radiculopathy.  You were started on Lyrica with the plan to taper the Gabapentin.  Please continue both and follow up with Dr. Walker to further taper the Gabapentin. Patient developed dysphagia post op, which is a normal complication of the surgery she had secondary to swelling.  She was evaluated by the speech department and was cleared for discharge home with a soft diet with thickened liquids.  Please follow up with Dr. Ye from ENT to evaluate your swallowing and possibly help to facilitate and swallowing therapy you may need as an outpatient.

## 2017-11-07 NOTE — SWALLOW BEDSIDE ASSESSMENT ADULT - SLP PERTINENT HISTORY OF CURRENT PROBLEM
61-year old woman who had a C4-C5 corpectomy, C6-C7 ACDF, C3-C7 anterior stabilization and fusion, and C2-C7 posterior stabilization and fusion on 06/14/2017. On 09/18/2017 she had the bilateral C7 screws removed for radicular pain and evidence that the screws were compressing the bilateral C7 nerve roots on her imaging. She presents with acute onset of pain in her left upper extremity that started on 10/25/17. On 10/28/17, she started to develop weakness in the entire left arm. She denies any trauma and has not heard any pops or clicks. She is having painful spasms in the left shoulder and pain in the scapula as well. She has intermittent sharp, stabbing pains from the elbow down to the lateral left hand (C8 and T1 dermatomes).CT C-spine shows that her hardware is in good placement. (29 Oct 2017 02:44)

## 2017-11-07 NOTE — DISCHARGE NOTE ADULT - CARE PROVIDERS DIRECT ADDRESSES
[email or web address],[email or web address],[email or web address],[email or web address]

## 2017-11-07 NOTE — DISCHARGE NOTE ADULT - MEDICATION SUMMARY - MEDICATIONS TO STOP TAKING
I will STOP taking the medications listed below when I get home from the hospital:    Vicodin 5 mg-300 mg oral tablet  -- 1 tab(s) by mouth every 6 hours

## 2017-11-07 NOTE — DISCHARGE NOTE ADULT - NS AS ACTIVITY OBS
Walking-Indoors allowed/Showering allowed/Do not drive or operate machinery/Do not make important decisions/No Heavy lifting/straining/Walking-Outdoors allowed/Stairs allowed

## 2017-11-07 NOTE — DISCHARGE NOTE ADULT - MEDICATION SUMMARY - MEDICATIONS TO CHANGE
I will SWITCH the dose or number of times a day I take the medications listed below when I get home from the hospital:    gabapentin 600 mg oral tablet  -- 1 tab(s) by mouth 3 times a day

## 2017-11-07 NOTE — DISCHARGE NOTE ADULT - PATIENT PORTAL LINK FT
“You can access the [facility] Patient Portal, offered by [facility], by registering with the following website: [email or web address]”

## 2017-11-08 VITALS
DIASTOLIC BLOOD PRESSURE: 78 MMHG | OXYGEN SATURATION: 96 % | HEART RATE: 78 BPM | TEMPERATURE: 98 F | RESPIRATION RATE: 18 BRPM | SYSTOLIC BLOOD PRESSURE: 112 MMHG

## 2017-11-08 LAB
GLUCOSE BLDC GLUCOMTR-MCNC: 162 MG/DL — HIGH (ref 70–99)
GLUCOSE BLDC GLUCOMTR-MCNC: 87 MG/DL — SIGNIFICANT CHANGE UP (ref 70–99)
HCT VFR BLD CALC: 31.9 % — LOW (ref 34.5–45)
HGB BLD-MCNC: 10.6 G/DL — LOW (ref 11.5–15.5)
MCHC RBC-ENTMCNC: 28 PG — SIGNIFICANT CHANGE UP (ref 27–34)
MCHC RBC-ENTMCNC: 33.2 GM/DL — SIGNIFICANT CHANGE UP (ref 32–36)
MCV RBC AUTO: 84.2 FL — SIGNIFICANT CHANGE UP (ref 80–100)
PLATELET # BLD AUTO: 440 K/UL — HIGH (ref 150–400)
RBC # BLD: 3.79 M/UL — LOW (ref 3.8–5.2)
RBC # FLD: 14 % — SIGNIFICANT CHANGE UP (ref 10.3–14.5)
WBC # BLD: 15.59 K/UL — HIGH (ref 3.8–10.5)
WBC # FLD AUTO: 15.59 K/UL — HIGH (ref 3.8–10.5)

## 2017-11-08 PROCEDURE — C1713: CPT

## 2017-11-08 PROCEDURE — 71045 X-RAY EXAM CHEST 1 VIEW: CPT

## 2017-11-08 PROCEDURE — 82550 ASSAY OF CK (CPK): CPT

## 2017-11-08 PROCEDURE — 82553 CREATINE MB FRACTION: CPT

## 2017-11-08 PROCEDURE — 72141 MRI NECK SPINE W/O DYE: CPT

## 2017-11-08 PROCEDURE — 84484 ASSAY OF TROPONIN QUANT: CPT

## 2017-11-08 PROCEDURE — 86900 BLOOD TYPING SEROLOGIC ABO: CPT

## 2017-11-08 PROCEDURE — C1769: CPT

## 2017-11-08 PROCEDURE — 74230 X-RAY XM SWLNG FUNCJ C+: CPT | Mod: 26

## 2017-11-08 PROCEDURE — 85730 THROMBOPLASTIN TIME PARTIAL: CPT

## 2017-11-08 PROCEDURE — 99285 EMERGENCY DEPT VISIT HI MDM: CPT | Mod: 25

## 2017-11-08 PROCEDURE — 86850 RBC ANTIBODY SCREEN: CPT

## 2017-11-08 PROCEDURE — 96374 THER/PROPH/DIAG INJ IV PUSH: CPT

## 2017-11-08 PROCEDURE — 72125 CT NECK SPINE W/O DYE: CPT

## 2017-11-08 PROCEDURE — 80053 COMPREHEN METABOLIC PANEL: CPT

## 2017-11-08 PROCEDURE — 84100 ASSAY OF PHOSPHORUS: CPT

## 2017-11-08 PROCEDURE — 96376 TX/PRO/DX INJ SAME DRUG ADON: CPT

## 2017-11-08 PROCEDURE — C1889: CPT

## 2017-11-08 PROCEDURE — 92610 EVALUATE SWALLOWING FUNCTION: CPT

## 2017-11-08 PROCEDURE — 85610 PROTHROMBIN TIME: CPT

## 2017-11-08 PROCEDURE — 71550 MRI CHEST W/O DYE: CPT

## 2017-11-08 PROCEDURE — 86901 BLOOD TYPING SEROLOGIC RH(D): CPT

## 2017-11-08 PROCEDURE — 85027 COMPLETE CBC AUTOMATED: CPT

## 2017-11-08 PROCEDURE — 81003 URINALYSIS AUTO W/O SCOPE: CPT

## 2017-11-08 PROCEDURE — 93005 ELECTROCARDIOGRAM TRACING: CPT

## 2017-11-08 PROCEDURE — 97161 PT EVAL LOW COMPLEX 20 MIN: CPT

## 2017-11-08 PROCEDURE — 80048 BASIC METABOLIC PNL TOTAL CA: CPT

## 2017-11-08 PROCEDURE — 74230 X-RAY XM SWLNG FUNCJ C+: CPT

## 2017-11-08 PROCEDURE — 83880 ASSAY OF NATRIURETIC PEPTIDE: CPT

## 2017-11-08 PROCEDURE — 82962 GLUCOSE BLOOD TEST: CPT

## 2017-11-08 PROCEDURE — 83735 ASSAY OF MAGNESIUM: CPT

## 2017-11-08 PROCEDURE — 76000 FLUOROSCOPY <1 HR PHYS/QHP: CPT

## 2017-11-08 PROCEDURE — 81025 URINE PREGNANCY TEST: CPT

## 2017-11-08 PROCEDURE — 97164 PT RE-EVAL EST PLAN CARE: CPT

## 2017-11-08 PROCEDURE — 92611 MOTION FLUOROSCOPY/SWALLOW: CPT

## 2017-11-08 RX ORDER — HYDROMORPHONE HYDROCHLORIDE 2 MG/ML
1 INJECTION INTRAMUSCULAR; INTRAVENOUS; SUBCUTANEOUS
Qty: 0 | Refills: 0 | COMMUNITY
Start: 2017-11-08

## 2017-11-08 RX ORDER — GABAPENTIN 400 MG/1
1 CAPSULE ORAL
Qty: 28 | Refills: 0
Start: 2017-11-08 | End: 2017-11-22

## 2017-11-08 RX ADMIN — Medication 100 MILLIGRAM(S): at 05:56

## 2017-11-08 RX ADMIN — GABAPENTIN 300 MILLIGRAM(S): 400 CAPSULE ORAL at 06:33

## 2017-11-08 RX ADMIN — HYDROMORPHONE HYDROCHLORIDE 2 MILLIGRAM(S): 2 INJECTION INTRAMUSCULAR; INTRAVENOUS; SUBCUTANEOUS at 12:32

## 2017-11-08 RX ADMIN — Medication 2: at 08:25

## 2017-11-08 RX ADMIN — Medication 1 DROP(S): at 12:09

## 2017-11-08 RX ADMIN — HYDROMORPHONE HYDROCHLORIDE 2 MILLIGRAM(S): 2 INJECTION INTRAMUSCULAR; INTRAVENOUS; SUBCUTANEOUS at 03:49

## 2017-11-08 RX ADMIN — HYDROMORPHONE HYDROCHLORIDE 2 MILLIGRAM(S): 2 INJECTION INTRAMUSCULAR; INTRAVENOUS; SUBCUTANEOUS at 12:08

## 2017-11-08 RX ADMIN — Medication 1 GRAM(S): at 05:56

## 2017-11-08 RX ADMIN — LOSARTAN POTASSIUM 100 MILLIGRAM(S): 100 TABLET, FILM COATED ORAL at 05:56

## 2017-11-08 RX ADMIN — Medication 0.62 MILLIGRAM(S): at 12:09

## 2017-11-08 RX ADMIN — Medication 1 GRAM(S): at 12:09

## 2017-11-08 RX ADMIN — Medication 25 MILLIGRAM(S): at 05:56

## 2017-11-08 RX ADMIN — PANTOPRAZOLE SODIUM 40 MILLIGRAM(S): 20 TABLET, DELAYED RELEASE ORAL at 05:56

## 2017-11-08 RX ADMIN — HYDROMORPHONE HYDROCHLORIDE 2 MILLIGRAM(S): 2 INJECTION INTRAMUSCULAR; INTRAVENOUS; SUBCUTANEOUS at 05:58

## 2017-11-08 NOTE — PROGRESS NOTE ADULT - SUBJECTIVE AND OBJECTIVE BOX
SUBJECTIVE: c/o continued left neck and hand pain    Medications:  MEDICATIONS  (STANDING):  amLODIPine   Tablet 10 milliGRAM(s) Oral at bedtime  atorvastatin 40 milliGRAM(s) Oral at bedtime  dexamethasone  Injectable 4 milliGRAM(s) IV Push every 6 hours  docusate sodium 100 milliGRAM(s) Oral three times a day  enoxaparin Injectable 40 milliGRAM(s) SubCutaneous at bedtime  estrogens    conjugated 0.625 milliGRAM(s) Oral daily  gabapentin 600 milliGRAM(s) Oral three times a day  losartan 100 milliGRAM(s) Oral daily  pantoprazole    Tablet 40 milliGRAM(s) Oral daily  pantoprazole    Tablet 40 milliGRAM(s) Oral before breakfast  pregabalin 25 milliGRAM(s) Oral two times a day  senna 2 Tablet(s) Oral at bedtime  sertraline 50 milliGRAM(s) Oral daily  sucralfate suspension 1 Gram(s) Oral Before meals and at bedtime  timolol 0.25% Solution 1 Drop(s) Both EYES daily    MEDICATIONS  (PRN):  cyclobenzaprine 10 milliGRAM(s) Oral three times a day PRN Muscle Spasm  HYDROmorphone   Tablet 2 milliGRAM(s) Oral every 4 hours PRN Moderate Pain  HYDROmorphone   Tablet 4 milliGRAM(s) Oral every 6 hours PRN Severe Pain (7 - 10)  HYDROmorphone  Injectable 1 milliGRAM(s) IV Push every 4 hours PRN breakthrough  methocarbamol 750 milliGRAM(s) Oral three times a day PRN spasm  ondansetron   Disintegrating Tablet 4 milliGRAM(s) Oral every 6 hours PRN Nausea      Labs:          CAPILLARY BLOOD GLUCOSE                Vitals:  Vital Signs Last 24 Hrs  T(C): 36.6 (01 Nov 2017 08:13), Max: 36.9 (31 Oct 2017 21:40)  T(F): 97.9 (01 Nov 2017 08:13), Max: 98.5 (31 Oct 2017 21:40)  HR: 100 (01 Nov 2017 08:13) (90 - 105)  BP: 160/84 (01 Nov 2017 08:13) (145/79 - 178/93)  BP(mean): --  RR: 18 (01 Nov 2017 08:13) (18 - 18)  SpO2: 95% (01 Nov 2017 08:13) (95% - 98%)        NEUROLOGICAL EXAM:    General: marked left cervical paraspinal.     Mental status: Awake, alert, and in no apparent distress. Oriented to person, place and time. Language function is normal. Recent memory, digit span and concentration were normal.     Cranial Nerves: Pupils were equal, round, reactive to light. Extraocular movements were intact. Visual field were full. Fundoscopic exam was deferred. Facial sensation was intact to light touch. There was no facial asymmetry. The palate was upgoing symmetrically and tongue was midline. Hearing acuity was intact to finger rub AU. Shoulder shrug was full bilaterally    Motor exam: Bulk and tone were normal. Strength was 5/5, right UE and B LE. Left UE pain limited giveway proximally but no clear weakness, distally finger abduction, extensor 4+ otherwise 5/5.     Reflexes: 2+ in the bilateral upper extremities apart from depressed left biceps, 2+ in the bilateral lower extremities. Toes were downgoing bilaterally.     Sensation: Intact to light touch, temperature. +left tinel's at elbow.    Coordination: left FTN limited by pain.    Gait: Narrow base and steady. 	    < from: MRI Cervical Spine w/o Cont (10.29.17 @ 16:18) >    Comparison is made with the prior CT of 6/19/2017.    No significant interval change is identified.    There has been an anterior corpectomy of C4 and C5 with an interposition   metallic graft extending from C3 through C6. Additionally there is been   an anterior discectomy at C6-7. Anterior metallic plating extends from C3   through C7 and there are screws in the C3, C6 and C7 vertebral bodies.   Posteriorly there are posterior element screws, connecting rods and bony   fusion mass. There is no cord compression. All osteophyte is present on   the left at C4-5 with narrowing of the spinal canal. There is mild volume   loss of the cord on the left at this level. No disc herniations or spinal   stenosis are identified.    IMPRESSION: No change from 6/19/2017. DAVON is anterior cervical discectomy   and corpectomy with postoperative changes extending from C3 through C7.   There is mild volume loss of the cord on the left at the C4-5 level to an   osteophyte and prior cord compression. There is no current cord   compression.    Labs:  CBC Full  -  ( 30 Oct 2017 06:36 )  WBC Count : 12.4 K/uL  Hemoglobin : 11.2 g/dL  Hematocrit : 32.4 %  Platelet Count - Automated : 413 K/uL  Mean Cell Volume : 85.8 fl  Mean Cell Hemoglobin : 29.6 pg  Mean Cell Hemoglobin Concentration : 34.5 gm/dL  Auto Neutrophil # : x  Auto Lymphocyte # : x  Auto Monocyte # : x  Auto Eosinophil # : x  Auto Basophil # : x  Auto Neutrophil % : x  Auto Lymphocyte % : x  Auto Monocyte % : x  Auto Eosinophil % : x  Auto Basophil % : x    137  |  100  |  13  ----------------------------<  130<H>  4.4   |  24  |  0.69    Ca    9.9      30 Oct 2017 06:36
HPI:  Emma Patel is a 61-year old woman who had a C4-C5 corpectomy, C6-C7 ACDF, C3-C7 anterior stabilization and fusion, and C2-C7 posterior stabilization and fusion on 06/14/2017. On 09/18/2017 she had the bilateral C7 screws removed for radicular pain and evidence that the screws were compressing the bilateral C7 nerve roots on her imaging.    ?small fragment, far lateral left disc herniation C7-T1 on cervical spine MRI     Overnight event: none    Vital Signs Last 24 Hrs  T(C): 36.8 (03 Nov 2017 13:43), Max: 36.8 (03 Nov 2017 13:43)  T(F): 98.3 (03 Nov 2017 13:43), Max: 98.3 (03 Nov 2017 13:43)  HR: 90 (03 Nov 2017 13:43) (73 - 101)  BP: 110/58 (03 Nov 2017 13:43) (110/58 - 126/73)  BP(mean): --  RR: 18 (03 Nov 2017 13:43) (16 - 18)  SpO2: 95% (03 Nov 2017 13:43) (95% - 97%)                          11.5   18.44 )-----------( 551      ( 03 Nov 2017 08:57 )             34.6    11-03    136  |  97  |  16  ----------------------------<  117<H>  4.4   |  22  |  0.78    Ca    9.8      03 Nov 2017 08:44       Stroke Core Measures      DRAIN OUTPUT:    NEUROIMAGING:     PHYSICAL EXAM:    General: No Acute Distress     Neurological: Awake, alert oriented x3, Motor exam: Bulk and tone were normal. Strength was 5/5, right UE and B LE. Left UE pain limited unable to raise LUE  but no clear weakness, distally finger abduction, extensor 4+ otherwise 5/5.        Pulmonary: Clear to Auscultation, No Rales, No Rhonchi, No Wheezes     Cardiovascular: S1, S2, Regular Rate and Rhythm     Gastrointestinal: Soft, Nontender, Nondistended     Extremities: No calf tenderness     Incision:     MEDICATIONS:   Antibiotics:    Neuro:  cyclobenzaprine 10 milliGRAM(s) Oral three times a day PRN Muscle Spasm  gabapentin 600 milliGRAM(s) Oral three times a day  HYDROmorphone   Tablet 2 milliGRAM(s) Oral every 4 hours PRN Moderate Pain  HYDROmorphone   Tablet 4 milliGRAM(s) Oral every 4 hours PRN Severe Pain (7 - 10)  HYDROmorphone  Injectable 1 milliGRAM(s) IV Push every 4 hours PRN breakthrough  methocarbamol 750 milliGRAM(s) Oral three times a day PRN spasm  ondansetron   Disintegrating Tablet 4 milliGRAM(s) Oral every 6 hours PRN Nausea  pregabalin 25 milliGRAM(s) Oral two times a day   sertraline 50 milliGRAM(s) Oral daily    Anticoagulation:    Cardiology:  amLODIPine   Tablet 10 milliGRAM(s) Oral at bedtime  losartan 100 milliGRAM(s) Oral daily    Endo:   atorvastatin 40 milliGRAM(s) Oral at bedtime  dexamethasone  Injectable 4 milliGRAM(s) IV Push every 6 hours  estrogens    conjugated 0.625 milliGRAM(s) Oral daily  insulin lispro (HumaLOG) corrective regimen sliding scale   SubCutaneous Before meals and at bedtime    Pulm:    GI/:  docusate sodium 100 milliGRAM(s) Oral three times a day  pantoprazole    Tablet 40 milliGRAM(s) Oral daily  pantoprazole    Tablet 40 milliGRAM(s) Oral before breakfast  senna 2 Tablet(s) Oral at bedtime  sucralfate suspension 1 Gram(s) Oral Before meals and at bedtime    Other:  timolol 0.25% Solution 1 Drop(s) Both EYES daily
HPI:  Emma Patel is a 61-year old woman who had a C4-C5 corpectomy, C6-C7 ACDF, C3-C7 anterior stabilization and fusion, and C2-C7 posterior stabilization and fusion on 06/14/2017. On 09/18/2017 she had the bilateral C7 screws removed for radicular pain and evidence that the screws were compressing the bilateral C7 nerve roots on her imaging.    Today she presents with acute onset of pain in her left upper extremity that started on 10/25/17. On 10/28/17, she started to develop weakness in the entire left arm. She denies any trauma and has not heard any pops or clicks. She is having painful spasms in the left shoulder and pain in the scapula as well. She has intermittent sharp, stabbing pains from the elbow down to the lateral left hand (C8 and T1 dermatomes).    CT cervical spine shows that her hardware is in good placement. (29 Oct 2017 02:44)    OVERNIGHT EVENTS:  Failed bedside S&S yesterday    Vital Signs Last 24 Hrs  T(C): 36.7 (08 Nov 2017 04:45), Max: 36.7 (07 Nov 2017 14:21)  T(F): 98 (08 Nov 2017 04:45), Max: 98.1 (07 Nov 2017 17:16)  HR: 82 (08 Nov 2017 04:45) (66 - 101)  BP: 124/71 (08 Nov 2017 04:45) (123/75 - 146/80)  BP(mean): --  RR: 18 (08 Nov 2017 04:45) (18 - 18)  SpO2: 96% (08 Nov 2017 04:45) (95% - 99%)    I&O's Detail    07 Nov 2017 07:01  -  08 Nov 2017 07:00  --------------------------------------------------------  IN:    Oral Fluid: 800 mL  Total IN: 800 mL    OUT:  Total OUT: 0 mL    Total NET: 800 mL        I&O's Summary    07 Nov 2017 07:01  -  08 Nov 2017 07:00  --------------------------------------------------------  IN: 800 mL / OUT: 0 mL / NET: 800 mL        PHYSICAL EXAM:    Neurological:  A&OX3, FC, Speech fluent, although voice hoarse.    CLIFFORD 5/5  SILT    Cardiovascular: +s1, s2  Respiratory: clear to auscultation b/l  Gastrointestinal: soft, non-distended, non-tender  Incision/Wound: Steristrips in place.  No hematoma appreciated.     DIET:  Mechanical soft                          11.6   17.5  )-----------( 477      ( 06 Nov 2017 11:09 )             34.9     11-06    136  |  99  |  10  ----------------------------<  216<H>  3.8   |  22  |  0.60    Ca    9.2      06 Nov 2017 11:09        CAPILLARY BLOOD GLUCOSE  162 (08 Nov 2017 07:53)      POCT Blood Glucose.: 162 mg/dL (08 Nov 2017 07:45)  POCT Blood Glucose.: 162 mg/dL (07 Nov 2017 22:00)  POCT Blood Glucose.: 144 mg/dL (07 Nov 2017 17:25)  POCT Blood Glucose.: 159 mg/dL (07 Nov 2017 12:01)          Allergies    No Known Allergies    Intolerances    morphine (Nausea; Vomiting)  Percocet 5/325 (Nausea; Vomiting)    MEDICATIONS:  Antibiotics:    Neuro:  acetaminophen  IVPB. 1000 milliGRAM(s) IV Intermittent once  cyclobenzaprine 10 milliGRAM(s) Oral three times a day PRN  gabapentin 300 milliGRAM(s) Oral two times a day  HYDROmorphone   Tablet 2 milliGRAM(s) Oral every 4 hours PRN  methocarbamol 750 milliGRAM(s) Oral three times a day PRN  ondansetron   Disintegrating Tablet 4 milliGRAM(s) Oral every 6 hours PRN  ondansetron Injectable 4 milliGRAM(s) IV Push every 30 minutes PRN  pregabalin 25 milliGRAM(s) Oral two times a day  sertraline 50 milliGRAM(s) Oral daily    Anticoagulation:  enoxaparin Injectable 40 milliGRAM(s) SubCutaneous <User Schedule>    OTHER:  amLODIPine   Tablet 5 milliGRAM(s) Oral at bedtime  atorvastatin 40 milliGRAM(s) Oral at bedtime  docusate sodium 100 milliGRAM(s) Oral three times a day  estrogens    conjugated 0.625 milliGRAM(s) Oral daily  insulin lispro (HumaLOG) corrective regimen sliding scale   SubCutaneous Before meals and at bedtime  losartan 100 milliGRAM(s) Oral daily  pantoprazole    Tablet 40 milliGRAM(s) Oral before breakfast  senna 2 Tablet(s) Oral at bedtime  sucralfate suspension 1 Gram(s) Oral Before meals and at bedtime  timolol 0.25% Solution 1 Drop(s) Both EYES daily
HPI:  Emma Patel is a 61-year old woman who had a C4-C5 corpectomy, C6-C7 ACDF, C3-C7 anterior stabilization and fusion, and C2-C7 posterior stabilization and fusion on 06/14/2017. On 09/18/2017 she had the bilateral C7 screws removed for radicular pain and evidence that the screws were compressing the bilateral C7 nerve roots on her imaging.    Today she presents with acute onset of pain in her left upper extremity that started on 10/25/17. On 10/28/17, she started to develop weakness in the entire left arm. She denies any trauma and has not heard any pops or clicks. She is having painful spasms in the left shoulder and pain in the scapula as well. She has intermittent sharp, stabbing pains from the elbow down to the lateral left hand (C8 and T1 dermatomes).    CT cervical spine shows that her hardware is in good placement. (29 Oct 2017 02:44)    OVERNIGHT EVENTS:  Vital Signs Last 24 Hrs  T(C): 36.6 (07 Nov 2017 08:30), Max: 37.1 (06 Nov 2017 15:08)  T(F): 97.8 (07 Nov 2017 08:30), Max: 98.7 (06 Nov 2017 15:08)  HR: 101 (07 Nov 2017 08:30) (86 - 119)  BP: 132/76 (07 Nov 2017 08:30) (127/83 - 150/66)  BP(mean): --  RR: 18 (07 Nov 2017 08:30) (18 - 18)  SpO2: 96% (07 Nov 2017 08:30) (96% - 99%)    I&O's Detail    06 Nov 2017 07:01  -  07 Nov 2017 07:00  --------------------------------------------------------  IN:    Oral Fluid: 380 mL  Total IN: 380 mL    OUT:  Total OUT: 0 mL    Total NET: 380 mL        I&O's Summary    06 Nov 2017 07:01  -  07 Nov 2017 07:00  --------------------------------------------------------  IN: 380 mL / OUT: 0 mL / NET: 380 mL        PHYSICAL EXAM:  Neurological: Hoarse voice. Tolerating liquids.    Motor exam:         [x] Upper extremity                 D             B          T          WE       WF      HI                                               R         5/5        5/5        5/5       5/5     5/5       5/5                                               L          5/5        5/5        5/5       5/5     5/5       5/5         [x] Lower extremity                Ps          Ha        Quad    EHL        FHL                                               R        5/5        5/5        5/5       5/5         5/5                                               L         5/5        5/5       5/5       5/5          5/5                                                        [] warm well perfused; capillary refill <3 seconds     Sensation: [x] intact to light touch  [] decreased:     Gait Amb independently    Cardiovascular:  Respiratory:  Gastrointestinal:  Genitourinary:  Extremities:  Incision/Wound: Clean and dry    TUBES/LINES:  [] CVC  [] A-line  [] Lumbar Drain  [] Ventriculostomy  [] Other    DIET:  [] NPO  [] Mechanical  [] Tube feeds    LABS:                        11.6   17.5  )-----------( 477      ( 06 Nov 2017 11:09 )             34.9     11-06    136  |  99  |  10  ----------------------------<  216<H>  3.8   |  22  |  0.60    Ca    9.2      06 Nov 2017 11:09              CAPILLARY BLOOD GLUCOSE      POCT Blood Glucose.: 124 mg/dL (07 Nov 2017 08:10)  POCT Blood Glucose.: 212 mg/dL (06 Nov 2017 22:23)  POCT Blood Glucose.: 143 mg/dL (06 Nov 2017 17:42)  POCT Blood Glucose.: 146 mg/dL (06 Nov 2017 12:54)          Drug Levels: [] N/A    CSF Analysis: [] N/A      Allergies    No Known Allergies    Intolerances    morphine (Nausea; Vomiting)  Percocet 5/325 (Nausea; Vomiting)    MEDICATIONS:  Antibiotics:    Neuro:  acetaminophen  IVPB. 1000 milliGRAM(s) IV Intermittent once  cyclobenzaprine 10 milliGRAM(s) Oral three times a day PRN  gabapentin 400 milliGRAM(s) Oral two times a day  HYDROmorphone   Tablet 2 milliGRAM(s) Oral every 4 hours PRN  methocarbamol 750 milliGRAM(s) Oral three times a day PRN  ondansetron   Disintegrating Tablet 4 milliGRAM(s) Oral every 6 hours PRN  ondansetron Injectable 4 milliGRAM(s) IV Push every 30 minutes PRN  pregabalin 25 milliGRAM(s) Oral two times a day  sertraline 50 milliGRAM(s) Oral daily    Anticoagulation:  enoxaparin Injectable 40 milliGRAM(s) SubCutaneous <User Schedule>    OTHER:  amLODIPine   Tablet 5 milliGRAM(s) Oral at bedtime  atorvastatin 40 milliGRAM(s) Oral at bedtime  docusate sodium 100 milliGRAM(s) Oral three times a day  estrogens    conjugated 0.625 milliGRAM(s) Oral daily  insulin lispro (HumaLOG) corrective regimen sliding scale   SubCutaneous Before meals and at bedtime  losartan 100 milliGRAM(s) Oral daily  pantoprazole    Tablet 40 milliGRAM(s) Oral before breakfast  senna 2 Tablet(s) Oral at bedtime  sucralfate suspension 1 Gram(s) Oral Before meals and at bedtime  timolol 0.25% Solution 1 Drop(s) Both EYES daily    IVF:    CULTURES:    RADIOLOGY & ADDITIONAL TESTS:      ASSESSMENT:  HPI:  Emma Patel is a 61-year old woman who had a C4-C5 corpectomy, C6-C7 ACDF, C3-C7 anterior stabilization and fusion, and C2-C7 posterior stabilization and fusion on 06/14/2017. On 09/18/2017 she had the bilateral C7 screws removed for radicular pain and evidence that the screws were compressing the bilateral C7 nerve roots on her imaging.    Today she presents with acute onset of pain in her left upper extremity that started on 10/25/17. On 10/28/17, she started to develop weakness in the entire left arm. She denies any trauma and has not heard any pops or clicks. She is having painful spasms in the left shoulder and pain in the scapula as well. She has intermittent sharp, stabbing pains from the elbow down to the lateral left hand (C8 and T1 dermatomes).    CT cervical spine shows that her hardware is in good placement. (29 Oct 2017 02:44)    62y Female s/p    PLAN:  NEURO:  CARDIOVASCULAR:  PULMONARY:  RENAL:  GI:  HEME:  ID:  ENDO:    DVT PROPHYLAXIS:  [] Venodynes                                [] Heparin/Lovenox    FALL RISK:  [] Low Risk                                    [] Impulsive
HPI:  Emma Patel is a 61-year old woman who had a C4-C5 corpectomy, C6-C7 ACDF, C3-C7 anterior stabilization and fusion, and C2-C7 posterior stabilization and fusion on 06/14/2017. On 09/18/2017 she had the bilateral C7 screws removed for radicular pain and evidence that the screws were compressing the bilateral C7 nerve roots on her imaging.    Today she presents with acute onset of pain in her left upper extremity that started on 10/25/17. On 10/28/17, she started to develop weakness in the entire left arm. She denies any trauma and has not heard any pops or clicks. She is having painful spasms in the left shoulder and pain in the scapula as well. She has intermittent sharp, stabbing pains from the elbow down to the lateral left hand (C8 and T1 dermatomes).    CT cervical spine shows that her hardware is in good placement. (29 Oct 2017 02:44)    OVERNIGHT EVENTS:  Vital Signs Last 24 Hrs  T(C): 36.6 (31 Oct 2017 13:39), Max: 36.8 (31 Oct 2017 00:26)  T(F): 97.9 (31 Oct 2017 13:39), Max: 98.3 (31 Oct 2017 00:26)  HR: 90 (31 Oct 2017 13:39) (84 - 99)  BP: 150/75 (31 Oct 2017 13:39) (129/72 - 156/77)  BP(mean): --  RR: 18 (31 Oct 2017 13:39) (18 - 18)  SpO2: 96% (31 Oct 2017 13:39) (95% - 99%)    I&O's Detail    30 Oct 2017 07:01  -  31 Oct 2017 07:00  --------------------------------------------------------  IN:    Oral Fluid: 900 mL  Total IN: 900 mL    OUT:    Voided: 2300 mL  Total OUT: 2300 mL    Total NET: -1400 mL      31 Oct 2017 07:01  -  31 Oct 2017 15:19  --------------------------------------------------------  IN:    Oral Fluid: 540 mL  Total IN: 540 mL    OUT:    Voided: 900 mL  Total OUT: 900 mL    Total NET: -360 mL        I&O's Summary    30 Oct 2017 07:01  -  31 Oct 2017 07:00  --------------------------------------------------------  IN: 900 mL / OUT: 2300 mL / NET: -1400 mL    31 Oct 2017 07:01  -  31 Oct 2017 15:19  --------------------------------------------------------  IN: 540 mL / OUT: 900 mL / NET: -360 mL        PHYSICAL EXAM:  Neurological:    Motor exam:         [] Upper extremity                 D             B          T          WE       WF      HI                                               R         5/5        5/5        5/5       5/5     5/5       5/5                                               L          5/5        5/5        5/5       5/5     5/5       4/5         [] Lower extremity                Ps          Ha        Quad    EHL        FHL                                               R        5/5        5/5        5/5       5/5         5/5                                               L         5/5        5/5       5/5       5/5          5/5                                                        [] warm well perfused; capillary refill <3 seconds     Sensation: [] intact to light touch  [] decreased: L IV, V fingers      Cardiovascular:  Respiratory:  Gastrointestinal:  Genitourinary:  Extremities:  Incision/Wound:    TUBES/LINES:  [] CVC  [] A-line  [] Lumbar Drain  [] Ventriculostomy  [] Other    DIET:  [] NPO  [] Mechanical  [] Tube feeds    LABS:                        11.2   12.4  )-----------( 413      ( 30 Oct 2017 06:36 )             32.4     10-30    137  |  100  |  13  ----------------------------<  130<H>  4.4   |  24  |  0.69    Ca    9.9      30 Oct 2017 06:36          CARDIAC MARKERS ( 30 Oct 2017 02:31 )  x     / <0.01 ng/mL / 45 U/L / x     / 2.3 ng/mL  CARDIAC MARKERS ( 29 Oct 2017 19:18 )  x     / <0.01 ng/mL / 39 U/L / x     / 1.6 ng/mL      CAPILLARY BLOOD GLUCOSE              Drug Levels: [] N/A    CSF Analysis: [] N/A      Allergies    No Known Allergies    Intolerances    morphine (Nausea; Vomiting)  Percocet 5/325 (Nausea; Vomiting)    MEDICATIONS:  Antibiotics:    Neuro:  cyclobenzaprine 10 milliGRAM(s) Oral three times a day PRN  gabapentin 600 milliGRAM(s) Oral three times a day  HYDROmorphone   Tablet 2 milliGRAM(s) Oral every 4 hours PRN  HYDROmorphone   Tablet 4 milliGRAM(s) Oral every 6 hours PRN  HYDROmorphone  Injectable 1 milliGRAM(s) IV Push every 4 hours PRN  methocarbamol 750 milliGRAM(s) Oral three times a day PRN  ondansetron   Disintegrating Tablet 4 milliGRAM(s) Oral every 6 hours PRN  pregabalin 25 milliGRAM(s) Oral two times a day  sertraline 50 milliGRAM(s) Oral daily    Anticoagulation:  enoxaparin Injectable 40 milliGRAM(s) SubCutaneous at bedtime    OTHER:  amLODIPine   Tablet 5 milliGRAM(s) Oral at bedtime  atorvastatin 40 milliGRAM(s) Oral at bedtime  dexamethasone  Injectable 4 milliGRAM(s) IV Push every 6 hours  docusate sodium 100 milliGRAM(s) Oral three times a day  estrogens    conjugated 0.625 milliGRAM(s) Oral daily  losartan 50 milliGRAM(s) Oral daily  pantoprazole    Tablet 40 milliGRAM(s) Oral daily  pantoprazole    Tablet 40 milliGRAM(s) Oral before breakfast  senna 2 Tablet(s) Oral at bedtime  sucralfate suspension 1 Gram(s) Oral Before meals and at bedtime  timolol 0.25% Solution 1 Drop(s) Both EYES daily    IVF:    CULTURES:    RADIOLOGY & ADDITIONAL TESTS:      ASSESSMENT:  HPI:  Emma Patel is a 61-year old woman who had a C4-C5 corpectomy, C6-C7 ACDF, C3-C7 anterior stabilization and fusion, and C2-C7 posterior stabilization and fusion on 06/14/2017. On 09/18/2017 she had the bilateral C7 screws removed for radicular pain and evidence that the screws were compressing the bilateral C7 nerve roots on her imaging.    Today she presents with acute onset of pain in her left upper extremity that started on 10/25/17. On 10/28/17, she started to develop weakness in the entire left arm. She denies any trauma and has not heard any pops or clicks. She is having painful spasms in the left shoulder and pain in the scapula as well. She has intermittent sharp, stabbing pains from the elbow down to the lateral left hand (C8 and T1 dermatomes).    CT cervical spine shows that her hardware is in good placement. (29 Oct 2017 02:44)    61y Female s/p    PLAN:  NEURO:  CARDIOVASCULAR:  PULMONARY:  RENAL:  GI:  HEME:  ID:  ENDO:    DVT PROPHYLAXIS:  [x] Venodynes                                [] Heparin/Lovenox    FALL RISK:  [] Low Risk                                    [] Impulsive
HPI:  Emma Patel is a 61-year old woman who had a C4-C5 corpectomy, C6-C7 ACDF, C3-C7 anterior stabilization and fusion, and C2-C7 posterior stabilization and fusion on 06/14/2017. On 09/18/2017 she had the bilateral C7 screws removed for radicular pain and evidence that the screws were compressing the bilateral C7 nerve roots on her imaging.    Today she presents with acute onset of pain in her left upper extremity that started on 10/25/17. On 10/28/17, she started to develop weakness in the entire left arm. She denies any trauma and has not heard any pops or clicks. She is having painful spasms in the left shoulder and pain in the scapula as well. She has intermittent sharp, stabbing pains from the elbow down to the lateral left hand (C8 and T1 dermatomes).    CT cervical spine shows that her hardware is in good placement. (29 Oct 2017 02:44)    OVERNIGHT EVENTS:  Vital Signs Last 24 Hrs  T(C): 36.8 (05 Nov 2017 07:00), Max: 37.2 (04 Nov 2017 16:00)  T(F): 98.2 (05 Nov 2017 07:00), Max: 99 (04 Nov 2017 16:00)  HR: 80 (05 Nov 2017 07:00) (71 - 98)  BP: 111/56 (05 Nov 2017 07:00) (103/52 - 155/72)  BP(mean): 69 (05 Nov 2017 07:00) (67 - 103)  RR: 17 (05 Nov 2017 07:00) (10 - 26)  SpO2: 96% (05 Nov 2017 07:00) (94% - 100%)    I&O's Detail    04 Nov 2017 08:01  -  05 Nov 2017 07:00  --------------------------------------------------------  IN:    IV PiggyBack: 250 mL    Oral Fluid: 100 mL    sodium chloride 0.9% with potassium chloride 20 mEq/L: 1500 mL  Total IN: 1850 mL    OUT:    Indwelling Catheter - Urethral: 2625 mL  Total OUT: 2625 mL    Total NET: -775 mL      05 Nov 2017 07:01  -  05 Nov 2017 09:55  --------------------------------------------------------  IN:    Oral Fluid: 520 mL  Total IN: 520 mL    OUT:  Total OUT: 0 mL    Total NET: 520 mL        I&O's Summary    04 Nov 2017 08:01 - 05 Nov 2017 07:00  --------------------------------------------------------  IN: 1850 mL / OUT: 2625 mL / NET: -775 mL    05 Nov 2017 07:01 - 05 Nov 2017 09:55  --------------------------------------------------------  IN: 520 mL / OUT: 0 mL / NET: 520 mL        PHYSICAL EXAM:  Neurological: Vocal hoarseness - tolerating liquids    Motor exam:         [] Upper extremity                 D             B          T          WE       WF      HI                                               R         5/5        5/5        5/5       5/5     5/5       5/5                                               L          5/5        5/5        5/5       5/5     5/5       4/5         [] Lower extremity                Ps          Ha        Quad    EHL        FHL                                               R        5/5        5/5        5/5       5/5         5/5                                               L         5/5        5/5       5/5       5/5          5/5                                                        [] warm well perfused; capillary refill <3 seconds     Sensation: [x] intact to light touch  [] decreased:     Gait NT    Cardiovascular:  Respiratory:  Gastrointestinal:  Genitourinary:  Extremities:  Incision/Wound: Clean and dry    TUBES/LINES:  [] CVC  [] A-line  [] Lumbar Drain  [] Ventriculostomy  [] Other    DIET:  [] NPO  [] Mechanical  [] Tube feeds    LABS:                        10.0   17.7  )-----------( 391      ( 04 Nov 2017 20:57 )             29.1     11-04    137  |  102  |  15  ----------------------------<  95  4.2   |  23  |  0.67    Ca    8.1<L>      04 Nov 2017 20:57  Phos  4.6     11-04  Mg     2.1     11-04      PT/INR - ( 03 Nov 2017 16:27 )   PT: 10.6 sec;   INR: 0.98 ratio         PTT - ( 03 Nov 2017 16:27 )  PTT:24.4 sec        CAPILLARY BLOOD GLUCOSE  92 (05 Nov 2017 06:00)  111 (04 Nov 2017 22:00)  121 (04 Nov 2017 16:00)      POCT Blood Glucose.: 92 mg/dL (05 Nov 2017 06:11)  POCT Blood Glucose.: 111 mg/dL (04 Nov 2017 22:29)  POCT Blood Glucose.: 121 mg/dL (04 Nov 2017 16:26)          Drug Levels: [] N/A    CSF Analysis: [] N/A      Allergies    No Known Allergies    Intolerances    morphine (Nausea; Vomiting)  Percocet 5/325 (Nausea; Vomiting)    MEDICATIONS:  Antibiotics:    Neuro:  acetaminophen  IVPB. 1000 milliGRAM(s) IV Intermittent once  cyclobenzaprine 10 milliGRAM(s) Oral three times a day PRN  gabapentin 600 milliGRAM(s) Oral three times a day  HYDROmorphone   Tablet 2 milliGRAM(s) Oral every 4 hours PRN  HYDROmorphone  Injectable 1 milliGRAM(s) IV Push every 4 hours PRN  methocarbamol 750 milliGRAM(s) Oral three times a day PRN  ondansetron   Disintegrating Tablet 4 milliGRAM(s) Oral every 6 hours PRN  ondansetron Injectable 4 milliGRAM(s) IV Push every 30 minutes PRN  pregabalin 25 milliGRAM(s) Oral two times a day  sertraline 50 milliGRAM(s) Oral daily    Anticoagulation:    OTHER:  amLODIPine   Tablet 10 milliGRAM(s) Oral at bedtime  atorvastatin 40 milliGRAM(s) Oral at bedtime  docusate sodium 100 milliGRAM(s) Oral three times a day  estrogens    conjugated 0.625 milliGRAM(s) Oral daily  insulin lispro (HumaLOG) corrective regimen sliding scale   SubCutaneous Before meals and at bedtime  losartan 100 milliGRAM(s) Oral daily  pantoprazole    Tablet 40 milliGRAM(s) Oral before breakfast  senna 2 Tablet(s) Oral at bedtime  sucralfate suspension 1 Gram(s) Oral Before meals and at bedtime  timolol 0.25% Solution 1 Drop(s) Both EYES daily    IVF:  sodium chloride 0.9% with potassium chloride 20 mEq/L 1000 milliLiter(s) IV Continuous <Continuous>    CULTURES:    RADIOLOGY & ADDITIONAL TESTS:      ASSESSMENT:  HPI:  Emma Patel is a 61-year old woman who had a C4-C5 corpectomy, C6-C7 ACDF, C3-C7 anterior stabilization and fusion, and C2-C7 posterior stabilization and fusion on 06/14/2017. On 09/18/2017 she had the bilateral C7 screws removed for radicular pain and evidence that the screws were compressing the bilateral C7 nerve roots on her imaging.    Today she presents with acute onset of pain in her left upper extremity that started on 10/25/17. On 10/28/17, she started to develop weakness in the entire left arm. She denies any trauma and has not heard any pops or clicks. She is having painful spasms in the left shoulder and pain in the scapula as well. She has intermittent sharp, stabbing pains from the elbow down to the lateral left hand (C8 and T1 dermatomes).    CT cervical spine shows that her hardware is in good placement. (29 Oct 2017 02:44)    62y Female s/p    PLAN:  NEURO:  CARDIOVASCULAR:  PULMONARY:  RENAL:  GI:  HEME:  ID:  ENDO:    DVT PROPHYLAXIS:  [] Venodynes                                [] Heparin/Lovenox    FALL RISK:  [] Low Risk                                    [] Impulsive
HPI:  Emma Patel is a 61-year old woman who had a C4-C5 corpectomy, C6-C7 ACDF, C3-C7 anterior stabilization and fusion, and C2-C7 posterior stabilization and fusion on 06/14/2017. On 09/18/2017 she had the bilateral C7 screws removed for radicular pain and evidence that the screws were compressing the bilateral C7 nerve roots on her imaging.    Today she presents with acute onset of pain in her left upper extremity that started on 10/25/17. On 10/28/17, she started to develop weakness in the entire left arm. She denies any trauma and has not heard any pops or clicks. She is having painful spasms in the left shoulder and pain in the scapula as well. She has intermittent sharp, stabbing pains from the elbow down to the lateral left hand (C8 and T1 dermatomes).    CT cervical spine shows that her hardware is in good placement. (29 Oct 2017 02:44)    OVERNIGHT EVENTS:  Vital Signs Last 24 Hrs  T(C): 36.8 (08 Nov 2017 07:53), Max: 36.8 (08 Nov 2017 07:53)  T(F): 98.2 (08 Nov 2017 07:53), Max: 98.2 (08 Nov 2017 07:53)  HR: 77 (08 Nov 2017 07:53) (66 - 94)  BP: 120/72 (08 Nov 2017 07:53) (120/72 - 146/80)  BP(mean): --  RR: 18 (08 Nov 2017 07:53) (18 - 18)  SpO2: 95% (08 Nov 2017 07:53) (95% - 99%)    I&O's Detail    07 Nov 2017 07:01  -  08 Nov 2017 07:00  --------------------------------------------------------  IN:    Oral Fluid: 800 mL  Total IN: 800 mL    OUT:  Total OUT: 0 mL    Total NET: 800 mL        I&O's Summary    07 Nov 2017 07:01  -  08 Nov 2017 07:00  --------------------------------------------------------  IN: 800 mL / OUT: 0 mL / NET: 800 mL        PHYSICAL EXAM:  Neurological: Dysphonia, improving c/w yesterday    Motor exam:         [x] Upper extremity                 D             B          T          WE       WF      HI                                               R         5/5        5/5        5/5       5/5     5/5       5/5                                               L          5/5        5/5        5/5       5/5     5/5       5/5         [x] Lower extremity                Ps          Ha        Quad    EHL        FHL                                               R        5/5        5/5        5/5       5/5         5/5                                               L         5/5        5/5       5/5       5/5          5/5                                                        [] warm well perfused; capillary refill <3 seconds     Sensation: [x] intact to light touch  [] decreased:      Gait nl    Cardiovascular:  Respiratory:  Gastrointestinal:  Genitourinary:  Extremities:  Incision/Wound: Clean and dry    TUBES/LINES:  [] CVC  [] A-line  [] Lumbar Drain  [] Ventriculostomy  [] Other    DIET:  [] NPO  [] Mechanical  [] Tube feeds    LABS:                        10.6   15.59 )-----------( 440      ( 08 Nov 2017 07:40 )             31.9                   CAPILLARY BLOOD GLUCOSE  162 (08 Nov 2017 07:53)      POCT Blood Glucose.: 162 mg/dL (08 Nov 2017 07:45)  POCT Blood Glucose.: 162 mg/dL (07 Nov 2017 22:00)  POCT Blood Glucose.: 144 mg/dL (07 Nov 2017 17:25)  POCT Blood Glucose.: 159 mg/dL (07 Nov 2017 12:01)          Drug Levels: [] N/A    CSF Analysis: [] N/A      Allergies    No Known Allergies    Intolerances    morphine (Nausea; Vomiting)  Percocet 5/325 (Nausea; Vomiting)    MEDICATIONS:  Antibiotics:    Neuro:  acetaminophen  IVPB. 1000 milliGRAM(s) IV Intermittent once  cyclobenzaprine 10 milliGRAM(s) Oral three times a day PRN  gabapentin 300 milliGRAM(s) Oral two times a day  HYDROmorphone   Tablet 2 milliGRAM(s) Oral every 4 hours PRN  methocarbamol 750 milliGRAM(s) Oral three times a day PRN  ondansetron   Disintegrating Tablet 4 milliGRAM(s) Oral every 6 hours PRN  ondansetron Injectable 4 milliGRAM(s) IV Push every 30 minutes PRN  pregabalin 25 milliGRAM(s) Oral two times a day  sertraline 50 milliGRAM(s) Oral daily    Anticoagulation:  enoxaparin Injectable 40 milliGRAM(s) SubCutaneous <User Schedule>    OTHER:  amLODIPine   Tablet 5 milliGRAM(s) Oral at bedtime  atorvastatin 40 milliGRAM(s) Oral at bedtime  docusate sodium 100 milliGRAM(s) Oral three times a day  estrogens    conjugated 0.625 milliGRAM(s) Oral daily  insulin lispro (HumaLOG) corrective regimen sliding scale   SubCutaneous Before meals and at bedtime  losartan 100 milliGRAM(s) Oral daily  pantoprazole    Tablet 40 milliGRAM(s) Oral before breakfast  senna 2 Tablet(s) Oral at bedtime  sucralfate suspension 1 Gram(s) Oral Before meals and at bedtime  timolol 0.25% Solution 1 Drop(s) Both EYES daily    IVF:    CULTURES:    RADIOLOGY & ADDITIONAL TESTS:      ASSESSMENT:  HPI:  Emma Patel is a 61-year old woman who had a C4-C5 corpectomy, C6-C7 ACDF, C3-C7 anterior stabilization and fusion, and C2-C7 posterior stabilization and fusion on 06/14/2017. On 09/18/2017 she had the bilateral C7 screws removed for radicular pain and evidence that the screws were compressing the bilateral C7 nerve roots on her imaging.    Today she presents with acute onset of pain in her left upper extremity that started on 10/25/17. On 10/28/17, she started to develop weakness in the entire left arm. She denies any trauma and has not heard any pops or clicks. She is having painful spasms in the left shoulder and pain in the scapula as well. She has intermittent sharp, stabbing pains from the elbow down to the lateral left hand (C8 and T1 dermatomes).    CT cervical spine shows that her hardware is in good placement. (29 Oct 2017 02:44)    62y Female s/p    PLAN:  NEURO:  CARDIOVASCULAR:  PULMONARY:  RENAL:  GI:  HEME:  ID:  ENDO:    DVT PROPHYLAXIS:  [x] Venodynes                                [x] Heparin/Lovenox    FALL RISK:  [x] Low Risk                                    [] Impulsive
HPI:  Emma Patel is a 61-year old woman who had a C4-C5 corpectomy, C6-C7 ACDF, C3-C7 anterior stabilization and fusion, and C2-C7 posterior stabilization and fusion on 06/14/2017. On 09/18/2017 she had the bilateral C7 screws removed for radicular pain and evidence that the screws were compressing the bilateral C7 nerve roots on her imaging.   She presents with acute onset of pain in her left upper extremity that started on 10/25/17. On 10/28/17, she started to develop weakness in the entire left arm. She denies any trauma and has not heard any pops or clicks. She is having painful spasms in the left shoulder and pain in the scapula as well. She has intermittent sharp, stabbing pains from the elbow down to the lateral left hand (C8 and T1 dermatomes).    CT cervical spine shows that her hardware is in good placement. (29 Oct 2017 02:44)    overnight event: none     Vital Signs Last 24 Hrs  T(C): 36.6 (02 Nov 2017 08:47), Max: 36.7 (01 Nov 2017 16:30)  T(F): 97.8 (02 Nov 2017 08:47), Max: 98.1 (01 Nov 2017 16:30)  HR: 104 (02 Nov 2017 08:47) (98 - 107)  BP: 115/65 (02 Nov 2017 08:47) (115/65 - 170/90)  BP(mean): --  RR: 18 (02 Nov 2017 08:47) (18 - 18)  SpO2: 96% (02 Nov 2017 08:47) (95% - 98%)            Stroke Core Measures      DRAIN OUTPUT:     NEUROIMAGING: MRI of the cervical spine discloses post operative changes limiting evaluation for significant foraminal stenosis.   Reviewed with neuroradiology  ?small fragment, far lateral left disc herniation C7-T1  Brachial plexus MRI is negative.     PHYSICAL EXAM:    General: No Acute Distress     Neurological: Awake, alert oriented x3, Motor exam: Bulk and tone were normal. Strength was 5/5, right UE and B LE. Left UE pain limited unable to raise LUE  but no clear weakness, distally finger abduction, extensor 4+ otherwise 5/5.        Pulmonary: Clear to Auscultation, No Rales, No Rhonchi, No Wheezes     Cardiovascular: S1, S2, Regular Rate and Rhythm     Gastrointestinal: Soft, Nontender, Nondistended     Extremities: No calf tenderness     Incision:     MEDICATIONS:   Antibiotics:    Neuro:  cyclobenzaprine 10 milliGRAM(s) Oral three times a day PRN Muscle Spasm  gabapentin 600 milliGRAM(s) Oral three times a day  HYDROmorphone   Tablet 2 milliGRAM(s) Oral every 4 hours PRN Moderate Pain  HYDROmorphone   Tablet 4 milliGRAM(s) Oral every 4 hours PRN Severe Pain (7 - 10)  HYDROmorphone  Injectable 1 milliGRAM(s) IV Push every 4 hours PRN breakthrough  methocarbamol 750 milliGRAM(s) Oral three times a day PRN spasm  ondansetron   Disintegrating Tablet 4 milliGRAM(s) Oral every 6 hours PRN Nausea  pregabalin 25 milliGRAM(s) Oral two times a day  sertraline 50 milliGRAM(s) Oral daily    Anticoagulation:  enoxaparin Injectable 40 milliGRAM(s) SubCutaneous at bedtime    Cardiology:  amLODIPine   Tablet 10 milliGRAM(s) Oral at bedtime  losartan 100 milliGRAM(s) Oral daily    Endo:   atorvastatin 40 milliGRAM(s) Oral at bedtime  dexamethasone  Injectable 4 milliGRAM(s) IV Push every 6 hours  estrogens    conjugated 0.625 milliGRAM(s) Oral daily  insulin lispro (HumaLOG) corrective regimen sliding scale   SubCutaneous Before meals and at bedtime    Pulm:    GI/:  docusate sodium 100 milliGRAM(s) Oral three times a day  pantoprazole    Tablet 40 milliGRAM(s) Oral daily  pantoprazole    Tablet 40 milliGRAM(s) Oral before breakfast  senna 2 Tablet(s) Oral at bedtime  sucralfate suspension 1 Gram(s) Oral Before meals and at bedtime    Other:  timolol 0.25% Solution 1 Drop(s) Both EYES daily
HPI:  Emma Patel is a 61-year old woman who had a C4-C5 corpectomy, C6-C7 ACDF, C3-C7 anterior stabilization and fusion, and C2-C7 posterior stabilization and fusion on 06/14/2017. On 09/18/2017 she had the bilateral C7 screws removed for radicular pain and evidence that the screws were compressing the bilateral C7 nerve roots on her imaging.  Presented this time 10/28/2017 with acute onset of pain in her left upper extremity and burning sensation from the L elbow to the lateral L hand that started 3 days before. Also developed weakness in the entire left arm. She denies any trauma. She was having painful spasms in the left shoulder and pain in the scapula as well. She has intermittent sharp, stabbing pains from the elbow down to the lateral left hand (C8 and T1 dermatomes).    PMH: GERD, Hiatal hernia, History of gastric ulcer, iron deficiency anemia, migraine, HLD, HTN, spinal stenosis in cervical region.    VITALS:  T(C): , Max: 37.1 (11-04-17 @ 05:56)  HR:  (79 - 98)  BP:  (114/71 - 155/72)  ABP:  (147/58 - 163/60)  RR:  (11 - 18)  SpO2:  (94% - 100%)      11-03-17 @ 07:01  -  11-04-17 @ 07:00  --------------------------------------------------------  IN: 2220 mL / OUT: 400 mL / NET: 1820 mL    11-04-17 @ 08:01  -  11-04-17 @ 14:42  --------------------------------------------------------  IN: 275 mL / OUT: 290 mL / NET: -15 mL      LABS:  Na: 136 (11-04 @ 11:57), 136 (11-03 @ 08:44)  K: 4.1 (11-04 @ 11:57), 4.4 (11-03 @ 08:44)  Cl: 100 (11-04 @ 11:57), 97 (11-03 @ 08:44)  CO2: 24 (11-04 @ 11:57), 22 (11-03 @ 08:44)  BUN: 16 (11-04 @ 11:57), 16 (11-03 @ 08:44)  Cr: 0.74 (11-04 @ 11:57), 0.78 (11-03 @ 08:44)  Glu: 141(11-04 @ 11:57), 117(11-03 @ 08:44)    Hgb: 10.4 (11-04 @ 11:57), 11.5 (11-03 @ 08:57)  Hct: 31.7 (11-04 @ 11:57), 34.6 (11-03 @ 08:57)  WBC: 15.7 (11-04 @ 11:57), 18.44 (11-03 @ 08:57)  Plt: 439 (11-04 @ 11:57), 551 (11-03 @ 08:57)  PT: 10.6 (11-03 @ 16:27)  INR: 0.98 (11-03 @ 16:27)  aPTT: 24.4 (11-03 @ 16:27)    IMAGING:   Recent imaging studies were reviewed.    MEDICATIONS:  acetaminophen  IVPB. 1000 milliGRAM(s) IV Intermittent once  amLODIPine   Tablet 10 milliGRAM(s) Oral at bedtime  atorvastatin 40 milliGRAM(s) Oral at bedtime  ceFAZolin   IVPB 2000 milliGRAM(s) IV Intermittent every 8 hours  cyclobenzaprine 10 milliGRAM(s) Oral three times a day PRN  docusate sodium 100 milliGRAM(s) Oral three times a day  estrogens    conjugated 0.625 milliGRAM(s) Oral daily  gabapentin 600 milliGRAM(s) Oral three times a day  HYDROmorphone  Injectable 0.5 milliGRAM(s) IV Push every 10 minutes PRN  insulin lispro (HumaLOG) corrective regimen sliding scale   SubCutaneous Before meals and at bedtime  losartan 100 milliGRAM(s) Oral daily  methocarbamol 750 milliGRAM(s) Oral three times a day PRN  ondansetron   Disintegrating Tablet 4 milliGRAM(s) Oral every 6 hours PRN  ondansetron Injectable 4 milliGRAM(s) IV Push every 30 minutes PRN  pantoprazole    Tablet 40 milliGRAM(s) Oral before breakfast  pregabalin 25 milliGRAM(s) Oral two times a day  senna 2 Tablet(s) Oral at bedtime  sertraline 50 milliGRAM(s) Oral daily  sodium chloride 0.9% with potassium chloride 20 mEq/L 1000 milliLiter(s) IV Continuous <Continuous>  sucralfate suspension 1 Gram(s) Oral Before meals and at bedtime  timolol 0.25% Solution 1 Drop(s) Both EYES daily    EXAMINATION:  General:  calm, cooperative.  HEENT:  clean, dry anterior neck dressing.  Neuro:  awake, alert, oriented x 3, follows commands, face symmetric, EOMI, PERRLA, moves all extremities - 5/5 throughout, no drift, sensation mildly decreased in L hand.  Cards:  S1S2 present.  Respiratory:  no respiratory distress, CTABL.  Abdomen:  soft  Extremities:  no edema  Skin:  warm/dry    Buenrostro in place.  L radial A-line.
HPI:  Emma Patel is a 61-year old woman who had a C4-C5 corpectomy, C6-C7 ACDF, C3-C7 anterior stabilization and fusion, and C2-C7 posterior stabilization and fusion on 06/14/2017. On 09/18/2017 she had the bilateral C7 screws removed for radicular pain and evidence that the screws were compressing the bilateral C7 nerve roots on her imaging.  She presents with acute onset of pain in her left upper extremity that started on 10/25/17. On 10/28/17, she started to develop weakness in the entire left arm. She denies any trauma and has not heard any pops or clicks. She is having painful spasms in the left shoulder and pain in the scapula as well. She has intermittent sharp, stabbing pains from the elbow down to the lateral left hand (C8 and T1 dermatomes).    CT cervical spine shows that her hardware is in good placement. (29 Oct 2017 02:44)    OVERNIGHT EVENTS:  Continues to have a hoarse voice- awaiting speech and swallow.  No longer complains of SOB.     Vital Signs Last 24 Hrs  T(C): 36.6 (07 Nov 2017 08:30), Max: 37.1 (06 Nov 2017 15:08)  T(F): 97.8 (07 Nov 2017 08:30), Max: 98.7 (06 Nov 2017 15:08)  HR: 101 (07 Nov 2017 08:30) (86 - 119)  BP: 132/76 (07 Nov 2017 08:30) (127/83 - 150/66)  BP(mean): --  RR: 18 (07 Nov 2017 08:30) (18 - 18)  SpO2: 96% (07 Nov 2017 08:30) (96% - 99%)    I&O's Detail    06 Nov 2017 07:01  -  07 Nov 2017 07:00  --------------------------------------------------------  IN:    Oral Fluid: 380 mL  Total IN: 380 mL    OUT:  Total OUT: 0 mL    Total NET: 380 mL        I&O's Summary    06 Nov 2017 07:01  -  07 Nov 2017 07:00  --------------------------------------------------------  IN: 380 mL / OUT: 0 mL / NET: 380 mL        PHYSICAL EXAM:  Neurological:  A&OX3, FC, Speech fluent, although voice hoarse.  Tolerating liquids well.  CLIFFORD 5/5  SILT    Cardiovascular: +s1, s2  Respiratory: clear to auscultation b/l  Gastrointestinal: soft, non-distended, non-tender  Incision/Wound: Steristrips in place.  No hematoma appreciated.     DIET:  Mechanical soft    LABS:                        11.6   17.5  )-----------( 477      ( 06 Nov 2017 11:09 )             34.9     11-06    136  |  99  |  10  ----------------------------<  216<H>  3.8   |  22  |  0.60    Ca    9.2      06 Nov 2017 11:09              CAPILLARY BLOOD GLUCOSE      POCT Blood Glucose.: 124 mg/dL (07 Nov 2017 08:10)  POCT Blood Glucose.: 212 mg/dL (06 Nov 2017 22:23)  POCT Blood Glucose.: 143 mg/dL (06 Nov 2017 17:42)  POCT Blood Glucose.: 146 mg/dL (06 Nov 2017 12:54)            Allergies  No Known Allergies    Intolerances    morphine (Nausea; Vomiting)  Percocet 5/325 (Nausea; Vomiting)    MEDICATIONS:      Neuro:  acetaminophen  IVPB. 1000 milliGRAM(s) IV Intermittent once  cyclobenzaprine 10 milliGRAM(s) Oral three times a day PRN  gabapentin 400 milliGRAM(s) Oral two times a day  HYDROmorphone   Tablet 2 milliGRAM(s) Oral every 4 hours PRN  methocarbamol 750 milliGRAM(s) Oral three times a day PRN  ondansetron   Disintegrating Tablet 4 milliGRAM(s) Oral every 6 hours PRN  ondansetron Injectable 4 milliGRAM(s) IV Push every 30 minutes PRN  pregabalin 25 milliGRAM(s) Oral two times a day  sertraline 50 milliGRAM(s) Oral daily    Anticoagulation:  enoxaparin Injectable 40 milliGRAM(s) SubCutaneous <User Schedule>    OTHER:  amLODIPine   Tablet 5 milliGRAM(s) Oral at bedtime  atorvastatin 40 milliGRAM(s) Oral at bedtime  docusate sodium 100 milliGRAM(s) Oral three times a day  estrogens    conjugated 0.625 milliGRAM(s) Oral daily  insulin lispro (HumaLOG) corrective regimen sliding scale   SubCutaneous Before meals and at bedtime  losartan 100 milliGRAM(s) Oral daily  pantoprazole    Tablet 40 milliGRAM(s) Oral before breakfast  senna 2 Tablet(s) Oral at bedtime  sucralfate suspension 1 Gram(s) Oral Before meals and at bedtime  timolol 0.25% Solution 1 Drop(s) Both EYES daily
HPI:  Patient is a 61 year old female with prior C4-C5 corpectomy, C6-C7 ACDF, and C3-C7 anterior stabilization/fusion, and C2-C7 posterior stabilization/fusion on 6/14/2017.  Returned for removal of bilateral C7 screws for radicular pain on 9/18/2017.  Now presents with acute onset of left upper extremity/shoulder pain.  Admitted for further management.        OVERNIGHT EVENTS:  Patient seen and examined at bedside.  Resting in bed comfortably, only complaint is persistent left shoulder/arm pain.  Pain is only minimally improved with pain medications.  On decadron.  Tolerating diet.  Is awaiting MRI.    Vital Signs Last 24 Hrs  T(C): 36.8 (29 Oct 2017 08:00), Max: 37.1 (28 Oct 2017 16:39)  T(F): 98.2 (29 Oct 2017 08:00), Max: 98.8 (28 Oct 2017 16:39)  HR: 82 (29 Oct 2017 08:00) (82 - 113)  BP: 120/70 (29 Oct 2017 08:00) (116/69 - 135/90)  BP(mean): --  RR: 18 (29 Oct 2017 08:00) (18 - 18)  SpO2: 95% (29 Oct 2017 08:00) (95% - 100%)          PHYSICAL EXAM:  Neurological: awake, alert, oriented x3, follows commands, speech clear and fluent, LUE delt 3/5, otherwise LUE 4/5 strength, RUE and b/l LE 5/5 strength throughout, sensation present, intact, equal throuhgout    Cardiovascular: +s1, s2  Respiratory: clear to auscultation b/l  Gastrointestinal: soft, non-distended, non-tender  Genitourinary: +voiding  Incision/Wound: left anterior neck incision well healed c/d/i, posterior vertical midline incision well healed c/d/i     TUBES/LINES:  [x] none    DIET:  [x] regular    LABS:                        11.1   8.2   )-----------( 405      ( 28 Oct 2017 18:21 )             34.1     10-28    137  |  101  |  7   ----------------------------<  117<H>  4.3   |  25  |  0.69    Ca    9.3      28 Oct 2017 18:21    TPro  7.6  /  Alb  4.2  /  TBili  <0.1<L>  /  DBili  x   /  AST  20  /  ALT  14  /  AlkPhos  81  10-28    PT/INR - ( 28 Oct 2017 18:21 )   PT: 11.2 sec;   INR: 1.03 ratio         PTT - ( 28 Oct 2017 18:21 )  PTT:27.6 sec    CARDIAC MARKERS ( 29 Oct 2017 10:16 )  x     / <0.01 ng/mL / 34 U/L / x     / 1.2 ng/mL      Allergies    No Known Allergies    Intolerances    morphine (Nausea; Vomiting)  Percocet 5/325 (Nausea; Vomiting)    MEDICATIONS:  Antibiotics: none    Neuro:  cyclobenzaprine 10 milliGRAM(s) Oral three times a day PRN  gabapentin 600 milliGRAM(s) Oral three times a day  HYDROmorphone   Tablet 2 milliGRAM(s) Oral every 4 hours PRN  HYDROmorphone  Injectable 0.5 milliGRAM(s) IV Push every 1 hour PRN  methocarbamol 750 milliGRAM(s) Oral three times a day PRN  ondansetron   Disintegrating Tablet 4 milliGRAM(s) Oral every 6 hours PRN  sertraline 50 milliGRAM(s) Oral daily    Anticoagulation:  enoxaparin Injectable 40 milliGRAM(s) SubCutaneous at bedtime    OTHER:  amLODIPine   Tablet 5 milliGRAM(s) Oral at bedtime  atorvastatin 40 milliGRAM(s) Oral at bedtime  dexamethasone  Injectable 4 milliGRAM(s) IV Push every 6 hours  docusate sodium 100 milliGRAM(s) Oral three times a day  estrogens    conjugated 0.625 milliGRAM(s) Oral daily  losartan 50 milliGRAM(s) Oral daily  pantoprazole    Tablet 40 milliGRAM(s) Oral daily  pantoprazole    Tablet 40 milliGRAM(s) Oral before breakfast  senna 2 Tablet(s) Oral at bedtime  sucralfate suspension 1 Gram(s) Oral Before meals and at bedtime  timolol 0.25% Solution 1 Drop(s) Both EYES daily    IVF: none    CULTURES:    RADIOLOGY & ADDITIONAL TESTS:  EKG: no acute changes
No Events overnight    Patient may be transferred to a regular floor
Patient is a 61 year old female with prior C4-C5 corpectomy, C6-C7 ACDF, and C3-C7 anterior stabilization/fusion, and C2-C7 posterior stabilization/fusion on 6/14/2017.  Returned for removal of bilateral C7 screws for radicular pain on 9/18/2017.  Now presents with acute onset of left upper extremity/shoulder pain.  Admitted for further management.      Pt seen at bedside and in no acute distress. She continues to complain of numbness in the left hand mostly in her 4th and 5th digits.     OVERNIGHT EVENTS:  none       Vital Signs Last 24 Hrs  T(C): 36.7 (31 Oct 2017 16:00), Max: 36.8 (31 Oct 2017 00:26)  T(F): 98 (31 Oct 2017 16:00), Max: 98.3 (31 Oct 2017 00:26)  HR: 103 (31 Oct 2017 16:00) (84 - 103)  BP: 145/79 (31 Oct 2017 16:00) (145/79 - 156/77)  BP(mean): --  RR: 18 (31 Oct 2017 16:00) (18 - 18)  SpO2: 95% (31 Oct 2017 16:00) (95% - 99%)          PHYSICAL EXAM:  Neurological: awake, alert, oriented x3, follows commands, speech clear and fluent, LUE delt 3/5, otherwise LUE 4/5 strength, RUE and b/l LE 5/5 strength throughout, sensation present, intact, equal throughout    Cardiovascular: +s1, s2  Respiratory: clear to auscultation b/l  Gastrointestinal: soft, non-distended, non-tender  Genitourinary: +voiding  Incision/Wound: left anterior neck incision well healed c/d/i, posterior vertical midline incision well healed c/d/i       DIET: regular       LABS:                                  11.2   12.4  )-----------( 413      ( 30 Oct 2017 06:36 )             32.4     10-30    137  |  100  |  13  ----------------------------<  130<H>  4.4   |  24  |  0.69    Ca    9.9      30 Oct 2017 06:36    TPro  7.6  /  Alb  4.2  /  TBili  <0.1<L>  /  DBili  x   /  AST  20  /  ALT  14  /  AlkPhos  81  10-28    CARDIAC MARKERS ( 30 Oct 2017 02:31 )  x     / <0.01 ng/mL / 45 U/L / x     / 2.3 ng/mL  CARDIAC MARKERS ( 29 Oct 2017 19:18 )  x     / <0.01 ng/mL / 39 U/L / x     / 1.6 ng/mL  CARDIAC MARKERS ( 29 Oct 2017 10:16 )  x     / <0.01 ng/mL / 34 U/L / x     / 1.2 ng/mL      MEDICATIONS:  Antibiotics: none    Neuro:  cyclobenzaprine 10 milliGRAM(s) Oral three times a day PRN  gabapentin 600 milliGRAM(s) Oral three times a day  HYDROmorphone   Tablet 2 milliGRAM(s) Oral every 4 hours PRN  HYDROmorphone  Injectable 0.5 milliGRAM(s) IV Push every 1 hour PRN  methocarbamol 750 milliGRAM(s) Oral three times a day PRN  ondansetron   Disintegrating Tablet 4 milliGRAM(s) Oral every 6 hours PRN  sertraline 50 milliGRAM(s) Oral daily    Anticoagulation:  enoxaparin Injectable 40 milliGRAM(s) SubCutaneous at bedtime    OTHER:  amLODIPine   Tablet 5 milliGRAM(s) Oral at bedtime  atorvastatin 40 milliGRAM(s) Oral at bedtime  dexamethasone  Injectable 4 milliGRAM(s) IV Push every 6 hours  docusate sodium 100 milliGRAM(s) Oral three times a day  estrogens    conjugated 0.625 milliGRAM(s) Oral daily  losartan 50 milliGRAM(s) Oral daily  pantoprazole    Tablet 40 milliGRAM(s) Oral daily  pantoprazole    Tablet 40 milliGRAM(s) Oral before breakfast  senna 2 Tablet(s) Oral at bedtime  sucralfate suspension 1 Gram(s) Oral Before meals and at bedtime  timolol 0.25% Solution 1 Drop(s) Both EYES daily    IVF: none    CULTURES:    RADIOLOGY & ADDITIONAL TESTS:    10/29 C-spine MRI: addendum : there appears to be a small disc herniation within  the left lateral recess and left proximal neural foramen at C7-T1 which  could be responsible for a left C8 nerve radiculopathy.
Patient is a 61 year old female with prior C4-C5 corpectomy, C6-C7 ACDF, and C3-C7 anterior stabilization/fusion, and C2-C7 posterior stabilization/fusion on 6/14/2017.  Returned for removal of bilateral C7 screws for radicular pain on 9/18/2017.  Now presents with acute onset of left upper extremity/shoulder pain.  Admitted for further management.      Pt seen at bedside in no acute distress.     OVERNIGHT EVENTS:  none       Vital Signs Last 24 Hrs  T(C): 36.8 (30 Oct 2017 08:01), Max: 36.8 (29 Oct 2017 14:48)  T(F): 98.2 (30 Oct 2017 08:01), Max: 98.3 (30 Oct 2017 00:06)  HR: 98 (30 Oct 2017 10:17) (80 - 110)  BP: 170/62 (30 Oct 2017 10:17) (114/74 - 170/62)  BP(mean): --  RR: 18 (30 Oct 2017 08:01) (18 - 18)  SpO2: 98% (30 Oct 2017 10:17) (95% - 98%)          PHYSICAL EXAM:  Neurological: awake, alert, oriented x3, follows commands, speech clear and fluent, LUE delt 3/5, otherwise LUE 4/5 strength, RUE and b/l LE 5/5 strength throughout, sensation present, intact, equal throuhgout    Cardiovascular: +s1, s2  Respiratory: clear to auscultation b/l  Gastrointestinal: soft, non-distended, non-tender  Genitourinary: +voiding  Incision/Wound: left anterior neck incision well healed c/d/i, posterior vertical midline incision well healed c/d/i       DIET: regular       LABS:                                  11.2   12.4  )-----------( 413      ( 30 Oct 2017 06:36 )             32.4     10-30    137  |  100  |  13  ----------------------------<  130<H>  4.4   |  24  |  0.69    Ca    9.9      30 Oct 2017 06:36    TPro  7.6  /  Alb  4.2  /  TBili  <0.1<L>  /  DBili  x   /  AST  20  /  ALT  14  /  AlkPhos  81  10-28    CARDIAC MARKERS ( 30 Oct 2017 02:31 )  x     / <0.01 ng/mL / 45 U/L / x     / 2.3 ng/mL  CARDIAC MARKERS ( 29 Oct 2017 19:18 )  x     / <0.01 ng/mL / 39 U/L / x     / 1.6 ng/mL  CARDIAC MARKERS ( 29 Oct 2017 10:16 )  x     / <0.01 ng/mL / 34 U/L / x     / 1.2 ng/mL      MEDICATIONS:  Antibiotics: none    Neuro:  cyclobenzaprine 10 milliGRAM(s) Oral three times a day PRN  gabapentin 600 milliGRAM(s) Oral three times a day  HYDROmorphone   Tablet 2 milliGRAM(s) Oral every 4 hours PRN  HYDROmorphone  Injectable 0.5 milliGRAM(s) IV Push every 1 hour PRN  methocarbamol 750 milliGRAM(s) Oral three times a day PRN  ondansetron   Disintegrating Tablet 4 milliGRAM(s) Oral every 6 hours PRN  sertraline 50 milliGRAM(s) Oral daily    Anticoagulation:  enoxaparin Injectable 40 milliGRAM(s) SubCutaneous at bedtime    OTHER:  amLODIPine   Tablet 5 milliGRAM(s) Oral at bedtime  atorvastatin 40 milliGRAM(s) Oral at bedtime  dexamethasone  Injectable 4 milliGRAM(s) IV Push every 6 hours  docusate sodium 100 milliGRAM(s) Oral three times a day  estrogens    conjugated 0.625 milliGRAM(s) Oral daily  losartan 50 milliGRAM(s) Oral daily  pantoprazole    Tablet 40 milliGRAM(s) Oral daily  pantoprazole    Tablet 40 milliGRAM(s) Oral before breakfast  senna 2 Tablet(s) Oral at bedtime  sucralfate suspension 1 Gram(s) Oral Before meals and at bedtime  timolol 0.25% Solution 1 Drop(s) Both EYES daily    IVF: none    CULTURES:    RADIOLOGY & ADDITIONAL TESTS:    10/29 C-spine MRI: No change from 6/19/2017. DAVON is anterior cervical discectomy  and corpectomy with postoperative changes extending from C3 through C7.  There is mild volume loss of the cord on the left at the C4-5 level to an  osteophyte and prior cord compression. There is no current cord compression.
Patient without complaints. Admits left arm weakness, numbness and pain has improved.     Vital signs stable.     Patient is alert, oriented x 3  RUE 5/5  LUE triceps /  handgrip 4+/5  Incision clean, dry and intact    60 yo F s/p C7-T1 ACDF extension of fusion  Advance diet as tolerated  Wean dilaudid as tolerated  PT eval   PACU overnight (NSCU consultation)
SUBJECTIVE: Lt shoulder pain, but comfortable. NAD.    OVERNIGHT EVENTS: None    Vital Signs Last 24 Hrs  T(C): 36.6 (01 Nov 2017 13:30), Max: 36.9 (31 Oct 2017 21:40)  T(F): 97.9 (01 Nov 2017 13:30), Max: 98.5 (31 Oct 2017 21:40)  HR: 102 (01 Nov 2017 13:30) (91 - 105)  BP: 170/90 (01 Nov 2017 13:30) (145/79 - 178/93)  BP(mean): --  RR: 18 (01 Nov 2017 13:30) (18 - 18)  SpO2: 96% (01 Nov 2017 13:30) (95% - 98%)  IVF: [X ] IVL [ ] NS+K@   DIET: [ X] Regular [ ] CCD [ ] Renal [ ] Puree [ ] Dysphagia [ ] Tube Feeds:   PCA: [ ] YES [ X] NO   BOWERS: [ ] YES [X ] NO [ X] VOID   BM: [ ] YES [ X] NO     DRAINS: N/A    PHYSICAL EXAM:    Constitutional: No Acute Distress     Neurological: AAOx3. follows commands, speech clear and fluent, LUE delt 3/5, otherwise LUE 4/5 strength, RUE and b/l LE 5/5 strength throughout, sensation present, intact, equal throughout  Sensation: [X] intact to light touch  [] decreased:     Pulmonary: Clear to Auscultation, No rales, No rhonchi, No wheezes     Cardiovascular: S1, S2, Regular rate and rhythm     Gastrointestinal: Soft, Non-tender, Non-distended     Extremities: No calf tenderness     Incision: n/a    LABS:         IMAGING: < from: MRI Brachial Plexus w/o Cont, Left (10.29.17 @ 17:11) >  1.  Left-sided brachial plexus appears normal in course and caliber.  2.  As seen on same-day cervical spine MRI, there is mild volume loss of   the cord on the left at C4-5.  3.  No abnormal edema or atrophy to suggest Parsonage-Wallace syndrome.    < from: MRI Cervical Spine w/o Cont (10.29.17 @ 16:18) >  No change from 6/19/2017. DAVON is anterior cervical discectomy   and corpectomy with postoperative changes extending from C3 through C7.   There is mild volume loss of the cord on the left at the C4-5 level to an   osteophyte and prior cord compression. There is no current cord   compression.    MEDICATIONS  (STANDING):  amLODIPine   Tablet 10 milliGRAM(s) Oral at bedtime  atorvastatin 40 milliGRAM(s) Oral at bedtime  dexamethasone  Injectable 4 milliGRAM(s) IV Push every 6 hours  docusate sodium 100 milliGRAM(s) Oral three times a day  enoxaparin Injectable 40 milliGRAM(s) SubCutaneous at bedtime  estrogens    conjugated 0.625 milliGRAM(s) Oral daily  gabapentin 600 milliGRAM(s) Oral three times a day  losartan 100 milliGRAM(s) Oral daily  pantoprazole    Tablet 40 milliGRAM(s) Oral daily  pantoprazole    Tablet 40 milliGRAM(s) Oral before breakfast  pregabalin 25 milliGRAM(s) Oral two times a day  senna 2 Tablet(s) Oral at bedtime  sertraline 50 milliGRAM(s) Oral daily  sucralfate suspension 1 Gram(s) Oral Before meals and at bedtime  timolol 0.25% Solution 1 Drop(s) Both EYES daily    MEDICATIONS  (PRN):  cyclobenzaprine 10 milliGRAM(s) Oral three times a day PRN Muscle Spasm  HYDROmorphone   Tablet 2 milliGRAM(s) Oral every 4 hours PRN Moderate Pain  HYDROmorphone   Tablet 4 milliGRAM(s) Oral every 6 hours PRN Severe Pain (7 - 10)  HYDROmorphone  Injectable 1 milliGRAM(s) IV Push every 4 hours PRN breakthrough  methocarbamol 750 milliGRAM(s) Oral three times a day PRN spasm  ondansetron   Disintegrating Tablet 4 milliGRAM(s) Oral every 6 hours PRN Nausea
SUBJECTIVE: More comfortable, but still with left radicular pain, mild hand weakness.     Medications:  amLODIPine   Tablet 10 milliGRAM(s) Oral at bedtime  atorvastatin 40 milliGRAM(s) Oral at bedtime  cyclobenzaprine 10 milliGRAM(s) Oral three times a day PRN  dexamethasone  Injectable 4 milliGRAM(s) IV Push every 6 hours  docusate sodium 100 milliGRAM(s) Oral three times a day  enoxaparin Injectable 40 milliGRAM(s) SubCutaneous at bedtime  estrogens    conjugated 0.625 milliGRAM(s) Oral daily  gabapentin 600 milliGRAM(s) Oral three times a day  HYDROmorphone   Tablet 2 milliGRAM(s) Oral every 4 hours PRN  HYDROmorphone   Tablet 4 milliGRAM(s) Oral every 4 hours PRN  HYDROmorphone  Injectable 1 milliGRAM(s) IV Push every 4 hours PRN  insulin lispro (HumaLOG) corrective regimen sliding scale   SubCutaneous Before meals and at bedtime  losartan 100 milliGRAM(s) Oral daily  methocarbamol 750 milliGRAM(s) Oral three times a day PRN  ondansetron   Disintegrating Tablet 4 milliGRAM(s) Oral every 6 hours PRN  pantoprazole    Tablet 40 milliGRAM(s) Oral daily  pantoprazole    Tablet 40 milliGRAM(s) Oral before breakfast  pregabalin 25 milliGRAM(s) Oral two times a day  senna 2 Tablet(s) Oral at bedtime  sertraline 50 milliGRAM(s) Oral daily  sucralfate suspension 1 Gram(s) Oral Before meals and at bedtime  timolol 0.25% Solution 1 Drop(s) Both EYES daily    POCT Blood Glucose.: 118 mg/dL (02 Nov 2017 12:17)  POCT Blood Glucose.: 124 mg/dL (02 Nov 2017 07:47)  POCT Blood Glucose.: 161 mg/dL (01 Nov 2017 22:01)  POCT Blood Glucose.: 144 mg/dL (01 Nov 2017 16:53)    Vitals:  Vital Signs Last 24 Hrs  T(C): 36.7 (02 Nov 2017 13:32), Max: 36.7 (01 Nov 2017 16:30)  T(F): 98 (02 Nov 2017 13:32), Max: 98.1 (01 Nov 2017 16:30)  HR: 100 (02 Nov 2017 13:32) (98 - 107)  BP: 120/68 (02 Nov 2017 13:32) (115/65 - 155/95)  BP(mean): --  RR: 18 (02 Nov 2017 13:32) (18 - 18)  SpO2: 98% (02 Nov 2017 13:32) (95% - 98%)    NEUROLOGICAL EXAM:    Mental status: Awake, alert, and in no apparent distress. Oriented to person, place and time. Language function is normal. Recent memory, digit span and concentration were normal.     Cranial Nerves: Pupils were equal, round, reactive to light. Extraocular movements were intact. Visual field were full. Fundoscopic exam was deferred. Facial sensation was intact to light touch. There was no facial asymmetry. The palate was upgoing symmetrically and tongue was midline. Hearing acuity was intact to finger rub AU. Shoulder shrug was full bilaterally    Motor exam: Bulk and tone were normal. Strength was 5/5, right UE and B LE. Left UE pain limited giveway proximally but no clear weakness, distally finger abduction, extensor 4+ otherwise 5/5.     Reflexes: 2+ in the bilateral upper extremities apart from depressed left biceps, 2+ in the bilateral lower extremities. Toes were downgoing bilaterally.     Sensation: Intact to light touch, temperature. +left tinel's at elbow.    Coordination: left FTN limited by pain.    Gait: Narrow base and steady. 	    < from: MRI Cervical Spine w/o Cont (10.29.17 @ 16:18) >    Comparison is made with the prior CT of 6/19/2017.    No significant interval change is identified.    There has been an anterior corpectomy of C4 and C5 with an interposition   metallic graft extending from C3 through C6. Additionally there is been   an anterior discectomy at C6-7. Anterior metallic plating extends from C3   through C7 and there are screws in the C3, C6 and C7 vertebral bodies.   Posteriorly there are posterior element screws, connecting rods and bony   fusion mass. There is no cord compression. All osteophyte is present on   the left at C4-5 with narrowing of the spinal canal. There is mild volume   loss of the cord on the left at this level. No disc herniations or spinal   stenosis are identified.    IMPRESSION: No change from 6/19/2017. DAVON is anterior cervical discectomy   and corpectomy with postoperative changes extending from C3 through C7.   There is mild volume loss of the cord on the left at the C4-5 level to an   osteophyte and prior cord compression. There is no current cord   compression.    Labs:  CBC Full  -  ( 30 Oct 2017 06:36 )  WBC Count : 12.4 K/uL  Hemoglobin : 11.2 g/dL  Hematocrit : 32.4 %  Platelet Count - Automated : 413 K/uL  Mean Cell Volume : 85.8 fl  Mean Cell Hemoglobin : 29.6 pg  Mean Cell Hemoglobin Concentration : 34.5 gm/dL  Auto Neutrophil # : x  Auto Lymphocyte # : x  Auto Monocyte # : x  Auto Eosinophil # : x  Auto Basophil # : x  Auto Neutrophil % : x  Auto Lymphocyte % : x  Auto Monocyte % : x  Auto Eosinophil % : x  Auto Basophil % : x    137  |  100  |  13  ----------------------------<  130<H>  4.4   |  24  |  0.69    Ca    9.9      30 Oct 2017 06:36
SUBJECTIVE: More comfortable, but still with left radicular pain, mild hand weakness.     Medications:  amLODIPine   Tablet 10 milliGRAM(s) Oral at bedtime  atorvastatin 40 milliGRAM(s) Oral at bedtime  cyclobenzaprine 10 milliGRAM(s) Oral three times a day PRN  dexamethasone  Injectable 4 milliGRAM(s) IV Push every 6 hours  docusate sodium 100 milliGRAM(s) Oral three times a day  enoxaparin Injectable 40 milliGRAM(s) SubCutaneous at bedtime  estrogens    conjugated 0.625 milliGRAM(s) Oral daily  gabapentin 600 milliGRAM(s) Oral three times a day  HYDROmorphone   Tablet 2 milliGRAM(s) Oral every 4 hours PRN  HYDROmorphone   Tablet 4 milliGRAM(s) Oral every 4 hours PRN  HYDROmorphone  Injectable 1 milliGRAM(s) IV Push every 4 hours PRN  insulin lispro (HumaLOG) corrective regimen sliding scale   SubCutaneous Before meals and at bedtime  losartan 100 milliGRAM(s) Oral daily  methocarbamol 750 milliGRAM(s) Oral three times a day PRN  ondansetron   Disintegrating Tablet 4 milliGRAM(s) Oral every 6 hours PRN  pantoprazole    Tablet 40 milliGRAM(s) Oral daily  pantoprazole    Tablet 40 milliGRAM(s) Oral before breakfast  pregabalin 25 milliGRAM(s) Oral two times a day  senna 2 Tablet(s) Oral at bedtime  sertraline 50 milliGRAM(s) Oral daily  sucralfate suspension 1 Gram(s) Oral Before meals and at bedtime  timolol 0.25% Solution 1 Drop(s) Both EYES daily    POCT Blood Glucose.: 118 mg/dL (02 Nov 2017 12:17)  POCT Blood Glucose.: 124 mg/dL (02 Nov 2017 07:47)  POCT Blood Glucose.: 161 mg/dL (01 Nov 2017 22:01)  POCT Blood Glucose.: 144 mg/dL (01 Nov 2017 16:53)    Vitals:  Vital Signs Last 24 Hrs  T(C): 36.7 (02 Nov 2017 13:32), Max: 36.7 (01 Nov 2017 16:30)  T(F): 98 (02 Nov 2017 13:32), Max: 98.1 (01 Nov 2017 16:30)  HR: 100 (02 Nov 2017 13:32) (98 - 107)  BP: 120/68 (02 Nov 2017 13:32) (115/65 - 155/95)  BP(mean): --  RR: 18 (02 Nov 2017 13:32) (18 - 18)  SpO2: 98% (02 Nov 2017 13:32) (95% - 98%)    NEUROLOGICAL EXAM:    Mental status: Awake, alert, and in no apparent distress. Oriented to person, place and time. Language function is normal. Recent memory, digit span and concentration were normal.     Cranial Nerves: Pupils were equal, round, reactive to light. Extraocular movements were intact. Visual field were full. Fundoscopic exam was deferred. Facial sensation was intact to light touch. There was no facial asymmetry. The palate was upgoing symmetrically and tongue was midline. Hearing acuity was intact to finger rub AU. Shoulder shrug was full bilaterally    Motor exam: Bulk and tone were normal. Strength was 5/5, right UE and B LE. Left UE pain limited giveway proximally but no clear weakness, distally finger abduction, extensor 4+ otherwise 5/5.     Reflexes: 2+ in the bilateral upper extremities apart from depressed left biceps, 2+ in the bilateral lower extremities. Toes were downgoing bilaterally.     Sensation: Intact to light touch, temperature. +left tinel's at elbow.    Coordination: left FTN limited by pain.    Gait: Narrow base and steady. 	    < from: MRI Cervical Spine w/o Cont (10.29.17 @ 16:18) >    Comparison is made with the prior CT of 6/19/2017.    No significant interval change is identified.    There has been an anterior corpectomy of C4 and C5 with an interposition   metallic graft extending from C3 through C6. Additionally there is been   an anterior discectomy at C6-7. Anterior metallic plating extends from C3   through C7 and there are screws in the C3, C6 and C7 vertebral bodies.   Posteriorly there are posterior element screws, connecting rods and bony   fusion mass. There is no cord compression. All osteophyte is present on   the left at C4-5 with narrowing of the spinal canal. There is mild volume   loss of the cord on the left at this level. No disc herniations or spinal   stenosis are identified.    IMPRESSION: No change from 6/19/2017. DAVON is anterior cervical discectomy   and corpectomy with postoperative changes extending from C3 through C7.   There is mild volume loss of the cord on the left at the C4-5 level to an   osteophyte and prior cord compression. There is no current cord   compression.    Labs:  CBC Full  -  ( 30 Oct 2017 06:36 )  WBC Count : 12.4 K/uL  Hemoglobin : 11.2 g/dL  Hematocrit : 32.4 %  Platelet Count - Automated : 413 K/uL  Mean Cell Volume : 85.8 fl  Mean Cell Hemoglobin : 29.6 pg  Mean Cell Hemoglobin Concentration : 34.5 gm/dL  Auto Neutrophil # : x  Auto Lymphocyte # : x  Auto Monocyte # : x  Auto Eosinophil # : x  Auto Basophil # : x  Auto Neutrophil % : x  Auto Lymphocyte % : x  Auto Monocyte % : x  Auto Eosinophil % : x  Auto Basophil % : x    137  |  100  |  13  ----------------------------<  130<H>  4.4   |  24  |  0.69    Ca    9.9      30 Oct 2017 06:36
SUBJECTIVE: Patient seen and examined at bedside. Patient complaining of shortness of breath with exertion; denies chest pain. Episode of hypotension yesterday when OOB; received fluid bolus and started on maintenance fluids. Her voice is hoarse but states she is currently tolerating a soft diet.     Vital Signs Last 24 Hrs  T(C): 36.4 (11-06-17 @ 07:34), Max: 36.9 (11-05-17 @ 15:15)  T(F): 97.5 (11-06-17 @ 07:34), Max: 98.5 (11-05-17 @ 23:14)  HR: 101 (11-06-17 @ 07:34) (86 - 103)  BP: 131/70 (11-06-17 @ 07:34) (86/46 - 134/77)  RR: 18 (11-06-17 @ 07:34) (18 - 20)  SpO2: 97% (11-06-17 @ 07:34) (96% - 100%)    PHYSICAL EXAM:    Constitutional: No Acute Distress, resting in bed comfortably    Neurological: Awake, alert, oriented to person, place and time, briskly following commands, speech clear and fluent, no drift, face equal, tongue midline, moving all extremities with 5/5 strength, sensation intact to light touch throughout, pupils 3mm and reactive, extraocular movements intact throughout, no nystagmus    Incision: Dressing removed; +steri strips C/D/I, neck supple and non-tender with some soraya-incisional swelling    Pulmonary: Clear to Auscultation, No rales, No rhonchi, No wheezes     Cardiovascular: S1, S2, Regular rate and rhythm     Gastrointestinal: Soft, Non-tender, Non-distended, +bowel sounds    Extremities: No calf tenderness bilaterally      LABS:                          11.6   17.5  )-----------( 477      ( 06 Nov 2017 11:09 )             34.9    11-06    136  |  99  |  10  ----------------------------<  216<H>  3.8   |  22  |  0.60    Ca    9.2      06 Nov 2017 11:09  Phos  4.6     11-04  Mg     2.1     11-04 11-05 @ 07:01 - 11-06 @ 07:00  --------------------------------------------------------  IN: 2075 mL / OUT: 425 mL / NET: 1650 mL        IMAGING: no new imaging    MEDICATIONS:  Antibiotics:    Neuro:  acetaminophen  IVPB. 1000 milliGRAM(s) IV Intermittent once  cyclobenzaprine 10 milliGRAM(s) Oral three times a day PRN Muscle Spasm  gabapentin 600 milliGRAM(s) Oral three times a day  HYDROmorphone   Tablet 2 milliGRAM(s) Oral every 4 hours PRN Severe Pain (7 - 10)  methocarbamol 750 milliGRAM(s) Oral three times a day PRN spasm  ondansetron   Disintegrating Tablet 4 milliGRAM(s) Oral every 6 hours PRN Nausea  ondansetron Injectable 4 milliGRAM(s) IV Push every 30 minutes PRN Nausea and/or Vomiting  pregabalin 25 milliGRAM(s) Oral two times a day  sertraline 50 milliGRAM(s) Oral daily    Cardiac:  amLODIPine   Tablet 5 milliGRAM(s) Oral at bedtime  losartan 100 milliGRAM(s) Oral daily    Pulm:    GI/:  docusate sodium 100 milliGRAM(s) Oral three times a day  pantoprazole    Tablet 40 milliGRAM(s) Oral before breakfast  senna 2 Tablet(s) Oral at bedtime  sucralfate suspension 1 Gram(s) Oral Before meals and at bedtime    Other:   atorvastatin 40 milliGRAM(s) Oral at bedtime  dexamethasone     Tablet 4 milliGRAM(s) Oral every 6 hours  enoxaparin Injectable 40 milliGRAM(s) SubCutaneous <User Schedule>  estrogens    conjugated 0.625 milliGRAM(s) Oral daily  insulin lispro (HumaLOG) corrective regimen sliding scale   SubCutaneous Before meals and at bedtime  timolol 0.25% Solution 1 Drop(s) Both EYES daily        DIET: mechanical soft
SUBJECTIVE: Radicular sx have resolved since the last operation    Medications:  acetaminophen  IVPB. 1000 milliGRAM(s) IV Intermittent once  amLODIPine   Tablet 5 milliGRAM(s) Oral at bedtime  atorvastatin 40 milliGRAM(s) Oral at bedtime  cyclobenzaprine 10 milliGRAM(s) Oral three times a day PRN  dexamethasone     Tablet 4 milliGRAM(s) Oral every 6 hours  docusate sodium 100 milliGRAM(s) Oral three times a day  enoxaparin Injectable 40 milliGRAM(s) SubCutaneous <User Schedule>  estrogens    conjugated 0.625 milliGRAM(s) Oral daily  gabapentin 600 milliGRAM(s) Oral three times a day  HYDROmorphone   Tablet 2 milliGRAM(s) Oral every 4 hours PRN  insulin lispro (HumaLOG) corrective regimen sliding scale   SubCutaneous Before meals and at bedtime  losartan 100 milliGRAM(s) Oral daily  methocarbamol 750 milliGRAM(s) Oral three times a day PRN  ondansetron   Disintegrating Tablet 4 milliGRAM(s) Oral every 6 hours PRN  ondansetron Injectable 4 milliGRAM(s) IV Push every 30 minutes PRN  pantoprazole    Tablet 40 milliGRAM(s) Oral before breakfast  pregabalin 25 milliGRAM(s) Oral two times a day  senna 2 Tablet(s) Oral at bedtime  sertraline 50 milliGRAM(s) Oral daily  sodium chloride 0.9% with potassium chloride 20 mEq/L 1000 milliLiter(s) IV Continuous <Continuous>  sucralfate suspension 1 Gram(s) Oral Before meals and at bedtime  timolol 0.25% Solution 1 Drop(s) Both EYES daily      Labs:  CBC Full  -  ( 04 Nov 2017 20:57 )  WBC Count : 17.7 K/uL  Hemoglobin : 10.0 g/dL  Hematocrit : 29.1 %  Platelet Count - Automated : 391 K/uL  Mean Cell Volume : 85.9 fl  Mean Cell Hemoglobin : 29.6 pg  Mean Cell Hemoglobin Concentration : 34.5 gm/dL  Auto Neutrophil # : x  Auto Lymphocyte # : x  Auto Monocyte # : x  Auto Eosinophil # : x  Auto Basophil # : x  Auto Neutrophil % : x  Auto Lymphocyte % : x  Auto Monocyte % : x  Auto Eosinophil % : x  Auto Basophil % : x    11-04    137  |  102  |  15  ----------------------------<  95  4.2   |  23  |  0.67    Ca    8.1<L>      04 Nov 2017 20:57  Phos  4.6     11-04  Mg     2.1     11-04      CAPILLARY BLOOD GLUCOSE  143 (05 Nov 2017 12:08)      POCT Blood Glucose.: 151 mg/dL (06 Nov 2017 08:23)  POCT Blood Glucose.: 183 mg/dL (05 Nov 2017 21:50)  POCT Blood Glucose.: 126 mg/dL (05 Nov 2017 17:47)  POCT Blood Glucose.: 143 mg/dL (05 Nov 2017 12:05)              Vitals:  Vital Signs Last 24 Hrs  T(C): 36.4 (06 Nov 2017 07:34), Max: 36.9 (05 Nov 2017 11:00)  T(F): 97.5 (06 Nov 2017 07:34), Max: 98.5 (05 Nov 2017 23:14)  HR: 101 (06 Nov 2017 07:34) (86 - 103)  BP: 131/70 (06 Nov 2017 07:34) (86/46 - 134/77)  BP(mean): 96 (05 Nov 2017 11:00) (96 - 96)  RR: 18 (06 Nov 2017 07:34) (18 - 23)  SpO2: 97% (06 Nov 2017 07:34) (96% - 100%)    NEUROLOGICAL EXAM:    Mental status: Awake, alert, and in no apparent distress. Oriented to person, place and time. Language function is normal. Recent memory, digit span and concentration were normal.     Cranial Nerves: Pupils were equal, round, reactive to light. Extraocular movements were intact. Visual field were full. Fundoscopic exam was deferred. Facial sensation was intact to light touch. There was no facial asymmetry. The palate was upgoing symmetrically and tongue was midline. Hearing acuity was intact to finger rub AU. Shoulder shrug was full bilaterally    Motor exam: Bulk and tone were normal. Strength was 5/5, right UE and B LE. Left UE pain 5/5 except hand  slightly less so    Reflexes: 2+ in the bilateral upper extremities apart from depressed left biceps, 2+ in the bilateral lower extremities. Toes were downgoing bilaterally.     Sensation: Intact to light touch, temperature. +left tinel's at elbow.    Coordination: no gross dymetria    Gait:  deferreed    < from: MRI Cervical Spine w/o Cont (10.29.17 @ 16:18) >    Comparison is made with the prior CT of 6/19/2017.    No significant interval change is identified.    There has been an anterior corpectomy of C4 and C5 with an interposition   metallic graft extending from C3 through C6. Additionally there is been   an anterior discectomy at C6-7. Anterior metallic plating extends from C3   through C7 and there are screws in the C3, C6 and C7 vertebral bodies.   Posteriorly there are posterior element screws, connecting rods and bony   fusion mass. There is no cord compression. All osteophyte is present on   the left at C4-5 with narrowing of the spinal canal. There is mild volume   loss of the cord on the left at this level. No disc herniations or spinal   stenosis are identified.    IMPRESSION: No change from 6/19/2017. DAVON is anterior cervical discectomy   and corpectomy with postoperative changes extending from C3 through C7.   There is mild volume loss of the cord on the left at the C4-5 level to an   osteophyte and prior cord compression. There is no current cord   compression.
SUMMARY:HPI:  Emma Patel is a 61-year old woman who had a C4-C5 corpectomy, C6-C7 ACDF, C3-C7 anterior stabilization and fusion, and C2-C7 posterior stabilization and fusion on 06/14/2017. On 09/18/2017 she had the bilateral C7 screws removed for radicular pain and evidence that the screws were compressing the bilateral C7 nerve roots on her imaging.    Today she presents with acute onset of pain in her left upper extremity that started on 10/25/17. On 10/28/17, she started to develop weakness in the entire left arm. She denies any trauma and has not heard any pops or clicks. She is having painful spasms in the left shoulder and pain in the scapula as well. She has intermittent sharp, stabbing pains from the elbow down to the lateral left hand (C8 and T1 dermatomes).    CT cervical spine shows that her hardware is in good placement. (29 Oct 2017 02:44)    OVERNIGHT EVENTS:     ADMISSION SCORES:   GCS: HH: MF: NIHSS: RASS: CAM-ICU: ICP:  CLINICAL TRIALS:  Allergies    No Known Allergies    Intolerances    morphine (Nausea; Vomiting)  Percocet 5/325 (Nausea; Vomiting)      REVIEW OF SYSTEMS: [ ] Unable to Assess due to neurologic exam   [ ] All ROS addressed below are non-contributory, except:  Neuro: [ ] Headache [ ] Back pain [ ] Numbness [ ] Weakness [ ] Ataxia [ ] Dizziness [ ] Aphasia [ ] Dysarthria [ ] Visual disturbance  Resp: [ ] Shortness of breath/dyspnea, [ ] Orthopnea [ ] Cough  CV: [ ] Chest pain [ ] Palpitation [ ] Lightheadedness [ ] Syncope  Renal: [ ] Thirst [ ] Edema  GI: [ ] Nausea [ ] Emesis [ ] Abdominal pain [ ] Constipation [ ] Diarrhea  Hem: [ ] Hematemesis [ ] bright red blood per rectum  ID: [ ] Fever [ ] Chills [ ] Dysuria  ENT: [ ] Rhinorrhea    DEVICES:   [ ] Restraints [ ] ET tube [ ] central line [ ] arterial line [ ] hernandez [ ] NGT/OGT [ ] EVD [ ] LD [ ] ZOE/HMV [ ] Trach [ ] PEG [ ] Chest Tube     VITALS: [ ] Reviewed  Vital Signs Last 24 Hrs  T(C): 36.9 (05 Nov 2017 11:00), Max: 37.2 (04 Nov 2017 16:00)  T(F): 98.4 (05 Nov 2017 11:00), Max: 99 (04 Nov 2017 16:00)  HR: 99 (05 Nov 2017 11:00) (71 - 99)  BP: 108/87 (05 Nov 2017 11:00) (103/52 - 155/72)  BP(mean): 96 (05 Nov 2017 11:00) (67 - 103)  RR: 23 (05 Nov 2017 11:00) (10 - 26)  SpO2: 100% (05 Nov 2017 11:00) (94% - 100%)  CAPILLARY BLOOD GLUCOSE  92 (05 Nov 2017 06:00)  111 (04 Nov 2017 22:00)  121 (04 Nov 2017 16:00)      POCT Blood Glucose.: 92 mg/dL (05 Nov 2017 06:11)  POCT Blood Glucose.: 111 mg/dL (04 Nov 2017 22:29)  POCT Blood Glucose.: 121 mg/dL (04 Nov 2017 16:26)        LABS:  PT/INR - ( 03 Nov 2017 16:27 )   PT: 10.6 sec;   INR: 0.98 ratio         PTT - ( 03 Nov 2017 16:27 )  PTT:24.4 sec  11-04    137  |  102  |  15  ----------------------------<  95  4.2   |  23  |  0.67    Ca    8.1<L>      04 Nov 2017 20:57  Phos  4.6     11-04  Mg     2.1     11-04                            10.0   17.7  )-----------( 391      ( 04 Nov 2017 20:57 )             29.1       STROKE CORE MEASURES:      MEDICATION LEVELS:     IMAGING/DATA: [ ] Reviewed    IVF FLUIDS/MEDICATIONS: [ ] Reviewed  MEDICATIONS  (STANDING):  acetaminophen  IVPB. 1000 milliGRAM(s) IV Intermittent once  amLODIPine   Tablet 10 milliGRAM(s) Oral at bedtime  atorvastatin 40 milliGRAM(s) Oral at bedtime  dexamethasone     Tablet 4 milliGRAM(s) Oral every 6 hours  docusate sodium 100 milliGRAM(s) Oral three times a day  estrogens    conjugated 0.625 milliGRAM(s) Oral daily  gabapentin 600 milliGRAM(s) Oral three times a day  insulin lispro (HumaLOG) corrective regimen sliding scale   SubCutaneous Before meals and at bedtime  losartan 100 milliGRAM(s) Oral daily  pantoprazole    Tablet 40 milliGRAM(s) Oral before breakfast  pregabalin 25 milliGRAM(s) Oral two times a day  senna 2 Tablet(s) Oral at bedtime  sertraline 50 milliGRAM(s) Oral daily  sodium chloride 0.9% with potassium chloride 20 mEq/L 1000 milliLiter(s) (75 mL/Hr) IV Continuous <Continuous>  sucralfate suspension 1 Gram(s) Oral Before meals and at bedtime  timolol 0.25% Solution 1 Drop(s) Both EYES daily    MEDICATIONS  (PRN):  cyclobenzaprine 10 milliGRAM(s) Oral three times a day PRN Muscle Spasm  HYDROmorphone   Tablet 2 milliGRAM(s) Oral every 4 hours PRN Severe Pain (7 - 10)  HYDROmorphone  Injectable 1 milliGRAM(s) IV Push every 4 hours PRN breakthrough pain  methocarbamol 750 milliGRAM(s) Oral three times a day PRN spasm  ondansetron   Disintegrating Tablet 4 milliGRAM(s) Oral every 6 hours PRN Nausea  ondansetron Injectable 4 milliGRAM(s) IV Push every 30 minutes PRN Nausea and/or Vomiting    I&O's Summary    04 Nov 2017 08:01  -  05 Nov 2017 07:00  --------------------------------------------------------  IN: 1850 mL / OUT: 2625 mL / NET: -775 mL    05 Nov 2017 07:01  -  05 Nov 2017 11:34  --------------------------------------------------------  IN: 960 mL / OUT: 425 mL / NET: 535 mL        EXAMINATION:  PHYSICAL EXAM:    Constitutional: No Acute Distress     Neurological: Awake, alert oriented to person, place and time, Following Commands, PERRL, EOMI, No Gaze Preference, Face Symmetrical, Speech Fluent, No dysmetria, No ataxia, No nystagmus     Motor exam:          Upper extremity                         Delt     Bicep     Tricep    HG                                                 R         5/5 5/5 5/5 5/5                                               L          5/5 5/5 5/5 5/5            isolate L 5tgh diit weak on flex and ext abd          Lower extremity                        HF         KF        KE       DF         PF                                                  R        5/5 5/5 5/5 5/5 5/5                                               L         5/5 5/5 5/5 5/5 5/5                                                 Sensation:   [X ] decreased ulnar aspect L hand      Reflexes: Deep Tendon Reflexes Intact     Pulmonary: Clear to Auscultation, No rales, No rhonchi, No wheezes     Cardiovascular: S1, S2, Regular rate and rhythm     Gastrointestinal: Soft, Non-tender, Non-distended     Extremities: No calf tenderness     Incision:
Subjective: Patient seen and examined. No new events except as noted.     SUBJECTIVE/ROS:    No chest pain, or sob.     MEDICATIONS:  MEDICATIONS  (STANDING):  acetaminophen  IVPB. 1000 milliGRAM(s) IV Intermittent once  amLODIPine   Tablet 5 milliGRAM(s) Oral at bedtime  atorvastatin 40 milliGRAM(s) Oral at bedtime  docusate sodium 100 milliGRAM(s) Oral three times a day  enoxaparin Injectable 40 milliGRAM(s) SubCutaneous <User Schedule>  estrogens    conjugated 0.625 milliGRAM(s) Oral daily  gabapentin 300 milliGRAM(s) Oral two times a day  insulin lispro (HumaLOG) corrective regimen sliding scale   SubCutaneous Before meals and at bedtime  losartan 100 milliGRAM(s) Oral daily  pantoprazole    Tablet 40 milliGRAM(s) Oral before breakfast  pregabalin 25 milliGRAM(s) Oral two times a day  senna 2 Tablet(s) Oral at bedtime  sertraline 50 milliGRAM(s) Oral daily  sucralfate suspension 1 Gram(s) Oral Before meals and at bedtime  timolol 0.25% Solution 1 Drop(s) Both EYES daily      PHYSICAL EXAM:  T(C): 36.4 (11-08-17 @ 13:30), Max: 36.8 (11-08-17 @ 07:53)  HR: 78 (11-08-17 @ 13:30) (66 - 89)  BP: 112/78 (11-08-17 @ 13:30) (112/78 - 146/80)  RR: 18 (11-08-17 @ 13:30) (18 - 18)  SpO2: 96% (11-08-17 @ 13:30) (95% - 99%)  Wt(kg): --  I&O's Summary    07 Nov 2017 07:01  -  08 Nov 2017 07:00  --------------------------------------------------------  IN: 800 mL / OUT: 0 mL / NET: 800 mL          Appearance: Normal	  HEENT:   Normal oral mucosa, PERRL, EOMI	  Cardiovascular: Normal S1 S2,    Murmur:   Neck: JVP normal  Respiratory: Lungs clear to auscultation  Gastrointestinal:  Soft, Non-tender, + BS	  Skin: normal   Neuro: No gross deficits.   Psychiatry:  Mood & affect appropriate  Ext: No edema      LABS/DATA:    CARDIAC MARKERS:                                10.6   15.59 )-----------( 440      ( 08 Nov 2017 07:40 )             31.9           proBNP:   Lipid Profile:   HgA1c:   TSH:     TELE:  EKG:
Subjective: Patient seen and examined. No new events except as noted.     SUBJECTIVE/ROS:  No chest pain, or sob.        MEDICATIONS:  MEDICATIONS  (STANDING):  amLODIPine   Tablet 10 milliGRAM(s) Oral at bedtime  atorvastatin 40 milliGRAM(s) Oral at bedtime  dexamethasone  Injectable 4 milliGRAM(s) IV Push every 6 hours  docusate sodium 100 milliGRAM(s) Oral three times a day  enoxaparin Injectable 40 milliGRAM(s) SubCutaneous at bedtime  estrogens    conjugated 0.625 milliGRAM(s) Oral daily  gabapentin 600 milliGRAM(s) Oral three times a day  insulin lispro (HumaLOG) corrective regimen sliding scale   SubCutaneous Before meals and at bedtime  losartan 100 milliGRAM(s) Oral daily  pantoprazole    Tablet 40 milliGRAM(s) Oral daily  pantoprazole    Tablet 40 milliGRAM(s) Oral before breakfast  pregabalin 25 milliGRAM(s) Oral two times a day  senna 2 Tablet(s) Oral at bedtime  sertraline 50 milliGRAM(s) Oral daily  sucralfate suspension 1 Gram(s) Oral Before meals and at bedtime  timolol 0.25% Solution 1 Drop(s) Both EYES daily      PHYSICAL EXAM:  T(C): 36.7 (11-03-17 @ 08:11), Max: 36.7 (11-02-17 @ 13:32)  HR: 100 (11-03-17 @ 08:11) (73 - 101)  BP: 114/66 (11-03-17 @ 08:11) (112/67 - 126/73)  RR: 18 (11-03-17 @ 08:11) (16 - 18)  SpO2: 97% (11-03-17 @ 08:11) (96% - 98%)  Wt(kg): --  I&O's Summary    02 Nov 2017 07:01  -  03 Nov 2017 07:00  --------------------------------------------------------  IN: 2220 mL / OUT: 500 mL / NET: 1720 mL    03 Nov 2017 07:01  -  03 Nov 2017 11:07  --------------------------------------------------------  IN: 240 mL / OUT: 400 mL / NET: -160 mL          Appearance: Normal	  HEENT:   Normal oral mucosa, PERRL, EOMI	  Cardiovascular: Normal S1 S2,    Murmur:   Neck: JVP normal  Respiratory: Lungs clear to auscultation  Gastrointestinal:  Soft, Non-tender, + BS	  Skin: normal   Neuro: No gross deficits.   Psychiatry:  Mood & affect appropriate  Ext: No edema      LABS/DATA:    CARDIAC MARKERS:                                11.5   18.44 )-----------( 551      ( 03 Nov 2017 08:57 )             34.6     11-03    136  |  97  |  16  ----------------------------<  117<H>  4.4   |  22  |  0.78    Ca    9.8      03 Nov 2017 08:44      proBNP:   Lipid Profile:   HgA1c:   TSH:     TELE:  EKG:
Subjective: Patient seen and examined. No new events except as noted.     SUBJECTIVE/ROS:  No chest pain, or sob.       MEDICATIONS:  MEDICATIONS  (STANDING):  acetaminophen  IVPB. 1000 milliGRAM(s) IV Intermittent once  amLODIPine   Tablet 5 milliGRAM(s) Oral at bedtime  atorvastatin 40 milliGRAM(s) Oral at bedtime  dexamethasone     Tablet 4 milliGRAM(s) Oral every 6 hours  docusate sodium 100 milliGRAM(s) Oral three times a day  enoxaparin Injectable 40 milliGRAM(s) SubCutaneous <User Schedule>  estrogens    conjugated 0.625 milliGRAM(s) Oral daily  gabapentin 600 milliGRAM(s) Oral three times a day  insulin lispro (HumaLOG) corrective regimen sliding scale   SubCutaneous Before meals and at bedtime  losartan 100 milliGRAM(s) Oral daily  pantoprazole    Tablet 40 milliGRAM(s) Oral before breakfast  pregabalin 25 milliGRAM(s) Oral two times a day  senna 2 Tablet(s) Oral at bedtime  sertraline 50 milliGRAM(s) Oral daily  sodium chloride 0.9% with potassium chloride 20 mEq/L 1000 milliLiter(s) (75 mL/Hr) IV Continuous <Continuous>  sucralfate suspension 1 Gram(s) Oral Before meals and at bedtime  timolol 0.25% Solution 1 Drop(s) Both EYES daily      PHYSICAL EXAM:  T(C): 36.9 (11-05-17 @ 11:00), Max: 37.2 (11-04-17 @ 16:00)  HR: 99 (11-05-17 @ 11:00) (71 - 99)  BP: 108/87 (11-05-17 @ 11:00) (103/52 - 155/72)  RR: 23 (11-05-17 @ 11:00) (10 - 26)  SpO2: 100% (11-05-17 @ 11:00) (94% - 100%)  Wt(kg): --  I&O's Summary    04 Nov 2017 08:01  -  05 Nov 2017 07:00  --------------------------------------------------------  IN: 1850 mL / OUT: 2625 mL / NET: -775 mL    05 Nov 2017 07:01  -  05 Nov 2017 12:02  --------------------------------------------------------  IN: 960 mL / OUT: 425 mL / NET: 535 mL          Appearance: Normal	  HEENT:   Normal oral mucosa, PERRL, EOMI	  Cardiovascular: Normal S1 S2,    Murmur:   Neck: JVP normal  Respiratory: Lungs clear to auscultation  Gastrointestinal:  Soft, Non-tender, + BS	  Skin: normal   Neuro: No gross deficits.   Psychiatry:  Mood & affect appropriate  Ext: No edema      LABS/DATA:    CARDIAC MARKERS:                                10.0   17.7  )-----------( 391      ( 04 Nov 2017 20:57 )             29.1     11-04    137  |  102  |  15  ----------------------------<  95  4.2   |  23  |  0.67    Ca    8.1<L>      04 Nov 2017 20:57  Phos  4.6     11-04  Mg     2.1     11-04      proBNP:   Lipid Profile:   HgA1c:   TSH:     TELE:  EKG:
Subjective: Patient seen and examined. No new events except as noted.     SUBJECTIVE/ROS:  No chest pain, or sob.       MEDICATIONS:  MEDICATIONS  (STANDING):  amLODIPine   Tablet 10 milliGRAM(s) Oral at bedtime  atorvastatin 40 milliGRAM(s) Oral at bedtime  dexamethasone  Injectable 4 milliGRAM(s) IV Push every 6 hours  docusate sodium 100 milliGRAM(s) Oral three times a day  enoxaparin Injectable 40 milliGRAM(s) SubCutaneous at bedtime  estrogens    conjugated 0.625 milliGRAM(s) Oral daily  gabapentin 600 milliGRAM(s) Oral three times a day  insulin lispro (HumaLOG) corrective regimen sliding scale   SubCutaneous Before meals and at bedtime  losartan 100 milliGRAM(s) Oral daily  pantoprazole    Tablet 40 milliGRAM(s) Oral daily  pantoprazole    Tablet 40 milliGRAM(s) Oral before breakfast  pregabalin 25 milliGRAM(s) Oral two times a day  senna 2 Tablet(s) Oral at bedtime  sertraline 50 milliGRAM(s) Oral daily  sucralfate suspension 1 Gram(s) Oral Before meals and at bedtime  timolol 0.25% Solution 1 Drop(s) Both EYES daily      PHYSICAL EXAM:  T(C): 36.6 (11-02-17 @ 04:59), Max: 36.7 (11-01-17 @ 16:30)  HR: 103 (11-02-17 @ 04:59) (98 - 107)  BP: 139/74 (11-02-17 @ 04:59) (139/74 - 170/90)  RR: 18 (11-02-17 @ 04:59) (18 - 18)  SpO2: 97% (11-02-17 @ 04:59) (95% - 98%)  Wt(kg): --  I&O's Summary    01 Nov 2017 07:01  -  02 Nov 2017 07:00  --------------------------------------------------------  IN: 3060 mL / OUT: 2000 mL / NET: 1060 mL      Height (cm): 152.4 (11-02 @ 06:13)  Weight (kg): 68.039 (11-02 @ 06:13)  BMI (kg/m2): 29.3 (11-02 @ 06:13)  BSA (m2): 1.65 (11-02 @ 06:13)    Appearance: Normal	  HEENT:   Normal oral mucosa, PERRL, EOMI	  Cardiovascular: Normal S1 S2,    Murmur:   Neck: JVP normal  Respiratory: Lungs clear to auscultation  Gastrointestinal:  Soft, Non-tender, + BS	  Skin: normal   Neuro: No gross deficits.   Psychiatry:  Mood & affect appropriate  Ext: No edema      LABS/DATA:    CARDIAC MARKERS:                  proBNP:   Lipid Profile:   HgA1c:   TSH:     TELE:  EKG:
Subjective: Patient seen and examined. No new events except as noted.     SUBJECTIVE/ROS:  No chest pain, or sob.       MEDICATIONS:  MEDICATIONS  (STANDING):  amLODIPine   Tablet 10 milliGRAM(s) Oral at bedtime  atorvastatin 40 milliGRAM(s) Oral at bedtime  dexamethasone  Injectable 4 milliGRAM(s) IV Push every 6 hours  docusate sodium 100 milliGRAM(s) Oral three times a day  enoxaparin Injectable 40 milliGRAM(s) SubCutaneous at bedtime  estrogens    conjugated 0.625 milliGRAM(s) Oral daily  gabapentin 600 milliGRAM(s) Oral three times a day  losartan 100 milliGRAM(s) Oral daily  pantoprazole    Tablet 40 milliGRAM(s) Oral daily  pantoprazole    Tablet 40 milliGRAM(s) Oral before breakfast  pregabalin 25 milliGRAM(s) Oral two times a day  senna 2 Tablet(s) Oral at bedtime  sertraline 50 milliGRAM(s) Oral daily  sucralfate suspension 1 Gram(s) Oral Before meals and at bedtime  timolol 0.25% Solution 1 Drop(s) Both EYES daily      PHYSICAL EXAM:  T(C): 36.6 (11-01-17 @ 08:13), Max: 36.9 (10-31-17 @ 21:40)  HR: 100 (11-01-17 @ 08:13) (90 - 105)  BP: 160/84 (11-01-17 @ 08:13) (145/79 - 178/93)  RR: 18 (11-01-17 @ 08:13) (18 - 18)  SpO2: 95% (11-01-17 @ 08:13) (95% - 98%)  Wt(kg): --  I&O's Summary    31 Oct 2017 07:01  -  01 Nov 2017 07:00  --------------------------------------------------------  IN: 1980 mL / OUT: 2100 mL / NET: -120 mL          Appearance: Normal	  HEENT:   Normal oral mucosa, PERRL, EOMI	  Cardiovascular: Normal S1 S2,    Murmur:   Neck: JVP normal  Respiratory: Lungs clear to auscultation  Gastrointestinal:  Soft, Non-tender, + BS	  Skin: normal   Neuro: No gross deficits.   Psychiatry:  Mood & affect appropriate  Ext: No edema      LABS/DATA:    CARDIAC MARKERS:  CARDIAC MARKERS ( 30 Oct 2017 02:31 )  x     / <0.01 ng/mL / 45 U/L / x     / 2.3 ng/mL  CARDIAC MARKERS ( 29 Oct 2017 19:18 )  x     / <0.01 ng/mL / 39 U/L / x     / 1.6 ng/mL  CARDIAC MARKERS ( 29 Oct 2017 10:16 )  x     / <0.01 ng/mL / 34 U/L / x     / 1.2 ng/mL                  proBNP:   Lipid Profile:   HgA1c:   TSH:     TELE:  EKG:
Subjective: Patient seen and examined. No new events except as noted.     SUBJECTIVE/ROS:  No chest pain, or sob.       MEDICATIONS:  MEDICATIONS  (STANDING):  amLODIPine   Tablet 5 milliGRAM(s) Oral at bedtime  atorvastatin 40 milliGRAM(s) Oral at bedtime  dexamethasone  Injectable 4 milliGRAM(s) IV Push every 6 hours  docusate sodium 100 milliGRAM(s) Oral three times a day  enoxaparin Injectable 40 milliGRAM(s) SubCutaneous at bedtime  estrogens    conjugated 0.625 milliGRAM(s) Oral daily  gabapentin 600 milliGRAM(s) Oral three times a day  losartan 50 milliGRAM(s) Oral daily  pantoprazole    Tablet 40 milliGRAM(s) Oral daily  pantoprazole    Tablet 40 milliGRAM(s) Oral before breakfast  senna 2 Tablet(s) Oral at bedtime  sertraline 50 milliGRAM(s) Oral daily  sucralfate suspension 1 Gram(s) Oral Before meals and at bedtime  timolol 0.25% Solution 1 Drop(s) Both EYES daily      PHYSICAL EXAM:  T(C): 36.8 (10-30-17 @ 08:01), Max: 36.8 (10-29-17 @ 14:48)  HR: 90 (10-30-17 @ 08:01) (80 - 110)  BP: 140/88 (10-30-17 @ 08:01) (114/74 - 147/82)  RR: 18 (10-30-17 @ 08:01) (18 - 18)  SpO2: 96% (10-30-17 @ 08:01) (95% - 98%)  Wt(kg): --  I&O's Summary    29 Oct 2017 07:01  -  30 Oct 2017 07:00  --------------------------------------------------------  IN: 1110 mL / OUT: 0 mL / NET: 1110 mL    30 Oct 2017 07:01  -  30 Oct 2017 08:31  --------------------------------------------------------  IN: 0 mL / OUT: 300 mL / NET: -300 mL          Appearance: Normal	  HEENT:   Normal oral mucosa, PERRL, EOMI	  Cardiovascular: Normal S1 S2,    Murmur:   Neck: JVP normal  Respiratory: Lungs clear to auscultation  Gastrointestinal:  Soft, Non-tender, + BS	  Skin: normal   Neuro: No gross deficits.   Psychiatry:  Mood & affect appropriate  Ext: No edema      LABS/DATA:    CARDIAC MARKERS:  CARDIAC MARKERS ( 30 Oct 2017 02:31 )  x     / <0.01 ng/mL / 45 U/L / x     / 2.3 ng/mL  CARDIAC MARKERS ( 29 Oct 2017 19:18 )  x     / <0.01 ng/mL / 39 U/L / x     / 1.6 ng/mL  CARDIAC MARKERS ( 29 Oct 2017 10:16 )  x     / <0.01 ng/mL / 34 U/L / x     / 1.2 ng/mL                                11.2   12.4  )-----------( 413      ( 30 Oct 2017 06:36 )             32.4     10-30    137  |  100  |  13  ----------------------------<  130<H>  4.4   |  24  |  0.69    Ca    9.9      30 Oct 2017 06:36    TPro  7.6  /  Alb  4.2  /  TBili  <0.1<L>  /  DBili  x   /  AST  20  /  ALT  14  /  AlkPhos  81  10-28    proBNP:   Lipid Profile:   HgA1c:   TSH:     TELE:  EKG:
Subjective: Patient seen and examined. No new events except as noted.     SUBJECTIVE/ROS:  No chest pain, or sob.   feels much better       MEDICATIONS:  MEDICATIONS  (STANDING):  acetaminophen  IVPB. 1000 milliGRAM(s) IV Intermittent once  amLODIPine   Tablet 5 milliGRAM(s) Oral at bedtime  atorvastatin 40 milliGRAM(s) Oral at bedtime  docusate sodium 100 milliGRAM(s) Oral three times a day  enoxaparin Injectable 40 milliGRAM(s) SubCutaneous <User Schedule>  estrogens    conjugated 0.625 milliGRAM(s) Oral daily  gabapentin 400 milliGRAM(s) Oral two times a day  insulin lispro (HumaLOG) corrective regimen sliding scale   SubCutaneous Before meals and at bedtime  losartan 100 milliGRAM(s) Oral daily  pantoprazole    Tablet 40 milliGRAM(s) Oral before breakfast  pregabalin 25 milliGRAM(s) Oral two times a day  senna 2 Tablet(s) Oral at bedtime  sertraline 50 milliGRAM(s) Oral daily  sucralfate suspension 1 Gram(s) Oral Before meals and at bedtime  timolol 0.25% Solution 1 Drop(s) Both EYES daily      PHYSICAL EXAM:  T(C): 36.7 (11-07-17 @ 14:21), Max: 36.9 (11-06-17 @ 19:29)  HR: 94 (11-07-17 @ 14:21) (86 - 101)  BP: 123/75 (11-07-17 @ 14:21) (123/75 - 150/66)  RR: 18 (11-07-17 @ 14:21) (18 - 18)  SpO2: 96% (11-07-17 @ 14:21) (96% - 99%)  Wt(kg): --  I&O's Summary    06 Nov 2017 07:01  -  07 Nov 2017 07:00  --------------------------------------------------------  IN: 380 mL / OUT: 0 mL / NET: 380 mL    07 Nov 2017 07:01  -  07 Nov 2017 15:28  --------------------------------------------------------  IN: 360 mL / OUT: 0 mL / NET: 360 mL          Appearance: Normal	  HEENT:   Normal oral mucosa, PERRL, EOMI	  Cardiovascular: Normal S1 S2,    Murmur:   Neck: JVP normal  Respiratory: Lungs clear to auscultation  Gastrointestinal:  Soft, Non-tender, + BS	  Skin: normal   Neuro: No gross deficits.   Psychiatry:  Mood & affect appropriate  Ext: No edema      LABS/DATA:    CARDIAC MARKERS:                                11.6   17.5  )-----------( 477      ( 06 Nov 2017 11:09 )             34.9     11-06    136  |  99  |  10  ----------------------------<  216<H>  3.8   |  22  |  0.60    Ca    9.2      06 Nov 2017 11:09      proBNP:   Lipid Profile:   HgA1c:   TSH:     TELE:  EKG:
Subjective: Patient seen and examined. No new events except as noted.     SUBJECTIVE/ROS:  No chest pain, or sob.   has left arm / shoulder pain       MEDICATIONS:  MEDICATIONS  (STANDING):  amLODIPine   Tablet 5 milliGRAM(s) Oral at bedtime  atorvastatin 40 milliGRAM(s) Oral at bedtime  dexamethasone  Injectable 4 milliGRAM(s) IV Push every 6 hours  docusate sodium 100 milliGRAM(s) Oral three times a day  enoxaparin Injectable 40 milliGRAM(s) SubCutaneous at bedtime  estrogens    conjugated 0.625 milliGRAM(s) Oral daily  gabapentin 600 milliGRAM(s) Oral three times a day  losartan 50 milliGRAM(s) Oral daily  pantoprazole    Tablet 40 milliGRAM(s) Oral daily  pantoprazole    Tablet 40 milliGRAM(s) Oral before breakfast  pregabalin 25 milliGRAM(s) Oral two times a day  senna 2 Tablet(s) Oral at bedtime  sertraline 50 milliGRAM(s) Oral daily  sucralfate suspension 1 Gram(s) Oral Before meals and at bedtime  timolol 0.25% Solution 1 Drop(s) Both EYES daily      PHYSICAL EXAM:  T(C): 36.6 (10-31-17 @ 13:39), Max: 36.8 (10-31-17 @ 00:26)  HR: 90 (10-31-17 @ 13:39) (84 - 99)  BP: 150/75 (10-31-17 @ 13:39) (129/72 - 156/77)  RR: 18 (10-31-17 @ 13:39) (18 - 18)  SpO2: 96% (10-31-17 @ 13:39) (95% - 99%)  Wt(kg): --  I&O's Summary    30 Oct 2017 07:01  -  31 Oct 2017 07:00  --------------------------------------------------------  IN: 900 mL / OUT: 2300 mL / NET: -1400 mL    31 Oct 2017 07:01  -  31 Oct 2017 15:59  --------------------------------------------------------  IN: 540 mL / OUT: 900 mL / NET: -360 mL          Appearance: Normal	  HEENT:   Normal oral mucosa, PERRL, EOMI	  Cardiovascular: Normal S1 S2,    Murmur:   Neck: JVP normal  Respiratory: Lungs clear to auscultation  Gastrointestinal:  Soft, Non-tender, + BS	  Skin: normal   Neuro: No gross deficits.   Psychiatry:  Mood & affect appropriate  Ext: No edema      LABS/DATA:    CARDIAC MARKERS:  CARDIAC MARKERS ( 30 Oct 2017 02:31 )  x     / <0.01 ng/mL / 45 U/L / x     / 2.3 ng/mL  CARDIAC MARKERS ( 29 Oct 2017 19:18 )  x     / <0.01 ng/mL / 39 U/L / x     / 1.6 ng/mL  CARDIAC MARKERS ( 29 Oct 2017 10:16 )  x     / <0.01 ng/mL / 34 U/L / x     / 1.2 ng/mL                                11.2   12.4  )-----------( 413      ( 30 Oct 2017 06:36 )             32.4     10-30    137  |  100  |  13  ----------------------------<  130<H>  4.4   |  24  |  0.69    Ca    9.9      30 Oct 2017 06:36      proBNP:   Lipid Profile:   HgA1c:   TSH:     TELE:  EKG:
Subjective: Patient seen and examined. No new events except as noted.     SUBJECTIVE/ROS:  mild sob  no chest pain       MEDICATIONS:  MEDICATIONS  (STANDING):  acetaminophen  IVPB. 1000 milliGRAM(s) IV Intermittent once  amLODIPine   Tablet 5 milliGRAM(s) Oral at bedtime  atorvastatin 40 milliGRAM(s) Oral at bedtime  dexamethasone     Tablet 4 milliGRAM(s) Oral every 6 hours  docusate sodium 100 milliGRAM(s) Oral three times a day  enoxaparin Injectable 40 milliGRAM(s) SubCutaneous <User Schedule>  estrogens    conjugated 0.625 milliGRAM(s) Oral daily  gabapentin 600 milliGRAM(s) Oral three times a day  insulin lispro (HumaLOG) corrective regimen sliding scale   SubCutaneous Before meals and at bedtime  losartan 100 milliGRAM(s) Oral daily  pantoprazole    Tablet 40 milliGRAM(s) Oral before breakfast  pregabalin 25 milliGRAM(s) Oral two times a day  senna 2 Tablet(s) Oral at bedtime  sertraline 50 milliGRAM(s) Oral daily  sucralfate suspension 1 Gram(s) Oral Before meals and at bedtime  timolol 0.25% Solution 1 Drop(s) Both EYES daily      PHYSICAL EXAM:  T(C): 36.4 (11-06-17 @ 07:34), Max: 36.9 (11-05-17 @ 11:00)  HR: 101 (11-06-17 @ 07:34) (86 - 103)  BP: 131/70 (11-06-17 @ 07:34) (86/46 - 134/77)  RR: 18 (11-06-17 @ 07:34) (18 - 23)  SpO2: 97% (11-06-17 @ 07:34) (96% - 100%)  Wt(kg): --  I&O's Summary    05 Nov 2017 07:01  -  06 Nov 2017 07:00  --------------------------------------------------------  IN: 2075 mL / OUT: 425 mL / NET: 1650 mL          Appearance: Normal	  HEENT:   Normal oral mucosa, PERRL, EOMI	  Cardiovascular: Normal S1 S2,    Murmur:   Neck: edema   Respiratory: Lungs few crackles   Gastrointestinal:  Soft, Non-tender, + BS	  Skin: normal   Neuro: No gross deficits.   Psychiatry:  Mood & affect appropriate  Ext: No edema      LABS/DATA:    CARDIAC MARKERS:                                10.0   17.7  )-----------( 391      ( 04 Nov 2017 20:57 )             29.1     11-04    137  |  102  |  15  ----------------------------<  95  4.2   |  23  |  0.67    Ca    8.1<L>      04 Nov 2017 20:57  Phos  4.6     11-04  Mg     2.1     11-04      proBNP:   Lipid Profile:   HgA1c:   TSH:     TELE:  EKG:

## 2017-11-08 NOTE — SWALLOW VFSS/MBS ASSESSMENT ADULT - SLP GENERAL OBSERVATIONS
Pt received in radiology, secure in DORETHA chair. Pt awake and alert, cooperative, following directions for the purposes of the exam.

## 2017-11-08 NOTE — SWALLOW VFSS/MBS ASSESSMENT ADULT - LARYNGEAL PENETRATION DURING THE SWALLOW - SILENT
over the laryngeal surfaces of the epiglottis and arytenoids, with retrieval via reswallow or spontaneous throat clear/Mild/Trace Mild/over the laryngeal surface of the epiglottis, not fully retrieved

## 2017-11-08 NOTE — PROGRESS NOTE ADULT - NSHPATTENDINGPLANDISCUSS_GEN_ALL_CORE
Pt, Kaley (Speech), Jasmin (PA), Pt daughter (MD)
Pt, daughter
Pt and daughter (MD)
Pt, daughter, Theodora SOLIZ)
PA and fellow
SAMI Mcgovern at this time

## 2017-11-08 NOTE — SWALLOW VFSS/MBS ASSESSMENT ADULT - ORAL PHASE
+ Tongue pumping, max spillover to the valleculae, trace lingual residue Delayed oral transit time/+ Tongue pumping, piecemeal deglutition, max spillover to the valleculae, trace to min lingual residue Delayed oral transit time/max spillover to the valleculae, trace to min lingual residue + Tongue pumping, max spillover to the pyriform sinuses, trace to min lingual residue/Delayed oral transit time max spillover to the pyriform sinuses, trace lingual residue trace to min lingual residue/Delayed oral transit time Delayed oral transit time/trace to min lingual residue

## 2017-11-08 NOTE — SWALLOW VFSS/MBS ASSESSMENT ADULT - DIAGNOSTIC IMPRESSIONS
Pt presents with an oropharyngeal dysphagia notable for laryngeal penetration across PO textures, and aspiration of thin and nectar-thick liquids. Aspiration of nectar-thick liquids was noted with a large cup sip with head in neutral position. Airway protection appeared improved with use of partial head flexion and small single sips of nectar-thick liquids. Strategies were not effective for improving airway protection with thin liquids. Pt with intact sensation with throat clears and repeat swallows that effectively clear most penetrated material from laryngeal vestibule, and material was not observed to descend further into the vestibule. Aspirated material was cleared from subglottic space by spontaneous throat clears. Pt presents with an oropharyngeal dysphagia notable for laryngeal penetration across PO textures, and aspiration of thin and nectar-thick liquids. Aspiration of nectar-thick liquids was noted with a large cup sip with head in neutral position. Airway protection appeared improved with use of partial head flexion and small single sips of nectar-thick liquids. Strategies were not effective for improving airway protection with thin liquids. Pt with intact sensation with throat clears and repeat swallows that effectively clear most penetrated material from laryngeal vestibule, and material was not observed to descend further into the vestibule. Aspirated material was cleared from subglottic space by spontaneous throat clears.    Disorders: reduced lingual strength/ROM/Rate of motion, reduced tongue to palate contact, mildly reduced BOT to posterior pharyngeal wall contact, reduced hyo-laryngeal excursion, reduced laryngeal closure.

## 2017-11-08 NOTE — SWALLOW VFSS/MBS ASSESSMENT ADULT - ADDITIONAL RECOMMENDATIONS
Maintain good oral hygiene.  Restorative swallow therapy. Will continue to follow while patient is in-house, as schedule permits.

## 2017-11-08 NOTE — SWALLOW VFSS/MBS ASSESSMENT ADULT - RECOMMENDED CONSISTENCY
Dysphagia 2 with Nectar-thick liquids with Use of Small Single Sips for liquids and Partial Head Flexion across textures.  MD/team Please enter the following as notes to RN: 1) ALL PO via Partial Head Flexion, 2) Crush meds or provide via alternate source, 3) Small single bites and sips at slow rate, 4) Successive swallows for oral and pharyngeal clearance, 5) Periodic throat clears to facilitate laryngeal clearance, 6) Aspiration precautions. Monitor for s/s aspiration/laryngeal penetration. If noted:  D/C p.o. intake, provide non-oral nutrition/hydration/meds

## 2017-11-08 NOTE — SWALLOW VFSS/MBS ASSESSMENT ADULT - ROSENBEK'S PENETRATION ASPIRATION SCALE
(2) contrast enters airway, remains above the vocal cords, no residue remains (penetration) (6) contrast passes glottis, no subglottic residue remains (aspiration) (1) no aspiration, contrast does not enter airway (3) contrast remains above the vocal cords, visible residue remains (penetration)

## 2017-11-08 NOTE — PROGRESS NOTE ADULT - PROVIDER SPECIALTY LIST ADULT
Cardiology
NSICU
Neurology
Neurosurgery
Neurology

## 2017-11-08 NOTE — PROGRESS NOTE ADULT - ASSESSMENT
Assessment and Plan:   · Assessment	  Admitted 10/28 with left arm radiculopathy; 11/4 s/p C7-T1 ACDF     PROCEDURE:  C7-T1 ACDF  POD#4    PLAN:  Neuro:   -Continue pain medications as patient has good pain relief.  -OOB and ambulating independently   -Failed bedside speech and swallow.  Patient refusing NPO, so compromise made to continue a soft honey-thickened diet.  Will go for MBS today.  -ENT re-called to scope patient post op.  -Weaning off of Gabapentin.  Decreased from 400 BID to 300 BID today.    Respiratory:   -No active issues  -Incentive spirometry and frequent ambulation encouraged.    CV:  -Awaiting echo given SOB 2 days ago as requested by caridology.  Will not hold up discharge however if echo not done, given that SOB has resolved  -Dr. Patiño following.  -Continue antihypertensives.  SBP has been within range of 110-150.    Endocrine:   -No active issues    Heme/Onc:               -DVT ppx with SQL, SCD's while in bed and frequent ambulation     Renal:   -IVL,   -Voiding spontaneously    ID:   -Leukocytosis in the setting of steroids.  Afebrile and no signs of infection.    GI:   -Awaiting MBS.  -Bowel regiment of colace and senna    Discharge planning:   -Home pending MBS

## 2017-11-08 NOTE — SWALLOW VFSS/MBS ASSESSMENT ADULT - LARYNGEAL PENETRATION DURING THE SWALLOW - COUGH
over the laryngeal surface of the arytenoids, with retrieval via reswallow or spontaneous throat clear/Mild/Trace up to moderate, over the laryngeal surface of the arytenoids, during large cup sip with head in neutral position, retrieved to the level of the vocal cords Trace/over the laryngeal surface of the epiglottis, with incomplete retrieval

## 2017-11-08 NOTE — SWALLOW VFSS/MBS ASSESSMENT ADULT - NS SWALLOW VFSS REC ASPIR MON
throat clearing/upper respiratory infection/change of breathing pattern/cough/gurgly voice/fever/pneumonia

## 2017-11-08 NOTE — PROGRESS NOTE ADULT - ATTENDING COMMENTS
Pt doing well.  L C8 radiculopathy essentially resolved.  Dysphonia improved c/w yesterday.  Spoke to Kaley from Speech Pathology, MBS just completed. There is mild aspiration and thickened liquid diet will be recommended.  ENT to evaluate vocal cords today.  Likely DC home today. RTC 1 week.
Pt doing well. left C8 radiculopathy resolved.  Has vocal hoarseness, tolerating PO's. Will add decadron.  Transfer to floor.
Pt stable.  Swallowing evaluation pending.  DC planning
Will add Lyrica and increase Dilaudid.  Neurology consult appreciated.  Neuroradiology addendum suggestive of left C7-T1 HNP.
L C8 radiculopathy stable. Pain relatively well controlled.  C7-T1 ACDF tomorrow.
Left C8 radiculopathy improved.  Has hoarse voice, tolerating soft diet; will obtain swallowing evaluation.  Taper Gabapentin.
Pain better controlled with pain meds.  ENT evaluation appreciated - on vocal cord issues.  C7-T1 ACDF 11/4/17.
Pt seen and examined. Left arm strength slightly improved.  MRI C spine demonstrates post-op changes, no spinal cord compression.  MRI brachial plexus not suggestive of [place]-Wallace Syndrome.  Continue Decadron, Muscle relaxants, Gabapentin.  Will obtain Neurology consult.
Pt seen and examined. MRI C-spine (after addendum) reviewed. Pt has left C8 radiculopathy with weakness of L HI/ADM/finger extensors. MRI demonstrates left C8-T1 HNP. Surgical and non-surgical treatment options discussed with patient and daughters. Plan for C7-T1 ACDF on  11/4/17.  Pt will need ENT evaluation of vocal cord function.

## 2017-11-22 ENCOUNTER — APPOINTMENT (OUTPATIENT)
Dept: OTOLARYNGOLOGY | Facility: CLINIC | Age: 62
End: 2017-11-22
Payer: COMMERCIAL

## 2017-11-22 VITALS
DIASTOLIC BLOOD PRESSURE: 74 MMHG | HEART RATE: 122 BPM | WEIGHT: 145 LBS | BODY MASS INDEX: 28.47 KG/M2 | SYSTOLIC BLOOD PRESSURE: 123 MMHG | HEIGHT: 60 IN

## 2017-11-22 DIAGNOSIS — K31.9 DISEASE OF STOMACH AND DUODENUM, UNSPECIFIED: ICD-10-CM

## 2017-11-22 DIAGNOSIS — Z86.79 PERSONAL HISTORY OF OTHER DISEASES OF THE CIRCULATORY SYSTEM: ICD-10-CM

## 2017-11-22 DIAGNOSIS — Z86.39 PERSONAL HISTORY OF OTHER ENDOCRINE, NUTRITIONAL AND METABOLIC DISEASE: ICD-10-CM

## 2017-11-22 PROCEDURE — 99244 OFF/OP CNSLTJ NEW/EST MOD 40: CPT | Mod: 25

## 2017-11-22 PROCEDURE — 31575 DIAGNOSTIC LARYNGOSCOPY: CPT

## 2017-11-22 RX ORDER — AMLODIPINE BESYLATE 5 MG/1
5 TABLET ORAL
Qty: 90 | Refills: 0 | Status: ACTIVE | COMMUNITY
Start: 2017-06-19

## 2017-11-22 RX ORDER — IRBESARTAN 150 MG/1
150 TABLET ORAL
Qty: 30 | Refills: 0 | Status: ACTIVE | COMMUNITY
Start: 2017-03-30

## 2017-12-08 ENCOUNTER — APPOINTMENT (OUTPATIENT)
Dept: OTOLARYNGOLOGY | Facility: CLINIC | Age: 62
End: 2017-12-08
Payer: COMMERCIAL

## 2017-12-08 VITALS
SYSTOLIC BLOOD PRESSURE: 113 MMHG | HEART RATE: 105 BPM | BODY MASS INDEX: 28.47 KG/M2 | WEIGHT: 145 LBS | HEIGHT: 60 IN | DIASTOLIC BLOOD PRESSURE: 68 MMHG

## 2017-12-08 PROCEDURE — 31575 DIAGNOSTIC LARYNGOSCOPY: CPT

## 2017-12-08 PROCEDURE — 99213 OFFICE O/P EST LOW 20 MIN: CPT | Mod: 25

## 2017-12-15 ENCOUNTER — OUTPATIENT (OUTPATIENT)
Dept: OUTPATIENT SERVICES | Facility: HOSPITAL | Age: 62
LOS: 1 days | End: 2017-12-15
Payer: COMMERCIAL

## 2017-12-15 VITALS
DIASTOLIC BLOOD PRESSURE: 75 MMHG | OXYGEN SATURATION: 98 % | HEART RATE: 107 BPM | RESPIRATION RATE: 18 BRPM | WEIGHT: 145.06 LBS | SYSTOLIC BLOOD PRESSURE: 124 MMHG | HEIGHT: 60 IN | TEMPERATURE: 98 F

## 2017-12-15 DIAGNOSIS — J38.01 PARALYSIS OF VOCAL CORDS AND LARYNX, UNILATERAL: ICD-10-CM

## 2017-12-15 DIAGNOSIS — Z01.818 ENCOUNTER FOR OTHER PREPROCEDURAL EXAMINATION: ICD-10-CM

## 2017-12-15 DIAGNOSIS — Z98.890 OTHER SPECIFIED POSTPROCEDURAL STATES: Chronic | ICD-10-CM

## 2017-12-15 DIAGNOSIS — Z90.49 ACQUIRED ABSENCE OF OTHER SPECIFIED PARTS OF DIGESTIVE TRACT: Chronic | ICD-10-CM

## 2017-12-15 DIAGNOSIS — R49.0 DYSPHONIA: ICD-10-CM

## 2017-12-15 DIAGNOSIS — Z90.722 ACQUIRED ABSENCE OF OVARIES, BILATERAL: Chronic | ICD-10-CM

## 2017-12-15 DIAGNOSIS — Z90.89 ACQUIRED ABSENCE OF OTHER ORGANS: Chronic | ICD-10-CM

## 2017-12-15 DIAGNOSIS — Z90.710 ACQUIRED ABSENCE OF BOTH CERVIX AND UTERUS: Chronic | ICD-10-CM

## 2017-12-15 LAB
ANION GAP SERPL CALC-SCNC: 14 MMOL/L — SIGNIFICANT CHANGE UP (ref 5–17)
BUN SERPL-MCNC: 9 MG/DL — SIGNIFICANT CHANGE UP (ref 7–23)
CALCIUM SERPL-MCNC: 9.7 MG/DL — SIGNIFICANT CHANGE UP (ref 8.4–10.5)
CHLORIDE SERPL-SCNC: 99 MMOL/L — SIGNIFICANT CHANGE UP (ref 96–108)
CO2 SERPL-SCNC: 24 MMOL/L — SIGNIFICANT CHANGE UP (ref 22–31)
CREAT SERPL-MCNC: 0.82 MG/DL — SIGNIFICANT CHANGE UP (ref 0.5–1.3)
GLUCOSE SERPL-MCNC: 147 MG/DL — HIGH (ref 70–99)
HCT VFR BLD CALC: 33.4 % — LOW (ref 34.5–45)
HGB BLD-MCNC: 11 G/DL — LOW (ref 11.5–15.5)
MCHC RBC-ENTMCNC: 27 PG — SIGNIFICANT CHANGE UP (ref 27–34)
MCHC RBC-ENTMCNC: 32.9 GM/DL — SIGNIFICANT CHANGE UP (ref 32–36)
MCV RBC AUTO: 82.1 FL — SIGNIFICANT CHANGE UP (ref 80–100)
PLATELET # BLD AUTO: 385 K/UL — SIGNIFICANT CHANGE UP (ref 150–400)
POTASSIUM SERPL-MCNC: 4.7 MMOL/L — SIGNIFICANT CHANGE UP (ref 3.5–5.3)
POTASSIUM SERPL-SCNC: 4.7 MMOL/L — SIGNIFICANT CHANGE UP (ref 3.5–5.3)
RBC # BLD: 4.07 M/UL — SIGNIFICANT CHANGE UP (ref 3.8–5.2)
RBC # FLD: 12.9 % — SIGNIFICANT CHANGE UP (ref 10.3–14.5)
SODIUM SERPL-SCNC: 137 MMOL/L — SIGNIFICANT CHANGE UP (ref 135–145)
WBC # BLD: 7.55 K/UL — SIGNIFICANT CHANGE UP (ref 3.8–10.5)
WBC # FLD AUTO: 7.55 K/UL — SIGNIFICANT CHANGE UP (ref 3.8–10.5)

## 2017-12-15 PROCEDURE — G0463: CPT

## 2017-12-15 PROCEDURE — 80048 BASIC METABOLIC PNL TOTAL CA: CPT

## 2017-12-15 PROCEDURE — 85027 COMPLETE CBC AUTOMATED: CPT

## 2017-12-15 RX ORDER — SODIUM CHLORIDE 9 MG/ML
3 INJECTION INTRAMUSCULAR; INTRAVENOUS; SUBCUTANEOUS EVERY 8 HOURS
Qty: 0 | Refills: 0 | Status: DISCONTINUED | OUTPATIENT
Start: 2017-12-29 | End: 2018-01-13

## 2017-12-15 RX ORDER — LIDOCAINE HCL 20 MG/ML
0.2 VIAL (ML) INJECTION ONCE
Qty: 0 | Refills: 0 | Status: DISCONTINUED | OUTPATIENT
Start: 2017-12-29 | End: 2018-01-13

## 2017-12-15 RX ORDER — IRBESARTAN 75 MG/1
1 TABLET ORAL
Qty: 0 | Refills: 0 | COMMUNITY

## 2017-12-15 RX ORDER — CEFAZOLIN SODIUM 1 G
2000 VIAL (EA) INJECTION ONCE
Qty: 0 | Refills: 0 | Status: DISCONTINUED | OUTPATIENT
Start: 2017-12-29 | End: 2018-01-13

## 2017-12-15 NOTE — H&P PST ADULT - PSH
History of appendectomy    History of arthroscopy of right knee    History of bilateral oophorectomy    History of carpal tunnel release    History of cholecystectomy    History of hysterectomy    History of tonsillectomy    S/P spinal surgery  7/17/17 - cervical region with hardware History of appendectomy    History of arthroscopy of right knee    History of bilateral oophorectomy    History of carpal tunnel release    History of cholecystectomy    History of hysterectomy    History of tonsillectomy    S/P spinal surgery  6/17/17 - cervical region with hardware 9/2017 and 11/2017

## 2017-12-15 NOTE — H&P PST ADULT - HISTORY OF PRESENT ILLNESS
63 y/o F PMH cervical stenosis 61 y/o F PMH cervical stenosis, S/P posterior cervical spine fusion 6/2017, 11/2017 and anterior cervical fusion 11/2017, now with paralysis of the right vocal cord with dysphonia and dysphagia.  Presents today for direct laryngoscopy with right vocal cord Prolaryn plus injection.

## 2017-12-15 NOTE — H&P PST ADULT - ANESTHESIA, PREVIOUS REACTION, PROFILE
during right knee arthroscopy was told her blood pressure dropped and stopped breathing, no problems with any other surgery/respiratory complications/hypotension

## 2017-12-29 ENCOUNTER — APPOINTMENT (OUTPATIENT)
Dept: OTOLARYNGOLOGY | Facility: HOSPITAL | Age: 62
End: 2017-12-29

## 2017-12-29 ENCOUNTER — TRANSCRIPTION ENCOUNTER (OUTPATIENT)
Age: 62
End: 2017-12-29

## 2017-12-29 ENCOUNTER — OUTPATIENT (OUTPATIENT)
Dept: OUTPATIENT SERVICES | Facility: HOSPITAL | Age: 62
LOS: 1 days | End: 2017-12-29
Payer: COMMERCIAL

## 2017-12-29 VITALS
HEART RATE: 97 BPM | OXYGEN SATURATION: 99 % | TEMPERATURE: 98 F | SYSTOLIC BLOOD PRESSURE: 117 MMHG | RESPIRATION RATE: 16 BRPM | DIASTOLIC BLOOD PRESSURE: 78 MMHG

## 2017-12-29 VITALS
HEART RATE: 89 BPM | SYSTOLIC BLOOD PRESSURE: 132 MMHG | DIASTOLIC BLOOD PRESSURE: 65 MMHG | TEMPERATURE: 98 F | RESPIRATION RATE: 14 BRPM | OXYGEN SATURATION: 97 %

## 2017-12-29 DIAGNOSIS — Z98.890 OTHER SPECIFIED POSTPROCEDURAL STATES: Chronic | ICD-10-CM

## 2017-12-29 DIAGNOSIS — Z90.89 ACQUIRED ABSENCE OF OTHER ORGANS: Chronic | ICD-10-CM

## 2017-12-29 DIAGNOSIS — Z90.722 ACQUIRED ABSENCE OF OVARIES, BILATERAL: Chronic | ICD-10-CM

## 2017-12-29 DIAGNOSIS — Z90.710 ACQUIRED ABSENCE OF BOTH CERVIX AND UTERUS: Chronic | ICD-10-CM

## 2017-12-29 DIAGNOSIS — R49.0 DYSPHONIA: ICD-10-CM

## 2017-12-29 DIAGNOSIS — Z90.49 ACQUIRED ABSENCE OF OTHER SPECIFIED PARTS OF DIGESTIVE TRACT: Chronic | ICD-10-CM

## 2017-12-29 DIAGNOSIS — J38.01 PARALYSIS OF VOCAL CORDS AND LARYNX, UNILATERAL: ICD-10-CM

## 2017-12-29 DIAGNOSIS — Z01.818 ENCOUNTER FOR OTHER PREPROCEDURAL EXAMINATION: ICD-10-CM

## 2017-12-29 DIAGNOSIS — J38.1 POLYP OF VOCAL CORD AND LARYNX: ICD-10-CM

## 2017-12-29 PROCEDURE — C1878: CPT

## 2017-12-29 PROCEDURE — 31622 DX BRONCHOSCOPE/WASH: CPT

## 2017-12-29 PROCEDURE — 31571 LARYNGOSCOP W/VC INJ + SCOPE: CPT | Mod: 59

## 2017-12-29 PROCEDURE — 31570 LARYNGOSCOPE W/VC INJ: CPT

## 2017-12-29 PROCEDURE — 31500 INSERT EMERGENCY AIRWAY: CPT

## 2017-12-29 RX ORDER — HYDROMORPHONE HYDROCHLORIDE 2 MG/ML
0.25 INJECTION INTRAMUSCULAR; INTRAVENOUS; SUBCUTANEOUS
Qty: 0 | Refills: 0 | Status: DISCONTINUED | OUTPATIENT
Start: 2017-12-29 | End: 2017-12-29

## 2017-12-29 RX ORDER — ACETAMINOPHEN 500 MG
1000 TABLET ORAL ONCE
Qty: 0 | Refills: 0 | Status: COMPLETED | OUTPATIENT
Start: 2017-12-29 | End: 2017-12-29

## 2017-12-29 RX ORDER — ONDANSETRON 8 MG/1
4 TABLET, FILM COATED ORAL ONCE
Qty: 0 | Refills: 0 | Status: DISCONTINUED | OUTPATIENT
Start: 2017-12-29 | End: 2017-12-29

## 2017-12-29 RX ADMIN — HYDROMORPHONE HYDROCHLORIDE 0.25 MILLIGRAM(S): 2 INJECTION INTRAMUSCULAR; INTRAVENOUS; SUBCUTANEOUS at 12:30

## 2017-12-29 RX ADMIN — Medication 1000 MILLIGRAM(S): at 12:31

## 2017-12-29 RX ADMIN — Medication 400 MILLIGRAM(S): at 11:45

## 2017-12-29 NOTE — BRIEF OPERATIVE NOTE - POST-OP DX
Dysphonia  12/29/2017    Active  Lisette Lake  Vocal cord paralysis  12/29/2017    Active  Lisette Lake

## 2017-12-29 NOTE — ASU DISCHARGE PLAN (ADULT/PEDIATRIC). - MEDICATION SUMMARY - MEDICATIONS TO TAKE
I will START or STAY ON the medications listed below when I get home from the hospital:    Avalide  -- 1 tab(s) by mouth once a day  -- Indication: For HTN    acetaminophen 325 mg oral tablet  -- 2 tab(s) by mouth every 6 hours, As needed, Mild Pain (1 - 3)  -- Indication: For Pain    pregabalin 25 mg oral capsule  -- 1 cap(s) by mouth 2 times a day MDD:2 tabs  -- Indication: For Nerve pain    gabapentin 300 mg oral capsule  -- 1 cap(s) by mouth 2 times a day  -- Indication: For Nerve pain    sertraline 50 mg oral tablet  -- 1 tab(s) by mouth once a day  -- Indication: For Disorder    atorvastatin 40 mg oral tablet  -- 1 tab(s) by mouth once a day (at bedtime)  -- Indication: For HLD    amLODIPine 5 mg oral tablet  -- 1 tab(s) by mouth once a day (at bedtime)  -- Indication: For HTN    sucralfate 1 g/10 mL oral suspension  -- 10 milliliter(s) by mouth 4 times a day (before meals and at bedtime)  -- Indication: For Ulcer    timolol maleate 0.25% ophthalmic solution  -- 1 drop(s) to each affected eye once a day  -- Indication: For Glaucoma    NexIUM 40 mg oral delayed release capsule  -- 1 cap(s) by mouth 2 times a day  -- Indication: For GERD    conjugated estrogens 0.625 mg oral tablet  -- 1 tab(s) by mouth once a day  -- Indication: For vulvovaginal atrophy

## 2017-12-29 NOTE — BRIEF OPERATIVE NOTE - PRE-OP DX
Dysphonia  12/29/2017    Active  Lisette Lake  Vocal cord paralysis, unilateral complete  12/29/2017    Active  Lisette Lake

## 2017-12-29 NOTE — BRIEF OPERATIVE NOTE - PROCEDURE
<<-----Click on this checkbox to enter Procedure Intubation of respiratory tract  12/29/2017    Active  ARBEN  Vocal cord injection, laryngoscopic  12/29/2017    Active  ARBEN  Bronchoscopy  12/29/2017    Active  ARBEN  Direct laryngoscopy  12/29/2017    Active  ARBEN

## 2018-01-08 ENCOUNTER — APPOINTMENT (OUTPATIENT)
Dept: OTOLARYNGOLOGY | Facility: CLINIC | Age: 63
End: 2018-01-08
Payer: COMMERCIAL

## 2018-01-08 VITALS
WEIGHT: 145 LBS | HEIGHT: 60 IN | BODY MASS INDEX: 28.47 KG/M2 | DIASTOLIC BLOOD PRESSURE: 69 MMHG | HEART RATE: 114 BPM | SYSTOLIC BLOOD PRESSURE: 112 MMHG

## 2018-01-08 PROCEDURE — 31575 DIAGNOSTIC LARYNGOSCOPY: CPT

## 2018-01-08 PROCEDURE — 99213 OFFICE O/P EST LOW 20 MIN: CPT | Mod: 25

## 2018-03-27 NOTE — BRIEF OPERATIVE NOTE - PROCEDURE POST
March 27, 2018     Patient: Arnulfo Gottlieb   YOB: 1953   Date of Visit: 3/27/2018       To Whom it May Concern:    Jm Pastora is under my professional care  She was seen in my office on 3/27/2018  She should continue to be out of work post operatively  She will be seen in 4 weeks at which time she will receive updated restrictions  If you have any questions or concerns, please don't hesitate to call           Sincerely,        Juana Meeks PA-C      CC: Arnulfo Aliciaon <<-----Click on this checkbox to enter Post-Op Dx

## 2018-04-10 ENCOUNTER — APPOINTMENT (OUTPATIENT)
Dept: OTOLARYNGOLOGY | Facility: CLINIC | Age: 63
End: 2018-04-10
Payer: COMMERCIAL

## 2018-04-10 VITALS
BODY MASS INDEX: 29.45 KG/M2 | HEART RATE: 128 BPM | HEIGHT: 60 IN | WEIGHT: 150 LBS | SYSTOLIC BLOOD PRESSURE: 118 MMHG | DIASTOLIC BLOOD PRESSURE: 69 MMHG

## 2018-04-10 PROCEDURE — 99214 OFFICE O/P EST MOD 30 MIN: CPT | Mod: 25

## 2018-04-10 PROCEDURE — 31575 DIAGNOSTIC LARYNGOSCOPY: CPT

## 2018-07-20 PROBLEM — H40.9 UNSPECIFIED GLAUCOMA: Chronic | Status: ACTIVE | Noted: 2017-05-31

## 2018-07-20 PROBLEM — Z86.69 PERSONAL HISTORY OF OTHER DISEASES OF THE NERVOUS SYSTEM AND SENSE ORGANS: Chronic | Status: ACTIVE | Noted: 2017-05-31

## 2018-07-20 PROBLEM — I10 ESSENTIAL (PRIMARY) HYPERTENSION: Chronic | Status: ACTIVE | Noted: 2017-05-31

## 2018-07-20 PROBLEM — M47.12 OTHER SPONDYLOSIS WITH MYELOPATHY, CERVICAL REGION: Chronic | Status: ACTIVE | Noted: 2017-05-31

## 2018-07-20 PROBLEM — E78.5 HYPERLIPIDEMIA, UNSPECIFIED: Chronic | Status: ACTIVE | Noted: 2017-05-31

## 2018-07-20 PROBLEM — M54.12 RADICULOPATHY, CERVICAL REGION: Chronic | Status: ACTIVE | Noted: 2017-05-31

## 2018-07-20 PROBLEM — Z87.19 PERSONAL HISTORY OF OTHER DISEASES OF THE DIGESTIVE SYSTEM: Chronic | Status: ACTIVE | Noted: 2017-05-31

## 2018-07-20 PROBLEM — M48.02 SPINAL STENOSIS, CERVICAL REGION: Chronic | Status: ACTIVE | Noted: 2017-05-31

## 2018-07-20 PROBLEM — Z86.2 PERSONAL HISTORY OF DISEASES OF THE BLOOD AND BLOOD-FORMING ORGANS AND CERTAIN DISORDERS INVOLVING THE IMMUNE MECHANISM: Chronic | Status: ACTIVE | Noted: 2017-05-31

## 2018-07-20 PROBLEM — K44.9 DIAPHRAGMATIC HERNIA WITHOUT OBSTRUCTION OR GANGRENE: Chronic | Status: ACTIVE | Noted: 2017-05-31

## 2018-07-20 PROBLEM — Z87.42 PERSONAL HISTORY OF OTHER DISEASES OF THE FEMALE GENITAL TRACT: Chronic | Status: ACTIVE | Noted: 2017-05-31

## 2018-07-20 PROBLEM — K21.9 GASTRO-ESOPHAGEAL REFLUX DISEASE WITHOUT ESOPHAGITIS: Chronic | Status: ACTIVE | Noted: 2017-05-31

## 2018-07-27 ENCOUNTER — APPOINTMENT (OUTPATIENT)
Dept: OTOLARYNGOLOGY | Facility: CLINIC | Age: 63
End: 2018-07-27
Payer: COMMERCIAL

## 2018-07-27 VITALS
WEIGHT: 150 LBS | DIASTOLIC BLOOD PRESSURE: 72 MMHG | HEART RATE: 111 BPM | SYSTOLIC BLOOD PRESSURE: 120 MMHG | BODY MASS INDEX: 29.45 KG/M2 | HEIGHT: 60 IN

## 2018-07-27 PROCEDURE — 99213 OFFICE O/P EST LOW 20 MIN: CPT | Mod: 25

## 2018-07-27 PROCEDURE — 31575 DIAGNOSTIC LARYNGOSCOPY: CPT

## 2018-08-03 ENCOUNTER — TRANSCRIPTION ENCOUNTER (OUTPATIENT)
Age: 63
End: 2018-08-03

## 2018-12-17 ENCOUNTER — APPOINTMENT (OUTPATIENT)
Dept: OTOLARYNGOLOGY | Facility: CLINIC | Age: 63
End: 2018-12-17

## 2019-04-03 ENCOUNTER — APPOINTMENT (OUTPATIENT)
Dept: OTOLARYNGOLOGY | Facility: CLINIC | Age: 64
End: 2019-04-03
Payer: COMMERCIAL

## 2019-04-03 ENCOUNTER — TRANSCRIPTION ENCOUNTER (OUTPATIENT)
Age: 64
End: 2019-04-03

## 2019-04-03 VITALS
BODY MASS INDEX: 29.45 KG/M2 | HEART RATE: 105 BPM | WEIGHT: 150 LBS | HEIGHT: 60 IN | SYSTOLIC BLOOD PRESSURE: 125 MMHG | DIASTOLIC BLOOD PRESSURE: 83 MMHG

## 2019-04-03 PROCEDURE — 99214 OFFICE O/P EST MOD 30 MIN: CPT | Mod: 25

## 2019-04-03 PROCEDURE — 31575 DIAGNOSTIC LARYNGOSCOPY: CPT

## 2019-04-03 NOTE — CONSULT LETTER
[Dear  ___] : Dear  [unfilled], [Courtesy Letter:] : I had the pleasure of seeing your patient, [unfilled], in my office today. [Please see my note below.] : Please see my note below. [Consult Closing:] : Thank you very much for allowing me to participate in the care of this patient.  If you have any questions, please do not hesitate to contact me. [Sincerely,] : Sincerely, [DrMayra  ___] : Dr. MCKEON [Sanjuanita Ye MD] : Sanjuanita Ye MD [Otolaryngology, Cranial Base Surgery] : Otolaryngology, Cranial Base Surgery [[facility]] : [facility] [[facility]] : [facility]

## 2019-04-04 NOTE — PHYSICAL EXAM
[FreeTextEntry1] : strong voice  [Normal] : mucosa is normal [Midline] : trachea located in midline position

## 2019-04-04 NOTE — ASSESSMENT
[FreeTextEntry1] : right VC paralysis s/p ACDF s/p VF injection with good result, worn off:\par - no recovery of function so would not expect at this point\par - patient was very difficult exposure with limited view on injection so would recommend proceed with implant if would like more definitive option otherwise refer to Dr. Barros for consideration of in office procedure\par - needs MBS to eval for aspiration/swallowing and r/o diverticulum since she notes regurgitation of pills minutes after swallowing them\par - will call with MBS results and determine next step\par

## 2019-04-04 NOTE — PROCEDURE
[de-identified] : Flexible scope #2 used. Passed through nasal passage and nasopharynx/oropharynx/hypopharynx clear. Supraglottis normal. Glottis with right vocal fold paralysis now in paramedian position, good closure secondary to compensation from left vocal cord. Visualized subglottis clear. Postcricoid area without erythema or edema. no pooling of secretions.

## 2019-04-04 NOTE — DATA REVIEWED
[de-identified] : [place] 11/8/17 - Laryngeal penetration across attempted oral textures.  Aspiration noted with thin and nectar thick liquids.

## 2019-04-04 NOTE — HISTORY OF PRESENT ILLNESS
[de-identified] : s/p 3 Cervical fusions with Dr. Mcgovern.  Had both anterior and post approach.  After the last surgery on 11/4/17, C7 -T1 ACDF, Pt. developed hoarse voice that has not returned.  Notes voice is at its best in the morning and worsens throughout the day.  Never returns to baseline.  No throat pain, notes continued difficulty swallowing, told to use thickened diet, however pt. sts she is eating full diet with occasional feeling of chocking.  No SOB. Scoped early November before her most recent ACDF and both cords were mobile at that time. Scope last visit showed paralyzed right vocal cord and she elected to observe for now.  MBS 11/8/17 - Laryngeal penetration across attempted oral textures [FreeTextEntry1] : Now S/P Right vocal fold injection on 12/29/17.  At last visit MBS and speech therapy ordered.  She notes has not done either.   Now notes voice waxes and wanes, gets worse with use.  No throat pain. \par Chocking continues, primarily with taking pills.  Notes one bad episode of chocking on a capsule.

## 2019-04-29 ENCOUNTER — FORM ENCOUNTER (OUTPATIENT)
Age: 64
End: 2019-04-29

## 2019-04-30 ENCOUNTER — APPOINTMENT (OUTPATIENT)
Dept: SPEECH THERAPY | Facility: HOSPITAL | Age: 64
End: 2019-04-30
Payer: COMMERCIAL

## 2019-04-30 ENCOUNTER — APPOINTMENT (OUTPATIENT)
Dept: RADIOLOGY | Facility: HOSPITAL | Age: 64
End: 2019-04-30
Payer: COMMERCIAL

## 2019-04-30 ENCOUNTER — OUTPATIENT (OUTPATIENT)
Dept: OUTPATIENT SERVICES | Facility: HOSPITAL | Age: 64
LOS: 1 days | Discharge: HOME | End: 2019-04-30

## 2019-04-30 ENCOUNTER — OUTPATIENT (OUTPATIENT)
Dept: OUTPATIENT SERVICES | Facility: HOSPITAL | Age: 64
LOS: 1 days | End: 2019-04-30

## 2019-04-30 DIAGNOSIS — Z98.890 OTHER SPECIFIED POSTPROCEDURAL STATES: Chronic | ICD-10-CM

## 2019-04-30 DIAGNOSIS — Z90.722 ACQUIRED ABSENCE OF OVARIES, BILATERAL: Chronic | ICD-10-CM

## 2019-04-30 DIAGNOSIS — Z90.49 ACQUIRED ABSENCE OF OTHER SPECIFIED PARTS OF DIGESTIVE TRACT: Chronic | ICD-10-CM

## 2019-04-30 DIAGNOSIS — R13.12 DYSPHAGIA, OROPHARYNGEAL PHASE: ICD-10-CM

## 2019-04-30 DIAGNOSIS — Z90.89 ACQUIRED ABSENCE OF OTHER ORGANS: Chronic | ICD-10-CM

## 2019-04-30 DIAGNOSIS — Z90.710 ACQUIRED ABSENCE OF BOTH CERVIX AND UTERUS: Chronic | ICD-10-CM

## 2019-04-30 DIAGNOSIS — R49.0 DYSPHONIA: ICD-10-CM

## 2019-04-30 PROCEDURE — 74230 X-RAY XM SWLNG FUNCJ C+: CPT | Mod: 26

## 2019-05-02 DIAGNOSIS — R13.13 DYSPHAGIA, PHARYNGEAL PHASE: ICD-10-CM

## 2019-05-13 ENCOUNTER — APPOINTMENT (OUTPATIENT)
Dept: OTOLARYNGOLOGY | Facility: CLINIC | Age: 64
End: 2019-05-13

## 2019-05-31 ENCOUNTER — TRANSCRIPTION ENCOUNTER (OUTPATIENT)
Age: 64
End: 2019-05-31

## 2019-06-06 ENCOUNTER — APPOINTMENT (OUTPATIENT)
Dept: SPINE | Facility: CLINIC | Age: 64
End: 2019-06-06

## 2019-06-11 ENCOUNTER — OUTPATIENT (OUTPATIENT)
Dept: OUTPATIENT SERVICES | Facility: HOSPITAL | Age: 64
LOS: 1 days | End: 2019-06-11
Payer: COMMERCIAL

## 2019-06-11 VITALS
HEART RATE: 87 BPM | SYSTOLIC BLOOD PRESSURE: 153 MMHG | RESPIRATION RATE: 16 BRPM | WEIGHT: 162.04 LBS | DIASTOLIC BLOOD PRESSURE: 87 MMHG | HEIGHT: 60 IN | OXYGEN SATURATION: 96 % | TEMPERATURE: 98 F

## 2019-06-11 DIAGNOSIS — Z98.890 OTHER SPECIFIED POSTPROCEDURAL STATES: Chronic | ICD-10-CM

## 2019-06-11 DIAGNOSIS — J38.01 PARALYSIS OF VOCAL CORDS AND LARYNX, UNILATERAL: ICD-10-CM

## 2019-06-11 DIAGNOSIS — Z90.49 ACQUIRED ABSENCE OF OTHER SPECIFIED PARTS OF DIGESTIVE TRACT: Chronic | ICD-10-CM

## 2019-06-11 DIAGNOSIS — Z90.710 ACQUIRED ABSENCE OF BOTH CERVIX AND UTERUS: Chronic | ICD-10-CM

## 2019-06-11 DIAGNOSIS — Z01.818 ENCOUNTER FOR OTHER PREPROCEDURAL EXAMINATION: ICD-10-CM

## 2019-06-11 DIAGNOSIS — Z90.722 ACQUIRED ABSENCE OF OVARIES, BILATERAL: Chronic | ICD-10-CM

## 2019-06-11 DIAGNOSIS — Z90.89 ACQUIRED ABSENCE OF OTHER ORGANS: Chronic | ICD-10-CM

## 2019-06-11 PROCEDURE — 85027 COMPLETE CBC AUTOMATED: CPT

## 2019-06-11 PROCEDURE — G0463: CPT

## 2019-06-11 PROCEDURE — 80048 BASIC METABOLIC PNL TOTAL CA: CPT

## 2019-06-11 RX ORDER — IRBESARTAN AND HYDROCHLOROTHIAZIDE 12.5; 3 MG/1; MG/1
1 TABLET ORAL
Qty: 0 | Refills: 0 | DISCHARGE

## 2019-06-11 RX ORDER — ESOMEPRAZOLE MAGNESIUM 40 MG/1
1 CAPSULE, DELAYED RELEASE ORAL
Qty: 0 | Refills: 0 | DISCHARGE

## 2019-06-11 NOTE — H&P PST ADULT - HISTORY OF PRESENT ILLNESS
63 year old female s/p multiple spinal surgeries now with vocal cord paralysis right.  For surgery.    **** Hx of cervical spine surgery now with hardware , limited ROM of head and neck

## 2019-06-11 NOTE — H&P PST ADULT - NSICDXPROBLEM_GEN_ALL_CORE_FT
PROBLEM DIAGNOSES  Problem: Paralysis of right vocal cord  Assessment and Plan: Right vocal cord Arvizu Implant Direct Laryngoscopy

## 2019-06-11 NOTE — H&P PST ADULT - NSICDXPASTSURGICALHX_GEN_ALL_CORE_FT
PAST SURGICAL HISTORY:  History of appendectomy     History of arthroscopy of right knee     History of bilateral oophorectomy     History of carpal tunnel release     History of cholecystectomy     History of hysterectomy     History of tonsillectomy     S/P spinal surgery 6/17/17 - cervical region with hardware 9/2017 and 11/2017

## 2019-06-11 NOTE — H&P PST ADULT - NSICDXFAMILYHX_GEN_ALL_CORE_FT
FAMILY HISTORY:  Grandparent  Still living? Unknown  Family history of kidney disease, Age at diagnosis: Age Unknown

## 2019-06-11 NOTE — H&P PST ADULT - NSICDXPASTMEDICALHX_GEN_ALL_CORE_FT
PAST MEDICAL HISTORY:  Cervical radiculopathy     Degenerative lumbar disc     Gastroesophageal reflux disease without esophagitis     Glaucoma b/l    H/O vocal cord paralysis right    Herniated cervical disc     Hiatal hernia     History of diverticulitis     History of endometriosis     History of gastric ulcer     History of iron deficiency anemia     History of migraine     Hyperlipidemia     Hypertension     Neuropathic pain of finger, left numbness 4th asnd 5th digit    Osteoarthritis of cervical spine with myelopathy     Spinal stenosis in cervical region

## 2019-06-17 ENCOUNTER — TRANSCRIPTION ENCOUNTER (OUTPATIENT)
Age: 64
End: 2019-06-17

## 2019-06-18 ENCOUNTER — OUTPATIENT (OUTPATIENT)
Dept: OUTPATIENT SERVICES | Facility: HOSPITAL | Age: 64
LOS: 1 days | Discharge: HOME | End: 2019-06-18
Payer: COMMERCIAL

## 2019-06-18 ENCOUNTER — APPOINTMENT (OUTPATIENT)
Dept: OTOLARYNGOLOGY | Facility: HOSPITAL | Age: 64
End: 2019-06-18

## 2019-06-18 VITALS
OXYGEN SATURATION: 98 % | TEMPERATURE: 99 F | HEIGHT: 60 IN | HEART RATE: 84 BPM | WEIGHT: 162.04 LBS | DIASTOLIC BLOOD PRESSURE: 69 MMHG | RESPIRATION RATE: 17 BRPM | SYSTOLIC BLOOD PRESSURE: 141 MMHG

## 2019-06-18 DIAGNOSIS — J38.01 PARALYSIS OF VOCAL CORDS AND LARYNX, UNILATERAL: ICD-10-CM

## 2019-06-18 DIAGNOSIS — Z90.722 ACQUIRED ABSENCE OF OVARIES, BILATERAL: Chronic | ICD-10-CM

## 2019-06-18 DIAGNOSIS — Z98.890 OTHER SPECIFIED POSTPROCEDURAL STATES: Chronic | ICD-10-CM

## 2019-06-18 DIAGNOSIS — Z90.89 ACQUIRED ABSENCE OF OTHER ORGANS: Chronic | ICD-10-CM

## 2019-06-18 DIAGNOSIS — Z90.710 ACQUIRED ABSENCE OF BOTH CERVIX AND UTERUS: Chronic | ICD-10-CM

## 2019-06-18 DIAGNOSIS — Z87.09 PERSONAL HISTORY OF OTHER DISEASES OF THE RESPIRATORY SYSTEM: ICD-10-CM

## 2019-06-18 DIAGNOSIS — Z90.49 ACQUIRED ABSENCE OF OTHER SPECIFIED PARTS OF DIGESTIVE TRACT: Chronic | ICD-10-CM

## 2019-06-18 PROCEDURE — 31591 LARYNGOPLASTY MEDIALIZATION: CPT | Mod: RT

## 2019-06-18 PROCEDURE — 99232 SBSQ HOSP IP/OBS MODERATE 35: CPT

## 2019-06-18 PROCEDURE — 31575 DIAGNOSTIC LARYNGOSCOPY: CPT | Mod: 59

## 2019-06-18 RX ORDER — FENTANYL CITRATE 50 UG/ML
25 INJECTION INTRAVENOUS
Refills: 0 | Status: DISCONTINUED | OUTPATIENT
Start: 2019-06-18 | End: 2019-06-19

## 2019-06-18 RX ORDER — ONDANSETRON 8 MG/1
4 TABLET, FILM COATED ORAL ONCE
Refills: 0 | Status: COMPLETED | OUTPATIENT
Start: 2019-06-18 | End: 2019-06-18

## 2019-06-18 RX ORDER — LIDOCAINE HCL 20 MG/ML
0.2 VIAL (ML) INJECTION ONCE
Refills: 0 | Status: COMPLETED | OUTPATIENT
Start: 2019-06-18 | End: 2019-06-18

## 2019-06-18 RX ORDER — CEPHALEXIN 500 MG
1 CAPSULE ORAL
Qty: 14 | Refills: 0
Start: 2019-06-18 | End: 2019-06-24

## 2019-06-18 RX ORDER — SODIUM CHLORIDE 9 MG/ML
3 INJECTION INTRAMUSCULAR; INTRAVENOUS; SUBCUTANEOUS EVERY 8 HOURS
Refills: 0 | Status: DISCONTINUED | OUTPATIENT
Start: 2019-06-18 | End: 2019-06-18

## 2019-06-18 RX ORDER — KETOROLAC TROMETHAMINE 30 MG/ML
15 SYRINGE (ML) INJECTION EVERY 6 HOURS
Refills: 0 | Status: DISCONTINUED | OUTPATIENT
Start: 2019-06-18 | End: 2019-06-19

## 2019-06-18 RX ORDER — SODIUM CHLORIDE 9 MG/ML
1000 INJECTION, SOLUTION INTRAVENOUS
Refills: 0 | Status: DISCONTINUED | OUTPATIENT
Start: 2019-06-18 | End: 2019-07-03

## 2019-06-18 RX ORDER — ACETAMINOPHEN 500 MG
1000 TABLET ORAL ONCE
Refills: 0 | Status: COMPLETED | OUTPATIENT
Start: 2019-06-18 | End: 2019-06-18

## 2019-06-18 RX ORDER — ACETAMINOPHEN 500 MG
1000 TABLET ORAL EVERY 6 HOURS
Refills: 0 | Status: COMPLETED | OUTPATIENT
Start: 2019-06-18 | End: 2019-06-18

## 2019-06-18 RX ADMIN — SODIUM CHLORIDE 75 MILLILITER(S): 9 INJECTION, SOLUTION INTRAVENOUS at 14:05

## 2019-06-18 RX ADMIN — Medication 400 MILLIGRAM(S): at 22:45

## 2019-06-18 RX ADMIN — FENTANYL CITRATE 25 MICROGRAM(S): 50 INJECTION INTRAVENOUS at 14:34

## 2019-06-18 RX ADMIN — ONDANSETRON 4 MILLIGRAM(S): 8 TABLET, FILM COATED ORAL at 23:40

## 2019-06-18 RX ADMIN — FENTANYL CITRATE 25 MICROGRAM(S): 50 INJECTION INTRAVENOUS at 23:35

## 2019-06-18 RX ADMIN — Medication 400 MILLIGRAM(S): at 16:27

## 2019-06-18 RX ADMIN — FENTANYL CITRATE 25 MICROGRAM(S): 50 INJECTION INTRAVENOUS at 14:45

## 2019-06-18 RX ADMIN — SODIUM CHLORIDE 3 MILLILITER(S): 9 INJECTION INTRAMUSCULAR; INTRAVENOUS; SUBCUTANEOUS at 10:12

## 2019-06-18 RX ADMIN — Medication 15 MILLIGRAM(S): at 20:39

## 2019-06-18 RX ADMIN — Medication 0.2 MILLILITER(S): at 10:12

## 2019-06-18 RX ADMIN — FENTANYL CITRATE 25 MICROGRAM(S): 50 INJECTION INTRAVENOUS at 23:40

## 2019-06-18 RX ADMIN — Medication 1000 MILLIGRAM(S): at 23:28

## 2019-06-18 RX ADMIN — Medication 15 MILLIGRAM(S): at 21:10

## 2019-06-18 RX ADMIN — Medication 1000 MILLIGRAM(S): at 17:00

## 2019-06-18 NOTE — PRE-ANESTHESIA EVALUATION ADULT - NSANTHADDINFOFT_GEN_ALL_CORE
Patient ID'd, chart & Hx reviewed, Pt seen & examined.  Plan for MAC w/ routine monitors, 1 x PIV, PACU post-op discussed with the patient; risks/benefits including death, CVA & MI explained.  I answered all questions and presented other anesthetic options.  The patient provided informed consent and expressed a willingness to proceed.

## 2019-06-18 NOTE — PROGRESS NOTE ADULT - ASSESSMENT
62 yo female s/p thyroplasty POD0 for Vocal cord paralysis w anterior neck pain controlled with medications. Tolerating ice chips. Anterior neck incision w dressing c/d/i/flat. ZOE drain w minimal serosanguinous output

## 2019-06-18 NOTE — ASU DISCHARGE PLAN (ADULT/PEDIATRIC) - CARE PROVIDER_API CALL
Sanjuanita Ye)  Otolaryngology  [address] [place] [address], [address]  [place] [address]  Phone: [phone]  Fax: [phone]  Follow Up Time:

## 2019-06-18 NOTE — PROGRESS NOTE ADULT - SUBJECTIVE AND OBJECTIVE BOX
ENT ISSUE/POD: S/p thyroplasty POD0 for Vocal cord paralysis    HPI: 64 yo female s/p thyroplasty POD0 for Vocal cord paralysis seen and examined postoperatively in the recovery room. Pt c/o mild throbbing pain at anterior neck incision site, made better with IV tylenol last given at 4:30. So far has been tolerating ice chips. Reports improved voice since the procedure. Pt otherwise denies hemoptysis, uncontrolled pain, n/v, difficulty breathing.         PAST MEDICAL & SURGICAL HISTORY:  Degenerative lumbar disc  Neuropathic pain of finger, left: numbness 4th and 5th digit  H/O vocal cord paralysis: right  Cervical radiculopathy  Glaucoma: b/l  Gastroesophageal reflux disease without esophagitis  Hyperlipidemia  Osteoarthritis of cervical spine with myelopathy  Spinal stenosis in cervical region  Herniated cervical disc  History of endometriosis  Hiatal hernia  History of diverticulitis  History of gastric ulcer  Hypertension  History of iron deficiency anemia  History of migraine  S/P spinal surgery: 6/17/17 - cervical region with hardware 9/2017 and 11/2017  History of tonsillectomy  History of arthroscopy of right knee  History of carpal tunnel release  History of cholecystectomy  History of appendectomy  History of bilateral oophorectomy  History of hysterectomy    Allergies    No Known Allergies    Intolerances    morphine (Nausea; Vomiting)  Percocet 5/325 (Nausea; Vomiting)    MEDICATIONS  (STANDING):  lactated ringers. 1000 milliLiter(s) (75 mL/Hr) IV Continuous <Continuous>    MEDICATIONS  (PRN):  fentaNYL    Injectable 25 MICROGram(s) IV Push every 5 minutes PRN Moderate Pain (4 - 6)  ondansetron Injectable 4 milliGRAM(s) IV Push once PRN Nausea and/or Vomiting      ROS:   ENT: all negative except as noted in HPI   Pulm: denies SOB, cough, hemoptysis  Neuro: denies numbness/tingling, loss of sensation  Endo: denies heat/cold intolerance, excessive sweating      Vital Signs Last 24 Hrs  T(C): 36.6 (18 Jun 2019 19:00), Max: 37.1 (18 Jun 2019 09:41)  T(F): 97.9 (18 Jun 2019 19:00), Max: 98.8 (18 Jun 2019 09:41)  HR: 80 (18 Jun 2019 19:00) (68 - 84)  BP: 118/59 (18 Jun 2019 19:00) (90/53 - 141/69)  BP(mean): 84 (18 Jun 2019 18:00) (84 - 84)  RR: 18 (18 Jun 2019 19:00) (16 - 20)  SpO2: 98% (18 Jun 2019 19:00) (95% - 100%)              PHYSICAL EXAM:  Gen: NAD, OOB in chair, speaking in complete sentences without difficulty  Skin: No rashes, bruises, or lesions  Head: Normocephalic, Atraumatic  Face: no edema, erythema, or fluctuance. Parotid glands soft without mass  Eyes: no scleral injection  Nose: Nares bilaterally patent, no discharge  Mouth: No Stridor / Drooling / Trismus.  Mucosa moist, tongue/uvula midline, oropharynx clear  Neck: Flat, supple, no lymphadenopathy, trachea midline, no masses, anterior incision site with telfa and tegaderm dressing, surround area flat, soft, ZOE drain in place with minimal serosanguinous output  Lymphatic: No lymphadenopathy  Resp: no stridor  Neuro: facial nerve intact, no facial droop

## 2019-06-18 NOTE — ASU DISCHARGE PLAN (ADULT/PEDIATRIC) - ASU DC SPECIAL INSTRUCTIONSFT
Follow up with Dr. Ye in 1 week, voice rest x 2 weeks Follow up with Dr. Ye in 1 week, voice rest x 2 weeks  soft diet x 2 weeks

## 2019-06-18 NOTE — PROGRESS NOTE ADULT - PROBLEM SELECTOR PLAN 1
Now s/p thyroplasty  Pain control prn  Advance diet as tolerated  Plan for drain removal in AM if output is adequate  Home tomorrow am

## 2019-06-19 VITALS
SYSTOLIC BLOOD PRESSURE: 158 MMHG | RESPIRATION RATE: 16 BRPM | OXYGEN SATURATION: 96 % | DIASTOLIC BLOOD PRESSURE: 74 MMHG | HEART RATE: 96 BPM | TEMPERATURE: 98 F

## 2019-06-19 PROCEDURE — 31513 INJECTION INTO VOCAL CORD: CPT

## 2019-06-19 PROCEDURE — C1878: CPT

## 2019-06-19 RX ORDER — ACETAMINOPHEN 500 MG
750 TABLET ORAL ONCE
Refills: 0 | Status: COMPLETED | OUTPATIENT
Start: 2019-06-19 | End: 2019-06-19

## 2019-06-19 RX ADMIN — Medication 15 MILLIGRAM(S): at 04:05

## 2019-06-19 RX ADMIN — Medication 300 MILLIGRAM(S): at 07:38

## 2019-06-19 RX ADMIN — Medication 750 MILLIGRAM(S): at 07:40

## 2019-06-19 RX ADMIN — Medication 15 MILLIGRAM(S): at 03:45

## 2019-06-19 NOTE — PROGRESS NOTE ADULT - SUBJECTIVE AND OBJECTIVE BOX
ENT ISSUE/POD: S/p thyroplasty POD1 for vocal cord paralysis    HPI: 64 yo female s/p thyroplasty POD1 for vocal cord paralysis seen and examined this morning, no acute events overnight. Pt c/o mild pain at anterior neck incision site, well controlled with pain meds, tylenol. Reports improved voice since the procedure. Pt otherwise denies hemoptysis, uncontrolled pain, n/v, difficulty breathing.     PAST MEDICAL & SURGICAL HISTORY:  Degenerative lumbar disc  Neuropathic pain of finger, left: numbness 4th asnd 5th digit  H/O vocal cord paralysis: right  Cervical radiculopathy  Glaucoma: b/l  Gastroesophageal reflux disease without esophagitis  Hyperlipidemia  Osteoarthritis of cervical spine with myelopathy  Spinal stenosis in cervical region  Herniated cervical disc  History of endometriosis  Hiatal hernia  History of diverticulitis  History of gastric ulcer  Hypertension  History of iron deficiency anemia  History of migraine  S/P spinal surgery: 6/17/17 - cervical region with hardware 9/2017 and 11/2017  History of tonsillectomy  History of arthroscopy of right knee  History of carpal tunnel release  History of cholecystectomy  History of appendectomy  History of bilateral oophorectomy  History of hysterectomy    Allergies: No Known Allergies  Intolerances: No Known Intolerances     morphine (Nausea; Vomiting)  Percocet 5/325 (Nausea; Vomiting)    MEDICATIONS  (STANDING):  lactated ringers. 1000 milliLiter(s) (75 mL/Hr) IV Continuous <Continuous>    MEDICATIONS  (PRN):  fentaNYL    Injectable 25 MICROGram(s) IV Push every 5 minutes PRN Moderate Pain (4 - 6)  ketorolac   Injectable 15 milliGRAM(s) IV Push every 6 hours PRN Moderate Pain (4 - 6)    Social History: see consult note   Family history: see consult note     ROS:   ENT: all negative except as noted in HPI   Pulm: denies SOB, cough, hemoptysis  Neuro: denies numbness/tingling, loss of sensation  Endo: denies heat/cold intolerance, excessive sweating    Vital Signs Last 24 Hrs  T(C): 36 (19 Jun 2019 06:00), Max: 37.1 (18 Jun 2019 09:41)  T(F): 96.8 (19 Jun 2019 06:00), Max: 98.8 (18 Jun 2019 09:41)  HR: 86 (19 Jun 2019 06:00) (68 - 93)  BP: 129/58 (19 Jun 2019 06:00) (90/53 - 141/69)  BP(mean): 84 (19 Jun 2019 06:00) (83 - 100)  RR: 16 (19 Jun 2019 06:00) (16 - 20)  SpO2: 94% (19 Jun 2019 06:00) (93% - 100%)    PHYSICAL EXAM:  Gen: NAD, OOB in chair, speaking in complete sentences without difficulty  Skin: No rashes, bruises, or lesions  Head: Normocephalic, Atraumatic  Face: no edema, erythema, or fluctuance. Parotid glands soft without mass  Eyes: no scleral injection  Nose: Nares bilaterally patent, no discharge  Mouth: No Stridor / Drooling / Trismus.  Mucosa moist, tongue/uvula midline, oropharynx clear  Neck: Flat, supple, no lymphadenopathy, trachea midline, no masses, anterior incision site with telfa and tegaderm dressing, surround area flat, soft, ZOE drain in place with minimal serosanguinous output  Lymphatic: No lymphadenopathy  Resp: no stridor  Neuro: facial nerve intact, no facial droop    ZOE drain output: (inaccurate in computer) will discuss with nurse ENT ISSUE/POD: S/p thyroplasty POD1 for vocal cord paralysis    HPI: 64 yo female s/p thyroplasty POD1 for vocal cord paralysis seen and examined this morning, no acute events overnight. Pt c/o mild pain at anterior neck incision site, well controlled with pain meds, tylenol. Reports improved voice since the procedure. Pt otherwise denies hemoptysis, uncontrolled pain, n/v, difficulty breathing.     PAST MEDICAL & SURGICAL HISTORY:  Degenerative lumbar disc  Neuropathic pain of finger, left: numbness 4th asnd 5th digit  H/O vocal cord paralysis: right  Cervical radiculopathy  Glaucoma: b/l  Gastroesophageal reflux disease without esophagitis  Hyperlipidemia  Osteoarthritis of cervical spine with myelopathy  Spinal stenosis in cervical region  Herniated cervical disc  History of endometriosis  Hiatal hernia  History of diverticulitis  History of gastric ulcer  Hypertension  History of iron deficiency anemia  History of migraine  S/P spinal surgery: 6/17/17 - cervical region with hardware 9/2017 and 11/2017  History of tonsillectomy  History of arthroscopy of right knee  History of carpal tunnel release  History of cholecystectomy  History of appendectomy  History of bilateral oophorectomy  History of hysterectomy    Allergies: No Known Allergies  Intolerances: No Known Intolerances     morphine (Nausea; Vomiting)  Percocet 5/325 (Nausea; Vomiting)    MEDICATIONS  (STANDING):  lactated ringers. 1000 milliLiter(s) (75 mL/Hr) IV Continuous <Continuous>    MEDICATIONS  (PRN):  fentaNYL    Injectable 25 MICROGram(s) IV Push every 5 minutes PRN Moderate Pain (4 - 6)  ketorolac   Injectable 15 milliGRAM(s) IV Push every 6 hours PRN Moderate Pain (4 - 6)    Social History: see consult note   Family history: see consult note     ROS:   ENT: all negative except as noted in HPI   Pulm: denies SOB, cough, hemoptysis  Neuro: denies numbness/tingling, loss of sensation  Endo: denies heat/cold intolerance, excessive sweating    Vital Signs Last 24 Hrs  T(C): 36 (19 Jun 2019 06:00), Max: 37.1 (18 Jun 2019 09:41)  T(F): 96.8 (19 Jun 2019 06:00), Max: 98.8 (18 Jun 2019 09:41)  HR: 86 (19 Jun 2019 06:00) (68 - 93)  BP: 129/58 (19 Jun 2019 06:00) (90/53 - 141/69)  BP(mean): 84 (19 Jun 2019 06:00) (83 - 100)  RR: 16 (19 Jun 2019 06:00) (16 - 20)  SpO2: 94% (19 Jun 2019 06:00) (93% - 100%)    PHYSICAL EXAM:  Gen: NAD, OOB in chair, speaking in complete sentences without difficulty  Skin: No rashes, bruises, or lesions  Head: Normocephalic, Atraumatic  Face: no edema, erythema, or fluctuance. Parotid glands soft without mass  Eyes: no scleral injection  Nose: Nares bilaterally patent, no discharge  Mouth: No Stridor / Drooling / Trismus.  Mucosa moist, tongue/uvula midline, oropharynx clear  Neck: Flat, supple, no lymphadenopathy, trachea midline, no masses, anterior incision site with telfa and tegaderm dressing, surround area flat, soft, ZOE drain in place with minimal serosanguinous output - drain removed, dressing changed with clean gauze and tegaderm  Lymphatic: No lymphadenopathy  Resp: no stridor  Neuro: facial nerve intact, no facial droop    ZOE drain output: (inaccurate in computer) will discuss with nurse

## 2019-06-19 NOTE — PROGRESS NOTE ADULT - PROBLEM SELECTOR PLAN 1
Pain control prn  Advance diet as tolerated  Plan for drain removal later today   D/C planning today Pain control prn  soft diet   D/C home   discussed with Dr. Ye

## 2019-06-19 NOTE — PROGRESS NOTE ADULT - ASSESSMENT
62 y/o female s/p thyroplasty POD1 for Vocal cord paralysis. 62 y/o female s/p thyroplasty POD1 for Vocal cord paralysis. ZOE drain removed, dressing changed. Pt ok to be discharged home.

## 2019-06-26 ENCOUNTER — APPOINTMENT (OUTPATIENT)
Dept: OTOLARYNGOLOGY | Facility: CLINIC | Age: 64
End: 2019-06-26
Payer: COMMERCIAL

## 2019-06-26 VITALS
SYSTOLIC BLOOD PRESSURE: 115 MMHG | BODY MASS INDEX: 29.45 KG/M2 | HEART RATE: 81 BPM | HEIGHT: 60 IN | DIASTOLIC BLOOD PRESSURE: 74 MMHG | WEIGHT: 150 LBS

## 2019-06-26 PROBLEM — M51.36 OTHER INTERVERTEBRAL DISC DEGENERATION, LUMBAR REGION: Chronic | Status: ACTIVE | Noted: 2019-06-11

## 2019-06-26 PROBLEM — M79.2 NEURALGIA AND NEURITIS, UNSPECIFIED: Chronic | Status: ACTIVE | Noted: 2019-06-11

## 2019-06-26 PROCEDURE — 99024 POSTOP FOLLOW-UP VISIT: CPT

## 2019-06-26 PROCEDURE — 31575 DIAGNOSTIC LARYNGOSCOPY: CPT | Mod: 58

## 2019-06-26 RX ORDER — METHYLPREDNISOLONE 4 MG/1
4 TABLET ORAL
Qty: 1 | Refills: 0 | Status: DISCONTINUED | COMMUNITY
Start: 2019-06-26 | End: 2019-06-26

## 2019-06-26 NOTE — PHYSICAL EXAM
[Normal] : normal appearance, well groomed, well nourished, and in no acute distress [de-identified] : Anterior surgical site intact. Steri strip in place was removed and prolene removed, mild edema of anterior neck but soft

## 2019-06-26 NOTE — HISTORY OF PRESENT ILLNESS
[de-identified] : S/P Right Vocal cord Arvizu implant. \par Feels voice is better, but still raspy.  No throat pain, no trouble eating or drinking.  Is still doing soft diet.  No SOB.   No bleeding.  \par Pt. also now notes that she lost her sense of smell a few month ago.  [place] it was due to Lyrica.  no nasal congestion or sinus pain or pressure.  Has tried nasal saline without improvement.  She has stopped the Lyrica and feels her smell may be returning slightly.

## 2019-06-26 NOTE — PROCEDURE
[de-identified] : Afrin 0.05% and lidocaine 4% sprayed into both nasal passages. Flexible scope #2 used. Passed through nasal passage and nasopharynx/oropharynx/hypopharynx clear. Supraglottis normal. Glottis with right vocal cord paralyzed in median position with moderate edema of cord. Airway is narrowed but adequate. Visualized subglottis clear. Postcricoid area without erythema. No pooling of secretions.

## 2019-06-26 NOTE — ASSESSMENT
[FreeTextEntry1] : right VC paralysis s/p right Mongotmery implant:\par - doing well\par - some edema still and notes mild SOB with exertion so will add medrol dosepak\par - f/u in 3-4 weeks to assess overall improvement and consider speech therapy

## 2019-07-03 ENCOUNTER — APPOINTMENT (OUTPATIENT)
Dept: OTOLARYNGOLOGY | Facility: CLINIC | Age: 64
End: 2019-07-03
Payer: COMMERCIAL

## 2019-07-03 VITALS
HEART RATE: 78 BPM | DIASTOLIC BLOOD PRESSURE: 81 MMHG | WEIGHT: 150 LBS | SYSTOLIC BLOOD PRESSURE: 141 MMHG | BODY MASS INDEX: 29.45 KG/M2 | HEIGHT: 60 IN

## 2019-07-03 PROCEDURE — 99024 POSTOP FOLLOW-UP VISIT: CPT

## 2019-07-03 PROCEDURE — 31575 DIAGNOSTIC LARYNGOSCOPY: CPT | Mod: 58

## 2019-07-03 NOTE — REASON FOR VISIT
[Post-Operative Visit] : a post-operative visit [FreeTextEntry2] : S/P Right Vocal cord Avrizu implant.

## 2019-07-03 NOTE — ASSESSMENT
[FreeTextEntry1] : right VC paralysis s/p right Arvizu implant:\par - notes she did well while on steroid but after completing it yesterday she noticed problem yesterday after choking some food with increased SOB\par - airway appears adequate and did well on the medrol suggesting that edema is part of the problem; will give a longer course of prednisone 40mg taper over 12 days\par - f/u in 2 weeks; called pt's daughter (a doctor) and discussed the situation; explained that by medializing the cord this inherently narrows the airway and if the pt is not tolerating it we will consider removing and replacing with smaller implant vs completely removing\par - f/u 2 weeks to reassess; advised to call immediately if any worsening in breathing or go to nearest ED\par \par HTN\par - F/U with PMD\par

## 2019-07-03 NOTE — PROCEDURE
[de-identified] : Afrin 0.05% and lidocaine 4% sprayed into both nasal passages. Flexible scope #2 used. Passed through nasal passage and nasopharynx/oropharynx/hypopharynx clear. Supraglottis normal. Glottis with right vocal cord paralyzed in median position with minimal edema of cord. Airway is narrowed but appears adequate. Visualized subglottis clear. Postcricoid area without erythema. No pooling of secretions. No evidence of hematoma or infection.

## 2019-07-03 NOTE — PHYSICAL EXAM
[Normal] : normal appearance, well groomed, well nourished, and in no acute distress [de-identified] : incision well healed, soft without erythema/induration

## 2019-07-03 NOTE — HISTORY OF PRESENT ILLNESS
[de-identified] : S/P Right Vocal cord Arvizu implant. \par At last visit was given Medrol dose dontae for mild swelling.  Feels Voice improved after taking, however 2 days ago started having increased raspy voice and globus sensation.  No pain, however is feeling SOB.  No noisy breathing.  Feels she "runs out of air" when talking. \par Notes her sense of smell improved while on Abx, however has since decreased again.

## 2019-07-17 ENCOUNTER — APPOINTMENT (OUTPATIENT)
Dept: OTOLARYNGOLOGY | Facility: CLINIC | Age: 64
End: 2019-07-17
Payer: COMMERCIAL

## 2019-07-17 VITALS
HEIGHT: 60 IN | HEART RATE: 88 BPM | WEIGHT: 150 LBS | DIASTOLIC BLOOD PRESSURE: 77 MMHG | BODY MASS INDEX: 29.45 KG/M2 | SYSTOLIC BLOOD PRESSURE: 137 MMHG

## 2019-07-17 PROCEDURE — 99024 POSTOP FOLLOW-UP VISIT: CPT

## 2019-07-17 PROCEDURE — 31575 DIAGNOSTIC LARYNGOSCOPY: CPT | Mod: 58

## 2019-07-17 NOTE — REASON FOR VISIT
[Post-Operative Visit] : a post-operative visit [FreeTextEntry2] : S/P Right Vocal cord Arvizu implant.

## 2019-07-17 NOTE — PHYSICAL EXAM
[Normal] : normal appearance, well groomed, well nourished, and in no acute distress [de-identified] : incision well healed, soft without erythema/induration

## 2019-07-17 NOTE — ASSESSMENT
[FreeTextEntry1] : right VC paralysis s/p right Arvizu implant:\par - significantly improved while on the steroid and doing well after taper completed this Sunday\par - explained that by medializing the cord this inherently narrows the airway and if the pt is not tolerating it we will consider removing and replacing with smaller implant vs completely removing\par - she is currently slowly improving; if she notes and persistent concerns or SOB will send to pulm for PFT to assess if significant obstruction\par - at this time patient would like to continue to observe as she feels she is improving\par - any worsening in breathing advised to go to nearest ED or if mild recommend call office and we can send in short course of steroids as temporizing measure\par \par HTN\par - F/U with PMD\par

## 2019-07-17 NOTE — HISTORY OF PRESENT ILLNESS
[de-identified] : S/P Right Vocal cord Arvizu implant. \par At last visit was given longer higher dose steroid taper.  Feels Voice improved, however Continues to have raspy voice that comes and goes.  No pain, however is feeling SOB and "runs out of air" when talking and with stairs.  No longer with choking sensation with eating and feels her swallowing is improved compared to before surgery.\par Notes her sense of smell improved while on Abx, however has since decreased again.

## 2019-07-17 NOTE — PROCEDURE
[de-identified] : Afrin 0.05% and lidocaine 4% sprayed into both nasal passages. Flexible scope #2 used. Passed through nasal passage and nasopharynx/oropharynx/hypopharynx clear. Supraglottis normal. Glottis with right vocal cord paralyzed in median position with resolved edema of cord. Airway is narrowed but appears adequate especially with good abduction of left cord. Visualized subglottis clear. Postcricoid area without erythema. No pooling of secretions. No evidence of hematoma or infection.

## 2019-08-20 NOTE — H&P PST ADULT - NS PRO ABUSE SCREEN SUSPICION NEGLECT YN
no

## 2019-08-21 ENCOUNTER — APPOINTMENT (OUTPATIENT)
Dept: OTOLARYNGOLOGY | Facility: CLINIC | Age: 64
End: 2019-08-21
Payer: COMMERCIAL

## 2019-08-21 VITALS
BODY MASS INDEX: 29.45 KG/M2 | WEIGHT: 150 LBS | SYSTOLIC BLOOD PRESSURE: 142 MMHG | HEART RATE: 96 BPM | HEIGHT: 60 IN | DIASTOLIC BLOOD PRESSURE: 84 MMHG

## 2019-08-21 PROCEDURE — 99213 OFFICE O/P EST LOW 20 MIN: CPT | Mod: 25

## 2019-08-21 PROCEDURE — 31575 DIAGNOSTIC LARYNGOSCOPY: CPT | Mod: 58

## 2019-08-22 NOTE — PHYSICAL EXAM
[Normal] : normal appearance, well groomed, well nourished, and in no acute distress [de-identified] : incision well healed, soft without erythema/induration

## 2019-08-22 NOTE — PROCEDURE
[de-identified] : Afrin 0.05% and lidocaine 4% sprayed into both nasal passages. Flexible scope #2 used. Passed through nasal passage and nasopharynx/oropharynx/hypopharynx clear. Supraglottis normal. Glottis with right vocal cord paralyzed in median position with resolved edema of cord. Airway is narrowed but appears adequate especially with good abduction of left cord. Visualized subglottis clear. Postcricoid area without erythema. No pooling of secretions. No evidence of hematoma or infection.

## 2019-08-22 NOTE — ASSESSMENT
[FreeTextEntry1] : right VC paralysis s/p right Arvizu implant:\par - improved while on steroids and seems to be overall tolerating ok however concerned about her inability to exert herself without feeling SOB\par - explained that by medializing the cord this inherently narrows the airway and if the pt is not tolerating it we will consider removing and replacing with smaller implant vs completely removing\par - recommend f/u with Dr. Mathis of pulmonary to eval for PFT and assess if she is significantly obstructed; this may guide us in deciding whether to remove the implant or not\par - f/u 1 month or sooner if neede\par \par HTN\par - F/U with PMD\par

## 2019-08-22 NOTE — HISTORY OF PRESENT ILLNESS
[de-identified] : S/P Right Vocal cord Arvizu implant. \par Voice is good with mild raspy intervals.  No pain, however is feeling SOB and "runs out of air" when talking and with stairs.  No longer with choking sensation with eating and feels her swallowing is improved compared to before surgery.\par Notes her sense of smell improved while on Abx, however has since decreased again.

## 2019-08-23 ENCOUNTER — APPOINTMENT (OUTPATIENT)
Dept: PULMONOLOGY | Facility: CLINIC | Age: 64
End: 2019-08-23
Payer: COMMERCIAL

## 2019-08-23 VITALS — HEART RATE: 94 BPM | BODY MASS INDEX: 31.22 KG/M2 | WEIGHT: 159 LBS | HEIGHT: 60 IN | OXYGEN SATURATION: 95 %

## 2019-08-23 VITALS
HEIGHT: 60 IN | HEART RATE: 87 BPM | DIASTOLIC BLOOD PRESSURE: 81 MMHG | WEIGHT: 159 LBS | BODY MASS INDEX: 31.22 KG/M2 | TEMPERATURE: 98.3 F | RESPIRATION RATE: 16 BRPM | SYSTOLIC BLOOD PRESSURE: 147 MMHG

## 2019-08-23 DIAGNOSIS — R06.09 OTHER FORMS OF DYSPNEA: ICD-10-CM

## 2019-08-23 DIAGNOSIS — R06.02 SHORTNESS OF BREATH: ICD-10-CM

## 2019-08-23 DIAGNOSIS — Z78.9 OTHER SPECIFIED HEALTH STATUS: ICD-10-CM

## 2019-08-23 PROCEDURE — 94726 PLETHYSMOGRAPHY LUNG VOLUMES: CPT

## 2019-08-23 PROCEDURE — ZZZZZ: CPT

## 2019-08-23 PROCEDURE — 94060 EVALUATION OF WHEEZING: CPT

## 2019-08-23 PROCEDURE — 99204 OFFICE O/P NEW MOD 45 MIN: CPT | Mod: 25

## 2019-08-23 PROCEDURE — 94729 DIFFUSING CAPACITY: CPT

## 2019-09-05 NOTE — DISCHARGE NOTE ADULT - ADMISSION DATE +STARTOFVISITDATE
Pap   Mammo ordered       Patient ID: Valencia Lee is a 48 year old female.     Chief complaint: had concerns including New Patient (Non Fasting).    HPI    Patient is here for an annual preventive exam    More achy than usual - knees mostly , is a runner, x 6-7 mos, no erythema/warmth/swelling. advil doesn't help. Still exercising, but not wose after.     periods gone for a year     Past medical history-   Patient Active Problem List   Diagnosis   • Chronic insomnia   • Depression with anxiety   • Annual physical exam     Surgeries-   Past Surgical History:   Procedure Laterality Date   •  section, classic     •  section, low transverse        Medications-  Home Medications    Medication Directions Start Date End Date   sertraline (ZOLOFT) 100 MG tablet Take 1 tablet by mouth 2 times daily. 19    zolpidem (AMBIEN) 5 MG tablet Take 1 tablet by mouth nightly as needed (Take 1 tablet daily at bedtime PRN). 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    Patient's past medical, surgical, social and family histories were reviewed and updated as appropriate.    Vitals:  Blood pressure 112/78, pulse 63, height 5' 10\" (1.778 m), weight 79.4 kg (175 lb), last menstrual period 2019.    Physical Exam      GENERAL: well nourished, well developed, in no acute distress  HEENT:NC/AT, anicteric, EOMI, no hemorrhage or exudate,  external ear and ear canals WNL, TMs intact without inflammation, fluid, or perforation, no nasal deformity, teeth and o/p nl  NECK: supple, full ROM, no thyromegaly or adenopathy, carotids without bruit  BREASTS: per gyne  LUNGS: clear to auscultation, no wheezes, rhonchi, rales  CV: RRR, no murmur, no S3, no S4  ABDOMEN: soft, NT, ND, no HSM, nl bowel sounds  : per gyne  MUSCULOSKELETAL: good strength and FROM of all 4 extremities, no joint erythema or swelling noted, FROM of lower back  PULSES: 2+ DP pulses   EXTREMITIES: no cyanosis, clubbing, edema  NEURO: no AMS, CN II-XII nl, DTR 2+ at patella and brachioradialis, nl gait  LYMPH NODES: no significant LAD  Skin: No rash or lesions  Psychiatric: Alert, oriented, normal attention span    ASSESSMENT AND PLAN  HEALTH MAINTENANCE  PLAN: Check CBC, CMP, TSH, lipids, UA, iFOBT.   SBE, exercise and a good diet encouraged.   Pap per gyne and UTD.   Mammogram and DEXA UTD.   Colonoscopy UTD.   Immunizations are UTD.     Chronic pain of both knees  Check labs    Depression with anxiety  well controlled, CPM     Chronic insomnia  well controlled, CPM     Encouraged to get flu shot in the fall.  Smoking assessed and advised to remain a non smoker. Aspirin use discussed      Follow up: pending labs  Patient has been given the opportunity to ask questions, and I have attempted to answer these questions to the best of my ability. Patient verbalizes understanding of the diagnosis and plan and has no further questions today.    Discussed medication dosage, usage, goals of therapy, and side effects.  The patient indicates understanding of these issues and agrees with the plan.    Odette Panda MD                  Statement Selected

## 2019-09-20 ENCOUNTER — APPOINTMENT (OUTPATIENT)
Dept: OTOLARYNGOLOGY | Facility: CLINIC | Age: 64
End: 2019-09-20
Payer: COMMERCIAL

## 2019-09-20 VITALS
WEIGHT: 159 LBS | BODY MASS INDEX: 31.22 KG/M2 | SYSTOLIC BLOOD PRESSURE: 125 MMHG | HEART RATE: 98 BPM | DIASTOLIC BLOOD PRESSURE: 81 MMHG | HEIGHT: 60 IN

## 2019-09-20 PROCEDURE — 99213 OFFICE O/P EST LOW 20 MIN: CPT | Mod: 25

## 2019-09-20 PROCEDURE — 31575 DIAGNOSTIC LARYNGOSCOPY: CPT

## 2019-09-20 NOTE — PHYSICAL EXAM
[Normal] : normal appearance, well groomed, well nourished, and in no acute distress [de-identified] : incision well healed, soft without erythema/induration

## 2019-09-20 NOTE — HISTORY OF PRESENT ILLNESS
[de-identified] : S/P Right Vocal cord Arvizu implant. \par Voice is good with mild raspy intervals that have been progressing lately.  No pain, however is feeling SOB and "runs out of air" when talking and with stairs.   Still poor sense of smell. \par Has seen Dr. Mathis who sent for CT neck and chest - showing Rt. VC implant without abnormality.  \par

## 2019-09-20 NOTE — ASSESSMENT
[FreeTextEntry1] : right VC paralysis s/p right Arvizu implant:\par - saw Dr. Mathis and seems to have some flattening of her spirometry but was unclear if obstruction was main issue so she sent for neck and chest CT\par - these studies were essentially unremarkable so recommend at this point consideration for removal of implant and either leave out or replace with smaller size\par - would recommend awake injection in office in future secondary to her difficult exposure for injection the first time\par \par HTN\par - F/U with PMD\par

## 2019-09-20 NOTE — PROCEDURE
[de-identified] : Afrin 0.05% and lidocaine 4% sprayed into both nasal passages. Flexible scope #2 used. Passed through nasal passage and nasopharynx/oropharynx/hypopharynx clear. Supraglottis normal. Glottis with right vocal cord paralyzed in median position with no edema of cord. Airway is narrowed but appears adequate especially with good abduction of left cord. Visualized subglottis clear. Postcricoid area without erythema. No pooling of secretions. No evidence of hematoma or infection.

## 2019-10-08 ENCOUNTER — OUTPATIENT (OUTPATIENT)
Dept: OUTPATIENT SERVICES | Facility: HOSPITAL | Age: 64
LOS: 1 days | End: 2019-10-08
Payer: COMMERCIAL

## 2019-10-08 VITALS
SYSTOLIC BLOOD PRESSURE: 128 MMHG | HEART RATE: 86 BPM | HEIGHT: 60 IN | TEMPERATURE: 98 F | RESPIRATION RATE: 20 BRPM | DIASTOLIC BLOOD PRESSURE: 83 MMHG | WEIGHT: 159.39 LBS | OXYGEN SATURATION: 96 %

## 2019-10-08 DIAGNOSIS — I10 ESSENTIAL (PRIMARY) HYPERTENSION: ICD-10-CM

## 2019-10-08 DIAGNOSIS — Z90.89 ACQUIRED ABSENCE OF OTHER ORGANS: Chronic | ICD-10-CM

## 2019-10-08 DIAGNOSIS — J38.01 PARALYSIS OF VOCAL CORDS AND LARYNX, UNILATERAL: ICD-10-CM

## 2019-10-08 DIAGNOSIS — Z98.890 OTHER SPECIFIED POSTPROCEDURAL STATES: Chronic | ICD-10-CM

## 2019-10-08 DIAGNOSIS — Z90.722 ACQUIRED ABSENCE OF OVARIES, BILATERAL: Chronic | ICD-10-CM

## 2019-10-08 DIAGNOSIS — Z90.49 ACQUIRED ABSENCE OF OTHER SPECIFIED PARTS OF DIGESTIVE TRACT: Chronic | ICD-10-CM

## 2019-10-08 DIAGNOSIS — Z90.710 ACQUIRED ABSENCE OF BOTH CERVIX AND UTERUS: Chronic | ICD-10-CM

## 2019-10-08 DIAGNOSIS — Z87.09 PERSONAL HISTORY OF OTHER DISEASES OF THE RESPIRATORY SYSTEM: ICD-10-CM

## 2019-10-08 DIAGNOSIS — Z01.818 ENCOUNTER FOR OTHER PREPROCEDURAL EXAMINATION: ICD-10-CM

## 2019-10-08 DIAGNOSIS — Z87.09 PERSONAL HISTORY OF OTHER DISEASES OF THE RESPIRATORY SYSTEM: Chronic | ICD-10-CM

## 2019-10-08 LAB
ANION GAP SERPL CALC-SCNC: 14 MMOL/L — SIGNIFICANT CHANGE UP (ref 5–17)
BUN SERPL-MCNC: 10 MG/DL — SIGNIFICANT CHANGE UP (ref 7–23)
CALCIUM SERPL-MCNC: 9.5 MG/DL — SIGNIFICANT CHANGE UP (ref 8.4–10.5)
CHLORIDE SERPL-SCNC: 102 MMOL/L — SIGNIFICANT CHANGE UP (ref 96–108)
CO2 SERPL-SCNC: 23 MMOL/L — SIGNIFICANT CHANGE UP (ref 22–31)
CREAT SERPL-MCNC: 0.72 MG/DL — SIGNIFICANT CHANGE UP (ref 0.5–1.3)
GLUCOSE SERPL-MCNC: 93 MG/DL — SIGNIFICANT CHANGE UP (ref 70–99)
POTASSIUM SERPL-MCNC: 4.5 MMOL/L — SIGNIFICANT CHANGE UP (ref 3.5–5.3)
POTASSIUM SERPL-SCNC: 4.5 MMOL/L — SIGNIFICANT CHANGE UP (ref 3.5–5.3)
SODIUM SERPL-SCNC: 139 MMOL/L — SIGNIFICANT CHANGE UP (ref 135–145)

## 2019-10-08 PROCEDURE — G0463: CPT

## 2019-10-08 PROCEDURE — 80048 BASIC METABOLIC PNL TOTAL CA: CPT

## 2019-10-08 PROCEDURE — 85027 COMPLETE CBC AUTOMATED: CPT

## 2019-10-08 RX ORDER — SODIUM CHLORIDE 9 MG/ML
3 INJECTION INTRAMUSCULAR; INTRAVENOUS; SUBCUTANEOUS EVERY 8 HOURS
Refills: 0 | Status: DISCONTINUED | OUTPATIENT
Start: 2019-10-15 | End: 2019-10-15

## 2019-10-08 RX ORDER — CEFAZOLIN SODIUM 1 G
2000 VIAL (EA) INJECTION ONCE
Refills: 0 | Status: DISCONTINUED | OUTPATIENT
Start: 2019-10-15 | End: 2019-11-04

## 2019-10-08 RX ORDER — SUCRALFATE 1 G
1 TABLET ORAL
Qty: 0 | Refills: 0 | DISCHARGE

## 2019-10-08 RX ORDER — LIDOCAINE HCL 20 MG/ML
0.2 VIAL (ML) INJECTION ONCE
Refills: 0 | Status: DISCONTINUED | OUTPATIENT
Start: 2019-10-15 | End: 2019-11-04

## 2019-10-08 NOTE — H&P PST ADULT - NSICDXPASTSURGICALHX_GEN_ALL_CORE_FT
PAST SURGICAL HISTORY:  H/O vocal cord paralysis Vocal cord injection 12/2018, Vocal cord implant 6/2019    History of appendectomy     History of arthroscopy of right knee     History of bilateral oophorectomy     History of carpal tunnel release     History of cholecystectomy     History of hysterectomy     History of tonsillectomy     S/P spinal surgery 6/17/17 - cervical region with hardware 9/2017 and 11/2017 PAST SURGICAL HISTORY:  H/O vocal cord paralysis Botox  injection 12/2017, Vocal cord implant 6/2019    History of appendectomy     History of arthroscopy of right knee     History of bilateral oophorectomy     History of carpal tunnel release     History of cholecystectomy     History of hysterectomy     History of tonsillectomy     S/P spinal surgery 6/17/17 - cervical region with hardware 9/2017 and 11/2017

## 2019-10-08 NOTE — H&P PST ADULT - NSICDXPASTMEDICALHX_GEN_ALL_CORE_FT
PAST MEDICAL HISTORY:  Cervical radiculopathy     Degenerative lumbar disc     Gastroesophageal reflux disease without esophagitis     Glaucoma b/l    H/O vocal cord paralysis right    Hiatal hernia     History of diverticulitis     History of endometriosis     History of gastric ulcer longg time aGO    History of iron deficiency anemia     History of migraine     Hyperlipidemia     Hypertension     Neuropathic pain of finger, left numbness 4th asnd 5th digit    Osteoarthritis of cervical spine with myelopathy     Spinal stenosis in cervical region

## 2019-10-08 NOTE — H&P PST ADULT - NSICDXPROBLEM_GEN_ALL_CORE_FT
PROBLEM DIAGNOSES  Problem: History of vocal cord paralysis  Assessment and Plan: Removal of Right vocal cord implant on 10/15/19.       Problem: Hypertension  Assessment and Plan: Continue on antihypertensive medication

## 2019-10-08 NOTE — H&P PST ADULT - HISTORY OF PRESENT ILLNESS
61 yr old female with HTN, HLD, GERD, cervical spine stenosis, underwent cervical spine surgery 7/17/17, reports severe neck pain a month after procedure, electrical pain shooting down the right arm and presents to PST for scheduled  revision b/l C7 lateral mass screws on 9/18/17. Denies fever, chills, pain 8/10. 61 yr old female with HTN, HLD, GERD, cervical spine stenosis, underwent cervical spine surgery 7/17/17, 9/2017, 11/2017- after last surgery, pt developed vocal cord paralysis , s/p Botox injection 12/2017, with improvement until 4/2019, s/p right vocal cord implant 4/2019, after which pt developed periods of SOB on exertion - had CT chest & other studies - cannot find cause of SOB. Now coming in for Removal of Right vocal cord implant on 10/15/19.

## 2019-10-09 LAB
HCT VFR BLD CALC: 38.2 % — SIGNIFICANT CHANGE UP (ref 34.5–45)
HGB BLD-MCNC: 12.6 G/DL — SIGNIFICANT CHANGE UP (ref 11.5–15.5)
MCHC RBC-ENTMCNC: 29.1 PG — SIGNIFICANT CHANGE UP (ref 27–34)
MCHC RBC-ENTMCNC: 33 GM/DL — SIGNIFICANT CHANGE UP (ref 32–36)
MCV RBC AUTO: 88.2 FL — SIGNIFICANT CHANGE UP (ref 80–100)
PLATELET # BLD AUTO: 433 K/UL — HIGH (ref 150–400)
RBC # BLD: 4.33 M/UL — SIGNIFICANT CHANGE UP (ref 3.8–5.2)
RBC # FLD: 12.7 % — SIGNIFICANT CHANGE UP (ref 10.3–14.5)
WBC # BLD: 9.52 K/UL — SIGNIFICANT CHANGE UP (ref 3.8–10.5)
WBC # FLD AUTO: 9.52 K/UL — SIGNIFICANT CHANGE UP (ref 3.8–10.5)

## 2019-10-14 ENCOUNTER — TRANSCRIPTION ENCOUNTER (OUTPATIENT)
Age: 64
End: 2019-10-14

## 2019-10-15 ENCOUNTER — OUTPATIENT (OUTPATIENT)
Dept: OUTPATIENT SERVICES | Facility: HOSPITAL | Age: 64
LOS: 1 days | End: 2019-10-15
Payer: COMMERCIAL

## 2019-10-15 ENCOUNTER — RESULT REVIEW (OUTPATIENT)
Age: 64
End: 2019-10-15

## 2019-10-15 ENCOUNTER — APPOINTMENT (OUTPATIENT)
Dept: OTOLARYNGOLOGY | Facility: HOSPITAL | Age: 64
End: 2019-10-15

## 2019-10-15 VITALS
RESPIRATION RATE: 16 BRPM | OXYGEN SATURATION: 98 % | HEART RATE: 92 BPM | HEIGHT: 60 IN | TEMPERATURE: 99 F | SYSTOLIC BLOOD PRESSURE: 150 MMHG | DIASTOLIC BLOOD PRESSURE: 85 MMHG | WEIGHT: 159.39 LBS

## 2019-10-15 DIAGNOSIS — Z90.49 ACQUIRED ABSENCE OF OTHER SPECIFIED PARTS OF DIGESTIVE TRACT: Chronic | ICD-10-CM

## 2019-10-15 DIAGNOSIS — Z90.89 ACQUIRED ABSENCE OF OTHER ORGANS: Chronic | ICD-10-CM

## 2019-10-15 DIAGNOSIS — Z98.890 OTHER SPECIFIED POSTPROCEDURAL STATES: Chronic | ICD-10-CM

## 2019-10-15 DIAGNOSIS — Z87.09 PERSONAL HISTORY OF OTHER DISEASES OF THE RESPIRATORY SYSTEM: Chronic | ICD-10-CM

## 2019-10-15 DIAGNOSIS — J38.01 PARALYSIS OF VOCAL CORDS AND LARYNX, UNILATERAL: ICD-10-CM

## 2019-10-15 DIAGNOSIS — Z90.722 ACQUIRED ABSENCE OF OVARIES, BILATERAL: Chronic | ICD-10-CM

## 2019-10-15 DIAGNOSIS — Z90.710 ACQUIRED ABSENCE OF BOTH CERVIX AND UTERUS: Chronic | ICD-10-CM

## 2019-10-15 PROCEDURE — 31599 UNLISTED PROCEDURE LARYNX: CPT

## 2019-10-15 PROCEDURE — 88300 SURGICAL PATH GROSS: CPT | Mod: 26

## 2019-10-15 RX ORDER — DEXAMETHASONE 0.5 MG/5ML
8 ELIXIR ORAL EVERY 8 HOURS
Refills: 0 | Status: DISCONTINUED | OUTPATIENT
Start: 2019-10-15 | End: 2019-10-16

## 2019-10-15 RX ORDER — HYDROCODONE BITARTRATE AND ACETAMINOPHEN 7.5; 325 MG/15ML; MG/15ML
1 SOLUTION ORAL
Qty: 4 | Refills: 0
Start: 2019-10-15 | End: 2019-10-15

## 2019-10-15 RX ORDER — ONDANSETRON 8 MG/1
4 TABLET, FILM COATED ORAL EVERY 4 HOURS
Refills: 0 | Status: DISCONTINUED | OUTPATIENT
Start: 2019-10-15 | End: 2019-10-16

## 2019-10-15 RX ORDER — HYDROMORPHONE HYDROCHLORIDE 2 MG/ML
0.25 INJECTION INTRAMUSCULAR; INTRAVENOUS; SUBCUTANEOUS
Refills: 0 | Status: DISCONTINUED | OUTPATIENT
Start: 2019-10-15 | End: 2019-10-16

## 2019-10-15 RX ADMIN — HYDROMORPHONE HYDROCHLORIDE 0.25 MILLIGRAM(S): 2 INJECTION INTRAMUSCULAR; INTRAVENOUS; SUBCUTANEOUS at 21:00

## 2019-10-15 RX ADMIN — Medication 101.6 MILLIGRAM(S): at 21:31

## 2019-10-15 RX ADMIN — HYDROMORPHONE HYDROCHLORIDE 0.25 MILLIGRAM(S): 2 INJECTION INTRAMUSCULAR; INTRAVENOUS; SUBCUTANEOUS at 20:45

## 2019-10-15 NOTE — PROGRESS NOTE ADULT - SUBJECTIVE AND OBJECTIVE BOX
ENT ISSUE/POD: S/P Removal of Arvizu Thyroplasty Implant POD # 0.     HPI: 64 YO F with PMH of  HTN, HLD, GERD, cervical spine stenosis, underwent cervical spine surgery 7/17/17, 9/2017, 11/2017- after last surgery, pt developed vocal cord paralysis , s/p Botox injection 12/2017, with improvement until 4/2019, s/p right vocal cord implant 4/2019, after which pt developed periods of SOB on exertion - had CT chest & other studies - cannot find cause of SOB. Now S/P Removal of Arvizu Thyroplasty Implant POD # 0. Pt was evaluated at bedside. c/o Pain at the neck incision site, sore throat, relieved with Dilaudid. Tolerating clear liquid diet. Otherwise denies Hoarseness, SOB, cough/N/V, fever/ chills, Hemoptysis/epistaxis/ Hematemesis, CP/Palpitations/Diaphoresis, HA/Dizziness/ Blurry vision/syncope.     PAST MEDICAL & SURGICAL HISTORY:  Degenerative lumbar disc  Neuropathic pain of finger, left: numbness 4th asnd 5th digit  H/O vocal cord paralysis: right  Cervical radiculopathy  Glaucoma: b/l  Gastroesophageal reflux disease without esophagitis  Hyperlipidemia  Osteoarthritis of cervical spine with myelopathy  Spinal stenosis in cervical region  History of endometriosis  Hiatal hernia  History of diverticulitis  History of gastric ulcer: longg time aGO  Hypertension  History of iron deficiency anemia  History of migraine  H/O vocal cord paralysis: Botox  injection 12/2017, Vocal cord implant 6/2019  S/P spinal surgery: 6/17/17 - cervical region with hardware 9/2017 and 11/2017  History of tonsillectomy  History of arthroscopy of right knee  History of carpal tunnel release  History of cholecystectomy  History of appendectomy  History of bilateral oophorectomy  History of hysterectomy.    Allergies.   No Known Allergies    Intolerances.   morphine (Nausea; Vomiting)  Percocet 5/325 (Nausea; Vomiting)    MEDICATIONS  (STANDING):  ceFAZolin   IVPB 2000 milliGRAM(s) IV Intermittent once  dexamethasone  IVPB 8 milliGRAM(s) IV Intermittent every 8 hours  lidocaine 1% Injectable 0.2 milliLiter(s) Local Injection once    MEDICATIONS  (PRN):  HYDROmorphone  Injectable 0.25 milliGRAM(s) IV Push every 10 minutes PRN Moderate Pain (4 - 6)  ondansetron Injectable 4 milliGRAM(s) IV Push every 4 hours PRN Nausea and/or Vomiting    Social History: SEE INITIAL CONSULT.   Family history: SEE INITIAL CONSULT.     ROS:   ENT: all negative except as noted in HPI   Pulm: denies SOB, cough, hemoptysis  Neuro: denies numbness/tingling, loss of sensation  Endo: denies heat/cold intolerance, excessive sweating      Vital Signs Last 24 Hrs  T(C): 36.3 (15 Oct 2019 22:00), Max: 37.1 (15 Oct 2019 12:39)  T(F): 97.3 (15 Oct 2019 22:00), Max: 98.8 (15 Oct 2019 12:39)  HR: 94 (16 Oct 2019 00:00) (73 - 100)  BP: 132/66 (16 Oct 2019 00:00) (102/56 - 150/85)  BP(mean): 82 (16 Oct 2019 00:00) (73 - 98)  RR: 16 (16 Oct 2019 00:00) (12 - 17)  SpO2: 96% (16 Oct 2019 00:00) (94% - 99%)     PHYSICAL EXAM:  Gen: NAD, On RA.   Skin: Neck incision site c/d/i.   Head: Normocephalic, Atraumatic.  Face: no edema, erythema, or fluctuance. Parotid glands soft without mass.  Eyes: no scleral injection.  Nose: Nares bilaterally patent, no discharge  Mouth: No Stridor / Drooling / Trismus.  Mucosa moist, tongue/uvula midline, oropharynx clear  Neck: Neck incision site c/d/i. Flat, supple, no lymphadenopathy, trachea midline, no masses  Lymphatic: No lymphadenopathy  Resp: breathing easily, no stridor.  Neuro: facial nerve intact, no facial droop. ENT ISSUE/POD: S/P Removal of Arvizu Thyroplasty Implant POD # 0.     HPI: 62 YO F with PMH of  HTN, HLD, GERD, cervical spine stenosis, underwent cervical spine surgery 7/17/17, 9/2017, 11/2017- after last surgery, pt developed vocal cord paralysis , s/p Botox injection 12/2017, with improvement until 4/2019, s/p right vocal cord implant 4/2019, after which pt developed periods of SOB on exertion - had CT chest & other studies - cannot find cause of SOB. Now S/P Removal of Arvizu Thyroplasty Implant POD # 0. Pt was evaluated at bedside. c/o Pain at the neck incision site, sore throat, relieved with Dilaudid. Tolerating clear liquid diet. Otherwise denies Hoarseness, SOB, cough/N/V, fever/ chills, Hemoptysis/epistaxis/ Hematemesis, CP/Palpitations/Diaphoresis, HA/Dizziness/ Blurry vision/syncope.     PAST MEDICAL & SURGICAL HISTORY:  Degenerative lumbar disc  Neuropathic pain of finger, left: numbness 4th asnd 5th digit  H/O vocal cord paralysis: right  Cervical radiculopathy  Glaucoma: b/l  Gastroesophageal reflux disease without esophagitis  Hyperlipidemia  Osteoarthritis of cervical spine with myelopathy  Spinal stenosis in cervical region  History of endometriosis  Hiatal hernia  History of diverticulitis  History of gastric ulcer: longg time aGO  Hypertension  History of iron deficiency anemia  History of migraine  H/O vocal cord paralysis: Botox  injection 12/2017, Vocal cord implant 6/2019  S/P spinal surgery: 6/17/17 - cervical region with hardware 9/2017 and 11/2017  History of tonsillectomy  History of arthroscopy of right knee  History of carpal tunnel release  History of cholecystectomy  History of appendectomy  History of bilateral oophorectomy  History of hysterectomy.    Allergies.   No Known Allergies    Intolerances.   morphine (Nausea; Vomiting)  Percocet 5/325 (Nausea; Vomiting)    MEDICATIONS  (STANDING):  ceFAZolin   IVPB 2000 milliGRAM(s) IV Intermittent once  dexamethasone  IVPB 8 milliGRAM(s) IV Intermittent every 8 hours  lidocaine 1% Injectable 0.2 milliLiter(s) Local Injection once    MEDICATIONS  (PRN):  HYDROmorphone  Injectable 0.25 milliGRAM(s) IV Push every 10 minutes PRN Moderate Pain (4 - 6)  ondansetron Injectable 4 milliGRAM(s) IV Push every 4 hours PRN Nausea and/or Vomiting    Social History: SEE H&P.   Family history: SEE H&P.     ROS:   ENT: all negative except as noted in HPI   Pulm: denies SOB, cough, hemoptysis  Neuro: denies numbness/tingling, loss of sensation  Endo: denies heat/cold intolerance, excessive sweating    Vital Signs Last 24 Hrs  T(C): 36.3 (15 Oct 2019 22:00), Max: 37.1 (15 Oct 2019 12:39)  T(F): 97.3 (15 Oct 2019 22:00), Max: 98.8 (15 Oct 2019 12:39)  HR: 94 (16 Oct 2019 00:00) (73 - 100)  BP: 132/66 (16 Oct 2019 00:00) (102/56 - 150/85)  BP(mean): 82 (16 Oct 2019 00:00) (73 - 98)  RR: 16 (16 Oct 2019 00:00) (12 - 17)  SpO2: 96% (16 Oct 2019 00:00) (94% - 99%)     PHYSICAL EXAM:  Gen: NAD, On RA.   Skin: Neck incision site c/d/i.   Head: Normocephalic, Atraumatic.  Face: no edema, erythema, or fluctuance. Parotid glands soft without mass.  Eyes: no scleral injection.  Nose: Nares bilaterally patent, no discharge  Mouth: No Stridor / Drooling / Trismus.  Mucosa moist, tongue/uvula midline, oropharynx clear  Neck: Neck incision site c/d/i. Flat, supple, no lymphadenopathy, trachea midline, no masses  Lymphatic: No lymphadenopathy  Resp: breathing easily, no stridor.  Neuro: facial nerve intact, no facial droop.

## 2019-10-15 NOTE — PROGRESS NOTE ADULT - PROBLEM SELECTOR PLAN 1
- S/P Removal of Arvizu Thyroplasty Implant POD # 0.  - Keep the incision site clean and dry.   - Monitor for SOB/ Hoarseness.   - Continuos SpO2.   - Decadron 8mg IVPB X 3 doses.   - Pain meds prn.   - Advance diet as tolerated.   - ENT will follow.   - Call with questions.     GUSTAVO WEISS PA-C.   # [id number]  ENT PA. - S/P Removal of Arvizu Thyroplasty Implant POD # 0.  - Keep the incision site clean and dry.   - Monitor for SOB/ Hoarseness.   - Continuos SpO2.   - Decadron 8mg IVPB q8h X 3 doses.   - Pain meds prn.   - Advance diet as tolerated.   - Will discharge on Vicodin and Medrol Dose Pack.   - ENT will follow.   - Call with questions.     GUSTAVO WEISS PA-C.   # [id number]  ENT PA.

## 2019-10-15 NOTE — BRIEF OPERATIVE NOTE - NSICDXBRIEFPROCEDURE_GEN_ALL_CORE_FT
PROCEDURES:  Removal of deep implant 15-Oct-2019 17:48:23 Removal of Arvizu thyroplasty implant (right) Jeremy Purcell

## 2019-10-15 NOTE — PROGRESS NOTE ADULT - ASSESSMENT
64 YO F with Right vocal cord paralysis , s/p Botox injection 12/2017, with improvement until 4/2019, s/p right vocal cord implant 4/2019.  Now S/P Removal of Arvizu Thyroplasty Implant POD # 0. Pt was evaluated at bedside. c/o Pain at the neck incision site, sore throat, relieved with Dilaudid. Tolerating clear liquid diet.

## 2019-10-16 VITALS
SYSTOLIC BLOOD PRESSURE: 116 MMHG | RESPIRATION RATE: 18 BRPM | TEMPERATURE: 98 F | HEART RATE: 104 BPM | DIASTOLIC BLOOD PRESSURE: 78 MMHG | OXYGEN SATURATION: 97 %

## 2019-10-16 DIAGNOSIS — J38.00 PARALYSIS OF VOCAL CORDS AND LARYNX, UNSPECIFIED: ICD-10-CM

## 2019-10-16 PROCEDURE — 31599 UNLISTED PROCEDURE LARYNX: CPT

## 2019-10-16 PROCEDURE — 99232 SBSQ HOSP IP/OBS MODERATE 35: CPT

## 2019-10-16 PROCEDURE — 88300 SURGICAL PATH GROSS: CPT

## 2019-10-16 RX ORDER — ATORVASTATIN CALCIUM 80 MG/1
1 TABLET, FILM COATED ORAL
Qty: 0 | Refills: 0 | DISCHARGE

## 2019-10-16 RX ORDER — ATORVASTATIN CALCIUM 80 MG/1
40 TABLET, FILM COATED ORAL AT BEDTIME
Refills: 0 | Status: DISCONTINUED | OUTPATIENT
Start: 2019-10-16 | End: 2019-10-16

## 2019-10-16 RX ORDER — AMLODIPINE BESYLATE 2.5 MG/1
1 TABLET ORAL
Qty: 0 | Refills: 0 | DISCHARGE

## 2019-10-16 RX ORDER — SUCRALFATE 1 G
10 TABLET ORAL
Qty: 0 | Refills: 0 | DISCHARGE

## 2019-10-16 RX ORDER — PANTOPRAZOLE SODIUM 20 MG/1
40 TABLET, DELAYED RELEASE ORAL
Refills: 0 | Status: DISCONTINUED | OUTPATIENT
Start: 2019-10-16 | End: 2019-10-16

## 2019-10-16 RX ORDER — DEXAMETHASONE 0.5 MG/5ML
8 ELIXIR ORAL ONCE
Refills: 0 | Status: COMPLETED | OUTPATIENT
Start: 2019-10-16 | End: 2019-10-16

## 2019-10-16 RX ORDER — HYDROCODONE BITARTRATE AND ACETAMINOPHEN 7.5; 325 MG/15ML; MG/15ML
1 SOLUTION ORAL
Qty: 4 | Refills: 0
Start: 2019-10-16 | End: 2019-10-16

## 2019-10-16 RX ORDER — HYDROMORPHONE HYDROCHLORIDE 2 MG/ML
0.25 INJECTION INTRAMUSCULAR; INTRAVENOUS; SUBCUTANEOUS EVERY 4 HOURS
Refills: 0 | Status: DISCONTINUED | OUTPATIENT
Start: 2019-10-16 | End: 2019-10-16

## 2019-10-16 RX ORDER — LOSARTAN POTASSIUM 100 MG/1
50 TABLET, FILM COATED ORAL DAILY
Refills: 0 | Status: DISCONTINUED | OUTPATIENT
Start: 2019-10-16 | End: 2019-10-16

## 2019-10-16 RX ORDER — SUCRALFATE 1 G
1 TABLET ORAL ONCE
Refills: 0 | Status: DISCONTINUED | OUTPATIENT
Start: 2019-10-16 | End: 2019-11-04

## 2019-10-16 RX ORDER — AMLODIPINE BESYLATE 2.5 MG/1
5 TABLET ORAL AT BEDTIME
Refills: 0 | Status: DISCONTINUED | OUTPATIENT
Start: 2019-10-16 | End: 2019-10-16

## 2019-10-16 RX ORDER — TIMOLOL 0.5 %
1 DROPS OPHTHALMIC (EYE)
Qty: 0 | Refills: 0 | DISCHARGE

## 2019-10-16 RX ORDER — BENZOCAINE AND MENTHOL 5; 1 G/100ML; G/100ML
1 LIQUID ORAL THREE TIMES A DAY
Refills: 0 | Status: DISCONTINUED | OUTPATIENT
Start: 2019-10-16 | End: 2019-10-16

## 2019-10-16 RX ORDER — HYDROMORPHONE HYDROCHLORIDE 2 MG/ML
0.5 INJECTION INTRAMUSCULAR; INTRAVENOUS; SUBCUTANEOUS EVERY 4 HOURS
Refills: 0 | Status: DISCONTINUED | OUTPATIENT
Start: 2019-10-16 | End: 2019-10-16

## 2019-10-16 RX ORDER — HYDROMORPHONE HYDROCHLORIDE 2 MG/ML
0.5 INJECTION INTRAMUSCULAR; INTRAVENOUS; SUBCUTANEOUS ONCE
Refills: 0 | Status: DISCONTINUED | OUTPATIENT
Start: 2019-10-16 | End: 2019-10-16

## 2019-10-16 RX ORDER — TIMOLOL 0.5 %
1 DROPS OPHTHALMIC (EYE)
Refills: 0 | Status: DISCONTINUED | OUTPATIENT
Start: 2019-10-16 | End: 2019-10-16

## 2019-10-16 RX ORDER — ACETAMINOPHEN 500 MG
1000 TABLET ORAL ONCE
Refills: 0 | Status: DISCONTINUED | OUTPATIENT
Start: 2019-10-16 | End: 2019-10-16

## 2019-10-16 RX ADMIN — HYDROMORPHONE HYDROCHLORIDE 0.5 MILLIGRAM(S): 2 INJECTION INTRAMUSCULAR; INTRAVENOUS; SUBCUTANEOUS at 01:45

## 2019-10-16 RX ADMIN — BENZOCAINE AND MENTHOL 1 LOZENGE: 5; 1 LIQUID ORAL at 11:22

## 2019-10-16 RX ADMIN — Medication 101.6 MILLIGRAM(S): at 14:08

## 2019-10-16 RX ADMIN — HYDROMORPHONE HYDROCHLORIDE 0.25 MILLIGRAM(S): 2 INJECTION INTRAMUSCULAR; INTRAVENOUS; SUBCUTANEOUS at 13:10

## 2019-10-16 RX ADMIN — Medication 101.6 MILLIGRAM(S): at 05:33

## 2019-10-16 RX ADMIN — HYDROMORPHONE HYDROCHLORIDE 0.25 MILLIGRAM(S): 2 INJECTION INTRAMUSCULAR; INTRAVENOUS; SUBCUTANEOUS at 13:25

## 2019-10-16 RX ADMIN — HYDROMORPHONE HYDROCHLORIDE 0.5 MILLIGRAM(S): 2 INJECTION INTRAMUSCULAR; INTRAVENOUS; SUBCUTANEOUS at 07:15

## 2019-10-16 RX ADMIN — HYDROMORPHONE HYDROCHLORIDE 0.5 MILLIGRAM(S): 2 INJECTION INTRAMUSCULAR; INTRAVENOUS; SUBCUTANEOUS at 07:30

## 2019-10-16 RX ADMIN — HYDROMORPHONE HYDROCHLORIDE 0.5 MILLIGRAM(S): 2 INJECTION INTRAMUSCULAR; INTRAVENOUS; SUBCUTANEOUS at 02:00

## 2019-10-16 NOTE — PROGRESS NOTE ADULT - SUBJECTIVE AND OBJECTIVE BOX
ENT ISSUE/POD: S/P Removal of Arvizu Thyroplasty Implant POD # 1    HPI: 64 YO F with PMH of  HTN, HLD, GERD, cervical spine stenosis, underwent cervical spine surgery 7/17/17, 9/2017, 11/2017- after last surgery, pt developed vocal cord paralysis , s/p Botox injection 12/2017, with improvement until 4/2019, s/p right vocal cord implant 4/2019, after which pt developed periods of SOB on exertion - had CT chest & other studies - cannot find cause of SOB. Now S/P Removal of Arvizu Thyroplasty Implant POD # 1. Pt was evaluated at bedside. c/o Pain at the neck incision site, sore throat, relieved with Dilaudid, ice pack and ice chips. Otherwise denies Hoarseness, SOB, cough/N/V, fever/ chills, Hemoptysis/epistaxis/ Hematemesis, CP/Palpitations/Diaphoresis, HA/Dizziness/ Blurry vision/syncope.         PAST MEDICAL & SURGICAL HISTORY:  Degenerative lumbar disc  Neuropathic pain of finger, left: numbness 4th asnd 5th digit  H/O vocal cord paralysis: right  Cervical radiculopathy  Glaucoma: b/l  Gastroesophageal reflux disease without esophagitis  Hyperlipidemia  Osteoarthritis of cervical spine with myelopathy  Spinal stenosis in cervical region  History of endometriosis  Hiatal hernia  History of diverticulitis  History of gastric ulcer: longg time aGO  Hypertension  History of iron deficiency anemia  History of migraine  H/O vocal cord paralysis: Botox  injection 12/2017, Vocal cord implant 6/2019  S/P spinal surgery: 6/17/17 - cervical region with hardware 9/2017 and 11/2017  History of tonsillectomy  History of arthroscopy of right knee  History of carpal tunnel release  History of cholecystectomy  History of appendectomy  History of bilateral oophorectomy  History of hysterectomy    Allergies    No Known Allergies    Intolerances    morphine (Nausea; Vomiting)  Percocet 5/325 (Nausea; Vomiting)    MEDICATIONS  (STANDING):  ceFAZolin   IVPB 2000 milliGRAM(s) IV Intermittent once  lidocaine 1% Injectable 0.2 milliLiter(s) Local Injection once    MEDICATIONS  (PRN):  HYDROmorphone  Injectable 0.5 milliGRAM(s) IV Push every 4 hours PRN Pain  ondansetron Injectable 4 milliGRAM(s) IV Push every 4 hours PRN Nausea and/or Vomiting        ROS:   ENT: all negative except as noted in HPI   Pulm: denies SOB, cough, hemoptysis  Neuro: denies numbness/tingling, loss of sensation  Endo: denies heat/cold intolerance, excessive sweating      Vital Signs Last 24 Hrs  T(C): 36.2 (16 Oct 2019 07:00), Max: 37.1 (15 Oct 2019 12:39)  T(F): 97.2 (16 Oct 2019 07:00), Max: 98.8 (15 Oct 2019 12:39)  HR: 101 (16 Oct 2019 07:00) (73 - 102)  BP: 128/60 (16 Oct 2019 07:00) (102/56 - 150/85)  BP(mean): 86 (16 Oct 2019 07:00) (73 - 98)  RR: 16 (16 Oct 2019 07:00) (12 - 17)  SpO2: 94% (16 Oct 2019 07:00) (93% - 99%)              PHYSICAL EXAM:  Gen: NAD  Skin: Neck incision site c/d/i.   Head: Normocephalic, Atraumatic.  Face: no edema, erythema, or fluctuance. Parotid glands soft without mass.  Eyes: no scleral injection.  Nose: Nares bilaterally patent, no discharge  Mouth: No Stridor / Drooling / Trismus.  Mucosa moist, tongue/uvula midline, oropharynx clear  Neck: Neck incision site c/d/i. Flat, supple, no lymphadenopathy, trachea midline, no masses  Lymphatic: No lymphadenopathy  Resp: breathing easily, no stridor.  Neuro: facial nerve intact, no facial droop.

## 2019-10-16 NOTE — PROGRESS NOTE ADULT - PROBLEM SELECTOR PLAN 1
- S/P Removal of Arvizu Thyroplasty Implant   - Keep the incision site clean and dry.   - Monitor for SOB/ Hoarseness.   - Continuos SpO2.   - Decadron 8mg IVPB q8h X 3 doses.   - Pain meds prn.   - Advance diet as tolerated.   - Will discharge on Vicodin and Medrol Dose Pack.

## 2019-10-16 NOTE — PROGRESS NOTE ADULT - ASSESSMENT
62 YO F with Right vocal cord paralysis , s/p Botox injection 12/2017, with improvement until 4/2019, s/p right vocal cord implant 4/2019.  Now S/P Removal of Arvizu Thyroplasty Implant POD # 1. Pt was evaluated at bedside. c/o Pain at the neck incision site, sore throat, relieved with Dilaudid, ice pack and ice chips

## 2019-10-16 NOTE — ASU DISCHARGE PLAN (ADULT/PEDIATRIC) - CALL YOUR DOCTOR IF YOU HAVE ANY OF THE FOLLOWING:
Pain not relieved by Medications/Excessive diarrhea/Fever greater than (need to indicate Fahrenheit or Celsius)/Bleeding that does not stop/Wound/Surgical Site with redness, or foul smelling discharge or pus/Swelling that gets worse/Inability to tolerate liquids or foods/Increased irritability or sluggishness/Numbness, tingling, color or temperature change to extremity/Nausea and vomiting that does not stop/Unable to urinate

## 2019-10-16 NOTE — ASU DISCHARGE PLAN (ADULT/PEDIATRIC) - CARE PROVIDER_API CALL
Sanjuanita Ye)  Otolaryngology  [address] [place] [address], [address]  [place] [address]  Phone: [phone]  Fax: [phone]  Follow Up Time:

## 2019-10-23 ENCOUNTER — APPOINTMENT (OUTPATIENT)
Dept: OTOLARYNGOLOGY | Facility: CLINIC | Age: 64
End: 2019-10-23
Payer: COMMERCIAL

## 2019-10-23 VITALS
DIASTOLIC BLOOD PRESSURE: 81 MMHG | HEIGHT: 60 IN | SYSTOLIC BLOOD PRESSURE: 124 MMHG | WEIGHT: 159 LBS | HEART RATE: 105 BPM | BODY MASS INDEX: 31.22 KG/M2

## 2019-10-23 DIAGNOSIS — R49.0 DYSPHONIA: ICD-10-CM

## 2019-10-23 DIAGNOSIS — R43.0 ANOSMIA: ICD-10-CM

## 2019-10-23 PROBLEM — Z87.19 PERSONAL HISTORY OF OTHER DISEASES OF THE DIGESTIVE SYSTEM: Chronic | Status: ACTIVE | Noted: 2017-05-31

## 2019-10-23 PROCEDURE — 31575 DIAGNOSTIC LARYNGOSCOPY: CPT

## 2019-10-23 PROCEDURE — 99213 OFFICE O/P EST LOW 20 MIN: CPT | Mod: 25

## 2019-10-23 NOTE — HISTORY OF PRESENT ILLNESS
[de-identified] : S/P Right Vocal cord Arvizu implant removal 10/15/19. \par Feels voice is good.  No trouble eating or drinking.  No longer feels SOB with going up stairs.  Today is last day of PO steroid taper.  No bleeding from anterior neck incision.  Still poor sense of smell (her baseline).  \par \par

## 2019-10-23 NOTE — PHYSICAL EXAM
[Normal] : normal appearance, well groomed, well nourished, and in no acute distress [de-identified] : Steri strip removed and suture removed. small superficial dehiscence on left aspect of incision

## 2019-10-23 NOTE — PROCEDURE
[de-identified] : Afrin 0.05% and lidocaine 4% sprayed into both nasal passages. Flexible scope #2 used. Passed through nasal passage and nasopharynx/oropharynx/hypopharynx clear. Supraglottis normal. Glottis with right vocal cord paralyzed in median position with no edema of cord. Airway with increased patency and with good abduction of left cord. Visualized subglottis clear. Postcricoid area without erythema. No pooling of secretions. No evidence of hematoma or infection.

## 2019-10-23 NOTE — ASSESSMENT
[FreeTextEntry1] : right VC paralysis s/p right Arvizu implant removal:\par - cord still in good position and closing well with excellent voice so hopefully the cord will stay scarred over like this since she has resolution of her SOB while preserving the voice\par - apply abx ointment to the superficial dehiscence on left aspect of incision for 1 week until healed; if any increase in redness/swelling/drainage come in for re-eval sooner\par - f/u 1 month to recheck\par

## 2019-11-20 ENCOUNTER — APPOINTMENT (OUTPATIENT)
Dept: OTOLARYNGOLOGY | Facility: CLINIC | Age: 64
End: 2019-11-20

## 2020-03-20 NOTE — H&P PST ADULT - NS NEC GEN PE MLT EXAM PC
[FreeTextEntry1] : 74 year old male with HTN, Afib, CHF, IgA Multiple Myeloma, recent admission for SDH s/p craniotomy for evacuation on 12/9/2019.  He was readmitted to [facility] on 2/14 with extensive subgaleal, epidural and subdural infection.  On 2/16 he underwent re-exploration of R frontotemporal craniotomy, debridement of infected granulation tissue from subgaleal space. He was discharged on ceftriaxone and nafcillin x 8 weeks (2/17-4/13).  On 3/9 he presented to [facility] ED with shortness of breath, found to have Afib with RVR.  He was started on Cardizem and discharged to home.   On 3/16 he called reporting itchy red/bumpy rash on arms and legs x 3-4 days.  He was instructed to hold naficillin and come in for follow up.  He states these were initially like small blisters.  He used diphenhydramine spray to affected areas.  Did not try PO diphenhydramine.  Lesions no longer itchy.  He is having significant nausea related to antibiotics. 
detailed exam

## 2020-08-03 NOTE — DISCHARGE NOTE ADULT - HAS THE PATIENT RECEIVED THE INFLUENZA VACCINE DURING THIS VISIT
Pt presents to the flu clinic today requesting a covid test. Pt denies symptoms but has had a possible exposure. Pt declined to be seen by a doctor today.  KT No

## 2020-08-20 ENCOUNTER — EMERGENCY (EMERGENCY)
Facility: HOSPITAL | Age: 65
LOS: 0 days | Discharge: HOME | End: 2020-08-20
Payer: MEDICARE

## 2020-08-20 VITALS
RESPIRATION RATE: 18 BRPM | OXYGEN SATURATION: 98 % | SYSTOLIC BLOOD PRESSURE: 153 MMHG | HEIGHT: 60 IN | HEART RATE: 100 BPM | TEMPERATURE: 99 F | DIASTOLIC BLOOD PRESSURE: 76 MMHG | WEIGHT: 149.91 LBS

## 2020-08-20 VITALS
RESPIRATION RATE: 17 BRPM | TEMPERATURE: 98 F | HEART RATE: 85 BPM | SYSTOLIC BLOOD PRESSURE: 153 MMHG | OXYGEN SATURATION: 98 % | DIASTOLIC BLOOD PRESSURE: 93 MMHG

## 2020-08-20 DIAGNOSIS — M50.10 CERVICAL DISC DISORDER WITH RADICULOPATHY, UNSPECIFIED CERVICAL REGION: ICD-10-CM

## 2020-08-20 DIAGNOSIS — Z87.09 PERSONAL HISTORY OF OTHER DISEASES OF THE RESPIRATORY SYSTEM: Chronic | ICD-10-CM

## 2020-08-20 DIAGNOSIS — K44.9 DIAPHRAGMATIC HERNIA WITHOUT OBSTRUCTION OR GANGRENE: ICD-10-CM

## 2020-08-20 DIAGNOSIS — N80.9 ENDOMETRIOSIS, UNSPECIFIED: ICD-10-CM

## 2020-08-20 DIAGNOSIS — I10 ESSENTIAL (PRIMARY) HYPERTENSION: ICD-10-CM
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
## 2020-08-20 DIAGNOSIS — Z98.890 OTHER SPECIFIED POSTPROCEDURAL STATES: Chronic | ICD-10-CM

## 2020-08-20 DIAGNOSIS — M48.02 SPINAL STENOSIS, CERVICAL REGION: ICD-10-CM

## 2020-08-20 DIAGNOSIS — Z90.710 ACQUIRED ABSENCE OF BOTH CERVIX AND UTERUS: Chronic | ICD-10-CM
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
## 2020-08-20 DIAGNOSIS — K57.92 DIVERTICULITIS OF INTESTINE, PART UNSPECIFIED, WITHOUT PERFORATION OR ABSCESS WITHOUT BLEEDING: ICD-10-CM

## 2020-08-20 DIAGNOSIS — Z79.82 LONG TERM (CURRENT) USE OF ASPIRIN: ICD-10-CM

## 2020-08-20 DIAGNOSIS — Z90.49 ACQUIRED ABSENCE OF OTHER SPECIFIED PARTS OF DIGESTIVE TRACT: Chronic | ICD-10-CM

## 2020-08-20 DIAGNOSIS — Z90.722 ACQUIRED ABSENCE OF OVARIES, BILATERAL: Chronic | ICD-10-CM

## 2020-08-20 DIAGNOSIS — S61.214A LACERATION WITHOUT FOREIGN BODY OF RIGHT RING FINGER WITHOUT DAMAGE TO NAIL, INITIAL ENCOUNTER: ICD-10-CM

## 2020-08-20 DIAGNOSIS — K25.9 GASTRIC ULCER, UNSPECIFIED AS ACUTE OR CHRONIC, WITHOUT HEMORRHAGE OR PERFORATION: ICD-10-CM

## 2020-08-20 DIAGNOSIS — Z90.722 ACQUIRED ABSENCE OF OVARIES, BILATERAL: ICD-10-CM

## 2020-08-20 DIAGNOSIS — Z90.49 ACQUIRED ABSENCE OF OTHER SPECIFIED PARTS OF DIGESTIVE TRACT: ICD-10-CM

## 2020-08-20 DIAGNOSIS — Z96.3 PRESENCE OF ARTIFICIAL LARYNX: ICD-10-CM

## 2020-08-20 DIAGNOSIS — G43.909 MIGRAINE, UNSPECIFIED, NOT INTRACTABLE, WITHOUT STATUS MIGRAINOSUS: ICD-10-CM

## 2020-08-20 DIAGNOSIS — E78.5 HYPERLIPIDEMIA, UNSPECIFIED: ICD-10-CM
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
## 2020-08-20 DIAGNOSIS — H40.9 UNSPECIFIED GLAUCOMA: ICD-10-CM

## 2020-08-20 DIAGNOSIS — Z90.89 ACQUIRED ABSENCE OF OTHER ORGANS: Chronic | ICD-10-CM

## 2020-08-20 DIAGNOSIS — Z87.891 PERSONAL HISTORY OF NICOTINE DEPENDENCE: ICD-10-CM

## 2020-08-20 DIAGNOSIS — D50.9 IRON DEFICIENCY ANEMIA, UNSPECIFIED: ICD-10-CM

## 2020-08-20 DIAGNOSIS — M47.12 OTHER SPONDYLOSIS WITH MYELOPATHY, CERVICAL REGION: ICD-10-CM

## 2020-08-20 PROCEDURE — 12001 RPR S/N/AX/GEN/TRNK 2.5CM/<: CPT

## 2020-08-20 PROCEDURE — 73140 X-RAY EXAM OF FINGER(S): CPT | Mod: 26,RT

## 2020-08-20 PROCEDURE — 99283 EMERGENCY DEPT VISIT LOW MDM: CPT | Mod: 25

## 2020-08-20 RX ORDER — ACETAMINOPHEN 500 MG
650 TABLET ORAL ONCE
Refills: 0 | Status: COMPLETED | OUTPATIENT
Start: 2020-08-20 | End: 2020-08-20

## 2020-08-20 RX ORDER — TETANUS TOXOID, REDUCED DIPHTHERIA TOXOID AND ACELLULAR PERTUSSIS VACCINE, ADSORBED 5; 2.5; 8; 8; 2.5 [IU]/.5ML; [IU]/.5ML; UG/.5ML; UG/.5ML; UG/.5ML
0.5 SUSPENSION INTRAMUSCULAR ONCE
Refills: 0 | Status: COMPLETED | OUTPATIENT
Start: 2020-08-20 | End: 2020-08-20

## 2020-08-20 RX ORDER — IBUPROFEN 200 MG
600 TABLET ORAL ONCE
Refills: 0 | Status: COMPLETED | OUTPATIENT
Start: 2020-08-20 | End: 2020-08-20

## 2020-08-20 RX ADMIN — Medication 650 MILLIGRAM(S): at 20:47

## 2020-08-20 RX ADMIN — TETANUS TOXOID, REDUCED DIPHTHERIA TOXOID AND ACELLULAR PERTUSSIS VACCINE, ADSORBED 0.5 MILLILITER(S): 5; 2.5; 8; 8; 2.5 SUSPENSION INTRAMUSCULAR at 19:27

## 2020-08-20 RX ADMIN — Medication 600 MILLIGRAM(S): at 20:47

## 2020-08-20 NOTE — ED PROVIDER NOTE - CLINICAL SUMMARY MEDICAL DECISION MAKING FREE TEXT BOX
64F w/ PMHx of HTN, HLD, and glaucoma presents today c/o laceration on R 4th digit. Patient was clearing out the , two glass mugs smashed together, and patient cut her hand on the broken glass. Laceration is 1.5 cm in length on the volar aspect of her proximal phalanx. She applied direct pressure but still has mild active bleeding. Patient is on ASA 81 mg. Patient denies numbness/tingling, decreased ROM, decreased sensation. She otherwise has no complaints. She denies fevers, chills, unintentional weight loss, chest pain, SOB, cough, abd pain, n/v/d, constipation, BRBPR, change in urinary/bowel habits. X-ray of R hand sent to r/o FB. Will suture laceration and d/c home. Recommended Tylenol and Motrin for pain management. F/u in 10-14 days for suture removal.

## 2020-08-20 NOTE — ED PROCEDURE NOTE - CPROC ED POST PROC CARE GUIDE1
Instructed patient/caregiver to follow-up with primary care physician./Keep the cast/splint/dressing clean and dry./Verbal/written post procedure instructions were given to patient/caregiver./Instructed patient/caregiver regarding signs and symptoms of infection./Elevate the injured extremity as instructed.
Instructed patient/caregiver regarding signs and symptoms of infection./Verbal/written post procedure instructions were given to patient/caregiver./Instructed patient/caregiver to follow-up with primary care physician./Keep the cast/splint/dressing clean and dry.

## 2020-08-20 NOTE — ED PROVIDER NOTE - PATIENT PORTAL LINK FT
You can access the FollowMyHealth Patient Portal offered by [facility] by registering at the following website: [email or web address]. By joining [employer]’s FollowMyHealth portal, you will also be able to view your health information using other applications (apps) compatible with our system.

## 2020-08-20 NOTE — ED PROVIDER NOTE - OBJECTIVE STATEMENT
64F w/ PMHx of HTN, HLD, and glaucoma presents today c/o laceration on R 4th digit. Patient was clearing out the , two glass mugs smashed together, and patient cut her hand on the broken glass. Laceration is 1.5 cm in length on the volar aspect of her proximal phalanx. She applied direct pressure but still has mild active bleeding. Patient is on ASA 81 mg. Patient denies numbness/tingling, decreased ROM, decreased sensation. She otherwise has no complaints. She denies fevers, chills, unintentional weight loss, chest pain, SOB, cough, abd pain, n/v/d, constipation, BRBPR, change in urinary/bowel habits.

## 2020-08-20 NOTE — ED PROVIDER NOTE - NSFOLLOWUPINSTRUCTIONS_ED_ALL_ED_FT
You were seen today for a finger laceration.     Rest, hydrate well    Keep wound clean and dry for the next 2 days  After 2 days, remove bandage, wash with soap and water. Be sure to remove any dried blood or debris that is overlying the sutures. Minimal bleeding may occur.  Apply new bandage / band-aid.  Clean twice a day.  Do not submerge wound under water for long periods of time (i.e. swimming)    Return to your doctor, urgent care, or the ED in 10 days to have the sutures removed.    Return immediately for fever, chills, purulent drainage, red streaking, persistent pain, or any other concerns.    Thank you for allowing me to participate in your care today.     Laceration    A laceration is a cut that goes through all of the layers of the skin and into the tissue that is right under the skin. Some lacerations heal on their own. Others need to be closed with skin adhesive strips, skin glue, stitches (sutures), or staples. Proper laceration care minimizes the risk of infection and helps the laceration to heal better.  If non-absorbable stitches or staples have been placed, they must be taken out within the time frame instructed by your healthcare provider.    SEEK IMMEDIATE MEDICAL CARE IF YOU HAVE ANY OF THE FOLLOWING SYMPTOMS: swelling around the wound, worsening pain, drainage from the wound, red streaking going away from your wound, inability to move finger or toe near the laceration, or discoloration of skin near the laceration.

## 2020-08-20 NOTE — ED ADULT NURSE NOTE - OBJECTIVE STATEMENT
pt presents to the ED c/o right 4th digit laceration sustained while emptying the  pt presents to the ED c/o right 4th digit laceration sustained while emptying the . pt presents to the ED c/o right 4th digit laceration sustained while emptying the , when two mug glass broke and cut her hand on the broken glass. pt states she on ASA 81 mg. pt denies numbness/tingling ,denies dizziness, denies HA< denies chest pain, denies SOB, denies decreased sensation.

## 2020-08-20 NOTE — ED ADULT NURSE NOTE - PSH
H/O vocal cord paralysis  Botox  injection 12/2017, Vocal cord implant 6/2019  History of appendectomy    History of arthroscopy of right knee    History of bilateral oophorectomy    History of carpal tunnel release    History of cholecystectomy    History of hysterectomy    History of tonsillectomy    S/P spinal surgery  6/17/17 - cervical region with hardware 9/2017 and 11/2017

## 2020-08-20 NOTE — ED ADULT NURSE NOTE - PMH
Cervical radiculopathy    Degenerative lumbar disc    Gastroesophageal reflux disease without esophagitis    Glaucoma  b/l  H/O vocal cord paralysis  right  Hiatal hernia    History of diverticulitis    History of endometriosis    History of gastric ulcer  longg time aGO  History of iron deficiency anemia    History of migraine    Hyperlipidemia    Hypertension    Neuropathic pain of finger, left  numbness 4th asnd 5th digit  Osteoarthritis of cervical spine with myelopathy    Spinal stenosis in cervical region

## 2021-01-01 NOTE — H&P PST ADULT - SKIN/BREAST
negative Site: Sternum (25 Nov 2021 13:05)  Bilirubin: 6.9 (25 Nov 2021 13:05)   Site: Sternum (26 Nov 2021 05:36)  Bilirubin: 9.1 (26 Nov 2021 05:36)  Bilirubin: 6.9 (25 Nov 2021 13:05)  Site: Sternum (25 Nov 2021 13:05)

## 2021-03-24 ENCOUNTER — EMERGENCY (EMERGENCY)
Facility: HOSPITAL | Age: 66
LOS: 0 days | Discharge: HOME | End: 2021-03-25
Attending: STUDENT IN AN ORGANIZED HEALTH CARE EDUCATION/TRAINING PROGRAM
Payer: MEDICARE

## 2021-03-24 VITALS
SYSTOLIC BLOOD PRESSURE: 142 MMHG | OXYGEN SATURATION: 96 % | RESPIRATION RATE: 18 BRPM | WEIGHT: 143.08 LBS | HEIGHT: 60 IN | DIASTOLIC BLOOD PRESSURE: 83 MMHG | HEART RATE: 108 BPM | TEMPERATURE: 99 F

## 2021-03-24 DIAGNOSIS — Z90.49 ACQUIRED ABSENCE OF OTHER SPECIFIED PARTS OF DIGESTIVE TRACT: Chronic | ICD-10-CM

## 2021-03-24 DIAGNOSIS — R10.9 UNSPECIFIED ABDOMINAL PAIN: ICD-10-CM

## 2021-03-24 DIAGNOSIS — Z90.722 ACQUIRED ABSENCE OF OVARIES, BILATERAL: Chronic | ICD-10-CM

## 2021-03-24 DIAGNOSIS — Z98.1 ARTHRODESIS STATUS: ICD-10-CM

## 2021-03-24 DIAGNOSIS — J38.01 PARALYSIS OF VOCAL CORDS AND LARYNX, UNILATERAL: ICD-10-CM

## 2021-03-24 DIAGNOSIS — K52.9 NONINFECTIVE GASTROENTERITIS AND COLITIS, UNSPECIFIED: ICD-10-CM

## 2021-03-24 DIAGNOSIS — Z90.710 ACQUIRED ABSENCE OF BOTH CERVIX AND UTERUS: Chronic | ICD-10-CM

## 2021-03-24 DIAGNOSIS — D50.9 IRON DEFICIENCY ANEMIA, UNSPECIFIED: ICD-10-CM

## 2021-03-24 DIAGNOSIS — Z98.890 OTHER SPECIFIED POSTPROCEDURAL STATES: Chronic | ICD-10-CM

## 2021-03-24 DIAGNOSIS — Z90.710 ACQUIRED ABSENCE OF BOTH CERVIX AND UTERUS: ICD-10-CM

## 2021-03-24 DIAGNOSIS — H26.9 UNSPECIFIED CATARACT: ICD-10-CM

## 2021-03-24 DIAGNOSIS — K44.9 DIAPHRAGMATIC HERNIA WITHOUT OBSTRUCTION OR GANGRENE: ICD-10-CM

## 2021-03-24 DIAGNOSIS — G62.9 POLYNEUROPATHY, UNSPECIFIED: ICD-10-CM

## 2021-03-24 DIAGNOSIS — Z96.3 PRESENCE OF ARTIFICIAL LARYNX: ICD-10-CM

## 2021-03-24 DIAGNOSIS — K59.00 CONSTIPATION, UNSPECIFIED: ICD-10-CM

## 2021-03-24 DIAGNOSIS — G43.909 MIGRAINE, UNSPECIFIED, NOT INTRACTABLE, WITHOUT STATUS MIGRAINOSUS: ICD-10-CM

## 2021-03-24 DIAGNOSIS — R19.7 DIARRHEA, UNSPECIFIED: ICD-10-CM

## 2021-03-24 DIAGNOSIS — Z87.09 PERSONAL HISTORY OF OTHER DISEASES OF THE RESPIRATORY SYSTEM: Chronic | ICD-10-CM

## 2021-03-24 DIAGNOSIS — Z88.5 ALLERGY STATUS TO NARCOTIC AGENT: ICD-10-CM

## 2021-03-24 DIAGNOSIS — E78.5 HYPERLIPIDEMIA, UNSPECIFIED: ICD-10-CM

## 2021-03-24 DIAGNOSIS — Z90.722 ACQUIRED ABSENCE OF OVARIES, BILATERAL: ICD-10-CM

## 2021-03-24 DIAGNOSIS — I10 ESSENTIAL (PRIMARY) HYPERTENSION: ICD-10-CM

## 2021-03-24 DIAGNOSIS — Z90.49 ACQUIRED ABSENCE OF OTHER SPECIFIED PARTS OF DIGESTIVE TRACT: ICD-10-CM

## 2021-03-24 DIAGNOSIS — M47.10 OTHER SPONDYLOSIS WITH MYELOPATHY, SITE UNSPECIFIED: ICD-10-CM

## 2021-03-24 DIAGNOSIS — Z90.89 ACQUIRED ABSENCE OF OTHER ORGANS: Chronic | ICD-10-CM

## 2021-03-24 DIAGNOSIS — K21.9 GASTRO-ESOPHAGEAL REFLUX DISEASE WITHOUT ESOPHAGITIS: ICD-10-CM

## 2021-03-24 LAB
ALBUMIN SERPL ELPH-MCNC: 3.6 G/DL — SIGNIFICANT CHANGE UP (ref 3.3–5)
ALP SERPL-CCNC: 78 U/L — SIGNIFICANT CHANGE UP (ref 40–120)
ALT FLD-CCNC: 28 U/L — SIGNIFICANT CHANGE UP (ref 12–78)
ANION GAP SERPL CALC-SCNC: 7 MMOL/L — SIGNIFICANT CHANGE UP (ref 5–17)
APPEARANCE UR: CLEAR — SIGNIFICANT CHANGE UP
AST SERPL-CCNC: 22 U/L — SIGNIFICANT CHANGE UP (ref 15–37)
BACTERIA # UR AUTO: ABNORMAL
BASOPHILS # BLD AUTO: 0.05 K/UL — SIGNIFICANT CHANGE UP (ref 0–0.2)
BASOPHILS NFR BLD AUTO: 0.5 % — SIGNIFICANT CHANGE UP (ref 0–2)
BILIRUB SERPL-MCNC: 0.4 MG/DL — SIGNIFICANT CHANGE UP (ref 0.2–1.2)
BILIRUB UR-MCNC: NEGATIVE — SIGNIFICANT CHANGE UP
BUN SERPL-MCNC: 5 MG/DL — LOW (ref 7–23)
CALCIUM SERPL-MCNC: 9.8 MG/DL — SIGNIFICANT CHANGE UP (ref 8.5–10.1)
CHLORIDE SERPL-SCNC: 104 MMOL/L — SIGNIFICANT CHANGE UP (ref 96–108)
CO2 SERPL-SCNC: 25 MMOL/L — SIGNIFICANT CHANGE UP (ref 22–31)
COLOR SPEC: YELLOW — SIGNIFICANT CHANGE UP
CREAT SERPL-MCNC: 0.77 MG/DL — SIGNIFICANT CHANGE UP (ref 0.5–1.3)
DIFF PNL FLD: ABNORMAL
EOSINOPHIL # BLD AUTO: 0.22 K/UL — SIGNIFICANT CHANGE UP (ref 0–0.5)
EOSINOPHIL NFR BLD AUTO: 2.4 % — SIGNIFICANT CHANGE UP (ref 0–6)
EPI CELLS # UR: SIGNIFICANT CHANGE UP
GLUCOSE SERPL-MCNC: 89 MG/DL — SIGNIFICANT CHANGE UP (ref 70–99)
GLUCOSE UR QL: NEGATIVE MG/DL — SIGNIFICANT CHANGE UP
HCT VFR BLD CALC: 31.6 % — LOW (ref 34.5–45)
HGB BLD-MCNC: 11.1 G/DL — LOW (ref 11.5–15.5)
IMM GRANULOCYTES NFR BLD AUTO: 0.3 % — SIGNIFICANT CHANGE UP (ref 0–1.5)
KETONES UR-MCNC: NEGATIVE — SIGNIFICANT CHANGE UP
LACTATE SERPL-SCNC: 1.2 MMOL/L — SIGNIFICANT CHANGE UP (ref 0.7–2)
LEUKOCYTE ESTERASE UR-ACNC: NEGATIVE — SIGNIFICANT CHANGE UP
LIDOCAIN IGE QN: 80 U/L — SIGNIFICANT CHANGE UP (ref 73–393)
LYMPHOCYTES # BLD AUTO: 2.79 K/UL — SIGNIFICANT CHANGE UP (ref 1–3.3)
LYMPHOCYTES # BLD AUTO: 30.1 % — SIGNIFICANT CHANGE UP (ref 13–44)
MCHC RBC-ENTMCNC: 29.6 PG — SIGNIFICANT CHANGE UP (ref 27–34)
MCHC RBC-ENTMCNC: 35.1 GM/DL — SIGNIFICANT CHANGE UP (ref 32–36)
MCV RBC AUTO: 84.3 FL — SIGNIFICANT CHANGE UP (ref 80–100)
MONOCYTES # BLD AUTO: 0.93 K/UL — HIGH (ref 0–0.9)
MONOCYTES NFR BLD AUTO: 10 % — SIGNIFICANT CHANGE UP (ref 2–14)
NEUTROPHILS # BLD AUTO: 5.24 K/UL — SIGNIFICANT CHANGE UP (ref 1.8–7.4)
NEUTROPHILS NFR BLD AUTO: 56.7 % — SIGNIFICANT CHANGE UP (ref 43–77)
NITRITE UR-MCNC: NEGATIVE — SIGNIFICANT CHANGE UP
NRBC # BLD: 0 /100 WBCS — SIGNIFICANT CHANGE UP (ref 0–0)
PH UR: 6 — SIGNIFICANT CHANGE UP (ref 5–8)
PLATELET # BLD AUTO: 372 K/UL — SIGNIFICANT CHANGE UP (ref 150–400)
POTASSIUM SERPL-MCNC: 4.5 MMOL/L — SIGNIFICANT CHANGE UP (ref 3.5–5.3)
POTASSIUM SERPL-SCNC: 4.5 MMOL/L — SIGNIFICANT CHANGE UP (ref 3.5–5.3)
PROT SERPL-MCNC: 7.6 GM/DL — SIGNIFICANT CHANGE UP (ref 6–8.3)
PROT UR-MCNC: NEGATIVE MG/DL — SIGNIFICANT CHANGE UP
RBC # BLD: 3.75 M/UL — LOW (ref 3.8–5.2)
RBC # FLD: 12.3 % — SIGNIFICANT CHANGE UP (ref 10.3–14.5)
RBC CASTS # UR COMP ASSIST: ABNORMAL /HPF (ref 0–4)
SODIUM SERPL-SCNC: 136 MMOL/L — SIGNIFICANT CHANGE UP (ref 135–145)
SP GR SPEC: 1 — LOW (ref 1.01–1.02)
UROBILINOGEN FLD QL: NEGATIVE MG/DL — SIGNIFICANT CHANGE UP
WBC # BLD: 9.26 K/UL — SIGNIFICANT CHANGE UP (ref 3.8–10.5)
WBC # FLD AUTO: 9.26 K/UL — SIGNIFICANT CHANGE UP (ref 3.8–10.5)

## 2021-03-24 PROCEDURE — 74177 CT ABD & PELVIS W/CONTRAST: CPT | Mod: 26,MH

## 2021-03-24 PROCEDURE — 99285 EMERGENCY DEPT VISIT HI MDM: CPT

## 2021-03-24 RX ORDER — IOHEXOL 300 MG/ML
30 INJECTION, SOLUTION INTRAVENOUS ONCE
Refills: 0 | Status: COMPLETED | OUTPATIENT
Start: 2021-03-24 | End: 2021-03-24

## 2021-03-24 RX ORDER — KETOROLAC TROMETHAMINE 30 MG/ML
15 SYRINGE (ML) INJECTION ONCE
Refills: 0 | Status: DISCONTINUED | OUTPATIENT
Start: 2021-03-24 | End: 2021-03-24

## 2021-03-24 RX ORDER — SODIUM CHLORIDE 9 MG/ML
1000 INJECTION INTRAMUSCULAR; INTRAVENOUS; SUBCUTANEOUS ONCE
Refills: 0 | Status: COMPLETED | OUTPATIENT
Start: 2021-03-24 | End: 2021-03-24

## 2021-03-24 RX ADMIN — SODIUM CHLORIDE 1000 MILLILITER(S): 9 INJECTION INTRAMUSCULAR; INTRAVENOUS; SUBCUTANEOUS at 21:56

## 2021-03-24 RX ADMIN — IOHEXOL 30 MILLILITER(S): 300 INJECTION, SOLUTION INTRAVENOUS at 21:28

## 2021-03-24 NOTE — ED PROVIDER NOTE - PROGRESS NOTE DETAILS
CT showing early enteritis, no SBO. labs otherwise unremarkable. To be discharged home w/ zofran and loperamide. tolerating PO at dc. abd soft nd nt. benign, I have discussed with the patient about the ED workup, lab results, diagnostics results, plan for discharge home, need for follow-up with primary care physician/specialists, and return precautions. At this time, the patient does not require further workup in the ED. The patient is subjectively feeling better and would like to be discharged home. The patient had the opportunity to ask questions and I have answered all inquiries. The patient verbalizes understanding and agreement with the plan. The patient is hemodynamically stable, clinically well-appearing, ambulatory, mentating well and ready for discharge home.

## 2021-03-24 NOTE — ED ADULT TRIAGE NOTE - CHIEF COMPLAINT QUOTE
pt sent to ED by Dr. Diego kerr for no bowel sounds, pt c/o diffuse abdominal pain, multiple episodes of watery diarrhea stared friday night. hx: bowel adhesions.

## 2021-03-24 NOTE — ED PROVIDER NOTE - OBJECTIVE STATEMENT
66 y/o F with a PMHx of Spinal fusion, Vocal cord paralysis, PSHx of Multiple abdominal surgeries and Spinal surgery presents to the ED c/o abdominal pain associated with diarrhea and constipation for the past week. Patient states that on Saturday she woke up with diarrhea and symptoms improved. Patient reports that over the days she became constipated and took Pepto bismol and Dulcolax and had diarrhea again. Patient notes since that time she had severe abdominal pain associated with nausea. Denies emesis, chest pain, trouble breathing or any other related symptoms. Patient was seen by Dr. Zavala today and sent to the ED to r/o bowel obstruction.

## 2021-03-24 NOTE — ED PROVIDER NOTE - PATIENT PORTAL LINK FT
You can access the FollowMyHealth Patient Portal offered by [facility] by registering at the following website: [email or web address]. By joining [email or web address]’s FollowMyHealth portal, you will also be able to view your health information using other applications (apps) compatible with our system.

## 2021-03-24 NOTE — ED PROVIDER NOTE - NS ED ROS FT
CONST: no fevers, no chills, no trauma  EYES: no pain, no visual disturbances  ENT: no sore throat, no epistaxis, no rhinorrhea, no hearing changes  CV: no chest pain, no palpitations, no orthopnea, no extremity pain or swelling  RESP: no shortness of breath, no cough, no sputum, no pleurisy, no wheezing  ABD: no abdominal pain, no nausea, no vomiting, +diarrhea, +constipation, no black or bloody stool  : no dysuria, no hematuria, no frequency, no urgency  MSK: no back pain, no neck pain, no extremity pain  NEURO: no headache, no sensory disturbances, no focal weakness, no dizziness  HEME: no easy bleeding or bruising  SKIN: no diaphoresis, no rash

## 2021-03-24 NOTE — ED PROVIDER NOTE - CCCP TRG CHIEF CMPLNT
Can defer urine as she is on ARB. FYI to daughter and to the nurse. The serum protein is not what we are evaluating for. We are assessing urine for microalbuminuria or protein leak into the urine.   This is a condition we monitor for in patients with lakeshia abdominal pain

## 2021-03-24 NOTE — ED ADULT TRIAGE NOTE - SOURCE OF INFORMATION
[facility]  [address]  [id number]  9/21/2018      Ronda Colindres  [address] [place] [id number]    Dear Ms. Colindres:    I am pleased to inform you that your recent lab tests are available and are normal.  These include:    [  ]  Complete blood count (CBC) - Normal - This includes screening for low red cell counts (anemia), white blood counts (reflects response to infection) and platelets, which help with normal clotting.    [  ]  Gonorrhea/chlamydia - Normal      (   ) other tests- AFP -Normal          Please call with any questions.        [phone]                                                                            Patient

## 2021-03-24 NOTE — ED PROVIDER NOTE - PMH
Cervical radiculopathy    Degenerative lumbar disc    Gastroesophageal reflux disease without esophagitis    Glaucoma  b/l  H/O vocal cord paralysis  right  Hiatal hernia    History of diverticulitis    History of endometriosis    History of gastric ulcer  longg time aGO  History of iron deficiency anemia    History of migraine    Hyperlipidemia    Hypertension    Neuropathic pain of finger, left  numbness 4th asnd 5th digit  Osteoarthritis of cervical spine with myelopathy    Spinal stenosis in cervical region    Vocal cord paralysis

## 2021-03-24 NOTE — ED ADULT NURSE NOTE - OBJECTIVE STATEMENT
Patient A&o x3 came in with complaints of seeing her primary dr because she had diarrhea since Friday and was alternately also having constipation. Patient said dr heard decreased bowel sounds so he told her to come to the ED. Patient has hx of bowel adhesions.

## 2021-03-24 NOTE — ED PROVIDER NOTE - NSFOLLOWUPINSTRUCTIONS_ED_ALL_ED_FT
Nausea / Vomiting    Nausea is the feeling that you have to vomit. As nausea gets worse, it can lead to vomiting. Vomiting puts you at an increased risk for dehydration. Older adults and people with other diseases or a weak immune system are at higher risk for dehydration. Drink clear fluids in small but frequent amounts as tolerated. Eat bland, easy-to-digest foods in small amounts as tolerated.    SEEK IMMEDIATE MEDICAL CARE IF YOU HAVE ANY OF THE FOLLOWING SYMPTOMS: fever, inability to keep sufficient fluids down, black or bloody vomitus, black or bloody stools, lightheadedness/dizziness, chest pain, severe headache, rash, shortness of breath, cold or clammy skin, confusion, pain with urination, or any signs of dehydration.     Diarrhea    Diarrhea is frequent loose or watery bowel movements that has many causes. Diarrhea can make you feel weak and cause you to become dehydrated. Diarrhea typically lasts 2–3 days, but can last longer if it is a sign of something more serious. Drink clear fluids to prevent dehydration. Eat bland, easy-to-digest foods as tolerated.     SEEK IMMEDIATE MEDICAL CARE IF YOU HAVE ANY OF THE FOLLOWING SYMPTOMS: high fevers, lightheadedness/dizziness, chest pain, black or bloody stools, shortness of breath, severe abdominal or back pain, or any signs of dehydration.     Take acetaminophen 650 mg orally every 6-8 hours for pain control as needed. Please do not exceed 4,000 mg of acetaminophen during a 24 hours period. Acetaminophen can be found in many over-the-counter cold medications as well as opioid medications that may be given for pain.    Take ibuprofen (also known as MOTRIN or ADVIL) 400 mg orally every 6-8 hours for pain control as needed with food to avoid an upset stomach. Ibuprofen can be found in many over-the-counter medications. Please do not take ibuprofen if you have a bleeding disorder, stomach or gastrointestinal ulcer, or liver disease.    If needed, you can alternate these medications so that you can take one medication every 3 hours. For example, at noon take ibuprofen, then at 3PM take acetaminophen, then at 6PM take ibuprofen.     Rest, drink plenty of fluids.  Advance activity as tolerated.  Continue all previously prescribed medications as directed.  Follow up with your PMD 2-3 days and bring copies of your results.

## 2021-03-25 VITALS
RESPIRATION RATE: 16 BRPM | SYSTOLIC BLOOD PRESSURE: 127 MMHG | HEART RATE: 102 BPM | DIASTOLIC BLOOD PRESSURE: 73 MMHG | OXYGEN SATURATION: 96 % | TEMPERATURE: 98 F

## 2021-03-25 RX ORDER — ONDANSETRON 8 MG/1
1 TABLET, FILM COATED ORAL
Qty: 15 | Refills: 0
Start: 2021-03-25 | End: 2021-03-29

## 2021-03-25 RX ADMIN — Medication 15 MILLIGRAM(S): at 00:10

## 2021-03-25 RX ADMIN — Medication 1 TABLET(S): at 00:50

## 2021-03-26 LAB
CULTURE RESULTS: SIGNIFICANT CHANGE UP
SPECIMEN SOURCE: SIGNIFICANT CHANGE UP

## 2021-05-17 NOTE — H&P PST ADULT - OPHTHALMOLOGIC
negative Partial Purse String (Intermediate) Text: Given the location of the defect and the characteristics of the surrounding skin an intermediate purse string closure was deemed most appropriate.  Undermining was performed circumfirentially around the surgical defect.  A purse string suture was then placed and tightened. Wound tension only allowed a partial closure of the circular defect.

## 2021-06-03 NOTE — H&P PST ADULT - FALL HARM RISK CONCLUSION
Physical Therapy  Cancellation/No-show Note  Patient Name:  Karen Geiger  :  1977   Date:  6/3/2021  Cancelled visits to date: 1  No-shows to date: 0    Patient status for today's appointment patient:  [x]  Cancelled 6/3/21  []  Rescheduled appointment  []  No-show     Reason given by patient:  []  Patient ill  []  Conflicting appointment  []  No transportation    []  Conflict with work  []  No reason given  [x]  Other:     Comments:  6/3 cx her PT ulises bc pt does not have an order for PT tx    Phone call information:   []  Phone call made today to patient at _ time at number provided:      []  Patient answered, conversation as follows:    []  Patient did not answer, message left as follows:  []  Phone call not made today  [x]  Phone call not needed - pt contacted us to cancel and provided reason for cancellation.      Electronically signed by:  Carole Disla, PT Fall Risk

## 2021-09-14 NOTE — PRE-ANESTHESIA EVALUATION ADULT - WEIGHT IN LBS
----- Message from Skinny Casas MD sent at 9/13/2021  2:03 PM CDT -----  Please contact the patient and find out whether Dr. Monico Alfaro's office has been in touch to schedule consultation. If not, please call Dr. Alfaro's office and have this set up for urgent orthopedic/oncology consultation. I have already placed order.  
Per chart review, Dr. Alfaro's office has reached out to patient to schedule visit through e-advice.   
thx  
159.3

## 2021-11-16 ENCOUNTER — TRANSCRIPTION ENCOUNTER (OUTPATIENT)
Age: 66
End: 2021-11-16

## 2021-11-30 PROBLEM — J38.00 PARALYSIS OF VOCAL CORDS AND LARYNX, UNSPECIFIED: Chronic | Status: ACTIVE | Noted: 2021-03-25

## 2021-12-02 ENCOUNTER — NON-APPOINTMENT (OUTPATIENT)
Age: 66
End: 2021-12-02

## 2021-12-03 ENCOUNTER — APPOINTMENT (OUTPATIENT)
Dept: SPINE | Facility: CLINIC | Age: 66
End: 2021-12-03
Payer: COMMERCIAL

## 2021-12-03 VITALS
OXYGEN SATURATION: 95 % | BODY MASS INDEX: 28.07 KG/M2 | WEIGHT: 143 LBS | HEIGHT: 60 IN | HEART RATE: 94 BPM | SYSTOLIC BLOOD PRESSURE: 132 MMHG | DIASTOLIC BLOOD PRESSURE: 84 MMHG

## 2021-12-03 DIAGNOSIS — M54.81 OCCIPITAL NEURALGIA: ICD-10-CM

## 2021-12-03 PROCEDURE — 99204 OFFICE O/P NEW MOD 45 MIN: CPT

## 2021-12-03 RX ORDER — GABAPENTIN 300 MG/1
300 CAPSULE ORAL
Qty: 90 | Refills: 0 | Status: DISCONTINUED | COMMUNITY
Start: 2017-08-24 | End: 2021-12-03

## 2021-12-03 RX ORDER — TIMOLOL MALEATE 5 MG/ML
0.5 SOLUTION OPHTHALMIC
Refills: 0 | Status: ACTIVE | COMMUNITY

## 2021-12-03 RX ORDER — AMLODIPINE BESYLATE 5 MG/1
TABLET ORAL
Refills: 0 | Status: DISCONTINUED | COMMUNITY
End: 2021-12-03

## 2021-12-03 RX ORDER — PREGABALIN 25 MG/1
25 CAPSULE ORAL
Qty: 60 | Refills: 0 | Status: DISCONTINUED | COMMUNITY
Start: 2017-11-07 | End: 2021-12-03

## 2021-12-03 RX ORDER — CEPHALEXIN 500 MG/1
500 CAPSULE ORAL
Qty: 40 | Refills: 0 | Status: DISCONTINUED | COMMUNITY
Start: 2017-09-24 | End: 2021-12-03

## 2021-12-03 RX ORDER — METHOCARBAMOL 750 MG/1
750 TABLET, FILM COATED ORAL
Qty: 120 | Refills: 0 | Status: DISCONTINUED | COMMUNITY
Start: 2017-02-13 | End: 2021-12-03

## 2021-12-03 RX ORDER — ECONAZOLE NITRATE 10 MG/G
1 CREAM TOPICAL
Qty: 85 | Refills: 0 | Status: DISCONTINUED | COMMUNITY
Start: 2017-09-12 | End: 2021-12-03

## 2021-12-03 RX ORDER — HYDROMORPHONE HYDROCHLORIDE 2 MG/1
2 TABLET ORAL
Qty: 42 | Refills: 0 | Status: DISCONTINUED | COMMUNITY
Start: 2017-11-07 | End: 2021-12-03

## 2021-12-03 RX ORDER — HYDROCODONE BITARTRATE AND ACETAMINOPHEN 5; 300 MG/1; MG/1
5-300 TABLET ORAL
Qty: 90 | Refills: 0 | Status: DISCONTINUED | COMMUNITY
Start: 2017-09-28 | End: 2021-12-03

## 2021-12-03 RX ORDER — SUCRALFATE 1 G/10ML
1 SUSPENSION ORAL
Qty: 3600 | Refills: 0 | Status: DISCONTINUED | COMMUNITY
Start: 2017-02-14 | End: 2021-12-03

## 2021-12-03 RX ORDER — HYDROCODONE BITARTRATE AND ACETAMINOPHEN 7.5; 3 MG/1; MG/1
7.5-3 TABLET ORAL
Qty: 20 | Refills: 0 | Status: DISCONTINUED | COMMUNITY
Start: 2017-12-29 | End: 2021-12-03

## 2021-12-03 RX ORDER — SERTRALINE HYDROCHLORIDE 50 MG/1
50 TABLET, FILM COATED ORAL
Qty: 180 | Refills: 0 | Status: DISCONTINUED | COMMUNITY
Start: 2017-08-07 | End: 2021-12-03

## 2021-12-03 RX ORDER — NORTRIPTYLINE HYDROCHLORIDE 10 MG/1
10 CAPSULE ORAL
Refills: 0 | Status: ACTIVE | COMMUNITY

## 2021-12-03 RX ORDER — MUPIROCIN 20 MG/G
2 OINTMENT TOPICAL
Qty: 22 | Refills: 0 | Status: DISCONTINUED | COMMUNITY
Start: 2017-09-24 | End: 2021-12-03

## 2021-12-03 RX ORDER — ESTROGENS, CONJUGATED 0.62 MG/1
0.62 TABLET, FILM COATED ORAL
Qty: 90 | Refills: 0 | Status: DISCONTINUED | COMMUNITY
Start: 2016-11-10 | End: 2021-12-03

## 2021-12-03 RX ORDER — CYCLOBENZAPRINE HYDROCHLORIDE 10 MG/1
10 TABLET, FILM COATED ORAL
Qty: 90 | Refills: 0 | Status: DISCONTINUED | COMMUNITY
Start: 2017-09-28 | End: 2021-12-03

## 2021-12-03 RX ORDER — IRBESARTAN AND HYDROCHLOROTHIAZIDE 150; 12.5 MG/1; MG/1
150-12.5 TABLET ORAL
Qty: 90 | Refills: 0 | Status: DISCONTINUED | COMMUNITY
Start: 2016-11-30 | End: 2021-12-03

## 2021-12-03 RX ORDER — TIMOLOL MALEATE 5 MG/ML
0.5 SOLUTION OPHTHALMIC
Qty: 15 | Refills: 0 | Status: DISCONTINUED | COMMUNITY
Start: 2017-04-12 | End: 2021-12-03

## 2021-12-03 RX ORDER — METHYLPREDNISOLONE 4 MG/1
4 TABLET ORAL
Qty: 21 | Refills: 0 | Status: DISCONTINUED | COMMUNITY
Start: 2017-06-19 | End: 2021-12-03

## 2021-12-03 RX ORDER — PRAVASTATIN SODIUM 20 MG/1
20 TABLET ORAL
Refills: 0 | Status: ACTIVE | COMMUNITY

## 2021-12-03 RX ORDER — PREDNISONE 10 MG/1
10 TABLET ORAL
Qty: 30 | Refills: 0 | Status: DISCONTINUED | COMMUNITY
Start: 2019-07-03 | End: 2021-12-03

## 2021-12-03 RX ORDER — METOPROLOL SUCCINATE 25 MG/1
25 TABLET, EXTENDED RELEASE ORAL
Refills: 0 | Status: ACTIVE | COMMUNITY

## 2021-12-03 RX ORDER — ESOMEPRAZOLE MAGNESIUM 40 MG/1
40 CAPSULE, DELAYED RELEASE ORAL
Qty: 60 | Refills: 0 | Status: DISCONTINUED | COMMUNITY
Start: 2017-04-20 | End: 2021-12-03

## 2021-12-03 RX ORDER — METHYLPREDNISOLONE 4 MG/1
4 TABLET ORAL
Qty: 1 | Refills: 0 | Status: DISCONTINUED | COMMUNITY
Start: 2019-06-26 | End: 2021-12-03

## 2021-12-03 NOTE — REVIEW OF SYSTEMS
[Feeling Poorly] : feeling poorly [Numbness] : numbness [Tingling] : tingling [Abnormal Sensation] : an abnormal sensation [Hypersensitivity] : hypersensitivity [Migraine Headache] : migraine headaches [Tension Headache] : tension-type headaches [Arthralgias] : arthralgias [Limb Pain] : limb pain [Negative] : Heme/Lymph

## 2021-12-03 NOTE — CONSULT LETTER
[Dear  ___] : Dear  [unfilled], [Courtesy Letter:] : I had the pleasure of seeing your patient, [unfilled], in my office today. [Please see my note below.] : Please see my note below. [Sincerely,] : Sincerely, [FreeTextEntry2] : Dr. Kaiden Zavala [FreeTextEntry3] : Francesca Knox MS, ANP-BC, SCRN\par Board Certified Adult Nurse Practitioner on Behalf of Dr. Wu Hall \par  Neurosurgeon\par Department Neurosurgery\par \par  [DrMayra  ___] : Dr. MCKEON [DrMayra ___] : Dr. MCKEON

## 2021-12-03 NOTE — HISTORY OF PRESENT ILLNESS
[de-identified] : Ms. Emma Patel 66 Year old woman presenting with a PMHx of High Cholesterol, HTN well controlled and stomach problems, S/P Multiple cervical spinal fusion beginning 2017 who presents with chronic neck and Left arm pain. She states that she suffered form chronic neck and arm pain for many years. In 2017 she was seen and evaluated by Dr. Mcgovern  for bilateral hand numbness gait instability and he recommended anterior posterior cervical fusion for treatment of cervical myelopathy. She underwent Anterior/Posterior Cervical fusion 6/17/17. Initially she got better after surgery for 2 months. She did not need postop rehabilitation. Two months later she began to experience Right arm numbness which led to a posterior revision of hardware removing screws 9/2017. She was left with residual posterior occipital headache and Left arm pain with Numbness in her 4th and 5 th fingers. He right arm pain improved and she was placed on gabapentin and later transitioned to Lyrica. Her symptoms progressed even further and she developed Left sided Shoulder. She underwent surgery #3 for a left C7-T1 DISC HERNIATION. She developed vocal cord paralysis postop leading to addition vocal cord surgery and treatment by Dr. Ye in 2019. A vocal cord implant was removed in 2019 four months after implantation.\par \par Today her main complaint is Occipital neck pain and left arm pain followed by numbness in her fingers (4th and fifth digit). She uses TENS unit at home and [facility]par She ambulates independently. She is  and lives with her . 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

## 2021-12-03 NOTE — PHYSICAL EXAM
[General Appearance - Alert] : alert [General Appearance - In No Acute Distress] : in no acute distress [Oriented To Time, Place, And Person] : oriented to person, place, and time [Impaired Insight] : insight and judgment were intact [Cranial Nerves Optic (II)] : visual acuity intact bilaterally,  pupils equal round and reactive to light [Cranial Nerves Oculomotor (III)] : extraocular motion intact [Cranial Nerves Trigeminal (V)] : facial sensation intact symmetrically [Cranial Nerves Facial (VII)] : face symmetrical [Cranial Nerves Vestibulocochlear (VIII)] : hearing was intact bilaterally [Cranial Nerves Glossopharyngeal (IX)] : tongue and palate midline [Cranial Nerves Accessory (XI - Cranial And Spinal)] : head turning and shoulder shrug symmetric [Cranial Nerves Hypoglossal (XII)] : there was no tongue deviation with protrusion [Motor Tone] : muscle tone was normal in all four extremities [Motor Strength] : muscle strength was normal in all four extremities [5] : L4/5 heel walking 5/5 [4] : S1 toe walking 4/5 [Deformity] : deformity [Sclera] : the sclera and conjunctiva were normal [] : no respiratory distress [Heart Rate And Rhythm] : heart rate was normal and rhythm regular [Abdomen Soft] : soft [Abnormal Walk] : normal gait [Skin Color & Pigmentation] : normal skin color and pigmentation [FreeTextEntry1] : Kyphosis; Limited ROM

## 2021-12-03 NOTE — DATA REVIEWED
[de-identified] : CT Cervical Spine 3/13/19  [facility] [de-identified] : MRI CERVICAL SPINE [facility] 3/24/21 BRAIN 3/24/21

## 2021-12-03 NOTE — ASSESSMENT
[FreeTextEntry1] : Ms. Emma Patel is a 66 Year old woman S/P Multiple anterior and posterior Cervical Spine fusion and Revision complicated by Vocal cord Paralysis who presents with Persistent occipital neck and shoulder pain and Left arm radiculopathy. Comprehensive workup recommended to evaluate current problem. CT cervical and Thoracic to assess bony configuration R/O Pseudoarthrosis and hardware failure. MRI Cervical and Thoracic to assess for proximal junctional stenosis and recurrent stenosis. Cervical flex ext Xray to assess for instability. Scoliosis Xray to assess coronal sagittal and pelvics balance. She refused PT at this time due to fear of complicating her cervical spine. She will follow up to see Dr. Wu Hall for neurosurgical evaluation. She will continue Pain management under the care of Dr. Dow.

## 2021-12-06 NOTE — H&P PST ADULT - NS PRO ABUSE SCREEN AFRAID ANYONE YN
[employer] Activation    Thank you for requesting access to [employer]. Please follow the instructions below to securely access and download your online medical record. [employer] allows you to send messages to your doctor, view your test results, renew your prescriptions, schedule appointments, and more. How Do I Sign Up? 1. In your internet browser, go to [email or web address]  2. Click on the First Time User? Click Here link in the Sign In box. You will be redirect to the New Member Sign Up page. 3. Enter your [employer] Access Code exactly as it appears below. You will not need to use this code after youve completed the sign-up process. If you do not sign up before the expiration date, you must request a new code. [employer] Access Code: Activation code not generated  Current [employer] Status: Active (This is the date your [employer] access code will )    4. Enter the last four digits of your Social Security Number (xxxx) and Date of Birth (mm/dd/yyyy) as indicated and click Submit. You will be taken to the next sign-up page. 5. Create a [employer] ID. This will be your [employer] login ID and cannot be changed, so think of one that is secure and easy to remember. 6. Create a [employer] password. You can change your password at any time. 7. Enter your Password Reset Question and Answer. This can be used at a later time if you forget your password. 8. Enter your e-mail address. You will receive e-mail notification when new information is available in [address] 9. Click Sign Up. You can now view and download portions of your medical record. 10. Click the Download Summary menu link to download a portable copy of your medical information. Additional Information    If you have questions, please visit the Frequently Asked Questions section of the [employer] website at [email or web address] Remember, [employer] is NOT to be used for urgent needs. For medical emergencies, dial 911.
no

## 2021-12-16 ENCOUNTER — APPOINTMENT (OUTPATIENT)
Dept: SPINE | Facility: CLINIC | Age: 66
End: 2021-12-16
Payer: MEDICARE

## 2021-12-16 VITALS
SYSTOLIC BLOOD PRESSURE: 119 MMHG | HEIGHT: 60 IN | BODY MASS INDEX: 28.07 KG/M2 | OXYGEN SATURATION: 94 % | DIASTOLIC BLOOD PRESSURE: 69 MMHG | WEIGHT: 143 LBS | HEART RATE: 96 BPM

## 2021-12-16 DIAGNOSIS — Z80.9 FAMILY HISTORY OF MALIGNANT NEOPLASM, UNSPECIFIED: ICD-10-CM

## 2021-12-16 DIAGNOSIS — Z87.891 PERSONAL HISTORY OF NICOTINE DEPENDENCE: ICD-10-CM

## 2021-12-16 PROCEDURE — 99213 OFFICE O/P EST LOW 20 MIN: CPT

## 2021-12-16 NOTE — PHYSICAL EXAM
[Person] : oriented to person [Place] : oriented to place [Time] : oriented to time [Short Term Intact] : short term memory intact [Remote Intact] : remote memory intact [Span Intact] : the attention span was normal [Concentration Intact] : normal concentrating ability [Fluency] : fluency intact [Comprehension] : comprehension intact [Current Events] : adequate knowledge of current events [Past History] : adequate knowledge of personal past history [Vocabulary] : adequate range of vocabulary [Cranial Nerves Optic (II)] : visual acuity intact bilaterally,  pupils equal round and reactive to light [Cranial Nerves Oculomotor (III)] : extraocular motion intact [Cranial Nerves Trigeminal (V)] : facial sensation intact symmetrically [Cranial Nerves Facial (VII)] : face symmetrical [Cranial Nerves Vestibulocochlear (VIII)] : hearing was intact bilaterally [Cranial Nerves Glossopharyngeal (IX)] : tongue and palate midline [Cranial Nerves Accessory (XI - Cranial And Spinal)] : head turning and shoulder shrug symmetric [Cranial Nerves Hypoglossal (XII)] : there was no tongue deviation with protrusion [Motor Tone] : muscle tone was normal in all four extremities [No Muscle Atrophy] : normal bulk in all four extremities [Sensation Tactile Decrease] : light touch was intact [Abnormal Walk] : normal gait [Balance] : balance was intact [Past-pointing] : there was no past-pointing [Tremor] : no tremor present [2+] : Ankle jerk left 2+ [Eugene] : Eugene's sign was not demonstrated [FreeTextEntry6] : mild left  weakness with left fourth and fifth finger extension weakness 4/5 [FreeTextEntry1] : negative Tinel's sign at the elbows

## 2021-12-16 NOTE — ASSESSMENT
[FreeTextEntry1] : \par \par Recurrent cervical radiculopathy after three cervical fusions.  Symptoms suggestive of left C8 or T1 on the left ulnar neuropathy.  On review of a cervical spine scan,  there is solid fusion and arthrodesis to the T 1 level.   There is no cord impingement and no severe stenosis.   It was suggested that she see a pain specialist and consider a dorsal root ganglion spine stimulator.   She can try more non -surgical  measures  such as PT , acupuncture with no aggressive ROM of neck.  Follow up PRN.   \par \par \par

## 2021-12-16 NOTE — DATA REVIEWED
[de-identified] : CT scan from [facility]  12/7/2021,   CT thoracic spine form [facility] - 12/7/2021 [de-identified] : Cervical MRI from  [facility] -   12/7/2021

## 2021-12-16 NOTE — END OF VISIT
[FreeTextEntry3] : I, Dr. Wu Hall, evaluated this patient with the Nurse Practitioner, Kim Lombardo, and established the plan of care.  I personally discussed this patient  during the key portions of the history and exam with the Nurse Practitioner at the time of the visit.  I agree with the assessment and plan as written, unless noted below.\par

## 2021-12-16 NOTE — REASON FOR VISIT
[Follow-Up: _____] : a [unfilled] follow-up visit [Family Member] : family member [FreeTextEntry1] : \par Ms Patel is a 66-year-old woman who is here for a new patient visit with a long standing history of cervical stenosis.  She had undergone multiple cervical fusions.   The first surgery was an anterior cervical fusion and then recurrent pain with hardware failure, a revision for a posterior cervical fusion was performed and then stenosis was noted below the prior level of fusion and an anterior cervical fusion was completed,  during this surgery she was left with vocal cord paralysis.   \par \par Today she reports her left last two digits are numb and recently the fingers have become painful with pain radiating into the arm .  It is  a shooting pain into the hand and arm with spasms.  She has seen a hand specialist with no treatment.  She had an EMG and the report is not available for review.  She has tried multiple medications such as Amitryptiline, Gabapentin and Volataren which was not beneficial.

## 2022-06-26 NOTE — H&P PST ADULT - GASTROINTESTINAL
No new care gaps identified.  [facility] Embedded Care Gaps. Reference number: [id number]. 6/26/2022   5:28:53 PM CDT   negative Soft, non-tender, no hepatosplenomegaly, normal bowel sounds

## 2022-12-21 NOTE — H&P PST ADULT - HEIGHT IN INCHES
2-year-old male child with no significant past medical history brought in by the parents with a chief complaint of fever woke up at 11:30 in the morning and came to the emergency room around 12 noon hours and she came in seizures and altered mental status patient's rectal temperature in the emergency room was 103.5 patient was immediately cooled down and Ativan 0.75 mg was administered IM and rectal Tylenol 240 mg rectally  As per the family everyone in the family has fluid they have been tested positive for flu except for the child  IV access established and 300 mL of normal saline over the past who compresses to bring the temperature down seizure in less than  10 minutes after the administration of Tylenol and Ativan patient was found to have a nasal congestion so the nose was suctioned flu and COVID is negative for influenza COVID or RSV 2-year-old male child with no significant past medical history brought in by the parents with a chief complaint of fever woke up at 11:30 in the morning and came to the emergency room around 12 noon hours and she came in seizures and altered mental status patient's rectal temperature in the emergency room was 103.5 patient was immediately cooled down and Ativan 0.75 mg was administered IM and rectal Tylenol 240 mg rectally  As per the family everyone in the family has fluid they have been tested positive for flu except for the child  IV access established and 300 mL of normal saline over the past who compresses to bring the temperature down seizure in less than  10 minutes after the administration of Tylenol and Ativan patient was found to have a nasal congestion so the nose was suctioned flu and COVID is negative for influenza COVID or RSV  3 pm pt unable to move L UL and L LL  repeat temp 98.4 fs glucose 71  3.15 pm [facility] called 2-year-old male child with no significant past medical history brought in by the parents with a chief complaint of fever woke up at 11:30 in the morning and came to the emergency room around 12 noon hours and she came in seizures and altered mental status patient's rectal temperature in the emergency room was 103.5 patient was immediately cooled down and Ativan 0.75 mg was administered IM and rectal Tylenol 240 mg rectally  As per the family everyone in the family has fluid they have been tested positive for flu except for the child  IV access established and 300 mL of normal saline over the past who compresses to bring the temperature down seizure in less than  10 minutes after the administration of Tylenol and Ativan patient was found to have a nasal congestion so the nose was suctioned flu and COVID is negative for influenza COVID or RSV  3 pm pt unable to move L UL and L LL  repeat temp 98.4 fs glucose 71  3.15 pm [facility] called  4.15 pm child accepted transfer noted moving L foot toes 0

## 2023-01-10 ENCOUNTER — NON-APPOINTMENT (OUTPATIENT)
Age: 68
End: 2023-01-10

## 2023-02-14 NOTE — H&P PST ADULT - TOBACCO, LAST USE DATE, PROFILE
Cephalexin Pregnancy And Lactation Text: This medication is Pregnancy Category B and considered safe during pregnancy.  It is also excreted in breast milk but can be used safely for shorter doses. 01-Jan-1995

## 2023-03-11 ENCOUNTER — NON-APPOINTMENT (OUTPATIENT)
Age: 68
End: 2023-03-11

## 2023-04-14 NOTE — PHYSICAL THERAPY INITIAL EVALUATION ADULT - IMPAIRMENTS FOUND, PT EVAL
tablet  Commonly known as: NORVASC     flintstones complete 10 MG Chew tablet     mometasone-formoterol 100-5 MCG/ACT inhaler  Commonly known as: DULERA     oxyCODONE-acetaminophen 5-325 MG per tablet  Commonly known as: Percocet  Take 1 tablet by mouth every 6 hours as needed for Pain for up to 7 days. Max Daily Amount: 4 tablets            STOP taking these medications      hydroCHLOROthiazide 25 MG tablet  Commonly known as: HYDRODIURIL     Levemir FlexTouch 100 UNIT/ML injection pen  Generic drug: insulin detemir     metFORMIN 500 MG extended release tablet  Commonly known as: GLUCOPHAGE-XR     NovoLOG FlexPen 100 UNIT/ML injection pen  Generic drug: insulin aspart              Activity: activity as tolerated with no heavy lifting of greater than 20 pounds. No anti- inflammatory medications. Use stool softeners at home as needed while taking pain medications since they are constipating. Diet: Bariatric liquid diet    Wound Care: Keep wound clean and dry, Reinforce dressing PRN and ice to area for comfort. Do not get wound wet for 2 days.     Follow-up: 14 days with Dr Yimi Boland M.D
gait, locomotion, and balance/aerobic capacity/endurance/muscle strength

## 2023-04-25 ENCOUNTER — APPOINTMENT (OUTPATIENT)
Dept: ORTHOPEDIC SURGERY | Facility: CLINIC | Age: 68
End: 2023-04-25
Payer: MEDICARE

## 2023-04-25 VITALS — HEIGHT: 60 IN | WEIGHT: 143 LBS | BODY MASS INDEX: 28.07 KG/M2

## 2023-04-25 DIAGNOSIS — M65.311 TRIGGER THUMB, RIGHT THUMB: ICD-10-CM

## 2023-04-25 PROCEDURE — 20550 NJX 1 TENDON SHEATH/LIGAMENT: CPT | Mod: RT

## 2023-04-25 PROCEDURE — 73130 X-RAY EXAM OF HAND: CPT | Mod: RT

## 2023-04-25 PROCEDURE — 99214 OFFICE O/P EST MOD 30 MIN: CPT | Mod: 25

## 2023-04-25 NOTE — IMAGING
[de-identified] : right thumb TTP a1 pulley\par +triggering\par otherwise farom\par neurovascular intact distally\par

## 2023-04-25 NOTE — REASON FOR VISIT
[FreeTextEntry2] : 04/25/2023 :SHARMIN DAMON , a 67 year old female, presents today for right hand thumb pain\par

## 2023-04-25 NOTE — ASSESSMENT
[FreeTextEntry1] : We reviewed the anatomy of the flexor sheath and pathology of trigger fingers with the use of drawings and discussion.  We discussed the treatment options including splinting/nsaids, injection and surgery.  We discussed that too many injections may lead to weakening o the tendon/tendon rupture and the safety of two injections. After a discussion of the risks, benefits and alternatives along with the expectations, the patient was amenable to injection.  The indication for injection is pain and inflammation.  The skin overlying the tendon sheath/A1 pulley site was prepared with alcohol and ethyl chloride was sprayed topically.  Sterile technique was used. An injection of 1ml of lidocaine and 6mg of betamethasone was used.  The patient was instructed to call if redness, pain or fever occur.  They ay apply ice for 15 minutes eery hour for the next 12-24 hours as tolerated.  .  The patient understands that it may take 2-5 days to see a noticeable difference.  Sterile Band-Aid was applied.\par \par Pt was given 1st TF CSI in right thumb today.

## 2023-04-25 NOTE — HISTORY OF PRESENT ILLNESS
[8] : 8 [Dull/Aching] : dull/aching [Localized] : localized [Sharp] : sharp [Nothing helps with pain getting better] : Nothing helps with pain getting better [Exercising] : exercising [Retired] : Work status: retired [de-identified] : 4/25/23:  Pt has pain at the base of the right thumb. [] : no

## 2023-05-25 ENCOUNTER — APPOINTMENT (OUTPATIENT)
Dept: ORTHOPEDIC SURGERY | Facility: CLINIC | Age: 68
End: 2023-05-25

## 2023-05-30 NOTE — PATIENT PROFILE ADULT. - PRO ARRIVE FROM
Patient:  CHARLEY Loya 1959  Date of Service:  23      Patient presents to [facility] [place] with complaints of low back pain that started 2 months ago and has been getting worse. She states the pain began following No specific cause    Pain is constant and is described as aching, dull, sharp, and miserable. She rates the pain as a 6/10 on her worst day , 1/10 on her best day, and a 3/10 on average on the VAS scale. Pain does radiate to right leg. She  has numbness, tingling of the right leg. Alleviating factors include: heat and ice. Aggravating factors include:  walking, standing, bending, lifting. She states that the pain does keep her from sleeping at night. She took her last dose of  voltaren  today. She is  on NSAIDS and  is not on anticoagulation medications. Previous treatments: Physical Therapy and medications. .      Personal Expectations from this treatment: increase activity and decrease pain    BP (!) 142/74   Pulse 78   Temp 97.4 °F (36.3 °C) (Infrared)   Resp 16   Ht 5' 2\" (1.575 m)   Wt 195 lb (88.5 kg)   SpO2 93%   BMI 35.67 kg/m²     No LMP recorded. Patient has had a hysterectomy. home
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
## 2023-07-13 NOTE — PROGRESS NOTE ADULT - ASSESSMENT
61F POD#1 s/p C4/5 corpectomy, C6-7 ACDF, and posterior C3-7 fusion  -q1 neuro checks  -pain control  -PT/OT  -tx to floor if stable Stage 14: Additional Anesthesia Type: 1% lidocaine with epinephrine

## 2023-08-24 ENCOUNTER — OUTPATIENT (OUTPATIENT)
Dept: OUTPATIENT SERVICES | Facility: HOSPITAL | Age: 68
LOS: 1 days | End: 2023-08-24
Payer: MEDICARE

## 2023-08-24 VITALS
HEART RATE: 76 BPM | WEIGHT: 150.36 LBS | RESPIRATION RATE: 18 BRPM | DIASTOLIC BLOOD PRESSURE: 88 MMHG | OXYGEN SATURATION: 97 % | TEMPERATURE: 98 F | HEIGHT: 60 IN | SYSTOLIC BLOOD PRESSURE: 155 MMHG

## 2023-08-24 DIAGNOSIS — Z90.89 ACQUIRED ABSENCE OF OTHER ORGANS: Chronic | ICD-10-CM

## 2023-08-24 DIAGNOSIS — Z90.722 ACQUIRED ABSENCE OF OVARIES, BILATERAL: Chronic | ICD-10-CM

## 2023-08-24 DIAGNOSIS — Z01.818 ENCOUNTER FOR OTHER PREPROCEDURAL EXAMINATION: ICD-10-CM

## 2023-08-24 DIAGNOSIS — M54.2 CERVICALGIA: ICD-10-CM

## 2023-08-24 DIAGNOSIS — Z98.1 ARTHRODESIS STATUS: Chronic | ICD-10-CM

## 2023-08-24 DIAGNOSIS — Z87.09 PERSONAL HISTORY OF OTHER DISEASES OF THE RESPIRATORY SYSTEM: Chronic | ICD-10-CM

## 2023-08-24 DIAGNOSIS — Z90.49 ACQUIRED ABSENCE OF OTHER SPECIFIED PARTS OF DIGESTIVE TRACT: Chronic | ICD-10-CM

## 2023-08-24 DIAGNOSIS — Z98.890 OTHER SPECIFIED POSTPROCEDURAL STATES: Chronic | ICD-10-CM

## 2023-08-24 DIAGNOSIS — Z90.710 ACQUIRED ABSENCE OF BOTH CERVIX AND UTERUS: Chronic | ICD-10-CM

## 2023-08-24 DIAGNOSIS — I10 ESSENTIAL (PRIMARY) HYPERTENSION: ICD-10-CM

## 2023-08-24 DIAGNOSIS — Z91.89 OTHER SPECIFIED PERSONAL RISK FACTORS, NOT ELSEWHERE CLASSIFIED: ICD-10-CM

## 2023-08-24 DIAGNOSIS — M47.12 OTHER SPONDYLOSIS WITH MYELOPATHY, CERVICAL REGION: ICD-10-CM

## 2023-08-24 LAB
A1C WITH ESTIMATED AVERAGE GLUCOSE RESULT: 6 % — HIGH (ref 4–5.6)
ANION GAP SERPL CALC-SCNC: 12 MMOL/L — SIGNIFICANT CHANGE UP (ref 5–17)
BLD GP AB SCN SERPL QL: NEGATIVE — SIGNIFICANT CHANGE UP
BUN SERPL-MCNC: 11 MG/DL — SIGNIFICANT CHANGE UP (ref 7–23)
CALCIUM SERPL-MCNC: 10.3 MG/DL — SIGNIFICANT CHANGE UP (ref 8.4–10.5)
CHLORIDE SERPL-SCNC: 100 MMOL/L — SIGNIFICANT CHANGE UP (ref 96–108)
CO2 SERPL-SCNC: 26 MMOL/L — SIGNIFICANT CHANGE UP (ref 22–31)
CREAT SERPL-MCNC: 0.66 MG/DL — SIGNIFICANT CHANGE UP (ref 0.5–1.3)
EGFR: 97 ML/MIN/1.73M2 — SIGNIFICANT CHANGE UP
ESTIMATED AVERAGE GLUCOSE: 126 MG/DL — HIGH (ref 68–114)
GLUCOSE SERPL-MCNC: 91 MG/DL — SIGNIFICANT CHANGE UP (ref 70–99)
HCT VFR BLD CALC: 36.8 % — SIGNIFICANT CHANGE UP (ref 34.5–45)
HGB BLD-MCNC: 12.2 G/DL — SIGNIFICANT CHANGE UP (ref 11.5–15.5)
MCHC RBC-ENTMCNC: 29.4 PG — SIGNIFICANT CHANGE UP (ref 27–34)
MCHC RBC-ENTMCNC: 33.2 GM/DL — SIGNIFICANT CHANGE UP (ref 32–36)
MCV RBC AUTO: 88.7 FL — SIGNIFICANT CHANGE UP (ref 80–100)
MRSA PCR RESULT.: SIGNIFICANT CHANGE UP
NRBC # BLD: 0 /100 WBCS — SIGNIFICANT CHANGE UP (ref 0–0)
PLATELET # BLD AUTO: 367 K/UL — SIGNIFICANT CHANGE UP (ref 150–400)
POTASSIUM SERPL-MCNC: 5.2 MMOL/L — SIGNIFICANT CHANGE UP (ref 3.5–5.3)
POTASSIUM SERPL-SCNC: 5.2 MMOL/L — SIGNIFICANT CHANGE UP (ref 3.5–5.3)
RBC # BLD: 4.15 M/UL — SIGNIFICANT CHANGE UP (ref 3.8–5.2)
RBC # FLD: 12.4 % — SIGNIFICANT CHANGE UP (ref 10.3–14.5)
RH IG SCN BLD-IMP: POSITIVE — SIGNIFICANT CHANGE UP
S AUREUS DNA NOSE QL NAA+PROBE: SIGNIFICANT CHANGE UP
SODIUM SERPL-SCNC: 138 MMOL/L — SIGNIFICANT CHANGE UP (ref 135–145)
WBC # BLD: 7.62 K/UL — SIGNIFICANT CHANGE UP (ref 3.8–10.5)
WBC # FLD AUTO: 7.62 K/UL — SIGNIFICANT CHANGE UP (ref 3.8–10.5)

## 2023-08-24 PROCEDURE — 86850 RBC ANTIBODY SCREEN: CPT

## 2023-08-24 PROCEDURE — 80048 BASIC METABOLIC PNL TOTAL CA: CPT

## 2023-08-24 PROCEDURE — 87641 MR-STAPH DNA AMP PROBE: CPT

## 2023-08-24 PROCEDURE — 86900 BLOOD TYPING SEROLOGIC ABO: CPT

## 2023-08-24 PROCEDURE — G0463: CPT

## 2023-08-24 PROCEDURE — 85027 COMPLETE CBC AUTOMATED: CPT

## 2023-08-24 PROCEDURE — 86901 BLOOD TYPING SEROLOGIC RH(D): CPT

## 2023-08-24 PROCEDURE — 87640 STAPH A DNA AMP PROBE: CPT

## 2023-08-24 PROCEDURE — 83036 HEMOGLOBIN GLYCOSYLATED A1C: CPT

## 2023-08-24 RX ORDER — SODIUM CHLORIDE 9 MG/ML
3 INJECTION INTRAMUSCULAR; INTRAVENOUS; SUBCUTANEOUS EVERY 8 HOURS
Refills: 0 | Status: DISCONTINUED | OUTPATIENT
Start: 2023-09-21 | End: 2023-09-21

## 2023-08-24 RX ORDER — LIDOCAINE HCL 20 MG/ML
0.2 VIAL (ML) INJECTION ONCE
Refills: 0 | Status: DISCONTINUED | OUTPATIENT
Start: 2023-09-21 | End: 2023-09-22

## 2023-08-24 RX ORDER — CHLORHEXIDINE GLUCONATE 213 G/1000ML
1 SOLUTION TOPICAL ONCE
Refills: 0 | Status: DISCONTINUED | OUTPATIENT
Start: 2023-09-21 | End: 2023-09-27

## 2023-08-24 NOTE — H&P PST ADULT - PROBLEM SELECTOR PLAN 1
C7-T1 Laminoforaminotomy  C6-T2 Stabilization and Fusion    Pt. instructed to stop aspirin one week prior to surgery

## 2023-08-24 NOTE — H&P PST ADULT - ATTENDING COMMENTS
Patient seen and examined.  L ADM and finger extensors 4/5, o/w motor 5/5.  Sensation decreased to FT L C6, C7, C8.  Patient reports no changes since last office visit.  All questions answered.

## 2023-08-24 NOTE — H&P PST ADULT - HISTORY OF PRESENT ILLNESS
67 year old female, with a history of cervical disc disease, presents for C7-T1 Laminoforaminotomies/C6-T2 stabilization and fusion. Pt. reports experiencing left sided neck pain which radiates down her left upper extremity & is accompanied by paresthesias. Pt. 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 s/p [employer] Thyroplasty Implant which was removed in 11/19. Pt. reports she is able to swallow food without difficulty and that her voice is almost back to normal. Pt. with limited cervical ROM, case reviewed with Dr. Barroso while patient in PST and pt. has been instructed to schedule pre op evaluation/scope with Dr. Ye.    Pt. reports labwork revealed that she had COVID-19 at some point, denies symptoms.

## 2023-08-24 NOTE — H&P PST ADULT - PROBLEM SELECTOR PLAN 3
Case reviewed with Dr. Barroso; pt. instructed to schedule ENT evaluation with Dr. Ye prior to procedure  Anesthesia record from 2019 reviewed from [facility] Thyroplasty Implant removal and the following information obtained:  Mask vent - Medium  Intubation difficult with glidescope  ETT 7.0

## 2023-08-24 NOTE — H&P PST ADULT - REASON FOR ADMISSION
"I am having another surgery on my cervical spine" "I am having another surgery on my cervical spine"  Goals of care "I want to be able to lift my grandchildren and use my left arm to prepare food again"

## 2023-08-24 NOTE — H&P PST ADULT - PROBLEM SELECTOR PLAN 2
Pt. refused to take morning dose of Avapro day of procedure due to past issue with low BP after induction of anesthesia

## 2023-08-24 NOTE — H&P PST ADULT - NSICDXPASTSURGICALHX_GEN_ALL_CORE_FT
PAST SURGICAL HISTORY:  History of appendectomy     History of arthroscopy of right knee     History of bilateral oophorectomy     History of carpal tunnel release     History of cholecystectomy     History of hysterectomy     History of tonsillectomy     S/P spinal surgery 6/17/17 - cervical region with hardware 9/2017 and 11/2017     PAST SURGICAL HISTORY:  History of appendectomy     History of arthroscopy of right knee     History of bilateral oophorectomy     History of carpal tunnel release     History of cholecystectomy     History of fusion of cervical spine     History of hysterectomy     History of thyroplasty     History of tonsillectomy     S/P spinal surgery 6/17/17 - cervical region with hardware 9/2017 and 11/2017

## 2023-08-24 NOTE — H&P PST ADULT - NSICDXPASTMEDICALHX_GEN_ALL_CORE_FT
PAST MEDICAL HISTORY:  Cervical radiculopathy     Degenerative lumbar disc     Gastroesophageal reflux disease without esophagitis     Glaucoma b/l    Hiatal hernia     History of diverticulitis     History of endometriosis     History of gastric ulcer longg time aGO    History of iron deficiency anemia     History of migraine     Hyperlipidemia     Hypertension     Neuropathic pain of finger, left numbness 4th asnd 5th digit    Osteoarthritis of cervical spine with myelopathy     Spinal stenosis in cervical region     Vocal cord paralysis      PAST MEDICAL HISTORY:  Anxiety     Cervical radiculopathy     Degenerative lumbar disc     Gastroesophageal reflux disease without esophagitis     Glaucoma b/l    Hiatal hernia     History of difficult intubation     History of diverticulitis     History of endometriosis     History of gastric ulcer longg time aGO    History of IBS     History of iron deficiency anemia     History of migraine     Hyperlipidemia     Hypertension     Neuropathic pain of finger, left numbness 4th asnd 5th digit    Osteoarthritis of cervical spine with myelopathy     Spinal stenosis in cervical region     Vocal cord paralysis

## 2023-08-24 NOTE — H&P PST ADULT - ASSESSMENT
Denies dentures or loose teeth Airway  Mallampati IV  Denies dentures or loose teeth    Activity  DASI 6.61; No formal exercise routine  CAPRINI VTE 2.0 SCORE [CLOT updated 2019]    AGE RELATED RISK FACTORS                                                       MOBILITY RELATED FACTORS  [ ] Age 41-60 years                                            (1 Point)                    [ ] Bed rest                                                        (1 Point)  [X ] Age: 61-74 years                                           (2 Points)                  [ ] Plaster cast                                                   (2 Points)  [ ] Age= 75 years                                              (3 Points)                    [ ] Bed bound for more than 72 hours                 (2 Points)    DISEASE RELATED RISK FACTORS                                               GENDER SPECIFIC FACTORS  [ ] Edema in the lower extremities                       (1 Point)              [ ] Pregnancy                                                     (1 Point)  [ ] Varicose veins                                               (1 Point)                     [ ] Post-partum < 6 weeks                                   (1 Point)             [X ] BMI > 25 Kg/m2                                            (1 Point)                     [ ] Hormonal therapy  or oral contraception          (1 Point)                 [ ] Sepsis (in the previous month)                        (1 Point)               [ ] History of pregnancy complications                 (1 point)  [ ] Pneumonia or serious lung disease                                               [ ] Unexplained or recurrent                     (1 Point)           (in the previous month)                               (1 Point)  [ ] Abnormal pulmonary function test                     (1 Point)                 SURGERY RELATED RISK FACTORS  [ ] Acute myocardial infarction                              (1 Point)               [ ]  Section                                             (1 Point)  [ ] Congestive heart failure (in the previous month)  (1 Point)      [ ] Minor surgery                                                  (1 Point)   [ ] Inflammatory bowel disease                             (1 Point)               [ ] Arthroscopic surgery                                        (2 Points)  [ ] Central venous access                                      (2 Points)                [X ] General surgery lasting more than 45 minutes (2 points)  [ ] Malignancy- Present or previous                   (2 Points)                [ ] Elective arthroplasty                                         (5 points)    [ ] Stroke (in the previous month)                          (5 Points)                                                                                                                                                           HEMATOLOGY RELATED FACTORS                                                 TRAUMA RELATED RISK FACTORS  [ ] Prior episodes of VTE                                     (3 Points)                [ ] Fracture of the hip, pelvis, or leg                       (5 Points)  [ ] Positive family history for VTE                         (3 Points)             [ ] Acute spinal cord injury (in the previous month)  (5 Points)  [ ] Prothrombin 10350 A                                     (3 Points)               [ ] Paralysis  (less than 1 month)                             (5 Points)  [ ] Factor V Leiden                                             (3 Points)                  [ ] Multiple Trauma within 1 month                        (5 Points)  [ ] Lupus anticoagulants                                     (3 Points)                                                           [ ] Anticardiolipin antibodies                               (3 Points)                                                       [ ] High homocysteine in the blood                      (3 Points)                                             [ ] Other congenital or acquired thrombophilia      (3 Points)                                                [ ] Heparin induced thrombocytopenia                  (3 Points)                                     Total Score [     5     ]

## 2023-09-15 ENCOUNTER — NON-APPOINTMENT (OUTPATIENT)
Age: 68
End: 2023-09-15

## 2023-09-15 ENCOUNTER — APPOINTMENT (OUTPATIENT)
Dept: OTOLARYNGOLOGY | Facility: CLINIC | Age: 68
End: 2023-09-15
Payer: MEDICARE

## 2023-09-15 VITALS
TEMPERATURE: 97.8 F | HEART RATE: 74 BPM | WEIGHT: 143 LBS | HEIGHT: 60 IN | SYSTOLIC BLOOD PRESSURE: 142 MMHG | DIASTOLIC BLOOD PRESSURE: 80 MMHG | BODY MASS INDEX: 28.07 KG/M2

## 2023-09-15 DIAGNOSIS — Z98.890 OTHER SPECIFIED POSTPROCEDURAL STATES: ICD-10-CM

## 2023-09-15 DIAGNOSIS — M48.02 SPINAL STENOSIS, CERVICAL REGION: ICD-10-CM

## 2023-09-15 PROCEDURE — 31575 DIAGNOSTIC LARYNGOSCOPY: CPT

## 2023-09-15 PROCEDURE — 99204 OFFICE O/P NEW MOD 45 MIN: CPT | Mod: 25

## 2023-09-20 ENCOUNTER — TRANSCRIPTION ENCOUNTER (OUTPATIENT)
Age: 68
End: 2023-09-20

## 2023-09-21 ENCOUNTER — INPATIENT (INPATIENT)
Facility: HOSPITAL | Age: 68
LOS: 8 days | Discharge: HOME HEALTH | DRG: 460 | End: 2023-09-30
Attending: NEUROLOGICAL SURGERY | Admitting: NEUROLOGICAL SURGERY
Payer: MEDICARE

## 2023-09-21 VITALS
WEIGHT: 150.36 LBS | HEIGHT: 60 IN | SYSTOLIC BLOOD PRESSURE: 138 MMHG | RESPIRATION RATE: 18 BRPM | DIASTOLIC BLOOD PRESSURE: 82 MMHG | TEMPERATURE: 99 F | HEART RATE: 73 BPM | OXYGEN SATURATION: 96 %

## 2023-09-21 DIAGNOSIS — Z98.890 OTHER SPECIFIED POSTPROCEDURAL STATES: Chronic | ICD-10-CM

## 2023-09-21 DIAGNOSIS — Z90.89 ACQUIRED ABSENCE OF OTHER ORGANS: Chronic | ICD-10-CM

## 2023-09-21 DIAGNOSIS — Z90.49 ACQUIRED ABSENCE OF OTHER SPECIFIED PARTS OF DIGESTIVE TRACT: Chronic | ICD-10-CM

## 2023-09-21 DIAGNOSIS — Z90.722 ACQUIRED ABSENCE OF OVARIES, BILATERAL: Chronic | ICD-10-CM

## 2023-09-21 DIAGNOSIS — M47.12 OTHER SPONDYLOSIS WITH MYELOPATHY, CERVICAL REGION: ICD-10-CM

## 2023-09-21 DIAGNOSIS — Z98.1 ARTHRODESIS STATUS: Chronic | ICD-10-CM

## 2023-09-21 DIAGNOSIS — Z90.710 ACQUIRED ABSENCE OF BOTH CERVIX AND UTERUS: Chronic | ICD-10-CM

## 2023-09-21 LAB
GAS PNL BLDA: SIGNIFICANT CHANGE UP
GLUCOSE BLDC GLUCOMTR-MCNC: 101 MG/DL — HIGH (ref 70–99)
RH IG SCN BLD-IMP: POSITIVE — SIGNIFICANT CHANGE UP

## 2023-09-21 DEVICE — SCREW SET INFINITY M6: Type: IMPLANTABLE DEVICE | Status: FUNCTIONAL

## 2023-09-21 DEVICE — IMPLANTABLE DEVICE: Type: IMPLANTABLE DEVICE | Status: FUNCTIONAL

## 2023-09-21 DEVICE — SURGIFLO MATRIX WITH THROMBIN KIT: Type: IMPLANTABLE DEVICE | Status: FUNCTIONAL

## 2023-09-21 DEVICE — LIGATING CLIPS WECK HORIZON MEDIUM (BLUE) 24: Type: IMPLANTABLE DEVICE | Status: FUNCTIONAL

## 2023-09-21 DEVICE — SURGIFOAM PAD 8CM X 12.5CM X 10MM (100): Type: IMPLANTABLE DEVICE | Status: FUNCTIONAL

## 2023-09-21 DEVICE — SCREW INFINITY MULTI AXIAL 4X26MM: Type: IMPLANTABLE DEVICE | Status: FUNCTIONAL

## 2023-09-21 RX ORDER — METOPROLOL TARTRATE 50 MG
25 TABLET ORAL DAILY
Refills: 0 | Status: DISCONTINUED | OUTPATIENT
Start: 2023-09-21 | End: 2023-09-30

## 2023-09-21 RX ORDER — SERTRALINE 25 MG/1
50 TABLET, FILM COATED ORAL DAILY
Refills: 0 | Status: DISCONTINUED | OUTPATIENT
Start: 2023-09-21 | End: 2023-09-30

## 2023-09-21 RX ORDER — ONDANSETRON 8 MG/1
4 TABLET, FILM COATED ORAL EVERY 6 HOURS
Refills: 0 | Status: DISCONTINUED | OUTPATIENT
Start: 2023-09-21 | End: 2023-09-30

## 2023-09-21 RX ORDER — ROSUVASTATIN CALCIUM 5 MG/1
1 TABLET ORAL
Refills: 0 | DISCHARGE

## 2023-09-21 RX ORDER — PANTOPRAZOLE SODIUM 20 MG/1
40 TABLET, DELAYED RELEASE ORAL
Refills: 0 | Status: DISCONTINUED | OUTPATIENT
Start: 2023-09-21 | End: 2023-09-30

## 2023-09-21 RX ORDER — ONDANSETRON 8 MG/1
4 TABLET, FILM COATED ORAL ONCE
Refills: 0 | Status: DISCONTINUED | OUTPATIENT
Start: 2023-09-21 | End: 2023-09-22

## 2023-09-21 RX ORDER — SODIUM CHLORIDE 9 MG/ML
1000 INJECTION, SOLUTION INTRAVENOUS
Refills: 0 | Status: DISCONTINUED | OUTPATIENT
Start: 2023-09-21 | End: 2023-09-22

## 2023-09-21 RX ORDER — AMLODIPINE BESYLATE 2.5 MG/1
5 TABLET ORAL AT BEDTIME
Refills: 0 | Status: DISCONTINUED | OUTPATIENT
Start: 2023-09-21 | End: 2023-09-26

## 2023-09-21 RX ORDER — ACETAMINOPHEN 500 MG
650 TABLET ORAL EVERY 6 HOURS
Refills: 0 | Status: DISCONTINUED | OUTPATIENT
Start: 2023-09-21 | End: 2023-09-23

## 2023-09-21 RX ORDER — CEFAZOLIN SODIUM 1 G
2000 VIAL (EA) INJECTION EVERY 8 HOURS
Refills: 0 | Status: COMPLETED | OUTPATIENT
Start: 2023-09-21 | End: 2023-09-22

## 2023-09-21 RX ORDER — SUCRALFATE 1 G
1 TABLET ORAL
Refills: 0 | DISCHARGE

## 2023-09-21 RX ORDER — OXYCODONE HYDROCHLORIDE 5 MG/1
5 TABLET ORAL EVERY 4 HOURS
Refills: 0 | Status: DISCONTINUED | OUTPATIENT
Start: 2023-09-21 | End: 2023-09-26

## 2023-09-21 RX ORDER — METHOCARBAMOL 500 MG/1
500 TABLET, FILM COATED ORAL THREE TIMES A DAY
Refills: 0 | Status: DISCONTINUED | OUTPATIENT
Start: 2023-09-21 | End: 2023-09-25

## 2023-09-21 RX ORDER — SUCRALFATE 1 G
1 TABLET ORAL
Refills: 0 | Status: DISCONTINUED | OUTPATIENT
Start: 2023-09-21 | End: 2023-09-22

## 2023-09-21 RX ORDER — CEFAZOLIN SODIUM 1 G
2000 VIAL (EA) INJECTION ONCE
Refills: 0 | Status: COMPLETED | OUTPATIENT
Start: 2023-09-21 | End: 2023-09-21

## 2023-09-21 RX ORDER — ENOXAPARIN SODIUM 100 MG/ML
40 INJECTION SUBCUTANEOUS EVERY 24 HOURS
Refills: 0 | Status: DISCONTINUED | OUTPATIENT
Start: 2023-09-22 | End: 2023-09-30

## 2023-09-21 RX ORDER — OXYCODONE HYDROCHLORIDE 5 MG/1
10 TABLET ORAL EVERY 4 HOURS
Refills: 0 | Status: DISCONTINUED | OUTPATIENT
Start: 2023-09-21 | End: 2023-09-26

## 2023-09-21 RX ORDER — INFLUENZA VIRUS VACCINE 15; 15; 15; 15 UG/.5ML; UG/.5ML; UG/.5ML; UG/.5ML
0.7 SUSPENSION INTRAMUSCULAR ONCE
Refills: 0 | Status: DISCONTINUED | OUTPATIENT
Start: 2023-09-21 | End: 2023-09-30

## 2023-09-21 RX ORDER — POLYETHYLENE GLYCOL 3350 17 G/17G
17 POWDER, FOR SOLUTION ORAL DAILY
Refills: 0 | Status: DISCONTINUED | OUTPATIENT
Start: 2023-09-21 | End: 2023-09-30

## 2023-09-21 RX ORDER — SENNA PLUS 8.6 MG/1
2 TABLET ORAL AT BEDTIME
Refills: 0 | Status: DISCONTINUED | OUTPATIENT
Start: 2023-09-21 | End: 2023-09-30

## 2023-09-21 RX ORDER — ATORVASTATIN CALCIUM 80 MG/1
80 TABLET, FILM COATED ORAL AT BEDTIME
Refills: 0 | Status: DISCONTINUED | OUTPATIENT
Start: 2023-09-21 | End: 2023-09-30

## 2023-09-21 RX ORDER — TIMOLOL 0.5 %
1 DROPS OPHTHALMIC (EYE) DAILY
Refills: 0 | Status: DISCONTINUED | OUTPATIENT
Start: 2023-09-21 | End: 2023-09-30

## 2023-09-21 RX ORDER — METOPROLOL TARTRATE 50 MG
1 TABLET ORAL
Refills: 0 | DISCHARGE

## 2023-09-21 RX ORDER — FENTANYL CITRATE 50 UG/ML
25 INJECTION INTRAVENOUS
Refills: 0 | Status: DISCONTINUED | OUTPATIENT
Start: 2023-09-21 | End: 2023-09-22

## 2023-09-21 RX ORDER — LOSARTAN POTASSIUM 100 MG/1
50 TABLET, FILM COATED ORAL DAILY
Refills: 0 | Status: DISCONTINUED | OUTPATIENT
Start: 2023-09-21 | End: 2023-09-26

## 2023-09-21 RX ORDER — ESOMEPRAZOLE MAGNESIUM 40 MG/1
1 CAPSULE, DELAYED RELEASE ORAL
Qty: 0 | Refills: 0 | DISCHARGE

## 2023-09-21 RX ORDER — SERTRALINE 25 MG/1
1 TABLET, FILM COATED ORAL
Refills: 0 | DISCHARGE

## 2023-09-21 RX ADMIN — FENTANYL CITRATE 25 MICROGRAM(S): 50 INJECTION INTRAVENOUS at 21:00

## 2023-09-21 RX ADMIN — FENTANYL CITRATE 25 MICROGRAM(S): 50 INJECTION INTRAVENOUS at 22:15

## 2023-09-21 RX ADMIN — FENTANYL CITRATE 25 MICROGRAM(S): 50 INJECTION INTRAVENOUS at 20:45

## 2023-09-21 RX ADMIN — Medication 1 GRAM(S): at 23:05

## 2023-09-21 RX ADMIN — ATORVASTATIN CALCIUM 80 MILLIGRAM(S): 80 TABLET, FILM COATED ORAL at 23:04

## 2023-09-21 RX ADMIN — FENTANYL CITRATE 25 MICROGRAM(S): 50 INJECTION INTRAVENOUS at 22:30

## 2023-09-21 RX ADMIN — SODIUM CHLORIDE 75 MILLILITER(S): 9 INJECTION, SOLUTION INTRAVENOUS at 20:55

## 2023-09-21 RX ADMIN — METHOCARBAMOL 500 MILLIGRAM(S): 500 TABLET, FILM COATED ORAL at 23:04

## 2023-09-21 NOTE — PRE-OP CHECKLIST - NS PREOP CHK CHLOROHEX WASH
----- Message from Ama Chavez sent at 7/27/2023 11:40 AM CDT -----  Regarding: call back  Contact: [phone]  Type:  Sooner Apoointment Request    Caller is requesting a sooner appointment.  Caller declined first available appointment listed below.  Caller will not accept being placed on the waitlist and is requesting a message be sent to doctor.  Name of Caller: PT   When is the first available appointment? Was asked to send a message   Symptoms: Severe cold   Would the patient rather a call back or a response via [facility]? Call back   Best Call Back Number: [phone]  Additional Information:          in ASU:

## 2023-09-21 NOTE — PRE-ANESTHESIA EVALUATION ADULT - NSANTHADDINFOFT_GEN_ALL_CORE
Patient was evaluated by ENT prior to procedure. Recommendations for glidescope intubation with ETT size 6.5. We will proceed with this plan with a second provider in the operating room.

## 2023-09-21 NOTE — PRE-OP CHECKLIST - PATIENT'S PERSONAL PROPERTY GIVEN TO
Bed: 47  Expected date:   Expected time:   Means of arrival:   Comments:  MG 5 fall rib pain    on unit

## 2023-09-21 NOTE — PRE-ANESTHESIA EVALUATION ADULT - NSANTHASARD_GEN_ALL_CORE
Reason for call:  Patient reporting a symptom    Symptom or request: throwing up and diarrhea    Duration (how long have symptoms been present): was seen last week    Have you been treated for this before? Yes    Additional comments:     Phone Number patient can be reached at:  [phone]    Best Time:      Can we leave a detailed message on this number:  NO    Call taken on 3/17/2020 at 10:08 AM by Jolei West     3

## 2023-09-22 ENCOUNTER — TRANSCRIPTION ENCOUNTER (OUTPATIENT)
Age: 68
End: 2023-09-22

## 2023-09-22 LAB
ALBUMIN SERPL ELPH-MCNC: 3.5 G/DL — SIGNIFICANT CHANGE UP (ref 3.3–5)
ALP SERPL-CCNC: 58 U/L — SIGNIFICANT CHANGE UP (ref 40–120)
ALT FLD-CCNC: 42 U/L — SIGNIFICANT CHANGE UP (ref 10–45)
ANION GAP SERPL CALC-SCNC: 9 MMOL/L — SIGNIFICANT CHANGE UP (ref 5–17)
AST SERPL-CCNC: 49 U/L — HIGH (ref 10–40)
BILIRUB SERPL-MCNC: 0.2 MG/DL — SIGNIFICANT CHANGE UP (ref 0.2–1.2)
BUN SERPL-MCNC: 12 MG/DL — SIGNIFICANT CHANGE UP (ref 7–23)
CALCIUM SERPL-MCNC: 8.8 MG/DL — SIGNIFICANT CHANGE UP (ref 8.4–10.5)
CHLORIDE SERPL-SCNC: 103 MMOL/L — SIGNIFICANT CHANGE UP (ref 96–108)
CO2 SERPL-SCNC: 24 MMOL/L — SIGNIFICANT CHANGE UP (ref 22–31)
CREAT SERPL-MCNC: 0.67 MG/DL — SIGNIFICANT CHANGE UP (ref 0.5–1.3)
EGFR: 96 ML/MIN/1.73M2 — SIGNIFICANT CHANGE UP
GLUCOSE SERPL-MCNC: 117 MG/DL — HIGH (ref 70–99)
HCT VFR BLD CALC: 23.8 % — LOW (ref 34.5–45)
HCT VFR BLD CALC: 25.3 % — LOW (ref 34.5–45)
HGB BLD-MCNC: 8.2 G/DL — LOW (ref 11.5–15.5)
HGB BLD-MCNC: 8.4 G/DL — LOW (ref 11.5–15.5)
MCHC RBC-ENTMCNC: 29.6 PG — SIGNIFICANT CHANGE UP (ref 27–34)
MCHC RBC-ENTMCNC: 30.5 PG — SIGNIFICANT CHANGE UP (ref 27–34)
MCHC RBC-ENTMCNC: 33.2 GM/DL — SIGNIFICANT CHANGE UP (ref 32–36)
MCHC RBC-ENTMCNC: 34.5 GM/DL — SIGNIFICANT CHANGE UP (ref 32–36)
MCV RBC AUTO: 88.5 FL — SIGNIFICANT CHANGE UP (ref 80–100)
MCV RBC AUTO: 89.1 FL — SIGNIFICANT CHANGE UP (ref 80–100)
NRBC # BLD: 0 /100 WBCS — SIGNIFICANT CHANGE UP (ref 0–0)
NRBC # BLD: 0 /100 WBCS — SIGNIFICANT CHANGE UP (ref 0–0)
PLATELET # BLD AUTO: 241 K/UL — SIGNIFICANT CHANGE UP (ref 150–400)
PLATELET # BLD AUTO: 249 K/UL — SIGNIFICANT CHANGE UP (ref 150–400)
POTASSIUM SERPL-MCNC: 4.9 MMOL/L — SIGNIFICANT CHANGE UP (ref 3.5–5.3)
POTASSIUM SERPL-SCNC: 4.9 MMOL/L — SIGNIFICANT CHANGE UP (ref 3.5–5.3)
PROT SERPL-MCNC: 5.6 G/DL — LOW (ref 6–8.3)
RBC # BLD: 2.69 M/UL — LOW (ref 3.8–5.2)
RBC # BLD: 2.84 M/UL — LOW (ref 3.8–5.2)
RBC # FLD: 12.7 % — SIGNIFICANT CHANGE UP (ref 10.3–14.5)
RBC # FLD: 13 % — SIGNIFICANT CHANGE UP (ref 10.3–14.5)
SODIUM SERPL-SCNC: 136 MMOL/L — SIGNIFICANT CHANGE UP (ref 135–145)
WBC # BLD: 12.64 K/UL — HIGH (ref 3.8–10.5)
WBC # BLD: 14.65 K/UL — HIGH (ref 3.8–10.5)
WBC # FLD AUTO: 12.64 K/UL — HIGH (ref 3.8–10.5)
WBC # FLD AUTO: 14.65 K/UL — HIGH (ref 3.8–10.5)

## 2023-09-22 RX ORDER — HYDROMORPHONE HYDROCHLORIDE 2 MG/ML
0.5 INJECTION INTRAMUSCULAR; INTRAVENOUS; SUBCUTANEOUS ONCE
Refills: 0 | Status: DISCONTINUED | OUTPATIENT
Start: 2023-09-22 | End: 2023-09-22

## 2023-09-22 RX ORDER — ACETAMINOPHEN 500 MG
1000 TABLET ORAL ONCE
Refills: 0 | Status: DISCONTINUED | OUTPATIENT
Start: 2023-09-22 | End: 2023-09-22

## 2023-09-22 RX ORDER — SUCRALFATE 1 G
1 TABLET ORAL
Refills: 0 | Status: DISCONTINUED | OUTPATIENT
Start: 2023-09-22 | End: 2023-09-30

## 2023-09-22 RX ORDER — ACETAMINOPHEN 500 MG
1000 TABLET ORAL ONCE
Refills: 0 | Status: DISCONTINUED | OUTPATIENT
Start: 2023-09-22 | End: 2023-09-25

## 2023-09-22 RX ADMIN — Medication 650 MILLIGRAM(S): at 15:00

## 2023-09-22 RX ADMIN — SERTRALINE 50 MILLIGRAM(S): 25 TABLET, FILM COATED ORAL at 11:35

## 2023-09-22 RX ADMIN — AMLODIPINE BESYLATE 5 MILLIGRAM(S): 2.5 TABLET ORAL at 21:15

## 2023-09-22 RX ADMIN — OXYCODONE HYDROCHLORIDE 10 MILLIGRAM(S): 5 TABLET ORAL at 03:46

## 2023-09-22 RX ADMIN — POLYETHYLENE GLYCOL 3350 17 GRAM(S): 17 POWDER, FOR SOLUTION ORAL at 11:35

## 2023-09-22 RX ADMIN — ENOXAPARIN SODIUM 40 MILLIGRAM(S): 100 INJECTION SUBCUTANEOUS at 21:15

## 2023-09-22 RX ADMIN — Medication 100 MILLIGRAM(S): at 11:35

## 2023-09-22 RX ADMIN — OXYCODONE HYDROCHLORIDE 10 MILLIGRAM(S): 5 TABLET ORAL at 14:16

## 2023-09-22 RX ADMIN — OXYCODONE HYDROCHLORIDE 10 MILLIGRAM(S): 5 TABLET ORAL at 09:11

## 2023-09-22 RX ADMIN — OXYCODONE HYDROCHLORIDE 10 MILLIGRAM(S): 5 TABLET ORAL at 02:46

## 2023-09-22 RX ADMIN — Medication 1 GRAM(S): at 21:15

## 2023-09-22 RX ADMIN — OXYCODONE HYDROCHLORIDE 10 MILLIGRAM(S): 5 TABLET ORAL at 21:24

## 2023-09-22 RX ADMIN — METHOCARBAMOL 500 MILLIGRAM(S): 500 TABLET, FILM COATED ORAL at 05:29

## 2023-09-22 RX ADMIN — Medication 1 GRAM(S): at 05:29

## 2023-09-22 RX ADMIN — Medication 100 MILLIGRAM(S): at 02:47

## 2023-09-22 RX ADMIN — METHOCARBAMOL 500 MILLIGRAM(S): 500 TABLET, FILM COATED ORAL at 14:17

## 2023-09-22 RX ADMIN — ATORVASTATIN CALCIUM 80 MILLIGRAM(S): 80 TABLET, FILM COATED ORAL at 21:15

## 2023-09-22 RX ADMIN — OXYCODONE HYDROCHLORIDE 10 MILLIGRAM(S): 5 TABLET ORAL at 20:24

## 2023-09-22 RX ADMIN — Medication 1 GRAM(S): at 16:42

## 2023-09-22 RX ADMIN — Medication 1 DROP(S): at 16:43

## 2023-09-22 RX ADMIN — OXYCODONE HYDROCHLORIDE 10 MILLIGRAM(S): 5 TABLET ORAL at 15:00

## 2023-09-22 RX ADMIN — HYDROMORPHONE HYDROCHLORIDE 0.5 MILLIGRAM(S): 2 INJECTION INTRAMUSCULAR; INTRAVENOUS; SUBCUTANEOUS at 06:15

## 2023-09-22 RX ADMIN — Medication 650 MILLIGRAM(S): at 22:35

## 2023-09-22 RX ADMIN — Medication 650 MILLIGRAM(S): at 14:16

## 2023-09-22 RX ADMIN — OXYCODONE HYDROCHLORIDE 10 MILLIGRAM(S): 5 TABLET ORAL at 09:45

## 2023-09-22 RX ADMIN — METHOCARBAMOL 500 MILLIGRAM(S): 500 TABLET, FILM COATED ORAL at 21:15

## 2023-09-22 RX ADMIN — HYDROMORPHONE HYDROCHLORIDE 0.5 MILLIGRAM(S): 2 INJECTION INTRAMUSCULAR; INTRAVENOUS; SUBCUTANEOUS at 06:30

## 2023-09-22 RX ADMIN — Medication 650 MILLIGRAM(S): at 21:35

## 2023-09-22 RX ADMIN — PANTOPRAZOLE SODIUM 40 MILLIGRAM(S): 20 TABLET, DELAYED RELEASE ORAL at 05:29

## 2023-09-22 NOTE — PHYSICAL THERAPY INITIAL EVALUATION ADULT - PATIENT/FAMILY/SIGNIFICANT OTHER GOALS STATEMENT, PT EVAL
"I want to be able to lift my grandchildren and use my left arm to prepare food again", pain goal 2/10

## 2023-09-22 NOTE — PROGRESS NOTE ADULT - SUBJECTIVE AND OBJECTIVE BOX
Subjective: Patient seen and examined at bedside. Pt with some improvement in upper extremity strength.     VITALS:  T(C): , Max: 37 (09-21-23 @ 14:36)  HR:  (67 - 103)  BP:  (101/59 - 142/67)  ABP:  (120/53 - 157/61)  RR:  (16 - 18)  SpO2:  (93% - 100%)  Wt(kg): --      09-21-23 @ 07:01  -  09-22-23 @ 07:00  --------------------------------------------------------  IN: 1195 mL / OUT: 380 mL / NET: 815 mL    09-22-23 @ 07:01  -  09-22-23 @ 11:24  --------------------------------------------------------  IN: 120 mL / OUT: 0 mL / NET: 120 mL      LABS:  Na: 136 (09-22 @ 06:44)  K: 4.9 (09-22 @ 06:44)  Cl: 103 (09-22 @ 06:44)  CO2: 24 (09-22 @ 06:44)  BUN: 12 (09-22 @ 06:44)  Cr: 0.67 (09-22 @ 06:44)  Glu: 117(09-22 @ 06:44)    Hgb: 8.4 (09-22 @ 06:42)  Hct: 25.3 (09-22 @ 06:42)  WBC: 12.64 (09-22 @ 06:42)  Plt: 241 (09-22 @ 06:42)      IMAGING:   Recent imaging studies were reviewed.    MEDICATIONS:  acetaminophen     Tablet .. 650 milliGRAM(s) Oral every 6 hours PRN  acetaminophen   IVPB .. 1000 milliGRAM(s) IV Intermittent once  amLODIPine   Tablet 5 milliGRAM(s) Oral at bedtime  atorvastatin 80 milliGRAM(s) Oral at bedtime  ceFAZolin   IVPB 2000 milliGRAM(s) IV Intermittent every 8 hours  chlorhexidine 2% Cloths 1 Application(s) Topical once  enoxaparin Injectable 40 milliGRAM(s) SubCutaneous every 24 hours  fentaNYL    Injectable 25 MICROGram(s) IV Push every 5 minutes PRN  influenza  Vaccine (HIGH DOSE) 0.7 milliLiter(s) IntraMuscular once  lidocaine 1% Injectable 0.2 milliLiter(s) Local Injection once  losartan 50 milliGRAM(s) Oral daily  methocarbamol 500 milliGRAM(s) Oral three times a day  metoprolol succinate ER 25 milliGRAM(s) Oral daily  ondansetron Injectable 4 milliGRAM(s) IV Push every 6 hours PRN  ondansetron Injectable 4 milliGRAM(s) IV Push once PRN  oxyCODONE    IR 10 milliGRAM(s) Oral every 4 hours PRN  oxyCODONE    IR 5 milliGRAM(s) Oral every 4 hours PRN  pantoprazole    Tablet 40 milliGRAM(s) Oral before breakfast  polyethylene glycol 3350 17 Gram(s) Oral daily  senna 2 Tablet(s) Oral at bedtime  sertraline 50 milliGRAM(s) Oral daily  sucralfate 1 Gram(s) Oral four times a day  timolol 0.25% Solution 1 Drop(s) Both EYES daily    EXAMINATION:  General:  calm  HEENT:  MMM  Neuro:  awake, alert, oriented x 3, follows commands, bicep 5/5, triceps 5/5, HG 5/5, BLE 5/5, SILT  Cards:  RRR  Respiratory:  no respiratory distress  Abdomen:  soft  Extremities:  no edema  Skin:  warm/dry  Incision: acquacel dressing in place, c/d/i Subjective: Patient seen and examined at bedside. Pt with some improvement in upper extremity strength.     VITALS:  T(C): , Max: 37 (09-21-23 @ 14:36)  HR:  (67 - 103)  BP:  (101/59 - 142/67)  ABP:  (120/53 - 157/61)  RR:  (16 - 18)  SpO2:  (93% - 100%)  Wt(kg): --      09-21-23 @ 07:01  -  09-22-23 @ 07:00  --------------------------------------------------------  IN: 1195 mL / OUT: 380 mL / NET: 815 mL    09-22-23 @ 07:01  -  09-22-23 @ 11:24  --------------------------------------------------------  IN: 120 mL / OUT: 0 mL / NET: 120 mL      LABS:  Na: 136 (09-22 @ 06:44)  K: 4.9 (09-22 @ 06:44)  Cl: 103 (09-22 @ 06:44)  CO2: 24 (09-22 @ 06:44)  BUN: 12 (09-22 @ 06:44)  Cr: 0.67 (09-22 @ 06:44)  Glu: 117(09-22 @ 06:44)    Hgb: 8.4 (09-22 @ 06:42)  Hct: 25.3 (09-22 @ 06:42)  WBC: 12.64 (09-22 @ 06:42)  Plt: 241 (09-22 @ 06:42)      IMAGING:   Recent imaging studies were reviewed.    MEDICATIONS:  acetaminophen     Tablet .. 650 milliGRAM(s) Oral every 6 hours PRN  acetaminophen   IVPB .. 1000 milliGRAM(s) IV Intermittent once  amLODIPine   Tablet 5 milliGRAM(s) Oral at bedtime  atorvastatin 80 milliGRAM(s) Oral at bedtime  ceFAZolin   IVPB 2000 milliGRAM(s) IV Intermittent every 8 hours  chlorhexidine 2% Cloths 1 Application(s) Topical once  enoxaparin Injectable 40 milliGRAM(s) SubCutaneous every 24 hours  fentaNYL    Injectable 25 MICROGram(s) IV Push every 5 minutes PRN  influenza  Vaccine (HIGH DOSE) 0.7 milliLiter(s) IntraMuscular once  lidocaine 1% Injectable 0.2 milliLiter(s) Local Injection once  losartan 50 milliGRAM(s) Oral daily  methocarbamol 500 milliGRAM(s) Oral three times a day  metoprolol succinate ER 25 milliGRAM(s) Oral daily  ondansetron Injectable 4 milliGRAM(s) IV Push every 6 hours PRN  ondansetron Injectable 4 milliGRAM(s) IV Push once PRN  oxyCODONE    IR 10 milliGRAM(s) Oral every 4 hours PRN  oxyCODONE    IR 5 milliGRAM(s) Oral every 4 hours PRN  pantoprazole    Tablet 40 milliGRAM(s) Oral before breakfast  polyethylene glycol 3350 17 Gram(s) Oral daily  senna 2 Tablet(s) Oral at bedtime  sertraline 50 milliGRAM(s) Oral daily  sucralfate 1 Gram(s) Oral four times a day  timolol 0.25% Solution 1 Drop(s) Both EYES daily    EXAMINATION:  General:  calm  HEENT:  MMM  Neuro:  awake, alert, oriented x 3, follows commands, BUE HG 4+/5, bicep 4+/5, triceps 4+/5, LUE delt 4+/5 pain limited, BLE 5/5, SILT  Cards:  RRR  Respiratory:  no respiratory distress  Abdomen:  soft  Extremities:  no edema  Skin:  warm/dry  Incision: acquacel dressing in place, c/d/i

## 2023-09-22 NOTE — OCCUPATIONAL THERAPY INITIAL EVALUATION ADULT - PERTINENT HX OF CURRENT PROBLEM, REHAB EVAL
67 year old female, with a history of cervical disc disease, presents for C7-T1 Laminoforaminotomies/C6-T2 stabilization and fusion. Pt. reports experiencing left sided neck pain which radiates down her left upper extremity & is accompanied by paresthesias. Pt. 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 s/p Arvizu Thyroplasty Implant which was removed in 11/19. Pt. reports she is able to swallow food without difficulty and that her voice is almost back to normal. Pt. with limited cervical ROM, case reviewed with Dr. Barroso while patient in PST and pt. has been instructed to schedule pre op evaluation/scope with Dr. Ye. Pt s/p Extension of prior cervical fusion to T2 from C6, C7-T1 laminectomy and foraminotomy 9/21

## 2023-09-22 NOTE — OCCUPATIONAL THERAPY INITIAL EVALUATION ADULT - LIVES WITH, PROFILE
Pt lives with family in private home with 4 steps to enter, +flight to bedroom, tub in bathroom with shower chair. Pt I in ADLs and ambulation prior to admission

## 2023-09-22 NOTE — PHYSICAL THERAPY INITIAL EVALUATION ADULT - ACTIVE RANGE OF MOTION EXAMINATION, REHAB EVAL
BUE shoulder flexion ~50% nettles 2/2 weakness/pain/bilateral upper extremity Active ROM was WFL (within functional limits)/bilateral  lower extremity Active ROM was WFL (within functional limits)

## 2023-09-22 NOTE — DISCHARGE NOTE PROVIDER - NSDCCPTREATMENT_GEN_ALL_CORE_FT
PRINCIPAL PROCEDURE  Procedure: Posterior fusion of cervical spine with laminectomy  Findings and Treatment:

## 2023-09-22 NOTE — DISCHARGE NOTE PROVIDER - CARE PROVIDERS DIRECT ADDRESSES
,[email or web address] ,[email or web address],[email or web address],[email or web address]

## 2023-09-22 NOTE — DISCHARGE NOTE PROVIDER - NSDCMRMEDTOKEN_GEN_ALL_CORE_FT
amLODIPine 5 mg oral tablet: 1 tab(s) orally once a day (at bedtime)  aspirin 81 mg oral tablet: 1 tab(s) orally once a day  Avapro 150 mg oral tablet: 1 tab(s) orally once a day  Carafate 1 g oral tablet: 1 tab(s) orally 4 times a day  Donnatal oral tablet: 1 tablet with sensor orally 2 times a day  methocarbamol 750 mg oral tablet: 1 tab(s) orally 2 times a day  metoprolol succinate 25 mg oral tablet, extended release: 1 tab(s) orally once a day (at bedtime)  NexIUM 40 mg oral delayed release capsule: 1 cap(s) orally 2 times a day  rosuvastatin 40 mg oral capsule: 1 tab(s) orally once a day (at bedtime)  timolol eye drops once daily both eyes:   Zoloft 50 mg oral tablet: 1 tab(s) orally once a day (at bedtime)   amLODIPine 5 mg oral tablet: 1 tab(s) orally once a day (at bedtime)  aspirin 81 mg oral tablet: 1 tab(s) orally once a day  Avapro 150 mg oral tablet: 1 tab(s) orally once a day  Carafate 1 g oral tablet: 1 tab(s) orally 4 times a day  Donnatal oral tablet: 1 tablet with sensor orally 2 times a day  methocarbamol 750 mg oral tablet: 1 tab(s) orally 2 times a day  metoprolol succinate 25 mg oral tablet, extended release: 1 tab(s) orally once a day (at bedtime)  NexIUM 40 mg oral delayed release capsule: 1 cap(s) orally 2 times a day  Freida Giles: Emma Patel 1955  Please obtain Freida Giles  Dx. Cervical Myelopathy s/p C7-T1 posterior lamiectomy/foraminotomy, C6-T2 fusion  Dr. David Mcgovern MDD: 0  Rolling Walker : s/p C7-T1 lami/ C6-T2 fusion  rosuvastatin 40 mg oral capsule: 1 tab(s) orally once a day (at bedtime)  timolol eye drops once daily both eyes:   Zoloft 50 mg oral tablet: 1 tab(s) orally once a day (at bedtime)   acetaminophen 500 mg oral capsule: 2 orally every 8 hours as needed for  moderate pain  Carafate 1 g oral tablet: 1 tab(s) orally 4 times a day  Donnatal oral tablet: 1 tablet with sensor orally 2 times a day  guaiFENesin 600 mg oral tablet, extended release: 1 tab(s) orally every 12 hours as needed for  congestion  ipratropium-albuterol 0.5 mg-2.5 mg/3 mL inhalation solution: 3 milliliter(s) inhaled every 8 hours as needed for  shortness of breath and/or wheezing  Lidocare Pain Relief Patch 4% topical film: Apply topically to affected area once a day on for 12 hrs, off for 12 hrs  methocarbamol 750 mg oral tablet: 1 tab(s) orally every 8 hours  metoprolol succinate 25 mg oral tablet, extended release: 1 tab(s) orally once a day (at bedtime)  NexIUM 40 mg oral delayed release capsule: 1 cap(s) orally 2 times a day  ondansetron 4 mg oral tablet, disintegratin tab(s) orally every 8 hours as needed for  nausea nausea with narcotics  oxyCODONE 10 mg oral tablet: 1 tab(s) orally every 4 to 6 hours MDD: 4  rosuvastatin 40 mg oral capsule: 1 tab(s) orally once a day (at bedtime)  timolol eye drops once daily both eyes:   Zoloft 50 mg oral tablet: 1 tab(s) orally once a day (at bedtime)

## 2023-09-22 NOTE — PHYSICAL THERAPY INITIAL EVALUATION ADULT - DID THE PATIENT HAVE SURGERY?
C6-T2 fusion/yes C6-T2 Extension of prior cervical fusion to T2 from C6, C7-T1 laminectomy and foraminotomy/yes

## 2023-09-22 NOTE — PHYSICAL THERAPY INITIAL EVALUATION ADULT - NSPTDMEREC_GEN_A_CORE
Patient will require rolling walker due to muslcle weakness and diminished activity tolerance for safe discharge home./rolling walker

## 2023-09-22 NOTE — PROGRESS NOTE ADULT - ASSESSMENT
Assessment: 67 year old female, with a history of cervical disc disease, presents for C7-T1 Laminoforaminotomies/C6-T2 stabilization and fusion. Pt. reports experiencing left sided neck pain which radiates down her left upper extremity & is accompanied by paresthesias. Pt. 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 s/p Arvizu Thyroplasty Implant which was removed in 11/19. Pt. reports she is able to swallow food without difficulty and that her voice is almost back to normal. Pt. with limited cervical ROM, case reviewed with Dr. Barroso while patient in PST and pt. has been instructed to schedule pre op evaluation/scope with Dr. Ye.  S/p 9/21 Extension of prior cervical fusion to T2 from C6, C7-T1 laminectomy and foraminotomy      NEURO:    q4h neuro checks, vital checks  Monitor HMV drain output  Pain control with tylenol, oxycodone 5/10 mg prn, robaxin 500 TID  H/o anxiety, continue with sertraline daily  H/o glaucoma, continue with timolol eye gtts  PT/OT - pending eval    CARDS:  -150  HTN, continue with amlodipine, metoprolol TID  HLD, continue with atorvastatin    PULM:  sat > 92%  incentive spirometry as tolerated    RENAL:  IVL  discontinue hernandez, bladder scan prn     GASTRO:  Regular diet  ---> Stress ulcer prophylaxis:  pantoprazole, sulcrafate  Continue with bowel regimen, senna, miralax BID,    HEME:  monitor H/H, post op anemia, h/o iron deficiency anema, recheck CBC  ---> DVT prophylaxis: SCDs, SQL    ENDO:  euglycemia    ID: afebrile  post abx ancef x2 dose    Dispo - PT/OT eval pending    Will discuss with Dr. Hu  #[id number] Assessment: 67 year old female, with a history of cervical disc disease, presents for C7-T1 Laminoforaminotomies/C6-T2 stabilization and fusion. Pt. reports experiencing left sided neck pain which radiates down her left upper extremity & is accompanied by paresthesias. Pt. 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 s/p Arvizu Thyroplasty Implant which was removed in 11/19. Pt. reports she is able to swallow food without difficulty and that her voice is almost back to normal. Pt. with limited cervical ROM, case reviewed with Dr. Barroso while patient in PST and pt. has been instructed to schedule pre op evaluation/scope with Dr. Ye.  S/p 9/21 Extension of prior cervical fusion to T2 from C6, C7-T1 laminectomy and foraminotomy      NEURO:    q4h neuro checks, vital checks  Monitor HMV drain output  Pain control with tylenol, oxycodone 5/10 mg prn, robaxin 500 TID  H/o anxiety, continue with sertraline daily  H/o glaucoma, continue with timolol eye gtts  PT/OT - pending eval    CARDS:  -150  HTN, continue with amlodipine, metoprolol TID  HLD, continue with atorvastatin    PULM:  sat > 92%  incentive spirometry as tolerated    RENAL:  IVL  discontinue hernandez, bladder scan prn     GASTRO:  Regular diet  ---> Stress ulcer prophylaxis:  pantoprazole, sulcrafate  Continue with bowel regimen, senna, miralax BID,    HEME:  monitor H/H, post op anemia, h/o iron deficiency anema, recheck CBC  ---> DVT prophylaxis: SCDs, SQL  LE dopplers pending    ENDO:  euglycemia    ID: afebrile  post abx ancef x2 dose    Dispo - PT/OT eval pending    Will discuss with Dr. Hu  #[id number] Assessment: 67 year old female, with a history of cervical disc disease, presents for C7-T1 Laminoforaminotomies/C6-T2 stabilization and fusion. Pt. reports experiencing left sided neck pain which radiates down her left upper extremity & is accompanied by paresthesias. Pt. 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 s/p Arvizu Thyroplasty Implant which was removed in 11/19. Pt. reports she is able to swallow food without difficulty and that her voice is almost back to normal. Pt. with limited cervical ROM, case reviewed with Dr. Barroso while patient in PST and pt. has been instructed to schedule pre op evaluation/scope with Dr. Ye.  S/p 9/21 Extension of prior cervical fusion to T2 from C6, C7-T1 laminectomy and foraminotomy      NEURO:    q4h neuro checks, vital checks  Monitor HMV drain output  Pain control with tylenol, oxycodone 5/10 mg prn, robaxin 500 TID  H/o anxiety, continue with sertraline daily  H/o glaucoma, continue with timolol eye gtts  PT/OT - pending eval    CARDS:  -150  HTN, continue with amlodipine, metoprolol TID  HLD, continue with atorvastatin    PULM:  sat > 92%  incentive spirometry as tolerated    RENAL:  IVL  discontinue hernandez, bladder scan prn     GASTRO:  Regular diet  ---> Stress ulcer prophylaxis:  pantoprazole, sulcrafate  Continue with bowel regimen, senna, miralax BID,    HEME:  monitor H/H, post op anemia, h/o iron deficiency anemia recheck CBC  ---> DVT prophylaxis: SCDs, SQL  LE dopplers pending    ENDO:  euglycemia    ID: afebrile  post abx ancef x2 dose    Dispo - PT/OT eval pending    Will discuss with Dr. Hu  #[id number]

## 2023-09-22 NOTE — DISCHARGE NOTE PROVIDER - NSRESEARCHGRANT_HIDDEN_GEN_A_CORE
Isamar FORBES Toshiaheidi discharged to home accompanied by .   Patient provided with the following educational materials upon discharge:  AVS, lovenox teaching completed.   Valuables and belongings sent with patient.   discharge summary, discharge instructions, medications and follow up appointments reviewed with patient and .  Patient and  verbalized understanding.    Yes

## 2023-09-22 NOTE — PHYSICAL THERAPY INITIAL EVALUATION ADULT - PHYSICAL ASSIST/NONPHYSICAL ASSIST: SIT/STAND, REHAB EVAL
verbal cues/1 person assist Azithromycin Counseling:  I discussed with the patient the risks of azithromycin including but not limited to GI upset, allergic reaction, drug rash, diarrhea, and yeast infections.

## 2023-09-22 NOTE — DISCHARGE NOTE PROVIDER - PROVIDER TOKENS
PROVIDER:[TOKEN:[[id number]]] PROVIDER:[TOKEN:[[id number]]],PROVIDER:[TOKEN:[[id number]]] PROVIDER:[TOKEN:[[id number]]],PROVIDER:[TOKEN:[[id number]]],PROVIDER:[TOKEN:[[id number]]],PROVIDER:[TOKEN:[[id number]]]

## 2023-09-22 NOTE — DISCHARGE NOTE PROVIDER - NSDCFUADDINST_GEN_ALL_CORE_FT
Return to ER if develops fevers, bleeding , wound drainage, uncontrolled pain, weakness of extremities, lethargy or sluggishness..  Do not take Aspirin Ibuprofen  (Motrin)  , Naproxen ( aleve)  or Meloxicam  for pain.   Return to ER if develops fevers, bleeding , wound drainage, uncontrolled pain, weakness of extremities, lethargy or sluggishness..  Do not take  Ibuprofen  (Motrin)  , Naproxen ( aleve)  or Meloxicam  for pain.  May restart Aspirin 81mg 10/1/23

## 2023-09-22 NOTE — DISCHARGE NOTE PROVIDER - NSDCCPCAREPLAN_GEN_ALL_CORE_FT
PRINCIPAL DISCHARGE DIAGNOSIS  Diagnosis: Cervical spine pain  Assessment and Plan of Treatment: -Please follow up with Dr. Mcgovern in one-two week; you may call the office to make an appointment at your earliest convenience.  -No strenous activity. No heavy lifting. Do not return to work or operate a motor vehicle until cleared by physician. DO NOT start aspirin / NSAIDS (motrin, advil ...) until cleared by your Neurosurgeon.  -You may shower on the 5th day after your surgery.  No soaking in tub,  Do not remove acquacel dressing. Do not apply any ointments to incision.  -You have been started on a new medication, oxycodone.  Oxycodone helps to control pain. Oxycodone is an additive medication so it is important to limit the use of this medication.  Side effects include nausea, vomiting, constipation, dry mouth, lightheadedness, dizziness or drowsiness.  It is important to tell your physician if you experience any of these side effects. Please take stool softeners while taking oxycodone.  -Return to ER immediately for any of the following: fever, bleeding, new onset numbness/tingling/weakness, nausea and/or vomiting, chest pain, shortness of breath, confusion, seizure, altered mental status, urinary and/or fecal incontinence or retention.       PRINCIPAL DISCHARGE DIAGNOSIS  Diagnosis: Cervical spine pain  Assessment and Plan of Treatment: -Please follow up with Dr. Mcgovern in one-two week; you may call the office to make an appointment at your earliest convenience.  -No strenous activity. No heavy lifting. Do not return to work or operate a motor vehicle until cleared by physician. DO NOT start aspirin / NSAIDS (motrin, advil ...) until cleared by your Neurosurgeon.  -You may shower on the 5th day after your surgery.  No soaking in tub,  Do not remove acquacel dressing. Do not apply any ointments to incision.  -You have been started on a new medication, oxycodone.  Oxycodone helps to control pain. Oxycodone is an additive medication so it is important to limit the use of this medication.  Side effects include nausea, vomiting, constipation, dry mouth, lightheadedness, dizziness or drowsiness.  It is important to tell your physician if you experience any of these side effects. Please take stool softeners while taking oxycodone.  -Return to ER immediately for any of the following: fever, bleeding, new onset numbness/tingling/weakness, nausea and/or vomiting, chest pain, shortness of breath, confusion, seizure, altered mental status, urinary and/or fecal incontinence or retention.        SECONDARY DISCHARGE DIAGNOSES  Diagnosis: Borderline low blood pressure determined by examination  Assessment and Plan of Treatment: Off antihypertensives. Continue low dose Toprol.  BP check at PMD in 3-5 days & to restart BP meds as tolerated    Diagnosis: Reactive airway disease  Assessment and Plan of Treatment: Continue incentive spirometry  Albuterol inhaler as needed for wheezing   Follow up with primary care in 1 week     PRINCIPAL DISCHARGE DIAGNOSIS  Diagnosis: Cervical spine pain  Assessment and Plan of Treatment: -Please follow up with Dr. Mcgovern in 1 week you may call the office to make an appointment at your earliest convenience. Incision evaluation and staple removal.  -No strenous activity. No heavy lifting. Do not return to work or operate a motor vehicle until cleared by physician. DO NOT start aspirin / NSAIDS (motrin, advil ...) until cleared by your Neurosurgeon.  -You may shower .  No soaking in tub,   Do not apply any ointments to incision.  -You have been started on a new medication, oxycodone.  Oxycodone helps to control pain. Oxycodone is an additive medication so it is important to limit the use of this medication.  Side effects include nausea, vomiting, constipation, dry mouth, lightheadedness, dizziness or drowsiness.  It is important to tell your physician if you experience any of these side effects. Please take stool softeners while taking oxycodone.  -Return to ER immediately for any of the following: fever, bleeding, new onset numbness/tingling/weakness, nausea and/or vomiting, chest pain, shortness of breath, confusion, seizure, altered mental status, urinary and/or fecal incontinence or retention.        SECONDARY DISCHARGE DIAGNOSES  Diagnosis: Borderline low blood pressure determined by examination  Assessment and Plan of Treatment: Off antihypertensives. Continue low dose Toprol.  BP check at PMD in 3-5 days & to restart BP meds as tolerated    Diagnosis: Reactive airway disease  Assessment and Plan of Treatment: Continue incentive spirometry  Albuterol nebs  as needed for wheezing / shortness of breath   Follow up with Pulmonary for Pulmonary function tests in 1-2 weeks    Diagnosis: HLD (hyperlipidemia)  Assessment and Plan of Treatment: Continue statin     PRINCIPAL DISCHARGE DIAGNOSIS  Diagnosis: Cervical spine pain  Assessment and Plan of Treatment: -Please follow up with Dr. Mcgovern in 1 week you may call the office to make an appointment at your earliest convenience. Incision evaluation and staple removal.  -No strenous activity. No heavy lifting. Do not return to work or operate a motor vehicle until cleared by physician. DO NOT start aspirin / NSAIDS (motrin, advil ...) until cleared by your Neurosurgeon.  -You may shower .  No soaking in tub,   Do not apply any ointments to incision.  -You have been started on a new medication, oxycodone.  Oxycodone helps to control pain. Oxycodone is an additive medication so it is important to limit the use of this medication.  Side effects include nausea, vomiting, constipation, dry mouth, lightheadedness, dizziness or drowsiness.  It is important to tell your physician if you experience any of these side effects. Please take stool softeners while taking oxycodone.  -Return to ER immediately for any of the following: fever, bleeding, new onset numbness/tingling/weakness, nausea and/or vomiting, chest pain, shortness of breath, confusion, seizure, altered mental status, urinary and/or fecal incontinence or retention.        SECONDARY DISCHARGE DIAGNOSES  Diagnosis: Paralysis of right vocal cord  Assessment and Plan of Treatment: Follow up with ENT Dr Ye or dr Khan    Diagnosis: Borderline low blood pressure determined by examination  Assessment and Plan of Treatment: Off antihypertensives. Continue low dose Toprol.  BP check at PMD in 3-5 days & to restart BP meds as tolerated    Diagnosis: LPRD (laryngopharyngeal reflux disease)  Assessment and Plan of Treatment: Continue Nexium    Diagnosis: Reactive airway disease  Assessment and Plan of Treatment: Continue incentive spirometry  Albuterol nebs  as needed for wheezing / shortness of breath   Follow up with Pulmonary for Pulmonary function tests in 1-2 weeks    Diagnosis: HLD (hyperlipidemia)  Assessment and Plan of Treatment: Continue statin

## 2023-09-22 NOTE — PHYSICAL THERAPY INITIAL EVALUATION ADULT - PERTINENT HX OF CURRENT PROBLEM, REHAB EVAL
Pt is a 67 year old female, with a history of cervical disc disease, presents for C7-T1 Laminoforaminotomies/C6-T2 stabilization and fusion. Pt. reports experiencing left sided neck pain which radiates down her left upper extremity & is accompanied by paresthesias. Pt. 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 s/p Arvizu Thyroplasty Implant which was removed in 11/19. Pt. reports she is able to swallow food without difficulty and that her voice is almost back to normal. Pt. with limited cervical ROM, case reviewed with Dr. Barroso while patient in PST and pt. has been instructed to schedule pre op evaluation/scope with Dr. Ye. Pt is now s/p extension of prior cervical fusion to T2 from C6, C7-T1 laminectomy and foraminotomy on 9/21

## 2023-09-22 NOTE — DISCHARGE NOTE PROVIDER - HOSPITAL COURSE
67 year old female, with a history of cervical disc disease, presents for C7-T1 Laminoforaminotomies/C6-T2 stabilization and fusion. Pt. reports experiencing left sided neck pain which radiates down her left upper extremity & is accompanied by paresthesias. Pt. 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 s/p Arvizu Thyroplasty Implant which was removed in 11/19. Pt. reports she is able to swallow food without difficulty and that her voice is almost back to normal. Pt. with limited cervical ROM, case reviewed with Dr. Barroso while patient in PST and pt. has been instructed to schedule pre op evaluation/scope with Dr. Ye. S/p 9/21 Extension of prior cervical fusion to T2 from C6, C7-T1 laminectomy and foraminotomy 9/21. Physical therapy recommended .. Occupational therapy recommend home OT.     67 year old female, with a history of cervical disc disease, presents for C7-T1 Laminoforaminotomies/C6-T2 stabilization and fusion. Pt. reports experiencing left sided neck pain which radiates down her left upper extremity & is accompanied by paresthesias. Pt. 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 s/p Arvizu Thyroplasty Implant which was removed in 11/19. Pt. reports she is able to swallow food without difficulty and that her voice is almost back to normal. Pt. with limited cervical ROM. ENT evaluation preop. Admitted for elective surgery     9/21/23 s/p Extension of prior cervical fusion to T2 from C6, C7-T1 laminectomy and foraminotomy for Cervical spondylosis with left C7-T1 neural foraminal narrowing and left C8 radiculopathy.   Medicine consulted for wheezing & borderline low BP. CXR with clear lungs .  Treated w duonebs. BP soft. Antihypertensives adjusted & currently off.    Evaluated by Physical therapy  & Occupational therapy recommend for  home PT/ OT. Discharged home in stable condition

## 2023-09-22 NOTE — CHART NOTE - NSCHARTNOTEFT_GEN_A_CORE
CAPRINI SCORE [CLOT] Score on Admission for     AGE RELATED RISK FACTORS                                                       MOBILITY RELATED FACTORS  [ ] Age 41-60 years                                            (1 Point)                  [ ] Bed rest                                                        (1 Point)  [x ] Age: 61-74 years                                           (2 Points)                 [ ] Plaster cast                                                   (2 Points)  [ ] Age= 75 years                                              (3 Points)                 [ ] Bed bound for more than 72 hours                 (2 Points)    DISEASE RELATED RISK FACTORS                                               GENDER SPECIFIC FACTORS  [ ] Edema in the lower extremities                       (1 Point)                  [ ] Pregnancy                                                     (1 Point)  [ ] Varicose veins                                               (1 Point)                  [ ] Post-partum < 6 weeks                                   (1 Point)             [x ] BMI > 25 Kg/m2                                            (1 Point)                  [ ] Hormonal therapy  or oral contraception          (1 Point)                 [ ] Sepsis (in the previous month)                        (1 Point)                  [ ] History of pregnancy complications                 (1 point)  [ ] Pneumonia or serious lung disease                                               [ ] Unexplained or recurrent                     (1 Point)           (in the previous month)                               (1 Point)  [ ] Abnormal pulmonary function test                     (1 Point)                 SURGERY RELATED RISK FACTORS (include planned surgeries)  [ ] Acute myocardial infarction                              (1 Point)                 [ ]  Section                                             (1 Point)  [ ] Congestive heart failure (in the previous month)  (1 Point)         [ ] Minor surgery                                                  (1 Point)   [ ] Inflammatory bowel disease                             (1 Point)                 [ ] Arthroscopic surgery                                        (2 Points)  [ ] Central venous access                                      (2 Points)                [ x] General surgery lasting more than 45 minutes   (2 Points)       [ ] Stroke (in the previous month)                          (5 Points)               [ ] Elective arthroplasty                                         (5 Points)            [ ] current or past malignancy                              (2 Points)                                                                                                       HEMATOLOGY RELATED FACTORS                                                 TRAUMA RELATED RISK FACTORS  [ ] Prior episodes of VTE                                     (3 Points)                [ ] Fracture of the hip, pelvis, or leg                       (5 Points)  [ ] Positive family history for VTE                         (3 Points)                 [ ] Acute spinal cord injury (in the previous month)  (5 Points)  [ ] Prothrombin 02693 A                                     (3 Points)                 [ ] Paralysis  (less than 1 month)                             (5 Points)  [ ] Factor V Leiden                                             (3 Points)                  [ ] Multiple Trauma within 1 month                        (5 Points)  [ ] Lupus anticoagulants                                     (3 Points)                                                           [ ] Anticardiolipin antibodies                               (3 Points)                                                       [ ] High homocysteine in the blood                      (3 Points)                                             [ ] Other congenital or acquired thrombophilia      (3 Points)                                                [ ] Heparin induced thrombocytopenia                  (3 Points)                                          Total Score [   5       ]    Risk:  Very low 0   Low 1 to 2   Moderate 3 to 4   High =5       VTE Prophylasix Recommednations:  [x ] mechanical pneumatic compression devices                                      [ ] contraindicated: _____________________  [x ] chemo prophylasix                                                                                   [ ] contraindicated _____________________    **** HIGH LIKELIHOOD DVT PRESENT ON ADMISSION  [ x] (please order LE dopplers within 24 hours of admission)

## 2023-09-22 NOTE — DISCHARGE NOTE PROVIDER - NSDCFUADDAPPT_GEN_ALL_CORE_FT
Follow up with Dr Mcgovern in 1 week  Follow up with Pulmonary in  2 weeks for pulmonary function tests   Follow up with cardiology or primary care for BP check in 3-5 days . BP meds held for borderline low BP

## 2023-09-22 NOTE — PHYSICAL THERAPY INITIAL EVALUATION ADULT - ADDITIONAL COMMENTS
Pt lives in a private house with her , daughter, young grandchildren, 4 steps to enter no handrail, 1 flight of stairs to bedroom, tub in bathroom with shower chair. Pt performed ADLs and amb independently prior to admission.

## 2023-09-22 NOTE — DISCHARGE NOTE PROVIDER - CARE PROVIDER_API CALL
David Mcgovern  Neurosurgery  [address] [place] [address], [facility] [address]  [place] [address]  Phone: [phone]  Fax: [phone]  Follow Up Time:    David Mcgovern  Neurosurgery  [address] [facility], [address]  [place] [address]  Phone: [phone]  Fax: [phone]  Follow Up Time:     Manan Zavala  Gastroenterology  [address] [facility], [address]  [place] [address]  Phone: [phone]  Fax: [phone]  Follow Up Time:    David Mcgovern  Neurosurgery  [address] [facility], [address]  [place] [address]  Phone: [phone]  Fax: [phone]  Follow Up Time:     Manan Zavala  Gastroenterology  [address] [facility], [address]  [place] [address]  Phone: [phone]  Fax: [phone]  Follow Up Time:     Sanjuanita Ye  Otolaryngology  [address] [place] [address], [address]  [place] [address]  Phone: [phone]  Fax: [phone]  Follow Up Time:     Mannie Cee  Cardiovascular Disease  [address] [place] [address]  Phone: [phone]  Fax: [phone]  Follow Up Time:

## 2023-09-22 NOTE — PROGRESS NOTE ADULT - SUBJECTIVE AND OBJECTIVE BOX
HPI:  67 year old female, with a history of cervical disc disease, presents for C7-T1 Laminoforaminotomies/C6-T2 stabilization and fusion. Pt. reports experiencing left sided neck pain which radiates down her left upper extremity & is accompanied by paresthesias. Pt. 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 s/p Arvizu Thyroplasty Implant which was removed in 11/19. Pt. reports she is able to swallow food without difficulty and that her voice is almost back to normal. Pt. with limited cervical ROM, case reviewed with Dr. Barroso while patient in PST and pt. has been instructed to schedule pre op evaluation/scope with Dr. Ye.    Pt. reports labwork revealed that she had COVID-19 at some point, denies symptoms. (24 Aug 2023 09:55)    OVERNIGHT EVENTS:  Vital Signs Last 24 Hrs  T(C): 36.6 (22 Sep 2023 04:42), Max: 37 (21 Sep 2023 10:35)  T(F): 97.8 (22 Sep 2023 04:42), Max: 98.6 (21 Sep 2023 10:35)  HR: 82 (22 Sep 2023 04:42) (67 - 103)  BP: 102/58 (22 Sep 2023 04:42) (102/58 - 142/67)  BP(mean): 88 (22 Sep 2023 01:00) (79 - 93)  RR: 18 (22 Sep 2023 04:42) (16 - 18)  SpO2: 94% (22 Sep 2023 04:42) (93% - 100%)    Parameters below as of 22 Sep 2023 04:42  Patient On (Oxygen Delivery Method): room air        I&O's Detail    21 Sep 2023 07:01  -  22 Sep 2023 07:00  --------------------------------------------------------  IN:    IV PiggyBack: 50 mL    Lactated Ringers: 825 mL    Oral Fluid: 320 mL  Total IN: 1195 mL    OUT:    Accordian (mL): 70 mL    Indwelling Catheter - Urethral (mL): 310 mL  Total OUT: 380 mL    Total NET: 815 mL        I&O's Summary    21 Sep 2023 07:01  -  22 Sep 2023 07:00  --------------------------------------------------------  IN: 1195 mL / OUT: 380 mL / NET: 815 mL        PHYSICAL EXAM:  Neurological:    Motor exam:         [x] Upper extremity                 D             B          T          WE       WF      HI                                               R         5/5        5/5        5/5       5/5     5/5       5/5                                               L          5/5        5/5        5/5       5/5     5/5       4/5         [x] Lower extremity                Ps          Ha        Quad    EHL        FHL                                               R        5/5        5/5        5/5       5/5         5/5                                               L         5/5        5/5       5/5       5/5          5/5                                                        [] warm well perfused; capillary refill <3 seconds     Sensation: [] intact to light touch  [x] decreased: L C8 (improved c/w pre-op)    Gait NT    Cardiovascular:  Respiratory:  Gastrointestinal:  Genitourinary:  Extremities:  Incision/Wound: Clean and dry    TUBES/LINES:  [] CVC  [] A-line  [] Lumbar Drain  [] Ventriculostomy  [] Other    DIET:  [] NPO  [] Mechanical  [] Tube feeds    LABS:                        8.4    12.64 )-----------( 241      ( 22 Sep 2023 06:42 )             25.3     09-22    136  |  103  |  12  ----------------------------<  117<H>  4.9   |  24  |  0.67    Ca    8.8      22 Sep 2023 06:44    TPro  5.6<L>  /  Alb  3.5  /  TBili  0.2  /  DBili  x   /  AST  49<H>  /  ALT  42  /  AlkPhos  58  09-22      Urinalysis Basic - ( 22 Sep 2023 06:44 )    Color: x / Appearance: x / SG: x / pH: x  Gluc: 117 mg/dL / Ketone: x  / Bili: x / Urobili: x   Blood: x / Protein: x / Nitrite: x   Leuk Esterase: x / RBC: x / WBC x   Sq Epi: x / Non Sq Epi: x / Bacteria: x          CAPILLARY BLOOD GLUCOSE      POCT Blood Glucose.: 101 mg/dL (21 Sep 2023 09:52)          Drug Levels: [] N/A    CSF Analysis: [] N/A      Allergies    No Known Allergies    Intolerances    morphine (Nausea; Vomiting)  Percocet 5/325 (Nausea; Vomiting)    MEDICATIONS:  Antibiotics:  ceFAZolin   IVPB 2000 milliGRAM(s) IV Intermittent every 8 hours    Neuro:  acetaminophen     Tablet .. 650 milliGRAM(s) Oral every 6 hours PRN  acetaminophen   IVPB .. 1000 milliGRAM(s) IV Intermittent once  fentaNYL    Injectable 25 MICROGram(s) IV Push every 5 minutes PRN  methocarbamol 500 milliGRAM(s) Oral three times a day  ondansetron Injectable 4 milliGRAM(s) IV Push once PRN  ondansetron Injectable 4 milliGRAM(s) IV Push every 6 hours PRN  oxyCODONE    IR 10 milliGRAM(s) Oral every 4 hours PRN  oxyCODONE    IR 5 milliGRAM(s) Oral every 4 hours PRN  sertraline 50 milliGRAM(s) Oral daily    Anticoagulation:  enoxaparin Injectable 40 milliGRAM(s) SubCutaneous every 24 hours    OTHER:  amLODIPine   Tablet 5 milliGRAM(s) Oral at bedtime  atorvastatin 80 milliGRAM(s) Oral at bedtime  chlorhexidine 2% Cloths 1 Application(s) Topical once  influenza  Vaccine (HIGH DOSE) 0.7 milliLiter(s) IntraMuscular once  lidocaine 1% Injectable 0.2 milliLiter(s) Local Injection once  losartan 50 milliGRAM(s) Oral daily  metoprolol succinate ER 25 milliGRAM(s) Oral daily  pantoprazole    Tablet 40 milliGRAM(s) Oral before breakfast  polyethylene glycol 3350 17 Gram(s) Oral daily  senna 2 Tablet(s) Oral at bedtime  sucralfate 1 Gram(s) Oral four times a day  timolol 0.25% Solution 1 Drop(s) Both EYES daily    IVF:  lactated ringers. 1000 milliLiter(s) IV Continuous <Continuous>    CULTURES:    RADIOLOGY & ADDITIONAL TESTS:      ASSESSMENT:  HPI:  67 year old female, with a history of cervical disc disease, presents for C7-T1 Laminoforaminotomies/C6-T2 stabilization and fusion. Pt. reports experiencing left sided neck pain which radiates down her left upper extremity & is accompanied by paresthesias. Pt. 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 s/p Arvizu Thyroplasty Implant which was removed in 11/19. Pt. reports she is able to swallow food without difficulty and that her voice is almost back to normal. Pt. with limited cervical ROM, case reviewed with Dr. Barroso while patient in PST and pt. has been instructed to schedule pre op evaluation/scope with Dr. Ye.    Pt. reports labwork revealed that she had COVID-19 at some point, denies symptoms. (24 Aug 2023 09:55)    67y Female s/p    PLAN:  NEURO:  CARDIOVASCULAR:  PULMONARY:  RENAL:  GI:  HEME:  ID:  ENDO:    DVT PROPHYLAXIS:  [x] Venodynes                                [x] Heparin/Lovenox    FALL RISK:  [] Low Risk                                    [] Impulsive

## 2023-09-23 LAB
ANION GAP SERPL CALC-SCNC: 9 MMOL/L — SIGNIFICANT CHANGE UP (ref 5–17)
BUN SERPL-MCNC: 7 MG/DL — SIGNIFICANT CHANGE UP (ref 7–23)
CALCIUM SERPL-MCNC: 8.5 MG/DL — SIGNIFICANT CHANGE UP (ref 8.4–10.5)
CHLORIDE SERPL-SCNC: 100 MMOL/L — SIGNIFICANT CHANGE UP (ref 96–108)
CO2 SERPL-SCNC: 26 MMOL/L — SIGNIFICANT CHANGE UP (ref 22–31)
CREAT SERPL-MCNC: 0.54 MG/DL — SIGNIFICANT CHANGE UP (ref 0.5–1.3)
EGFR: 101 ML/MIN/1.73M2 — SIGNIFICANT CHANGE UP
GLUCOSE SERPL-MCNC: 139 MG/DL — HIGH (ref 70–99)
HCT VFR BLD CALC: 22.7 % — LOW (ref 34.5–45)
HGB BLD-MCNC: 7.5 G/DL — LOW (ref 11.5–15.5)
MCHC RBC-ENTMCNC: 29.4 PG — SIGNIFICANT CHANGE UP (ref 27–34)
MCHC RBC-ENTMCNC: 33 GM/DL — SIGNIFICANT CHANGE UP (ref 32–36)
MCV RBC AUTO: 89 FL — SIGNIFICANT CHANGE UP (ref 80–100)
NRBC # BLD: 0 /100 WBCS — SIGNIFICANT CHANGE UP (ref 0–0)
PLATELET # BLD AUTO: 219 K/UL — SIGNIFICANT CHANGE UP (ref 150–400)
POTASSIUM SERPL-MCNC: 4.1 MMOL/L — SIGNIFICANT CHANGE UP (ref 3.5–5.3)
POTASSIUM SERPL-SCNC: 4.1 MMOL/L — SIGNIFICANT CHANGE UP (ref 3.5–5.3)
RBC # BLD: 2.55 M/UL — LOW (ref 3.8–5.2)
RBC # FLD: 13 % — SIGNIFICANT CHANGE UP (ref 10.3–14.5)
SODIUM SERPL-SCNC: 135 MMOL/L — SIGNIFICANT CHANGE UP (ref 135–145)
WBC # BLD: 11.97 K/UL — HIGH (ref 3.8–10.5)
WBC # FLD AUTO: 11.97 K/UL — HIGH (ref 3.8–10.5)

## 2023-09-23 RX ORDER — ACETAMINOPHEN 500 MG
1000 TABLET ORAL EVERY 8 HOURS
Refills: 0 | Status: COMPLETED | OUTPATIENT
Start: 2023-09-23 | End: 2023-09-26

## 2023-09-23 RX ORDER — OXYCODONE HYDROCHLORIDE 5 MG/1
10 TABLET ORAL ONCE
Refills: 0 | Status: DISCONTINUED | OUTPATIENT
Start: 2023-09-23 | End: 2023-09-25

## 2023-09-23 RX ADMIN — Medication 1000 MILLIGRAM(S): at 18:08

## 2023-09-23 RX ADMIN — POLYETHYLENE GLYCOL 3350 17 GRAM(S): 17 POWDER, FOR SOLUTION ORAL at 11:36

## 2023-09-23 RX ADMIN — Medication 25 MILLIGRAM(S): at 05:23

## 2023-09-23 RX ADMIN — Medication 1 GRAM(S): at 05:24

## 2023-09-23 RX ADMIN — Medication 1000 MILLIGRAM(S): at 10:26

## 2023-09-23 RX ADMIN — METHOCARBAMOL 500 MILLIGRAM(S): 500 TABLET, FILM COATED ORAL at 05:23

## 2023-09-23 RX ADMIN — Medication 1000 MILLIGRAM(S): at 17:08

## 2023-09-23 RX ADMIN — SERTRALINE 50 MILLIGRAM(S): 25 TABLET, FILM COATED ORAL at 11:36

## 2023-09-23 RX ADMIN — Medication 1 GRAM(S): at 17:07

## 2023-09-23 RX ADMIN — LOSARTAN POTASSIUM 50 MILLIGRAM(S): 100 TABLET, FILM COATED ORAL at 05:23

## 2023-09-23 RX ADMIN — Medication 1 GRAM(S): at 23:25

## 2023-09-23 RX ADMIN — Medication 1000 MILLIGRAM(S): at 09:26

## 2023-09-23 RX ADMIN — ATORVASTATIN CALCIUM 80 MILLIGRAM(S): 80 TABLET, FILM COATED ORAL at 21:03

## 2023-09-23 RX ADMIN — OXYCODONE HYDROCHLORIDE 10 MILLIGRAM(S): 5 TABLET ORAL at 16:51

## 2023-09-23 RX ADMIN — OXYCODONE HYDROCHLORIDE 10 MILLIGRAM(S): 5 TABLET ORAL at 22:03

## 2023-09-23 RX ADMIN — OXYCODONE HYDROCHLORIDE 10 MILLIGRAM(S): 5 TABLET ORAL at 17:51

## 2023-09-23 RX ADMIN — OXYCODONE HYDROCHLORIDE 10 MILLIGRAM(S): 5 TABLET ORAL at 01:27

## 2023-09-23 RX ADMIN — Medication 1 DROP(S): at 11:35

## 2023-09-23 RX ADMIN — OXYCODONE HYDROCHLORIDE 10 MILLIGRAM(S): 5 TABLET ORAL at 10:26

## 2023-09-23 RX ADMIN — OXYCODONE HYDROCHLORIDE 10 MILLIGRAM(S): 5 TABLET ORAL at 06:23

## 2023-09-23 RX ADMIN — OXYCODONE HYDROCHLORIDE 10 MILLIGRAM(S): 5 TABLET ORAL at 02:27

## 2023-09-23 RX ADMIN — METHOCARBAMOL 500 MILLIGRAM(S): 500 TABLET, FILM COATED ORAL at 13:10

## 2023-09-23 RX ADMIN — ENOXAPARIN SODIUM 40 MILLIGRAM(S): 100 INJECTION SUBCUTANEOUS at 21:03

## 2023-09-23 RX ADMIN — PANTOPRAZOLE SODIUM 40 MILLIGRAM(S): 20 TABLET, DELAYED RELEASE ORAL at 05:22

## 2023-09-23 RX ADMIN — Medication 650 MILLIGRAM(S): at 05:40

## 2023-09-23 RX ADMIN — Medication 650 MILLIGRAM(S): at 04:40

## 2023-09-23 RX ADMIN — OXYCODONE HYDROCHLORIDE 10 MILLIGRAM(S): 5 TABLET ORAL at 21:03

## 2023-09-23 RX ADMIN — OXYCODONE HYDROCHLORIDE 10 MILLIGRAM(S): 5 TABLET ORAL at 09:26

## 2023-09-23 RX ADMIN — METHOCARBAMOL 500 MILLIGRAM(S): 500 TABLET, FILM COATED ORAL at 21:03

## 2023-09-23 RX ADMIN — Medication 1 GRAM(S): at 11:35

## 2023-09-23 RX ADMIN — AMLODIPINE BESYLATE 5 MILLIGRAM(S): 2.5 TABLET ORAL at 21:04

## 2023-09-23 RX ADMIN — OXYCODONE HYDROCHLORIDE 10 MILLIGRAM(S): 5 TABLET ORAL at 05:23

## 2023-09-23 NOTE — PROGRESS NOTE ADULT - SUBJECTIVE AND OBJECTIVE BOX
Subjective:     Overnight events:    OBJECTIVE    Vital Signs Last 24 Hrs  T(C): 36.8 (09-23-23 @ 04:41), Max: 37.1 (09-22-23 @ 15:33)  T(F): 98.2 (09-23-23 @ 04:41), Max: 98.8 (09-22-23 @ 15:33)  HR: 100 (09-23-23 @ 04:41) (82 - 100)  BP: 133/73 (09-23-23 @ 04:41) (101/59 - 133/73)  BP(mean): --  RR: 18 (09-23-23 @ 04:41) (18 - 18)  SpO2: 93% (09-23-23 @ 04:41) (93% - 96%)      I&O's Summary    21 Sep 2023 07:01  -  22 Sep 2023 07:00  --------------------------------------------------------  IN: 1195 mL / OUT: 380 mL / NET: 815 mL    22 Sep 2023 07:01  -  23 Sep 2023 06:27  --------------------------------------------------------  IN: 800 mL / OUT: 1691 mL / NET: -891 mL    Drains:   cc/24hrs      LABS:  Na: 136 (09-22 @ 06:44)  K: 4.9 (09-22 @ 06:44)  Cl: 103 (09-22 @ 06:44)  CO2: 24 (09-22 @ 06:44)  BUN: 12 (09-22 @ 06:44)  Cr: 0.67 (09-22 @ 06:44)  Glu: 117(09-22 @ 06:44)    Hgb: 8.4 (09-22 @ 06:42)  Hct: 25.3 (09-22 @ 06:42)  WBC: 12.64 (09-22 @ 06:42)  Plt: 241 (09-22 @ 06:42)      IMAGING:   Recent imaging studies were reviewed.    MEDICATIONS  (STANDING):  acetaminophen   IVPB .. 1000 milliGRAM(s) IV Intermittent once  amLODIPine   Tablet 5 milliGRAM(s) Oral at bedtime  atorvastatin 80 milliGRAM(s) Oral at bedtime  chlorhexidine 2% Cloths 1 Application(s) Topical once  enoxaparin Injectable 40 milliGRAM(s) SubCutaneous every 24 hours  influenza  Vaccine (HIGH DOSE) 0.7 milliLiter(s) IntraMuscular once  losartan 50 milliGRAM(s) Oral daily  methocarbamol 500 milliGRAM(s) Oral three times a day  metoprolol succinate ER 25 milliGRAM(s) Oral daily  pantoprazole    Tablet 40 milliGRAM(s) Oral before breakfast  polyethylene glycol 3350 17 Gram(s) Oral daily  senna 2 Tablet(s) Oral at bedtime  sertraline 50 milliGRAM(s) Oral daily  sucralfate suspension 1 Gram(s) Oral four times a day  timolol 0.25% Solution 1 Drop(s) Both EYES daily    MEDICATIONS  (PRN):  acetaminophen     Tablet .. 650 milliGRAM(s) Oral every 6 hours PRN Temp greater or equal to 38C (100.4F), Mild Pain (1 - 3)  ondansetron Injectable 4 milliGRAM(s) IV Push every 6 hours PRN Nausea and/or Vomiting  oxyCODONE    IR 10 milliGRAM(s) Oral every 4 hours PRN Severe Pain (7 - 10)  oxyCODONE    IR 5 milliGRAM(s) Oral every 4 hours PRN Moderate Pain (4 - 6)      EXAMINATION:  General:  calm  HEENT:  MMM  Neuro:  awake, alert, oriented x 3, follows commands, BUE HG 4+/5, bicep 4+/5, triceps 4+/5, LUE delt 4+/5 pain limited, BLE 5/5, SILT  Cards:  RRR  Respiratory:  no respiratory distress  Abdomen:  soft  Extremities:  no edema  Skin:  warm/dry  Incision: acquacel dressing in place, c/d/i Subjective:  Patient seen and examined at bedside in mild distress. Pt reports severe incisional pain overnight. Mild relief with current pain regimen. Denies having any chest pain or shortness of breath. No BM yet.    Overnight events: incision pain overnight with mild relief with medication    OBJECTIVE    Vital Signs Last 24 Hrs  T(C): 36.8 (09-23-23 @ 04:41), Max: 37.1 (09-22-23 @ 15:33)  T(F): 98.2 (09-23-23 @ 04:41), Max: 98.8 (09-22-23 @ 15:33)  HR: 100 (09-23-23 @ 04:41) (82 - 100)  BP: 133/73 (09-23-23 @ 04:41) (101/59 - 133/73)  BP(mean): --  RR: 18 (09-23-23 @ 04:41) (18 - 18)  SpO2: 93% (09-23-23 @ 04:41) (93% - 96%)      I&O's Summary    21 Sep 2023 07:01  -  22 Sep 2023 07:00  --------------------------------------------------------  IN: 1195 mL / OUT: 380 mL / NET: 815 mL    22 Sep 2023 07:01  -  23 Sep 2023 06:27  --------------------------------------------------------  IN: 800 mL / OUT: 1691 mL / NET: -891 mL    Drains:   cc/24hrs      LABS:                        8.2    14.65 )-----------( 249      ( 22 Sep 2023 13:01 )             23.8   09-22    136  |  103  |  12  ----------------------------<  117<H>  4.9   |  24  |  0.67    Ca    8.8      22 Sep 2023 06:44    TPro  5.6<L>  /  Alb  3.5  /  TBili  0.2  /  DBili  x   /  AST  49<H>  /  ALT  42  /  AlkPhos  58  09-22        IMAGING:   Recent imaging studies were reviewed.    MEDICATIONS  (STANDING):  acetaminophen   IVPB .. 1000 milliGRAM(s) IV Intermittent once  amLODIPine   Tablet 5 milliGRAM(s) Oral at bedtime  atorvastatin 80 milliGRAM(s) Oral at bedtime  chlorhexidine 2% Cloths 1 Application(s) Topical once  enoxaparin Injectable 40 milliGRAM(s) SubCutaneous every 24 hours  influenza  Vaccine (HIGH DOSE) 0.7 milliLiter(s) IntraMuscular once  losartan 50 milliGRAM(s) Oral daily  methocarbamol 500 milliGRAM(s) Oral three times a day  metoprolol succinate ER 25 milliGRAM(s) Oral daily  pantoprazole    Tablet 40 milliGRAM(s) Oral before breakfast  polyethylene glycol 3350 17 Gram(s) Oral daily  senna 2 Tablet(s) Oral at bedtime  sertraline 50 milliGRAM(s) Oral daily  sucralfate suspension 1 Gram(s) Oral four times a day  timolol 0.25% Solution 1 Drop(s) Both EYES daily    MEDICATIONS  (PRN):  acetaminophen     Tablet .. 650 milliGRAM(s) Oral every 6 hours PRN Temp greater or equal to 38C (100.4F), Mild Pain (1 - 3)  ondansetron Injectable 4 milliGRAM(s) IV Push every 6 hours PRN Nausea and/or Vomiting  oxyCODONE    IR 10 milliGRAM(s) Oral every 4 hours PRN Severe Pain (7 - 10)  oxyCODONE    IR 5 milliGRAM(s) Oral every 4 hours PRN Moderate Pain (4 - 6)      EXAMINATION:  General:  calm  HEENT:  MMM  Neuro:  awake, alert, oriented x 3, follows commands, BUE HG 4+/5, bicep 4+/5, triceps 4+/5, LUE delt 4+/5 pain limited, BLE 5/5, SILT  Cards:  RRR  Respiratory:  no respiratory distress  Abdomen:  soft  Extremities:  no edema  Skin:  warm/dry  Incision: aquacel dressing in place, c/d/i

## 2023-09-23 NOTE — PROGRESS NOTE ADULT - ASSESSMENT
Assessment: 67 year old female, with a history of cervical disc disease, presents for C7-T1 Laminoforaminotomies/C6-T2 stabilization and fusion. Pt. reports experiencing left sided neck pain which radiates down her left upper extremity & is accompanied by paresthesias. Pt. 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 s/p Arvizu Thyroplasty Implant which was removed in 11/19. Pt. reports she is able to swallow food without difficulty and that her voice is almost back to normal. Pt. with limited cervical ROM, case reviewed with Dr. Barroos while patient in PST and pt. has been instructed to schedule pre op evaluation/scope with Dr. Ye.  S/p 9/21 Extension of prior cervical fusion to T2 from C6, C7-T1 laminectomy and foraminotomy      NEURO:    q4h neuro checks, vital checks  Monitor HMV drain output 190cc/24hrs  Pain control with tylenol, oxycodone 5/10 mg prn, robaxin 500 TID  H/o anxiety, continue with sertraline daily  H/o glaucoma, continue with timolol eye gtts  PT/OT - pending eval    CARDS:  -150  HTN, continue with amlodipine, metoprolol TID  HLD, continue with atorvastatin    PULM:  sat > 92%  incentive spirometry as tolerated    RENAL:  IVL  voiding well    GASTRO:  Regular diet  ---> Stress ulcer prophylaxis:  pantoprazole, sulcrafate  Continue with bowel regimen, senna, miralax BID,    HEME:  monitor H/H, stable post op anemia, h/o iron deficiency anemia  ---> DVT prophylaxis: SCDs, SQL  LE dopplers pending    ENDO:  euglycemia    ID: afebrile  post abx ancef x2 dose complete    Dispo - PT/OT eval pending    Will discuss with Dr. Hu  #[id number] Assessment: 67 year old female, with a history of cervical disc disease, presents for C7-T1 Laminoforaminotomies/C6-T2 stabilization and fusion. Pt. reports experiencing left sided neck pain which radiates down her left upper extremity & is accompanied by paresthesias. Pt. 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 s/p Arvizu Thyroplasty Implant which was removed in 11/19. Pt. reports she is able to swallow food without difficulty and that her voice is almost back to normal. Pt. with limited cervical ROM, case reviewed with Dr. Barroso while patient in PST and pt. has been instructed to schedule pre op evaluation/scope with Dr. Ye.  S/p 9/21 Extension of prior cervical fusion to T2 from C6, C7-T1 laminectomy and foraminotomy      NEURO:    q4h neuro checks, vital checks  Monitor HMV drain output 190cc/24hrs  Pain control adjusted. 1gram tylenol q 8 hrs x3 days, oxycodone 5/10 mg prn, robaxin 500 TID  H/o anxiety, continue with sertraline daily  H/o glaucoma, continue with timolol eye gtts  PT/OT - home PT/OT with rolling walker    CARDS:  -150  HTN, continue with amlodipine, metoprolol TID  HLD, continue with atorvastatin    PULM:  sat > 92%  incentive spirometry as tolerated    RENAL:  IVL  voiding well    GASTRO:  Regular diet  ---> Stress ulcer prophylaxis:  pantoprazole, sulcrafate  Continue with bowel regimen, senna, miralax BID    HEME:  monitor H/H, stable post op anemia, h/o iron deficiency anemia, check CBC in AM  ---> DVT prophylaxis: SCDs, SQL  LE dopplers pending    ENDO:  euglycemia    ID: afebrile  post abx ancef x2 dose complete    Dispo - PT/OT - Home PT/OT with rolling walker    Will discuss with Dr. Hu  #[phone]

## 2023-09-23 NOTE — PROGRESS NOTE ADULT - TIME BILLING
Patient doing well.  Motor / sensory intact.  Excellent left hand strength and sensation.  Ambulated with PT today.  Continue hemovac.

## 2023-09-24 LAB
BLD GP AB SCN SERPL QL: NEGATIVE — SIGNIFICANT CHANGE UP
HCT VFR BLD CALC: 24.6 % — LOW (ref 34.5–45)
HGB BLD-MCNC: 8 G/DL — LOW (ref 11.5–15.5)
MCHC RBC-ENTMCNC: 29.7 PG — SIGNIFICANT CHANGE UP (ref 27–34)
MCHC RBC-ENTMCNC: 32.5 GM/DL — SIGNIFICANT CHANGE UP (ref 32–36)
MCV RBC AUTO: 91.4 FL — SIGNIFICANT CHANGE UP (ref 80–100)
NRBC # BLD: 0 /100 WBCS — SIGNIFICANT CHANGE UP (ref 0–0)
PLATELET # BLD AUTO: 222 K/UL — SIGNIFICANT CHANGE UP (ref 150–400)
RBC # BLD: 2.69 M/UL — LOW (ref 3.8–5.2)
RBC # FLD: 13 % — SIGNIFICANT CHANGE UP (ref 10.3–14.5)
RH IG SCN BLD-IMP: POSITIVE — SIGNIFICANT CHANGE UP
WBC # BLD: 9.88 K/UL — SIGNIFICANT CHANGE UP (ref 3.8–10.5)
WBC # FLD AUTO: 9.88 K/UL — SIGNIFICANT CHANGE UP (ref 3.8–10.5)

## 2023-09-24 PROCEDURE — 93970 EXTREMITY STUDY: CPT | Mod: 26

## 2023-09-24 RX ORDER — ALBUTEROL 90 UG/1
1.25 AEROSOL, METERED ORAL ONCE
Refills: 0 | Status: COMPLETED | OUTPATIENT
Start: 2023-09-24 | End: 2023-09-24

## 2023-09-24 RX ORDER — ALBUTEROL 90 UG/1
2 AEROSOL, METERED ORAL EVERY 6 HOURS
Refills: 0 | Status: DISCONTINUED | OUTPATIENT
Start: 2023-09-24 | End: 2023-09-24

## 2023-09-24 RX ADMIN — SERTRALINE 50 MILLIGRAM(S): 25 TABLET, FILM COATED ORAL at 11:12

## 2023-09-24 RX ADMIN — AMLODIPINE BESYLATE 5 MILLIGRAM(S): 2.5 TABLET ORAL at 21:02

## 2023-09-24 RX ADMIN — OXYCODONE HYDROCHLORIDE 10 MILLIGRAM(S): 5 TABLET ORAL at 15:33

## 2023-09-24 RX ADMIN — Medication 600 MILLIGRAM(S): at 18:07

## 2023-09-24 RX ADMIN — METHOCARBAMOL 500 MILLIGRAM(S): 500 TABLET, FILM COATED ORAL at 05:28

## 2023-09-24 RX ADMIN — Medication 1000 MILLIGRAM(S): at 09:06

## 2023-09-24 RX ADMIN — OXYCODONE HYDROCHLORIDE 10 MILLIGRAM(S): 5 TABLET ORAL at 20:17

## 2023-09-24 RX ADMIN — ONDANSETRON 4 MILLIGRAM(S): 8 TABLET, FILM COATED ORAL at 17:35

## 2023-09-24 RX ADMIN — Medication 1 GRAM(S): at 11:12

## 2023-09-24 RX ADMIN — OXYCODONE HYDROCHLORIDE 10 MILLIGRAM(S): 5 TABLET ORAL at 16:33

## 2023-09-24 RX ADMIN — METHOCARBAMOL 500 MILLIGRAM(S): 500 TABLET, FILM COATED ORAL at 21:02

## 2023-09-24 RX ADMIN — Medication 1 GRAM(S): at 23:25

## 2023-09-24 RX ADMIN — OXYCODONE HYDROCHLORIDE 10 MILLIGRAM(S): 5 TABLET ORAL at 09:35

## 2023-09-24 RX ADMIN — Medication 1 GRAM(S): at 17:36

## 2023-09-24 RX ADMIN — OXYCODONE HYDROCHLORIDE 10 MILLIGRAM(S): 5 TABLET ORAL at 21:17

## 2023-09-24 RX ADMIN — Medication 1000 MILLIGRAM(S): at 18:06

## 2023-09-24 RX ADMIN — PANTOPRAZOLE SODIUM 40 MILLIGRAM(S): 20 TABLET, DELAYED RELEASE ORAL at 05:28

## 2023-09-24 RX ADMIN — POLYETHYLENE GLYCOL 3350 17 GRAM(S): 17 POWDER, FOR SOLUTION ORAL at 20:17

## 2023-09-24 RX ADMIN — OXYCODONE HYDROCHLORIDE 10 MILLIGRAM(S): 5 TABLET ORAL at 05:28

## 2023-09-24 RX ADMIN — Medication 25 MILLIGRAM(S): at 05:29

## 2023-09-24 RX ADMIN — Medication 1000 MILLIGRAM(S): at 08:06

## 2023-09-24 RX ADMIN — Medication 1 GRAM(S): at 05:28

## 2023-09-24 RX ADMIN — Medication 1000 MILLIGRAM(S): at 19:06

## 2023-09-24 RX ADMIN — LOSARTAN POTASSIUM 50 MILLIGRAM(S): 100 TABLET, FILM COATED ORAL at 05:28

## 2023-09-24 RX ADMIN — ALBUTEROL 1.25 MILLIGRAM(S): 90 AEROSOL, METERED ORAL at 11:39

## 2023-09-24 RX ADMIN — Medication 1000 MILLIGRAM(S): at 01:00

## 2023-09-24 RX ADMIN — Medication 1 DROP(S): at 11:12

## 2023-09-24 RX ADMIN — METHOCARBAMOL 500 MILLIGRAM(S): 500 TABLET, FILM COATED ORAL at 13:32

## 2023-09-24 RX ADMIN — OXYCODONE HYDROCHLORIDE 10 MILLIGRAM(S): 5 TABLET ORAL at 06:28

## 2023-09-24 RX ADMIN — OXYCODONE HYDROCHLORIDE 10 MILLIGRAM(S): 5 TABLET ORAL at 10:35

## 2023-09-24 RX ADMIN — ATORVASTATIN CALCIUM 80 MILLIGRAM(S): 80 TABLET, FILM COATED ORAL at 21:02

## 2023-09-24 RX ADMIN — Medication 1000 MILLIGRAM(S): at 02:00

## 2023-09-24 RX ADMIN — ENOXAPARIN SODIUM 40 MILLIGRAM(S): 100 INJECTION SUBCUTANEOUS at 21:02

## 2023-09-24 NOTE — PROGRESS NOTE ADULT - TIME BILLING
Patient doing well.  Motor / sensory intact.  Excellent left hand strength and sensation.  Ambulated with PT today.  Continue hemovac. Patient doing well.  Motor / sensory intact.  Excellent left hand strength and sensation.  Ambulated with PT today.  Continue hemovac.    Addendum 9/24/23 1:04 PM Patient in Vascular Lab getting dopplers. Continue hemovac.

## 2023-09-24 NOTE — PROGRESS NOTE ADULT - ASSESSMENT
Assessment: 67 year old female, with a history of cervical disc disease, presents for C7-T1 Laminoforaminotomies/C6-T2 stabilization and fusion. Pt. reports experiencing left sided neck pain which radiates down her left upper extremity & is accompanied by paresthesias. Pt. 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 s/p Arvizu Thyroplasty Implant which was removed in 11/19. Pt. reports she is able to swallow food without difficulty and that her voice is almost back to normal. Pt. with limited cervical ROM, case reviewed with Dr. Barroso while patient in PST and pt. has been instructed to schedule pre op evaluation/scope with Dr. Ye.  S/p 9/21 Extension of prior cervical fusion to T2 from C6, C7-T1 laminectomy and foraminotomy      NEURO:    q4h neuro checks, vital checks  Monitor HMV drain output 190cc/24hrs  Pain control adjusted. 1gram tylenol q 8 hrs x3 days, oxycodone 5/10 mg prn, robaxin 500 TID  H/o anxiety, continue with sertraline daily  H/o glaucoma, continue with timolol eye gtts  PT/OT - home PT/OT with rolling walker    CARDS:  -150  HTN, continue with amlodipine, metoprolol TID  HLD, continue with atorvastatin    PULM:  sat > 92%  incentive spirometry as tolerated    RENAL:  IVL  voiding well    GASTRO:  Regular diet  ---> Stress ulcer prophylaxis:  pantoprazole, sulcrafate  Continue with bowel regimen, senna, miralax BID    HEME:  monitor H/H, stable post op anemia, h/o iron deficiency anemia, check CBC in AM - stable  ---> DVT prophylaxis: SCDs, SQL  LE dopplers pending    ENDO:  euglycemia    ID: afebrile  post abx ancef x2 dose complete    Dispo - PT/OT - Home PT/OT with rolling walker    Will discuss with Dr. Hu  #[id number]

## 2023-09-24 NOTE — PROGRESS NOTE ADULT - SUBJECTIVE AND OBJECTIVE BOX
Subjective:  Patient seen and examined at bedside in mild distress. Pt reports continued incisional pain overnight. Mild relief with current pain regimen. Denies having any chest pain or shortness of breath. No BM yet.    Overnight events: incision pain overnight with mild relief with medication    OBJECTIVE    Vital Signs Last 24 Hrs  T(C): 36.8 (24 Sep 2023 04:18), Max: 37 (23 Sep 2023 13:56)  T(F): 98.2 (24 Sep 2023 04:18), Max: 98.6 (23 Sep 2023 13:56)  HR: 82 (24 Sep 2023 04:18) (80 - 84)  BP: 109/69 (24 Sep 2023 04:18) (106/65 - 113/67)  BP(mean): --  RR: 18 (24 Sep 2023 04:18) (18 - 18)  SpO2: 93% (24 Sep 2023 04:18) (93% - 96%)    Parameters below as of 24 Sep 2023 04:18  Patient On (Oxygen Delivery Method): room air      Drains:   cc/24hrs    .  LABS:                         8.0    9.88  )-----------( 222      ( 24 Sep 2023 06:40 )             24.6     09-23    135  |  100  |  7   ----------------------------<  139<H>  4.1   |  26  |  0.54    Ca    8.5      23 Sep 2023 11:26        Urinalysis Basic - ( 23 Sep 2023 11:26 )    Color: x / Appearance: x / SG: x / pH: x  Gluc: 139 mg/dL / Ketone: x  / Bili: x / Urobili: x   Blood: x / Protein: x / Nitrite: x   Leuk Esterase: x / RBC: x / WBC x   Sq Epi: x / Non Sq Epi: x / Bacteria: x            RADIOLOGY, EKG & ADDITIONAL TESTS: Reviewed.       IMAGING:   Recent imaging studies were reviewed.    MEDICATIONS  (STANDING):  acetaminophen     Tablet .. 1000 milliGRAM(s) Oral every 8 hours  acetaminophen   IVPB .. 1000 milliGRAM(s) IV Intermittent once  amLODIPine   Tablet 5 milliGRAM(s) Oral at bedtime  atorvastatin 80 milliGRAM(s) Oral at bedtime  chlorhexidine 2% Cloths 1 Application(s) Topical once  enoxaparin Injectable 40 milliGRAM(s) SubCutaneous every 24 hours  influenza  Vaccine (HIGH DOSE) 0.7 milliLiter(s) IntraMuscular once  losartan 50 milliGRAM(s) Oral daily  methocarbamol 500 milliGRAM(s) Oral three times a day  metoprolol succinate ER 25 milliGRAM(s) Oral daily  oxyCODONE    IR 10 milliGRAM(s) Oral once  pantoprazole    Tablet 40 milliGRAM(s) Oral before breakfast  polyethylene glycol 3350 17 Gram(s) Oral daily  senna 2 Tablet(s) Oral at bedtime  sertraline 50 milliGRAM(s) Oral daily  sucralfate suspension 1 Gram(s) Oral four times a day  timolol 0.25% Solution 1 Drop(s) Both EYES daily    MEDICATIONS  (PRN):  ondansetron Injectable 4 milliGRAM(s) IV Push every 6 hours PRN Nausea and/or Vomiting  oxyCODONE    IR 10 milliGRAM(s) Oral every 4 hours PRN Severe Pain (7 - 10)  oxyCODONE    IR 5 milliGRAM(s) Oral every 4 hours PRN Moderate Pain (4 - 6)      EXAMINATION:  General:  calm  HEENT:  MMM  Neuro:  awake, alert, oriented x 3, follows commands, BUE HG 4+/5, bicep 4+/5, triceps 4+/5, LUE delt 4+/5 pain limited, BLE 5/5, SILT  Cards:  RRR  Respiratory:  no respiratory distress  Abdomen:  soft  Extremities:  no edema  Skin:  warm/dry  Incision: aquacel dressing in place, c/d/i

## 2023-09-25 DIAGNOSIS — Z29.9 ENCOUNTER FOR PROPHYLACTIC MEASURES, UNSPECIFIED: ICD-10-CM

## 2023-09-25 DIAGNOSIS — J45.909 UNSPECIFIED ASTHMA, UNCOMPLICATED: ICD-10-CM

## 2023-09-25 DIAGNOSIS — Z98.890 OTHER SPECIFIED POSTPROCEDURAL STATES: ICD-10-CM

## 2023-09-25 DIAGNOSIS — E78.5 HYPERLIPIDEMIA, UNSPECIFIED: ICD-10-CM

## 2023-09-25 DIAGNOSIS — J38.01 PARALYSIS OF VOCAL CORDS AND LARYNX, UNILATERAL: ICD-10-CM

## 2023-09-25 DIAGNOSIS — K21.9 GASTRO-ESOPHAGEAL REFLUX DISEASE WITHOUT ESOPHAGITIS: ICD-10-CM

## 2023-09-25 DIAGNOSIS — F41.9 ANXIETY DISORDER, UNSPECIFIED: ICD-10-CM

## 2023-09-25 LAB
ANION GAP SERPL CALC-SCNC: 12 MMOL/L — SIGNIFICANT CHANGE UP (ref 5–17)
BASOPHILS # BLD AUTO: 0.05 K/UL — SIGNIFICANT CHANGE UP (ref 0–0.2)
BASOPHILS NFR BLD AUTO: 0.4 % — SIGNIFICANT CHANGE UP (ref 0–2)
BLD GP AB SCN SERPL QL: NEGATIVE — SIGNIFICANT CHANGE UP
BUN SERPL-MCNC: 7 MG/DL — SIGNIFICANT CHANGE UP (ref 7–23)
CALCIUM SERPL-MCNC: 9.4 MG/DL — SIGNIFICANT CHANGE UP (ref 8.4–10.5)
CHLORIDE SERPL-SCNC: 99 MMOL/L — SIGNIFICANT CHANGE UP (ref 96–108)
CO2 SERPL-SCNC: 28 MMOL/L — SIGNIFICANT CHANGE UP (ref 22–31)
CREAT SERPL-MCNC: 0.69 MG/DL — SIGNIFICANT CHANGE UP (ref 0.5–1.3)
EGFR: 96 ML/MIN/1.73M2 — SIGNIFICANT CHANGE UP
EOSINOPHIL # BLD AUTO: 0.28 K/UL — SIGNIFICANT CHANGE UP (ref 0–0.5)
EOSINOPHIL NFR BLD AUTO: 2.5 % — SIGNIFICANT CHANGE UP (ref 0–6)
GLUCOSE SERPL-MCNC: 156 MG/DL — HIGH (ref 70–99)
HCT VFR BLD CALC: 25.1 % — LOW (ref 34.5–45)
HGB BLD-MCNC: 8.5 G/DL — LOW (ref 11.5–15.5)
IMM GRANULOCYTES NFR BLD AUTO: 0.3 % — SIGNIFICANT CHANGE UP (ref 0–0.9)
LYMPHOCYTES # BLD AUTO: 2.79 K/UL — SIGNIFICANT CHANGE UP (ref 1–3.3)
LYMPHOCYTES # BLD AUTO: 24.7 % — SIGNIFICANT CHANGE UP (ref 13–44)
MCHC RBC-ENTMCNC: 30.7 PG — SIGNIFICANT CHANGE UP (ref 27–34)
MCHC RBC-ENTMCNC: 33.9 GM/DL — SIGNIFICANT CHANGE UP (ref 32–36)
MCV RBC AUTO: 90.6 FL — SIGNIFICANT CHANGE UP (ref 80–100)
MONOCYTES # BLD AUTO: 0.73 K/UL — SIGNIFICANT CHANGE UP (ref 0–0.9)
MONOCYTES NFR BLD AUTO: 6.5 % — SIGNIFICANT CHANGE UP (ref 2–14)
NEUTROPHILS # BLD AUTO: 7.4 K/UL — SIGNIFICANT CHANGE UP (ref 1.8–7.4)
NEUTROPHILS NFR BLD AUTO: 65.6 % — SIGNIFICANT CHANGE UP (ref 43–77)
NRBC # BLD: 0 /100 WBCS — SIGNIFICANT CHANGE UP (ref 0–0)
PLATELET # BLD AUTO: 283 K/UL — SIGNIFICANT CHANGE UP (ref 150–400)
POTASSIUM SERPL-MCNC: 3.7 MMOL/L — SIGNIFICANT CHANGE UP (ref 3.5–5.3)
POTASSIUM SERPL-SCNC: 3.7 MMOL/L — SIGNIFICANT CHANGE UP (ref 3.5–5.3)
PROCALCITONIN SERPL-MCNC: 0.03 NG/ML — SIGNIFICANT CHANGE UP (ref 0.02–0.1)
RAPID RVP RESULT: SIGNIFICANT CHANGE UP
RBC # BLD: 2.77 M/UL — LOW (ref 3.8–5.2)
RBC # FLD: 12.7 % — SIGNIFICANT CHANGE UP (ref 10.3–14.5)
RH IG SCN BLD-IMP: POSITIVE — SIGNIFICANT CHANGE UP
SARS-COV-2 RNA SPEC QL NAA+PROBE: SIGNIFICANT CHANGE UP
SODIUM SERPL-SCNC: 139 MMOL/L — SIGNIFICANT CHANGE UP (ref 135–145)
WBC # BLD: 11.28 K/UL — HIGH (ref 3.8–10.5)
WBC # FLD AUTO: 11.28 K/UL — HIGH (ref 3.8–10.5)

## 2023-09-25 PROCEDURE — 99223 1ST HOSP IP/OBS HIGH 75: CPT

## 2023-09-25 PROCEDURE — 71045 X-RAY EXAM CHEST 1 VIEW: CPT | Mod: 26

## 2023-09-25 RX ORDER — ALBUTEROL 90 UG/1
2.5 AEROSOL, METERED ORAL EVERY 6 HOURS
Refills: 0 | Status: DISCONTINUED | OUTPATIENT
Start: 2023-09-25 | End: 2023-09-25

## 2023-09-25 RX ORDER — METHOCARBAMOL 500 MG/1
750 TABLET, FILM COATED ORAL EVERY 8 HOURS
Refills: 0 | Status: DISCONTINUED | OUTPATIENT
Start: 2023-09-25 | End: 2023-09-30

## 2023-09-25 RX ORDER — CEFTRIAXONE 500 MG/1
1000 INJECTION, POWDER, FOR SOLUTION INTRAMUSCULAR; INTRAVENOUS EVERY 24 HOURS
Refills: 0 | Status: DISCONTINUED | OUTPATIENT
Start: 2023-09-25 | End: 2023-09-25

## 2023-09-25 RX ORDER — IPRATROPIUM/ALBUTEROL SULFATE 18-103MCG
3 AEROSOL WITH ADAPTER (GRAM) INHALATION EVERY 6 HOURS
Refills: 0 | Status: DISCONTINUED | OUTPATIENT
Start: 2023-09-25 | End: 2023-09-27

## 2023-09-25 RX ORDER — AZITHROMYCIN 500 MG/1
500 TABLET, FILM COATED ORAL DAILY
Refills: 0 | Status: DISCONTINUED | OUTPATIENT
Start: 2023-09-25 | End: 2023-09-25

## 2023-09-25 RX ADMIN — Medication 1 GRAM(S): at 11:43

## 2023-09-25 RX ADMIN — OXYCODONE HYDROCHLORIDE 10 MILLIGRAM(S): 5 TABLET ORAL at 11:36

## 2023-09-25 RX ADMIN — Medication 1 DROP(S): at 11:43

## 2023-09-25 RX ADMIN — LOSARTAN POTASSIUM 50 MILLIGRAM(S): 100 TABLET, FILM COATED ORAL at 05:04

## 2023-09-25 RX ADMIN — OXYCODONE HYDROCHLORIDE 10 MILLIGRAM(S): 5 TABLET ORAL at 20:42

## 2023-09-25 RX ADMIN — OXYCODONE HYDROCHLORIDE 10 MILLIGRAM(S): 5 TABLET ORAL at 16:22

## 2023-09-25 RX ADMIN — METHOCARBAMOL 750 MILLIGRAM(S): 500 TABLET, FILM COATED ORAL at 21:39

## 2023-09-25 RX ADMIN — Medication 1000 MILLIGRAM(S): at 18:30

## 2023-09-25 RX ADMIN — Medication 600 MILLIGRAM(S): at 17:58

## 2023-09-25 RX ADMIN — Medication 1000 MILLIGRAM(S): at 09:40

## 2023-09-25 RX ADMIN — Medication 3 MILLILITER(S): at 18:01

## 2023-09-25 RX ADMIN — Medication 600 MILLIGRAM(S): at 05:03

## 2023-09-25 RX ADMIN — PANTOPRAZOLE SODIUM 40 MILLIGRAM(S): 20 TABLET, DELAYED RELEASE ORAL at 05:04

## 2023-09-25 RX ADMIN — ENOXAPARIN SODIUM 40 MILLIGRAM(S): 100 INJECTION SUBCUTANEOUS at 19:42

## 2023-09-25 RX ADMIN — OXYCODONE HYDROCHLORIDE 10 MILLIGRAM(S): 5 TABLET ORAL at 05:24

## 2023-09-25 RX ADMIN — Medication 1 GRAM(S): at 18:01

## 2023-09-25 RX ADMIN — SERTRALINE 50 MILLIGRAM(S): 25 TABLET, FILM COATED ORAL at 11:45

## 2023-09-25 RX ADMIN — AMLODIPINE BESYLATE 5 MILLIGRAM(S): 2.5 TABLET ORAL at 21:39

## 2023-09-25 RX ADMIN — POLYETHYLENE GLYCOL 3350 17 GRAM(S): 17 POWDER, FOR SOLUTION ORAL at 11:44

## 2023-09-25 RX ADMIN — Medication 1 GRAM(S): at 05:03

## 2023-09-25 RX ADMIN — METHOCARBAMOL 500 MILLIGRAM(S): 500 TABLET, FILM COATED ORAL at 05:04

## 2023-09-25 RX ADMIN — OXYCODONE HYDROCHLORIDE 10 MILLIGRAM(S): 5 TABLET ORAL at 15:52

## 2023-09-25 RX ADMIN — OXYCODONE HYDROCHLORIDE 10 MILLIGRAM(S): 5 TABLET ORAL at 11:06

## 2023-09-25 RX ADMIN — OXYCODONE HYDROCHLORIDE 10 MILLIGRAM(S): 5 TABLET ORAL at 19:42

## 2023-09-25 RX ADMIN — Medication 1000 MILLIGRAM(S): at 09:05

## 2023-09-25 RX ADMIN — ATORVASTATIN CALCIUM 80 MILLIGRAM(S): 80 TABLET, FILM COATED ORAL at 21:38

## 2023-09-25 RX ADMIN — Medication 25 MILLIGRAM(S): at 05:04

## 2023-09-25 RX ADMIN — OXYCODONE HYDROCHLORIDE 10 MILLIGRAM(S): 5 TABLET ORAL at 06:24

## 2023-09-25 RX ADMIN — METHOCARBAMOL 500 MILLIGRAM(S): 500 TABLET, FILM COATED ORAL at 13:22

## 2023-09-25 RX ADMIN — Medication 1000 MILLIGRAM(S): at 17:58

## 2023-09-25 NOTE — PROGRESS NOTE ADULT - ASSESSMENT
HPI:  67 year old female, with a history of cervical disc disease, presents for C7-T1 Laminoforaminotomies/C6-T2 stabilization and fusion. Pt. reports experiencing left sided neck pain which radiates down her left upper extremity & is accompanied by paresthesias. Pt. 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 s/p Arvizu Thyroplasty Implant which was removed in 11/19. Pt. reports she is able to swallow food without difficulty and that her voice is almost back to normal. Pt. with limited cervical ROM, case reviewed with Dr. Barroso while patient in PST and pt. has been instructed to schedule pre op evaluation/scope with Dr. Ye.    Pt. reports labwork revealed that she had COVID-19 at some point, denies symptoms. (24 Aug 2023 09:55)    PAST MEDICAL & SURGICAL HISTORY:  History of migraine      History of iron deficiency anemia      Hypertension      History of gastric ulcer  longg time aGO      History of diverticulitis      Hiatal hernia      History of endometriosis      Spinal stenosis in cervical region      Osteoarthritis of cervical spine with myelopathy      Hyperlipidemia      Gastroesophageal reflux disease without esophagitis      Glaucoma  b/l      Cervical radiculopathy      Neuropathic pain of finger, left  numbness 4th asnd 5th digit      Degenerative lumbar disc      Vocal cord paralysis      History of difficult intubation      History of IBS      Anxiety      History of hysterectomy      History of bilateral oophorectomy      History of appendectomy      History of cholecystectomy      History of carpal tunnel release      History of arthroscopy of right knee      History of tonsillectomy      S/P spinal surgery  6/17/17 - cervical region with hardware 9/2017 and 11/2017      History of fusion of cervical spine      History of thyroplasty        PROCEDURE: 9/21/23 s/p Extension of prior cervical fusion to T2 from C6, C7-T1 laminectomy and foraminotomy for Cervical spondylosis with left C7-T1 neural foraminal narrowing and left C8 radiculopathy.    PLAN:  Neuro: continue oxycodone PRN pain, robaxin increased 750mg Q8hrs, reminded her to ask for pain meds; cont drain and monitor output  Respiratory: patient instructed on incentive spirometer  CV: cont amlodipine, losartan, and metoprolol for HTN  Heme/Onc: stable CBC, asymptomatic acute post op blood loss anemia, will trend            DVT ppx: [] SQH [x] SQL and venodynes bilaterally  Renal: voiding  ID: afebrile, leukocytosis may be likely due to intraop decadron- will trend and add procal in am; CXR with clear lungs  GI: bowel regimen  PT/OT: home PT upon d/c  hospitalist medicine asked to see in consultation  f/u am labs    Will discuss with Dr Shaniqua Davisink # [phone]

## 2023-09-25 NOTE — CONSULT NOTE ADULT - SUBJECTIVE AND OBJECTIVE BOX
Chief Complaint: asthma exacerbation    HPI:  67 year old female, with a history of cervical disc disease, presents for C7-T1 Laminoforaminotomies/C6-T2 stabilization and fusion. Pt reports experiencing left sided neck pain which radiates down her left upper extremity & is accompanied by paresthesias. Pt. 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 s/p Arvizu Thyroplasty Implant which was removed in 11/19. Pt reports she is able to swallow food without difficulty and that her voice is almost back to normal. Pt with limited cervical ROM, case reviewed with Dr. Barroso while patient in PST and pt. has been instructed to schedule pre op evaluation/scope with Dr. Ye which was negative.   Pt. reports labwork revealed that she had COVID-19 at some point, denies symptoms. (24 Aug 2023 09:55)    S/p 9/21 Extension of prior cervical fusion to T2 from C6, C7-T1 laminectomy and foraminotomy. Post surgical procedure, pt has felt like she was waking up sweating. She lives at home with 2 young grandchildren. She was short of breath yesterday while speaking. She also feels as though she sounds congested. Today she is having difficulty clearing her throat. She reports having childhood asthma which flared last winter. Recently her cardiologist appreciated wheezing on physical examination and prescribed albuterol inhaler for prn use.     PAST MEDICAL & SURGICAL HISTORY:  History of migraine  History of iron deficiency anemia  Hypertension  History of gastric ulcer  History of diverticulitis  Hiatal hernia  History of endometriosis  Spinal stenosis in cervical region  Osteoarthritis of cervical spine with myelopathy  Hyperlipidemia  Gastroesophageal reflux disease without esophagitis  Glaucoma  b/l Cervical radiculopathy  Neuropathic pain of finger, left  numbness 4th asnd 5th digit  Degenerative lumbar disc  Vocal cord paralysis  History of difficult intubation  History of IBS  Anxiety  History of hysterectomy  History of bilateral oophorectomy  History of appendectomy  History of cholecystectomy  History of carpal tunnel release  History of arthroscopy of right knee  History of tonsillectomy  S/P spinal surgery  6/17/17 - cervical region with hardware 9/2017 and 11/2017  History of fusion of cervical spine  History of thyroplasty      Review of Systems:   CONSTITUTIONAL: No fever.   EYES: No eye pain or discharge.  ENMT:  No throat pain  NECK: + Neck pain post surgical  RESPIRATORY: + SOB - cough  CARDIOVASCULAR: No chest pain, palpitations  GASTROINTESTINAL: No diarrhea, nausea, vomiting  GENITOURINARY: No dysuria or incontinence  NEUROLOGICAL: No headaches  SKIN: No rashes.  LYMPH NODES: No enlarged glands  ENDOCRINE: No heat or cold intolerance  MUSCULOSKELETAL: No joint pain or swelling  HEME/LYMPH: No easy bruising, or bleeding gums  ALLERY AND IMMUNOLOGIC: No hives or eczema    Allergies    No Known Allergies    Intolerances    morphine (Nausea; Vomiting)  Percocet 5/325 (Nausea; Vomiting)      Social History:     FAMILY HISTORY:  Family history of kidney disease (Grandparent)    Home Medications:  amLODIPine 5 mg oral tablet: 1 tab(s) orally once a day (at bedtime) (21 Sep 2023 10:22)  aspirin 81 mg oral tablet: 1 tab(s) orally once a day (21 Sep 2023 10:22)  Avapro 150 mg oral tablet: 1 tab(s) orally once a day (21 Sep 2023 10:22)  Carafate 1 g oral tablet: 1 tab(s) orally 4 times a day (21 Sep 2023 10:22)  Donnatal oral tablet: 1 tablet with sensor orally 2 times a day (21 Sep 2023 10:22)  methocarbamol 750 mg oral tablet: 1 tab(s) orally 2 times a day (21 Sep 2023 10:22)  metoprolol succinate 25 mg oral tablet, extended release: 1 tab(s) orally once a day (at bedtime) (21 Sep 2023 10:22)  NexIUM 40 mg oral delayed release capsule: 1 cap(s) orally 2 times a day (21 Sep 2023 10:22)  rosuvastatin 40 mg oral capsule: 1 tab(s) orally once a day (at bedtime) (21 Sep 2023 10:22)  timolol eye drops once daily both eyes:  (21 Sep 2023 10:22)  Zoloft 50 mg oral tablet: 1 tab(s) orally once a day (at bedtime) (21 Sep 2023 10:22)      MEDICATIONS  (STANDING):  acetaminophen     Tablet .. 1000 milliGRAM(s) Oral every 8 hours  albuterol/ipratropium for Nebulization 3 milliLiter(s) Nebulizer every 6 hours  amLODIPine   Tablet 5 milliGRAM(s) Oral at bedtime  atorvastatin 80 milliGRAM(s) Oral at bedtime  chlorhexidine 2% Cloths 1 Application(s) Topical once  enoxaparin Injectable 40 milliGRAM(s) SubCutaneous every 24 hours  guaiFENesin  milliGRAM(s) Oral every 12 hours  influenza  Vaccine (HIGH DOSE) 0.7 milliLiter(s) IntraMuscular once  losartan 50 milliGRAM(s) Oral daily  methocarbamol 500 milliGRAM(s) Oral three times a day  metoprolol succinate ER 25 milliGRAM(s) Oral daily  pantoprazole    Tablet 40 milliGRAM(s) Oral before breakfast  polyethylene glycol 3350 17 Gram(s) Oral daily  senna 2 Tablet(s) Oral at bedtime  sertraline 50 milliGRAM(s) Oral daily  sucralfate suspension 1 Gram(s) Oral four times a day  timolol 0.25% Solution 1 Drop(s) Both EYES daily    MEDICATIONS  (PRN):  ondansetron Injectable 4 milliGRAM(s) IV Push every 6 hours PRN Nausea and/or Vomiting  oxyCODONE    IR 10 milliGRAM(s) Oral every 4 hours PRN Severe Pain (7 - 10)  oxyCODONE    IR 5 milliGRAM(s) Oral every 4 hours PRN Moderate Pain (4 - 6)      CAPILLARY BLOOD GLUCOSE        I&O's Summary    24 Sep 2023 07:01  -  25 Sep 2023 07:00  --------------------------------------------------------  IN: 1000 mL / OUT: 75 mL / NET: 925 mL    25 Sep 2023 07:01  -  25 Sep 2023 13:41  --------------------------------------------------------  IN: 240 mL / OUT: 0 mL / NET: 240 mL        PHYSICAL EXAM:  Vital Signs Last 24 Hrs  T(C): 36.8 (25 Sep 2023 08:42), Max: 36.8 (24 Sep 2023 16:53)  T(F): 98.3 (25 Sep 2023 08:42), Max: 98.3 (25 Sep 2023 08:42)  HR: 81 (25 Sep 2023 08:42) (80 - 97)  BP: 106/68 (25 Sep 2023 08:42) (105/60 - 144/70)  BP(mean): --  RR: 18 (25 Sep 2023 08:42) (18 - 18)  SpO2: 94% (25 Sep 2023 08:42) (93% - 98%)    Parameters below as of 25 Sep 2023 08:42  Patient On (Oxygen Delivery Method): room air      GENERAL: Well-developed, drain noted, throat clearing   HEAD:  Normocephalic  EYES: Conjunctiva and sclera clear  NECK: Supple, No JVD  CHEST/LUNG: Mild expiratory wheezing   HEART: Regular rate and rhythm  ABDOMEN: Soft, Nontender  EXTREMITIES:  2+ Peripheral Pulses  PSYCH: AAOx3  NEUROLOGY: non-focal  SKIN: No rashes or lesions. Incision site along c/t spine bandaged non erythematous. Slight tenderness bilaterally     LABS:                        8.5    11.28 )-----------( 283      ( 25 Sep 2023 10:33 )             25.1     09-25    139  |  99  |  7   ----------------------------<  156<H>  3.7   |  28  |  0.69    Ca    9.4      25 Sep 2023 10:33             Chief Complaint: asthma exacerbation    HPI:  67 year old female, with a history of cervical disc disease, presents for C7-T1 Laminoforaminotomies/C6-T2 stabilization and fusion. Pt reports experiencing left sided neck pain which radiates down her left upper extremity & is accompanied by paresthesias. Pt. 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 s/p Arvizu Thyroplasty Implant which was removed in 11/19. Pt reports she is able to swallow food without difficulty and that her voice is almost back to normal. Pt with limited cervical ROM, case reviewed with Dr. Barroso while patient in PST and pt. has been instructed to schedule pre op evaluation/scope with Dr. Ye which was negative.   Pt. reports labwork revealed that she had COVID-19 at some point, denies symptoms. (24 Aug 2023 09:55)    S/p 9/21 Extension of prior cervical fusion to T2 from C6, C7-T1 laminectomy and foraminotomy. Post surgical procedure, pt has felt like she was waking up sweating. She lives at home with 2 young grandchildren. She was short of breath yesterday while speaking. She also feels as though she sounds congested. Today she is having difficulty clearing her throat. She reports having childhood asthma which flared last winter. Recently her cardiologist appreciated wheezing on physical examination and prescribed albuterol inhaler for prn use.     PAST MEDICAL & SURGICAL HISTORY:  History of migraine  History of iron deficiency anemia  Hypertension  History of gastric ulcer  History of diverticulitis  Hiatal hernia  History of endometriosis  Spinal stenosis in cervical region  Osteoarthritis of cervical spine with myelopathy  Hyperlipidemia  Gastroesophageal reflux disease without esophagitis  Glaucoma  b/l Cervical radiculopathy  Neuropathic pain of finger, left  numbness 4th asnd 5th digit  Degenerative lumbar disc  Vocal cord paralysis  History of difficult intubation  History of IBS  Anxiety  History of hysterectomy  History of bilateral oophorectomy  History of appendectomy  History of cholecystectomy  History of carpal tunnel release  History of arthroscopy of right knee  History of tonsillectomy  S/P spinal surgery  6/17/17 - cervical region with hardware 9/2017 and 11/2017  History of fusion of cervical spine  History of thyroplasty      Review of Systems:   CONSTITUTIONAL: No fever.   EYES: No eye pain or discharge.  ENMT:  No throat pain  NECK: + Neck pain post surgical  RESPIRATORY: + SOB - cough  CARDIOVASCULAR: No chest pain, palpitations  GASTROINTESTINAL: No diarrhea, nausea, vomiting  GENITOURINARY: No dysuria or incontinence  NEUROLOGICAL: No headaches  SKIN: No rashes.  LYMPH NODES: No enlarged glands  ENDOCRINE: No heat or cold intolerance  MUSCULOSKELETAL: No joint pain or swelling  HEME/LYMPH: No easy bruising, or bleeding gums  ALLERY AND IMMUNOLOGIC: No hives or eczema    Allergies    No Known Allergies    Intolerances    morphine (Nausea; Vomiting)  Percocet 5/325 (Nausea; Vomiting)      Social History: Former smoker - quit in 90s    FAMILY HISTORY:  Family history of kidney disease (Grandparent)    Home Medications:  amLODIPine 5 mg oral tablet: 1 tab(s) orally once a day (at bedtime) (21 Sep 2023 10:22)  aspirin 81 mg oral tablet: 1 tab(s) orally once a day (21 Sep 2023 10:22)  Avapro 150 mg oral tablet: 1 tab(s) orally once a day (21 Sep 2023 10:22)  Carafate 1 g oral tablet: 1 tab(s) orally 4 times a day (21 Sep 2023 10:22)  Donnatal oral tablet: 1 tablet with sensor orally 2 times a day (21 Sep 2023 10:22)  methocarbamol 750 mg oral tablet: 1 tab(s) orally 2 times a day (21 Sep 2023 10:22)  metoprolol succinate 25 mg oral tablet, extended release: 1 tab(s) orally once a day (at bedtime) (21 Sep 2023 10:22)  NexIUM 40 mg oral delayed release capsule: 1 cap(s) orally 2 times a day (21 Sep 2023 10:22)  rosuvastatin 40 mg oral capsule: 1 tab(s) orally once a day (at bedtime) (21 Sep 2023 10:22)  timolol eye drops once daily both eyes:  (21 Sep 2023 10:22)  Zoloft 50 mg oral tablet: 1 tab(s) orally once a day (at bedtime) (21 Sep 2023 10:22)      MEDICATIONS  (STANDING):  acetaminophen     Tablet .. 1000 milliGRAM(s) Oral every 8 hours  albuterol/ipratropium for Nebulization 3 milliLiter(s) Nebulizer every 6 hours  amLODIPine   Tablet 5 milliGRAM(s) Oral at bedtime  atorvastatin 80 milliGRAM(s) Oral at bedtime  chlorhexidine 2% Cloths 1 Application(s) Topical once  enoxaparin Injectable 40 milliGRAM(s) SubCutaneous every 24 hours  guaiFENesin  milliGRAM(s) Oral every 12 hours  influenza  Vaccine (HIGH DOSE) 0.7 milliLiter(s) IntraMuscular once  losartan 50 milliGRAM(s) Oral daily  methocarbamol 500 milliGRAM(s) Oral three times a day  metoprolol succinate ER 25 milliGRAM(s) Oral daily  pantoprazole    Tablet 40 milliGRAM(s) Oral before breakfast  polyethylene glycol 3350 17 Gram(s) Oral daily  senna 2 Tablet(s) Oral at bedtime  sertraline 50 milliGRAM(s) Oral daily  sucralfate suspension 1 Gram(s) Oral four times a day  timolol 0.25% Solution 1 Drop(s) Both EYES daily    MEDICATIONS  (PRN):  ondansetron Injectable 4 milliGRAM(s) IV Push every 6 hours PRN Nausea and/or Vomiting  oxyCODONE    IR 10 milliGRAM(s) Oral every 4 hours PRN Severe Pain (7 - 10)  oxyCODONE    IR 5 milliGRAM(s) Oral every 4 hours PRN Moderate Pain (4 - 6)      CAPILLARY BLOOD GLUCOSE        I&O's Summary    24 Sep 2023 07:01  -  25 Sep 2023 07:00  --------------------------------------------------------  IN: 1000 mL / OUT: 75 mL / NET: 925 mL    25 Sep 2023 07:01  -  25 Sep 2023 13:41  --------------------------------------------------------  IN: 240 mL / OUT: 0 mL / NET: 240 mL        PHYSICAL EXAM:  Vital Signs Last 24 Hrs  T(C): 36.8 (25 Sep 2023 08:42), Max: 36.8 (24 Sep 2023 16:53)  T(F): 98.3 (25 Sep 2023 08:42), Max: 98.3 (25 Sep 2023 08:42)  HR: 81 (25 Sep 2023 08:42) (80 - 97)  BP: 106/68 (25 Sep 2023 08:42) (105/60 - 144/70)  BP(mean): --  RR: 18 (25 Sep 2023 08:42) (18 - 18)  SpO2: 94% (25 Sep 2023 08:42) (93% - 98%)    Parameters below as of 25 Sep 2023 08:42  Patient On (Oxygen Delivery Method): room air      GENERAL: Well-developed, drain noted, throat clearing   HEAD:  Normocephalic  EYES: Conjunctiva and sclera clear  NECK: Supple, No JVD  CHEST/LUNG: Mild expiratory wheezing   HEART: Regular rate and rhythm  ABDOMEN: Soft, Nontender  EXTREMITIES:  2+ Peripheral Pulses  PSYCH: AAOx3  NEUROLOGY: non-focal  SKIN: No rashes or lesions. Incision site along c/t spine bandaged non erythematous. Slight tenderness bilaterally     LABS:                        8.5    11.28 )-----------( 283      ( 25 Sep 2023 10:33 )             25.1     09-25    139  |  99  |  7   ----------------------------<  156<H>  3.7   |  28  |  0.69    Ca    9.4      25 Sep 2023 10:33

## 2023-09-25 NOTE — PROGRESS NOTE ADULT - SUBJECTIVE AND OBJECTIVE BOX
CHIEF COMPLAINT: patient reports asthma and anxiety attack overnight and better now but remains unsettled, nebulizer overnight helped; denies SOB or CPs; reports incisional pain control suboptimal and muscle relaxant increased today;   c/o increased phlegm (has h/o VC paralysis) mucinex helping    Vital Signs Last 24 Hrs  T(C): 36.8 (25 Sep 2023 16:16), Max: 36.8 (25 Sep 2023 04:00)  T(F): 98.2 (25 Sep 2023 16:16), Max: 98.3 (25 Sep 2023 08:42)  HR: 73 (25 Sep 2023 16:16) (73 - 97)  BP: 108/70 (25 Sep 2023 16:16) (105/60 - 144/70)  BP(mean): --  RR: 18 (25 Sep 2023 16:16) (18 - 18)  SpO2: 97% (25 Sep 2023 16:16) (93% - 97%)    Parameters below as of 25 Sep 2023 16:16  Patient On (Oxygen Delivery Method): room air    DRAINS: [] ZOE (cc/24h) [x] HMV (75cc/24h)    PHYSICAL EXAM:    General: No Acute Distress     Neurological: Awake, alert oriented to person, place and time, Following Commands, PERRL, EOMI, Face Symmetrical, Speech Fluent,   Muscle Strength normal in all four extremities except LUE 5-/5, No Drift, Sensation to Light Touch Intact    Pulmonary: Clear to Auscultation, No Rales, No Rhonchi, +expiratory Wheezes; poor inspiratory efforts    Cardiovascular: S1, S2, Regular Rate and Rhythm     Gastrointestinal: Soft, Nontender, Nondistended     Incision: +dressing c/d/i, drain x 1    LABS:                        8.5    11.28 )-----------( 283      ( 25 Sep 2023 10:33 )             25.1    09-25    139  |  99  |  7   ----------------------------<  156<H>  3.7   |  28  |  0.69    Ca    9.4      25 Sep 2023 10:33    09-24 @ 07:01  -  09-25 @ 07:00  --------------------------------------------------------  IN: 1000 mL / OUT: 75 mL / NET: 925 mL    09-25 @ 07:01  - 09-25 @ 17:05  --------------------------------------------------------  IN: 360 mL / OUT: 29 mL / NET: 331 mL    MEDICATIONS:  Anticoagulation:  enoxaparin Injectable 40 milliGRAM(s) SubCutaneous every 24 hours    Endo:  atorvastatin 80 milliGRAM(s) Oral at bedtime    Neuro:  acetaminophen     Tablet .. 1000 milliGRAM(s) Oral every 8 hours  methocarbamol 750 milliGRAM(s) Oral every 8 hours  ondansetron Injectable 4 milliGRAM(s) IV Push every 6 hours PRN Nausea and/or Vomiting  oxyCODONE    IR 10 milliGRAM(s) Oral every 4 hours PRN Severe Pain (7 - 10)  oxyCODONE    IR 5 milliGRAM(s) Oral every 4 hours PRN Moderate Pain (4 - 6)  sertraline 50 milliGRAM(s) Oral daily    Cardiac:  amLODIPine   Tablet 5 milliGRAM(s) Oral at bedtime  losartan 50 milliGRAM(s) Oral daily  metoprolol succinate ER 25 milliGRAM(s) Oral daily    Pulm:  albuterol/ipratropium for Nebulization 3 milliLiter(s) Nebulizer every 6 hours  guaiFENesin  milliGRAM(s) Oral every 12 hours    GI/:  pantoprazole    Tablet 40 milliGRAM(s) Oral before breakfast  polyethylene glycol 3350 17 Gram(s) Oral daily  senna 2 Tablet(s) Oral at bedtime  sucralfate suspension 1 Gram(s) Oral four times a day    Other:   chlorhexidine 2% Cloths 1 Application(s) Topical once  influenza  Vaccine (HIGH DOSE) 0.7 milliLiter(s) IntraMuscular once  timolol 0.25% Solution 1 Drop(s) Both EYES daily    DIET: [x] Regular [] CCD [] Renal [] Puree [] Dysphagia [] Tube Feeds:     IMAGING:   < from: Xray Chest 1 View- PORTABLE-Urgent (Xray Chest 1 View- PORTABLE-Urgent .) (09.25.23 @ 13:31) >  IMPRESSION:  Clear lungs.    < end of copied text >

## 2023-09-25 NOTE — CONSULT NOTE ADULT - SUBJECTIVE AND OBJECTIVE BOX
CC:    HPI:       PAST MEDICAL & SURGICAL HISTORY:  History of migraine      History of iron deficiency anemia      Hypertension      History of gastric ulcer  longg time aGO      History of diverticulitis      Hiatal hernia      History of endometriosis      Spinal stenosis in cervical region      Osteoarthritis of cervical spine with myelopathy      Hyperlipidemia      Gastroesophageal reflux disease without esophagitis      Glaucoma  b/l      Cervical radiculopathy      Neuropathic pain of finger, left  numbness 4th asnd 5th digit      Degenerative lumbar disc      Vocal cord paralysis      History of difficult intubation      History of IBS      Anxiety      History of hysterectomy      History of bilateral oophorectomy      History of appendectomy      History of cholecystectomy      History of carpal tunnel release      History of arthroscopy of right knee      History of tonsillectomy      S/P spinal surgery  6/17/17 - cervical region with hardware 9/2017 and 11/2017      History of fusion of cervical spine      History of thyroplasty        Allergies    No Known Allergies    Intolerances    morphine (Nausea; Vomiting)  Percocet 5/325 (Nausea; Vomiting)    MEDICATIONS  (STANDING):  acetaminophen     Tablet .. 1000 milliGRAM(s) Oral every 8 hours  albuterol/ipratropium for Nebulization 3 milliLiter(s) Nebulizer every 6 hours  amLODIPine   Tablet 5 milliGRAM(s) Oral at bedtime  atorvastatin 80 milliGRAM(s) Oral at bedtime  chlorhexidine 2% Cloths 1 Application(s) Topical once  enoxaparin Injectable 40 milliGRAM(s) SubCutaneous every 24 hours  guaiFENesin  milliGRAM(s) Oral every 12 hours  influenza  Vaccine (HIGH DOSE) 0.7 milliLiter(s) IntraMuscular once  losartan 50 milliGRAM(s) Oral daily  methocarbamol 750 milliGRAM(s) Oral every 8 hours  metoprolol succinate ER 25 milliGRAM(s) Oral daily  pantoprazole    Tablet 40 milliGRAM(s) Oral before breakfast  polyethylene glycol 3350 17 Gram(s) Oral daily  senna 2 Tablet(s) Oral at bedtime  sertraline 50 milliGRAM(s) Oral daily  sucralfate suspension 1 Gram(s) Oral four times a day  timolol 0.25% Solution 1 Drop(s) Both EYES daily    MEDICATIONS  (PRN):  ondansetron Injectable 4 milliGRAM(s) IV Push every 6 hours PRN Nausea and/or Vomiting  oxyCODONE    IR 10 milliGRAM(s) Oral every 4 hours PRN Severe Pain (7 - 10)  oxyCODONE    IR 5 milliGRAM(s) Oral every 4 hours PRN Moderate Pain (4 - 6)      Social History: **??**    Family history: **??**    ROS:   ENT: all negative except as noted in HPI   CV: denies palpitations  Pulm: denies SOB, cough, hemoptysis  GI: denies change in appetite, indigestion, n/v  : denies pertinent urinary symptoms, urgency  Neuro: denies numbness/tingling, loss of sensation  Psych: denies anxiety  MS: denies muscle weakness, instability  Heme: denies easy bruising or bleeding  Endo: denies heat/cold intolerance, excessive sweating  Vascular: denies LE edema    Vital Signs Last 24 Hrs  T(C): 36.8 (25 Sep 2023 16:16), Max: 36.8 (25 Sep 2023 04:00)  T(F): 98.2 (25 Sep 2023 16:16), Max: 98.3 (25 Sep 2023 08:42)  HR: 73 (25 Sep 2023 16:16) (73 - 97)  BP: 108/70 (25 Sep 2023 16:16) (105/60 - 144/70)  BP(mean): --  RR: 18 (25 Sep 2023 16:16) (18 - 18)  SpO2: 97% (25 Sep 2023 16:16) (93% - 97%)    Parameters below as of 25 Sep 2023 16:16  Patient On (Oxygen Delivery Method): room air                              8.5    11.28 )-----------( 283      ( 25 Sep 2023 10:33 )             25.1    09-25    139  |  99  |  7   ----------------------------<  156<H>  3.7   |  28  |  0.69    Ca    9.4      25 Sep 2023 10:33         PHYSICAL EXAM:  Gen: NAD  Skin: No rashes, bruises, or lesions  Head: Normocephalic, Atraumatic  Face: no edema, erythema, or fluctuance. Parotid glands soft without mass  Eyes: no scleral injection  Ears: Right: ear canal clear, TM intact without effusion or erythema. No evidence of any fluid drainage. No mastoid tenderness, erythema, or ear bulging            Left: ear canal clear, TM intact without effusion or erythema. No evidence of any fluid drainage. No mastoid tenderness, erythema, or ear bulging  Nose: Nares bilaterally patent, no discharge  Mouth: No Stridor / Drooling / Trismus.  Mucosa moist, tongue/uvula midline, oropharynx clear  Neck: Flat, supple, no lymphadenopathy, trachea midline, no masses  Lymphatic: No lymphadenopathy  Resp: breathing easily, no stridor  CV: no peripheral edema/cyanosis  GI: nondistended   Peripheral vascular: no JVD or edema  Neuro: facial nerve intact, no facial droop      Diagnostic Nasal Endoscopy: (Scope #2 used)    Fiberoptic Indirect laryngoscopy:  (Scope #2 used)    IMAGING/ADDITIONAL STUDIES:  CC: hoarseness x 2 days     HPI: 67 year old female, with a history of HTN, HLD, GERD, cervical disc disease, is status post spinal surgery on 9/21/2023. ENT consulted for 2-day history of hoarseness. Per chart review, pt had cervical fusion surgery in 2017. Post surgery, pt developed right vocal paralysis. In 12/2017, pt had right vocal cord injection with ENT Dr Ye, with improvement until 4/2019. Pt underwent ott thyroplasty implant 06/18/2019. However, pt developed intermitten SOB with negative findings on CT. Implant was removed 10/15/2019. Pt also went to outpatient ENT office on 9/15/2023 for intubation clearance. In office laryngoscopy noted that "right vocal cord remains paralyzed in midline position and Left vocal cord fully mobile with good glottic closure.pt cleared for intubation; however, there is significant limited extension of the neck, oropharyngeal crowding (suspect MURRAY) and right cord paralysis, recommend use of a Glidescope and would recommend intubation with as small ETT as possible (6.5 or 7). " Per pt, she had an episode of SOB with sudden onset of hoarseness and profuse secretions yesterday; However, she is feeling much better after Mucomyst and albuterol treatment. Currently, pt denies F/C/N/V, recent URI, SOB, rhinorrhea, odynophagia, bloody secretions, or inability to tolerate secretions.    PAST MEDICAL & SURGICAL HISTORY:  History of migraine    History of iron deficiency anemia    Hypertension    History of gastric ulcer  longg time aGO    History of diverticulitis    Hiatal hernia    History of endometriosis    Spinal stenosis in cervical region    Osteoarthritis of cervical spine with myelopathy    Hyperlipidemia    Gastroesophageal reflux disease without esophagitis    Glaucoma  b/l    Cervical radiculopathy    Neuropathic pain of finger, left  numbness 4th asnd 5th digit    Degenerative lumbar disc    Vocal cord paralysis    History of difficult intubation    History of IBS    Anxiety    History of hysterectomy    History of bilateral oophorectomy    History of appendectomy    History of cholecystectomy    History of carpal tunnel release    History of arthroscopy of right knee    History of tonsillectomy    S/P spinal surgery  6/17/17 - cervical region with hardware 9/2017 and 11/2017    History of fusion of cervical spine    History of thyroplasty    Allergies    No Known Allergies    Intolerances    morphine (Nausea; Vomiting)  Percocet 5/325 (Nausea; Vomiting)    MEDICATIONS  (STANDING):  acetaminophen     Tablet .. 1000 milliGRAM(s) Oral every 8 hours  albuterol/ipratropium for Nebulization 3 milliLiter(s) Nebulizer every 6 hours  amLODIPine   Tablet 5 milliGRAM(s) Oral at bedtime  atorvastatin 80 milliGRAM(s) Oral at bedtime  chlorhexidine 2% Cloths 1 Application(s) Topical once  enoxaparin Injectable 40 milliGRAM(s) SubCutaneous every 24 hours  guaiFENesin  milliGRAM(s) Oral every 12 hours  influenza  Vaccine (HIGH DOSE) 0.7 milliLiter(s) IntraMuscular once  losartan 50 milliGRAM(s) Oral daily  methocarbamol 750 milliGRAM(s) Oral every 8 hours  metoprolol succinate ER 25 milliGRAM(s) Oral daily  pantoprazole    Tablet 40 milliGRAM(s) Oral before breakfast  polyethylene glycol 3350 17 Gram(s) Oral daily  senna 2 Tablet(s) Oral at bedtime  sertraline 50 milliGRAM(s) Oral daily  sucralfate suspension 1 Gram(s) Oral four times a day  timolol 0.25% Solution 1 Drop(s) Both EYES daily    MEDICATIONS  (PRN):  ondansetron Injectable 4 milliGRAM(s) IV Push every 6 hours PRN Nausea and/or Vomiting  oxyCODONE    IR 10 milliGRAM(s) Oral every 4 hours PRN Severe Pain (7 - 10)  oxyCODONE    IR 5 milliGRAM(s) Oral every 4 hours PRN Moderate Pain (4 - 6)      Social History: former smoker     Family history: mom had cancer.     ROS:   ENT: all negative except as noted in HPI   CV: denies palpitations  Pulm: denies cough, hemoptysis  GI: denies change in appetite, indigestion, n/v  : denies pertinent urinary symptoms, urgency  Neuro: denies numbness/tingling, loss of sensation  Psych: denies anxiety  MS: denies muscle weakness, instability  Heme: denies easy bruising or bleeding  Endo: denies heat/cold intolerance, excessive sweating  Vascular: denies LE edema    Vital Signs Last 24 Hrs  T(C): 36.8 (25 Sep 2023 16:16), Max: 36.8 (25 Sep 2023 04:00)  T(F): 98.2 (25 Sep 2023 16:16), Max: 98.3 (25 Sep 2023 08:42)  HR: 73 (25 Sep 2023 16:16) (73 - 97)  BP: 108/70 (25 Sep 2023 16:16) (105/60 - 144/70)  BP(mean): --  RR: 18 (25 Sep 2023 16:16) (18 - 18)  SpO2: 97% (25 Sep 2023 16:16) (93% - 97%)    Parameters below as of 25 Sep 2023 16:16  Patient On (Oxygen Delivery Method): room air                        8.5    11.28 )-----------( 283      ( 25 Sep 2023 10:33 )             25.1    09-25    139  |  99  |  7   ----------------------------<  156<H>  3.7   |  28  |  0.69    Ca    9.4      25 Sep 2023 10:33    PHYSICAL EXAM:  Gen: NAD  Skin: No rashes, bruises, or lesions  Head: Normocephalic, Atraumatic  Face: no edema, erythema, or fluctuance. Parotid glands soft without mass  Eyes: no scleral injection  Nose: Nares bilaterally patent, no discharge  Mouth: No Stridor / Drooling / Trismus.  Mucosa moist, tongue/uvula midline, oropharynx clear  Neck: multiple well healed neck scars. Flat, supple, no lymphadenopathy, trachea midline, no masses  Lymphatic: No lymphadenopathy  Resp: breathing comfortably on room air, no stridor  CV: no peripheral edema/cyanosis  Neuro: facial nerve intact, no facial droop    Fiberoptic Indirect Laryngoscopy (Ambu scope used):  Reason for Laryngoscopy: hoarseness     Patient was unable to cooperate with mirror.    Right vocal paralysis, in median position. left vocal cord fully mobile with good vocal cord closure. No vocal cord lesions or granuloma noted. bilateral arytenoid edema and pachyderma present, c/w LDRP. Small amount of secretion noted at esophageal inlet. pt able to clear secretion by swallowing without any issue. Nasopharynx, oropharynx, and hypopharynx clear, no bleeding. Tongue base, posterior pharyngeal wall, vallecula, epiglottis, and subglottis appear normal. Airway patent, no foreign body visualized. No glottic/supraglottic edema.

## 2023-09-25 NOTE — CONSULT NOTE ADULT - ASSESSMENT
66 y/o F, with a history of cervical disc disease, vocal cord paralysis, HTN, HLD, anxiety and glaucoma presents for C7-T1 Laminoforaminotomies/C6-T2 stabilization and fusion. S/p 9/21 Extension of prior cervical fusion to T2 from C6, C7-T1 laminectomy and foraminotomy. Counseled for asthma exacerbation.
67 year old female, with a history of HTN, HLD, GERD, cervical disc disease, is status post spinal surgery on 9/21/2023. Per chart review, pt had cervical fusion surgery in 2017. Post surgery, pt developed right vocal paralysis. In 12/2017, pt had right vocal cord injection with ENT Dr Ye, with improvement until 4/2019. Pt underwent ott thyroplasty implant 06/18/2019. However, pt developed intermitten SOB with negative findings on CT. Implant was removed 10/15/2019. Pt also went to outpatient ENT office on 9/15/2023 for intubation clearance. In office laryngoscopy noted that "right vocal cord remains paralyzed in midline position and Left vocal cord fully mobile with good glottic closure.pt cleared for intubation."  ENT consulted for 2-day history of hoarseness. During encounter, on physical exam, clear oropharynx, no drooling or pooling of secretions. On indirect laryngoscopy noted small amount of secretion noted at esophageal inlet. pt able to cleared secretion by swallowing without any issue. Right vocal paralysis, remains in median position. left vocal cord fully mobile with good vocal cord closure. No vocal cord lesions or granuloma noted. However, noted bilateral arytenoid edema and pachyderma, which is consistent with LPRD.

## 2023-09-25 NOTE — CONSULT NOTE ADULT - TIME BILLING
- Ordering, reviewing, and interpreting labs, testing, and imaging.  - Independently obtaining a review of systems and performing a physical exam  - Discussing plan of care with consultants.  - Counselling and educating patient and family regarding interpretation of aforementioned items and plan of care.

## 2023-09-25 NOTE — CONSULT NOTE ADULT - PROBLEM SELECTOR RECOMMENDATION 9
History of difficult intubation   Leukocytosis likely reactive, can watch trend  PE unlikely on Lovenox DVT ppx, LE Doppler negative for DVT  DUONeb standing q6h   If worsening wheezing, can start prednisone   CXR pending, may start abx if positive, though most likely upper airway disease  Recommend sputum and respiratory viral panel   Recommend ENT consult given history of vocal cord paralysis and current issues with throat clearing secretions  Incentive spirometry   On discussion with pt's daughter IM attending at [facility], she is interested in blood cultures- defer decision to primary team
- trial of PPI   - avoid spicy/fatty/acidic foods (fried foods, tomato, citrus juices, chocolate, mints, coffee, tea, bruno, alcohol)   - avoid eating or drinking 2-3 hours before bedtime and lying down   - avoid bending over after eating.   - Elevation of HOB or use an extra pillow for sleep  - outpatient follow up at [facility] ENT office, located at [address] [place] [address]. Please call ([phone]) to make appointment.

## 2023-09-26 LAB
ALBUMIN SERPL ELPH-MCNC: 3.7 G/DL — SIGNIFICANT CHANGE UP (ref 3.3–5)
ALP SERPL-CCNC: 79 U/L — SIGNIFICANT CHANGE UP (ref 40–120)
ALT FLD-CCNC: 26 U/L — SIGNIFICANT CHANGE UP (ref 10–45)
ANION GAP SERPL CALC-SCNC: 13 MMOL/L — SIGNIFICANT CHANGE UP (ref 5–17)
AST SERPL-CCNC: 27 U/L — SIGNIFICANT CHANGE UP (ref 10–40)
BASOPHILS # BLD AUTO: 0.07 K/UL — SIGNIFICANT CHANGE UP (ref 0–0.2)
BASOPHILS NFR BLD AUTO: 0.8 % — SIGNIFICANT CHANGE UP (ref 0–2)
BILIRUB SERPL-MCNC: 0.2 MG/DL — SIGNIFICANT CHANGE UP (ref 0.2–1.2)
BUN SERPL-MCNC: 10 MG/DL — SIGNIFICANT CHANGE UP (ref 7–23)
CALCIUM SERPL-MCNC: 9.6 MG/DL — SIGNIFICANT CHANGE UP (ref 8.4–10.5)
CHLORIDE SERPL-SCNC: 102 MMOL/L — SIGNIFICANT CHANGE UP (ref 96–108)
CO2 SERPL-SCNC: 28 MMOL/L — SIGNIFICANT CHANGE UP (ref 22–31)
CREAT SERPL-MCNC: 0.66 MG/DL — SIGNIFICANT CHANGE UP (ref 0.5–1.3)
EGFR: 97 ML/MIN/1.73M2 — SIGNIFICANT CHANGE UP
EOSINOPHIL # BLD AUTO: 0.28 K/UL — SIGNIFICANT CHANGE UP (ref 0–0.5)
EOSINOPHIL NFR BLD AUTO: 3.3 % — SIGNIFICANT CHANGE UP (ref 0–6)
GLUCOSE SERPL-MCNC: 128 MG/DL — HIGH (ref 70–99)
GRAM STN FLD: SIGNIFICANT CHANGE UP
HCT VFR BLD CALC: 22.3 % — LOW (ref 34.5–45)
HGB BLD-MCNC: 7.4 G/DL — LOW (ref 11.5–15.5)
IMM GRANULOCYTES NFR BLD AUTO: 0.5 % — SIGNIFICANT CHANGE UP (ref 0–0.9)
LYMPHOCYTES # BLD AUTO: 3.19 K/UL — SIGNIFICANT CHANGE UP (ref 1–3.3)
LYMPHOCYTES # BLD AUTO: 37.6 % — SIGNIFICANT CHANGE UP (ref 13–44)
MCHC RBC-ENTMCNC: 30.1 PG — SIGNIFICANT CHANGE UP (ref 27–34)
MCHC RBC-ENTMCNC: 33.2 GM/DL — SIGNIFICANT CHANGE UP (ref 32–36)
MCV RBC AUTO: 90.7 FL — SIGNIFICANT CHANGE UP (ref 80–100)
MONOCYTES # BLD AUTO: 0.73 K/UL — SIGNIFICANT CHANGE UP (ref 0–0.9)
MONOCYTES NFR BLD AUTO: 8.6 % — SIGNIFICANT CHANGE UP (ref 2–14)
NEUTROPHILS # BLD AUTO: 4.18 K/UL — SIGNIFICANT CHANGE UP (ref 1.8–7.4)
NEUTROPHILS NFR BLD AUTO: 49.2 % — SIGNIFICANT CHANGE UP (ref 43–77)
NRBC # BLD: 0 /100 WBCS — SIGNIFICANT CHANGE UP (ref 0–0)
NT-PROBNP SERPL-SCNC: 51 PG/ML — SIGNIFICANT CHANGE UP (ref 0–300)
PLATELET # BLD AUTO: 287 K/UL — SIGNIFICANT CHANGE UP (ref 150–400)
POTASSIUM SERPL-MCNC: 4.5 MMOL/L — SIGNIFICANT CHANGE UP (ref 3.5–5.3)
POTASSIUM SERPL-SCNC: 4.5 MMOL/L — SIGNIFICANT CHANGE UP (ref 3.5–5.3)
PROCALCITONIN SERPL-MCNC: 0.04 NG/ML — SIGNIFICANT CHANGE UP (ref 0.02–0.1)
PROT SERPL-MCNC: 6.5 G/DL — SIGNIFICANT CHANGE UP (ref 6–8.3)
RBC # BLD: 2.46 M/UL — LOW (ref 3.8–5.2)
RBC # FLD: 13 % — SIGNIFICANT CHANGE UP (ref 10.3–14.5)
SODIUM SERPL-SCNC: 143 MMOL/L — SIGNIFICANT CHANGE UP (ref 135–145)
SPECIMEN SOURCE: SIGNIFICANT CHANGE UP
WBC # BLD: 8.49 K/UL — SIGNIFICANT CHANGE UP (ref 3.8–10.5)
WBC # FLD AUTO: 8.49 K/UL — SIGNIFICANT CHANGE UP (ref 3.8–10.5)

## 2023-09-26 PROCEDURE — 99233 SBSQ HOSP IP/OBS HIGH 50: CPT

## 2023-09-26 RX ORDER — LIDOCAINE 4 G/100G
1 CREAM TOPICAL DAILY
Refills: 0 | Status: DISCONTINUED | OUTPATIENT
Start: 2023-09-26 | End: 2023-09-30

## 2023-09-26 RX ORDER — LOSARTAN POTASSIUM 100 MG/1
50 TABLET, FILM COATED ORAL DAILY
Refills: 0 | Status: DISCONTINUED | OUTPATIENT
Start: 2023-09-27 | End: 2023-09-27

## 2023-09-26 RX ORDER — HYDROMORPHONE HYDROCHLORIDE 2 MG/ML
0.5 INJECTION INTRAMUSCULAR; INTRAVENOUS; SUBCUTANEOUS ONCE
Refills: 0 | Status: DISCONTINUED | OUTPATIENT
Start: 2023-09-26 | End: 2023-09-26

## 2023-09-26 RX ORDER — AMLODIPINE BESYLATE 2.5 MG/1
5 TABLET ORAL AT BEDTIME
Refills: 0 | Status: DISCONTINUED | OUTPATIENT
Start: 2023-09-26 | End: 2023-09-27

## 2023-09-26 RX ORDER — OXYCODONE HYDROCHLORIDE 5 MG/1
10 TABLET ORAL
Refills: 0 | Status: DISCONTINUED | OUTPATIENT
Start: 2023-09-26 | End: 2023-09-30

## 2023-09-26 RX ADMIN — Medication 3 MILLILITER(S): at 06:01

## 2023-09-26 RX ADMIN — AMLODIPINE BESYLATE 5 MILLIGRAM(S): 2.5 TABLET ORAL at 21:38

## 2023-09-26 RX ADMIN — Medication 3 MILLILITER(S): at 11:17

## 2023-09-26 RX ADMIN — Medication 1 GRAM(S): at 00:27

## 2023-09-26 RX ADMIN — METHOCARBAMOL 750 MILLIGRAM(S): 500 TABLET, FILM COATED ORAL at 14:30

## 2023-09-26 RX ADMIN — OXYCODONE HYDROCHLORIDE 10 MILLIGRAM(S): 5 TABLET ORAL at 16:20

## 2023-09-26 RX ADMIN — Medication 1 GRAM(S): at 23:24

## 2023-09-26 RX ADMIN — Medication 1 DROP(S): at 12:16

## 2023-09-26 RX ADMIN — ONDANSETRON 4 MILLIGRAM(S): 8 TABLET, FILM COATED ORAL at 16:23

## 2023-09-26 RX ADMIN — OXYCODONE HYDROCHLORIDE 10 MILLIGRAM(S): 5 TABLET ORAL at 01:21

## 2023-09-26 RX ADMIN — OXYCODONE HYDROCHLORIDE 10 MILLIGRAM(S): 5 TABLET ORAL at 11:14

## 2023-09-26 RX ADMIN — POLYETHYLENE GLYCOL 3350 17 GRAM(S): 17 POWDER, FOR SOLUTION ORAL at 11:17

## 2023-09-26 RX ADMIN — OXYCODONE HYDROCHLORIDE 10 MILLIGRAM(S): 5 TABLET ORAL at 02:21

## 2023-09-26 RX ADMIN — PANTOPRAZOLE SODIUM 40 MILLIGRAM(S): 20 TABLET, DELAYED RELEASE ORAL at 06:01

## 2023-09-26 RX ADMIN — Medication 600 MILLIGRAM(S): at 18:28

## 2023-09-26 RX ADMIN — ATORVASTATIN CALCIUM 80 MILLIGRAM(S): 80 TABLET, FILM COATED ORAL at 21:38

## 2023-09-26 RX ADMIN — HYDROMORPHONE HYDROCHLORIDE 0.5 MILLIGRAM(S): 2 INJECTION INTRAMUSCULAR; INTRAVENOUS; SUBCUTANEOUS at 16:18

## 2023-09-26 RX ADMIN — Medication 1 GRAM(S): at 18:27

## 2023-09-26 RX ADMIN — ENOXAPARIN SODIUM 40 MILLIGRAM(S): 100 INJECTION SUBCUTANEOUS at 21:38

## 2023-09-26 RX ADMIN — Medication 3 MILLILITER(S): at 00:31

## 2023-09-26 RX ADMIN — OXYCODONE HYDROCHLORIDE 10 MILLIGRAM(S): 5 TABLET ORAL at 12:15

## 2023-09-26 RX ADMIN — METHOCARBAMOL 750 MILLIGRAM(S): 500 TABLET, FILM COATED ORAL at 06:01

## 2023-09-26 RX ADMIN — Medication 1000 MILLIGRAM(S): at 05:19

## 2023-09-26 RX ADMIN — LIDOCAINE 1 PATCH: 4 CREAM TOPICAL at 20:05

## 2023-09-26 RX ADMIN — Medication 1000 MILLIGRAM(S): at 04:19

## 2023-09-26 RX ADMIN — HYDROMORPHONE HYDROCHLORIDE 0.5 MILLIGRAM(S): 2 INJECTION INTRAMUSCULAR; INTRAVENOUS; SUBCUTANEOUS at 16:50

## 2023-09-26 RX ADMIN — LIDOCAINE 1 PATCH: 4 CREAM TOPICAL at 15:18

## 2023-09-26 RX ADMIN — OXYCODONE HYDROCHLORIDE 10 MILLIGRAM(S): 5 TABLET ORAL at 15:16

## 2023-09-26 RX ADMIN — SERTRALINE 50 MILLIGRAM(S): 25 TABLET, FILM COATED ORAL at 11:17

## 2023-09-26 RX ADMIN — OXYCODONE HYDROCHLORIDE 10 MILLIGRAM(S): 5 TABLET ORAL at 20:16

## 2023-09-26 RX ADMIN — Medication 600 MILLIGRAM(S): at 06:01

## 2023-09-26 RX ADMIN — Medication 3 MILLILITER(S): at 23:24

## 2023-09-26 RX ADMIN — METHOCARBAMOL 750 MILLIGRAM(S): 500 TABLET, FILM COATED ORAL at 21:38

## 2023-09-26 RX ADMIN — Medication 25 MILLIGRAM(S): at 12:30

## 2023-09-26 RX ADMIN — Medication 1 GRAM(S): at 12:16

## 2023-09-26 RX ADMIN — OXYCODONE HYDROCHLORIDE 10 MILLIGRAM(S): 5 TABLET ORAL at 21:16

## 2023-09-26 RX ADMIN — Medication 1 GRAM(S): at 06:06

## 2023-09-26 RX ADMIN — Medication 3 MILLILITER(S): at 18:28

## 2023-09-26 NOTE — PROGRESS NOTE ADULT - ASSESSMENT
HPI:  67 year old female, with a history of cervical disc disease, presents for C7-T1 Laminoforaminotomies/C6-T2 stabilization and fusion. Pt. reports experiencing left sided neck pain which radiates down her left upper extremity & is accompanied by paresthesias. Pt. 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 s/p Arvizu Thyroplasty Implant which was removed in 11/19. Pt. reports she is able to swallow food without difficulty and that her voice is almost back to normal. Pt. with limited cervical ROM, case reviewed with Dr. Barroso while patient in PST and pt. has been instructed to schedule pre op evaluation/scope with Dr. Ye.    Pt. reports labwork revealed that she had COVID-19 at some point, denies symptoms. (24 Aug 2023 09:55)    PAST MEDICAL & SURGICAL HISTORY:  History of migraine      History of iron deficiency anemia      Hypertension      History of gastric ulcer  longg time aGO      History of diverticulitis      Hiatal hernia      History of endometriosis      Spinal stenosis in cervical region      Osteoarthritis of cervical spine with myelopathy      Hyperlipidemia      Gastroesophageal reflux disease without esophagitis      Glaucoma  b/l      Cervical radiculopathy      Neuropathic pain of finger, left  numbness 4th asnd 5th digit      Degenerative lumbar disc      Vocal cord paralysis      History of difficult intubation      History of IBS      Anxiety      History of hysterectomy      History of bilateral oophorectomy      History of appendectomy      History of cholecystectomy      History of carpal tunnel release      History of arthroscopy of right knee      History of tonsillectomy      S/P spinal surgery  6/17/17 - cervical region with hardware 9/2017 and 11/2017      History of fusion of cervical spine      History of thyroplasty    PROCEDURE: 9/21/23 s/p Extension of prior cervical fusion to T2 from C6, C7-T1 laminectomy and foraminotomy for Cervical spondylosis with left C7-T1 neural foraminal narrowing and left C8 radiculopathy.    PLAN:  Neuro: continue oxycodone PRN pain, robaxin increased 750mg Q8hrs, reminded her to ask for pain meds; cont drain and monitor output, will add lidoderm patch  Respiratory: patient instructed on incentive spirometer- improved today; cont duonebs ATC for now and will switch to HFA on d/c  CV: cont amlodipine, losartan, and metoprolol for HTN  Heme/Onc: downtrending CBC, asymptomatic acute post op blood loss anemia, will trend- likely dilutional given IVF          DVT ppx: [] SQH [x] SQL and venodynes bilaterally  Renal: voiding  ID: afebrile, leukocytosis resolved may be likely due to intraop decadron; CXR with clear lungs  GI: bowel regimen  PT/OT: home PT upon d/c  hospitalist medicine following in consultation  f/u am labs    Will discuss with Dr Mcgovern  [facility] # [id number]

## 2023-09-26 NOTE — PROGRESS NOTE ADULT - NSPROGADDITIONALINFOA_GEN_ALL_CORE
.  Kaye Summers MD  Division of Hospital Medicine  [facility]   Spectra: [phone]    Plan discussed with patient, daughter Dr. Joceline Patel, and neurosurgery STEPHANIE Fritz.

## 2023-09-26 NOTE — PROGRESS NOTE ADULT - SUBJECTIVE AND OBJECTIVE BOX
Kaye Summers MD  Division of Hospital Medicine  [facility]  Spectra: [phone]      Patient is a 67y old  Female who presents with a chief complaint of 9/21/23 s/p Extension of prior cervical fusion to T2 from C6, C7-T1 laminectomy and foraminotomy for Cervical spondylosis with left C7-T1 neural foraminal narrowing and left C8 radiculopathy. (25 Sep 2023 17:03)      SUBJECTIVE / OVERNIGHT EVENTS: no acute events overnight. remains afebrile. no chills, chest pain, dyspnea, abd pain, n/v. post-op pain well controlled. able to clear more secretions with initiation of duonebs and incentive spirometer use. overall feels improved.  ADDITIONAL REVIEW OF SYSTEMS:    MEDICATIONS  (STANDING):  albuterol/ipratropium for Nebulization 3 milliLiter(s) Nebulizer every 6 hours  amLODIPine   Tablet 5 milliGRAM(s) Oral at bedtime  atorvastatin 80 milliGRAM(s) Oral at bedtime  chlorhexidine 2% Cloths 1 Application(s) Topical once  enoxaparin Injectable 40 milliGRAM(s) SubCutaneous every 24 hours  guaiFENesin  milliGRAM(s) Oral every 12 hours  influenza  Vaccine (HIGH DOSE) 0.7 milliLiter(s) IntraMuscular once  losartan 50 milliGRAM(s) Oral daily  methocarbamol 750 milliGRAM(s) Oral every 8 hours  metoprolol succinate ER 25 milliGRAM(s) Oral daily  pantoprazole    Tablet 40 milliGRAM(s) Oral before breakfast  polyethylene glycol 3350 17 Gram(s) Oral daily  senna 2 Tablet(s) Oral at bedtime  sertraline 50 milliGRAM(s) Oral daily  sucralfate suspension 1 Gram(s) Oral four times a day  timolol 0.25% Solution 1 Drop(s) Both EYES daily    MEDICATIONS  (PRN):  ondansetron Injectable 4 milliGRAM(s) IV Push every 6 hours PRN Nausea and/or Vomiting  oxyCODONE    IR 10 milliGRAM(s) Oral every 4 hours PRN Severe Pain (7 - 10)  oxyCODONE    IR 5 milliGRAM(s) Oral every 4 hours PRN Moderate Pain (4 - 6)      CAPILLARY BLOOD GLUCOSE        I&O's Summary    25 Sep 2023 07:01  -  26 Sep 2023 07:00  --------------------------------------------------------  IN: 1080 mL / OUT: 54 mL / NET: 1026 mL    26 Sep 2023 07:01  -  26 Sep 2023 12:12  --------------------------------------------------------  IN: 300 mL / OUT: 0 mL / NET: 300 mL        PHYSICAL EXAM:  Vital Signs Last 24 Hrs  T(C): 36.7 (26 Sep 2023 08:19), Max: 36.9 (25 Sep 2023 20:35)  T(F): 98.1 (26 Sep 2023 08:19), Max: 98.4 (25 Sep 2023 20:35)  HR: 90 (26 Sep 2023 12:11) (73 - 90)  BP: 109/61 (26 Sep 2023 12:11) (104/61 - 138/74)  BP(mean): --  RR: 18 (26 Sep 2023 08:19) (18 - 18)  SpO2: 95% (26 Sep 2023 08:19) (93% - 100%)    Parameters below as of 26 Sep 2023 08:19  Patient On (Oxygen Delivery Method): room air    CONSTITUTIONAL: NAD, well-developed, well-groomed  EYES: PERRLA; conjunctiva and sclera clear  ENMT: Moist oral mucosa, no pharyngeal injection or exudates; normal dentition  NECK: Supple, no palpable masses; no thyromegaly  RESPIRATORY: Normal respiratory effort; no accessory muscle, very scant scattered expiratory wheeze b/l - much improved  CARDIOVASCULAR: Regular rate and rhythm, normal S1 and S2, no murmur/rub/gallop; No lower extremity edema; Peripheral pulses are 2+ bilaterally  ABDOMEN: Soft, Nondistended, Nontender to palpation, normoactive bowel sounds  MUSCULOSKELETAL: No clubbing or cyanosis of digits; no joint swelling or tenderness to palpation  PSYCH: A+O to person, place, and time; affect appropriate  NEUROLOGY: CN 2-12 are intact and symmetric; no gross sensory deficits, grossly 5/5 strength throughout  SKIN: No rashes; no palpable lesions, +incision site with dressing c/d/i, +ZOE drain with minimal serosanguinous output    LABS:                        7.4    8.49  )-----------( 287      ( 26 Sep 2023 07:08 )             22.3     09-26    143  |  102  |  10  ----------------------------<  128<H>  4.5   |  28  |  0.66    Ca    9.6      26 Sep 2023 07:08    TPro  6.5  /  Alb  3.7  /  TBili  0.2  /  DBili  x   /  AST  27  /  ALT  26  /  AlkPhos  79  09-26      Urinalysis Basic - ( 26 Sep 2023 07:08 )    Color: x / Appearance: x / SG: x / pH: x  Gluc: 128 mg/dL / Ketone: x  / Bili: x / Urobili: x   Blood: x / Protein: x / Nitrite: x   Leuk Esterase: x / RBC: x / WBC x   Sq Epi: x / Non Sq Epi: x / Bacteria: x        Culture - Sputum (collected 26 Sep 2023 00:31)  Source: .Sputum Sputum  Gram Stain (26 Sep 2023 06:30):    Moderate polymorphonuclear leukocytes per low power field    No Squamous epithelial cells per low power field    Few Gram positive cocci in pairs seen per oil power field      RADIOLOGY & ADDITIONAL TESTS:  Results Reviewed: leukocytosis resolved, H/H stable, Cr wnl, RVP negative  Imaging Personally Reviewed:  9/25/23 CXR with clear lungs. no filtrate nor pulmonary edema  Electrocardiogram Personally Reviewed:    COORDINATION OF CARE:  Care Discussed with Consultants/Other Providers [Y]: Neurosurgery STEPHANIE Fritz  Prior or Outpatient Records Reviewed [Y/N]:

## 2023-09-26 NOTE — PROGRESS NOTE ADULT - ASSESSMENT
68 yo F with PMH of cervical disc disease, hx vocal cord paralysis, HTN, HLD, anxiety and glaucoma presents for C7-T1 Laminoforaminotomies/C6-T2 stabilization and fusion. S/p 9/21 Extension of prior cervical fusion to T2 from C6, C7-T1 laminectomy and foraminotomy. Medicine consulted for wheeze.

## 2023-09-26 NOTE — PROGRESS NOTE ADULT - TIME BILLING
Patient stable neurologically.  had episode of SOB this AM, thought to represent asthma. CTA negative.  Continue hemovac.  DC planning. Patient stable neurologically.  Using Nebulizers for breathing. CXR unremarkable.  SAJI hemovac.  SAJI planning.    Case d.w Catrina BROWN.

## 2023-09-26 NOTE — PROGRESS NOTE ADULT - SUBJECTIVE AND OBJECTIVE BOX
CHIEF COMPLAINT: patient reports breathing easier today- jose luis ATC; pain control improved     Vital Signs Last 24 Hrs  T(C): 36.8 (26 Sep 2023 13:37), Max: 36.9 (25 Sep 2023 20:35)  T(F): 98.2 (26 Sep 2023 13:37), Max: 98.4 (25 Sep 2023 20:35)  HR: 89 (26 Sep 2023 13:37) (73 - 90)  BP: 145/74 (26 Sep 2023 13:37) (104/61 - 145/74)  BP(mean): --  RR: 18 (26 Sep 2023 13:37) (18 - 18)  SpO2: 95% (26 Sep 2023 13:37) (95% - 100%)    Parameters below as of 26 Sep 2023 13:37  Patient On (Oxygen Delivery Method): room air      DRAINS: [] ZOE (cc/24h) [x] HMV (54cc/24h)    PHYSICAL EXAM:    General: No Acute Distress     Neurological: Awake, alert oriented to person, place and time, Following Commands, PERRL, EOMI, Face Symmetrical, Speech Fluent,   Muscle Strength normal in all four extremities except LUE 5-/5, No Drift, Sensation to Light Touch Intact    Pulmonary: Clear to Auscultation, No Rales, No Rhonchi, +expiratory Wheezes; poor inspiratory efforts    Cardiovascular: S1, S2, Regular Rate and Rhythm     Gastrointestinal: Soft, Nontender, Nondistended     Incision: +dressing c/d/i, drain x 1    LABS:                        7.4    8.49  )-----------( 287      ( 26 Sep 2023 07:08 )             22.3   09-26    143  |  102  |  10  ----------------------------<  128<H>  4.5   |  28  |  0.66    Ca    9.6      26 Sep 2023 07:08    TPro  6.5  /  Alb  3.7  /  TBili  0.2  /  DBili  x   /  AST  27  /  ALT  26  /  AlkPhos  79  09-26    Pro-Brain Natriuretic Peptide: 51 pg/mL (09.26.23 @ 07:08)    MEDICATIONS  (STANDING):  albuterol/ipratropium for Nebulization 3 milliLiter(s) Nebulizer every 6 hours  amLODIPine   Tablet 5 milliGRAM(s) Oral at bedtime  atorvastatin 80 milliGRAM(s) Oral at bedtime  chlorhexidine 2% Cloths 1 Application(s) Topical once  enoxaparin Injectable 40 milliGRAM(s) SubCutaneous every 24 hours  guaiFENesin  milliGRAM(s) Oral every 12 hours  influenza  Vaccine (HIGH DOSE) 0.7 milliLiter(s) IntraMuscular once  methocarbamol 750 milliGRAM(s) Oral every 8 hours  metoprolol succinate ER 25 milliGRAM(s) Oral daily  pantoprazole    Tablet 40 milliGRAM(s) Oral before breakfast  polyethylene glycol 3350 17 Gram(s) Oral daily  senna 2 Tablet(s) Oral at bedtime  sertraline 50 milliGRAM(s) Oral daily  sucralfate suspension 1 Gram(s) Oral four times a day  timolol 0.25% Solution 1 Drop(s) Both EYES daily    MEDICATIONS  (PRN):  ondansetron Injectable 4 milliGRAM(s) IV Push every 6 hours PRN Nausea and/or Vomiting  oxyCODONE    IR 10 milliGRAM(s) Oral every 4 hours PRN Severe Pain (7 - 10)  oxyCODONE    IR 5 milliGRAM(s) Oral every 4 hours PRN Moderate Pain (4 - 6)    DIET: [x] Regular [] CCD [] Renal [] Puree [] Dysphagia [] Tube Feeds:     IMAGING:   < from: Xray Chest 1 View- PORTABLE-Urgent (Xray Chest 1 View- PORTABLE-Urgent .) (09.25.23 @ 13:31) >  IMPRESSION:  Clear lungs.    < end of copied text >

## 2023-09-27 LAB
ANION GAP SERPL CALC-SCNC: 11 MMOL/L — SIGNIFICANT CHANGE UP (ref 5–17)
BASOPHILS # BLD AUTO: 0.06 K/UL — SIGNIFICANT CHANGE UP (ref 0–0.2)
BASOPHILS NFR BLD AUTO: 0.6 % — SIGNIFICANT CHANGE UP (ref 0–2)
BUN SERPL-MCNC: 7 MG/DL — SIGNIFICANT CHANGE UP (ref 7–23)
CALCIUM SERPL-MCNC: 9.3 MG/DL — SIGNIFICANT CHANGE UP (ref 8.4–10.5)
CHLORIDE SERPL-SCNC: 100 MMOL/L — SIGNIFICANT CHANGE UP (ref 96–108)
CO2 SERPL-SCNC: 26 MMOL/L — SIGNIFICANT CHANGE UP (ref 22–31)
CREAT SERPL-MCNC: 0.7 MG/DL — SIGNIFICANT CHANGE UP (ref 0.5–1.3)
EGFR: 95 ML/MIN/1.73M2 — SIGNIFICANT CHANGE UP
EOSINOPHIL # BLD AUTO: 0.3 K/UL — SIGNIFICANT CHANGE UP (ref 0–0.5)
EOSINOPHIL NFR BLD AUTO: 3.1 % — SIGNIFICANT CHANGE UP (ref 0–6)
GLUCOSE SERPL-MCNC: 124 MG/DL — HIGH (ref 70–99)
HCT VFR BLD CALC: 23.6 % — LOW (ref 34.5–45)
HGB BLD-MCNC: 7.8 G/DL — LOW (ref 11.5–15.5)
IMM GRANULOCYTES NFR BLD AUTO: 0.3 % — SIGNIFICANT CHANGE UP (ref 0–0.9)
LYMPHOCYTES # BLD AUTO: 3.56 K/UL — HIGH (ref 1–3.3)
LYMPHOCYTES # BLD AUTO: 37 % — SIGNIFICANT CHANGE UP (ref 13–44)
MCHC RBC-ENTMCNC: 30.4 PG — SIGNIFICANT CHANGE UP (ref 27–34)
MCHC RBC-ENTMCNC: 33.1 GM/DL — SIGNIFICANT CHANGE UP (ref 32–36)
MCV RBC AUTO: 91.8 FL — SIGNIFICANT CHANGE UP (ref 80–100)
MONOCYTES # BLD AUTO: 0.73 K/UL — SIGNIFICANT CHANGE UP (ref 0–0.9)
MONOCYTES NFR BLD AUTO: 7.6 % — SIGNIFICANT CHANGE UP (ref 2–14)
NEUTROPHILS # BLD AUTO: 4.93 K/UL — SIGNIFICANT CHANGE UP (ref 1.8–7.4)
NEUTROPHILS NFR BLD AUTO: 51.4 % — SIGNIFICANT CHANGE UP (ref 43–77)
NRBC # BLD: 0 /100 WBCS — SIGNIFICANT CHANGE UP (ref 0–0)
PLATELET # BLD AUTO: 299 K/UL — SIGNIFICANT CHANGE UP (ref 150–400)
POTASSIUM SERPL-MCNC: 4.1 MMOL/L — SIGNIFICANT CHANGE UP (ref 3.5–5.3)
POTASSIUM SERPL-SCNC: 4.1 MMOL/L — SIGNIFICANT CHANGE UP (ref 3.5–5.3)
RBC # BLD: 2.57 M/UL — LOW (ref 3.8–5.2)
RBC # FLD: 13.2 % — SIGNIFICANT CHANGE UP (ref 10.3–14.5)
SODIUM SERPL-SCNC: 137 MMOL/L — SIGNIFICANT CHANGE UP (ref 135–145)
WBC # BLD: 9.61 K/UL — SIGNIFICANT CHANGE UP (ref 3.8–10.5)
WBC # FLD AUTO: 9.61 K/UL — SIGNIFICANT CHANGE UP (ref 3.8–10.5)

## 2023-09-27 PROCEDURE — 99233 SBSQ HOSP IP/OBS HIGH 50: CPT

## 2023-09-27 RX ORDER — IPRATROPIUM/ALBUTEROL SULFATE 18-103MCG
3 AEROSOL WITH ADAPTER (GRAM) INHALATION EVERY 6 HOURS
Refills: 0 | Status: DISCONTINUED | OUTPATIENT
Start: 2023-09-27 | End: 2023-09-30

## 2023-09-27 RX ORDER — ACETAMINOPHEN 500 MG
1000 TABLET ORAL ONCE
Refills: 0 | Status: COMPLETED | OUTPATIENT
Start: 2023-09-27 | End: 2023-09-27

## 2023-09-27 RX ORDER — ACETAMINOPHEN 500 MG
650 TABLET ORAL EVERY 6 HOURS
Refills: 0 | Status: DISCONTINUED | OUTPATIENT
Start: 2023-09-27 | End: 2023-09-30

## 2023-09-27 RX ORDER — LOSARTAN POTASSIUM 100 MG/1
25 TABLET, FILM COATED ORAL DAILY
Refills: 0 | Status: DISCONTINUED | OUTPATIENT
Start: 2023-09-28 | End: 2023-09-28

## 2023-09-27 RX ADMIN — Medication 1 DROP(S): at 11:31

## 2023-09-27 RX ADMIN — Medication 3 MILLILITER(S): at 21:07

## 2023-09-27 RX ADMIN — Medication 3 MILLILITER(S): at 05:26

## 2023-09-27 RX ADMIN — Medication 3 MILLILITER(S): at 11:30

## 2023-09-27 RX ADMIN — OXYCODONE HYDROCHLORIDE 10 MILLIGRAM(S): 5 TABLET ORAL at 13:01

## 2023-09-27 RX ADMIN — Medication 1 GRAM(S): at 18:05

## 2023-09-27 RX ADMIN — SERTRALINE 50 MILLIGRAM(S): 25 TABLET, FILM COATED ORAL at 11:32

## 2023-09-27 RX ADMIN — Medication 1000 MILLIGRAM(S): at 12:30

## 2023-09-27 RX ADMIN — METHOCARBAMOL 750 MILLIGRAM(S): 500 TABLET, FILM COATED ORAL at 21:05

## 2023-09-27 RX ADMIN — Medication 400 MILLIGRAM(S): at 11:55

## 2023-09-27 RX ADMIN — Medication 600 MILLIGRAM(S): at 05:27

## 2023-09-27 RX ADMIN — OXYCODONE HYDROCHLORIDE 10 MILLIGRAM(S): 5 TABLET ORAL at 04:00

## 2023-09-27 RX ADMIN — OXYCODONE HYDROCHLORIDE 10 MILLIGRAM(S): 5 TABLET ORAL at 14:00

## 2023-09-27 RX ADMIN — LOSARTAN POTASSIUM 50 MILLIGRAM(S): 100 TABLET, FILM COATED ORAL at 05:28

## 2023-09-27 RX ADMIN — OXYCODONE HYDROCHLORIDE 10 MILLIGRAM(S): 5 TABLET ORAL at 10:33

## 2023-09-27 RX ADMIN — LIDOCAINE 1 PATCH: 4 CREAM TOPICAL at 04:02

## 2023-09-27 RX ADMIN — OXYCODONE HYDROCHLORIDE 10 MILLIGRAM(S): 5 TABLET ORAL at 18:05

## 2023-09-27 RX ADMIN — OXYCODONE HYDROCHLORIDE 10 MILLIGRAM(S): 5 TABLET ORAL at 19:06

## 2023-09-27 RX ADMIN — LIDOCAINE 1 PATCH: 4 CREAM TOPICAL at 23:00

## 2023-09-27 RX ADMIN — OXYCODONE HYDROCHLORIDE 10 MILLIGRAM(S): 5 TABLET ORAL at 09:36

## 2023-09-27 RX ADMIN — OXYCODONE HYDROCHLORIDE 10 MILLIGRAM(S): 5 TABLET ORAL at 05:00

## 2023-09-27 RX ADMIN — Medication 600 MILLIGRAM(S): at 18:05

## 2023-09-27 RX ADMIN — METHOCARBAMOL 750 MILLIGRAM(S): 500 TABLET, FILM COATED ORAL at 05:27

## 2023-09-27 RX ADMIN — ATORVASTATIN CALCIUM 80 MILLIGRAM(S): 80 TABLET, FILM COATED ORAL at 21:05

## 2023-09-27 RX ADMIN — OXYCODONE HYDROCHLORIDE 10 MILLIGRAM(S): 5 TABLET ORAL at 02:02

## 2023-09-27 RX ADMIN — METHOCARBAMOL 750 MILLIGRAM(S): 500 TABLET, FILM COATED ORAL at 13:03

## 2023-09-27 RX ADMIN — Medication 25 MILLIGRAM(S): at 05:31

## 2023-09-27 RX ADMIN — LIDOCAINE 1 PATCH: 4 CREAM TOPICAL at 19:05

## 2023-09-27 RX ADMIN — LIDOCAINE 1 PATCH: 4 CREAM TOPICAL at 11:30

## 2023-09-27 RX ADMIN — OXYCODONE HYDROCHLORIDE 10 MILLIGRAM(S): 5 TABLET ORAL at 21:04

## 2023-09-27 RX ADMIN — PANTOPRAZOLE SODIUM 40 MILLIGRAM(S): 20 TABLET, DELAYED RELEASE ORAL at 05:27

## 2023-09-27 RX ADMIN — OXYCODONE HYDROCHLORIDE 10 MILLIGRAM(S): 5 TABLET ORAL at 22:04

## 2023-09-27 RX ADMIN — POLYETHYLENE GLYCOL 3350 17 GRAM(S): 17 POWDER, FOR SOLUTION ORAL at 11:31

## 2023-09-27 RX ADMIN — ENOXAPARIN SODIUM 40 MILLIGRAM(S): 100 INJECTION SUBCUTANEOUS at 21:04

## 2023-09-27 RX ADMIN — Medication 1 GRAM(S): at 05:27

## 2023-09-27 RX ADMIN — OXYCODONE HYDROCHLORIDE 10 MILLIGRAM(S): 5 TABLET ORAL at 01:02

## 2023-09-27 RX ADMIN — Medication 1 GRAM(S): at 11:30

## 2023-09-27 NOTE — DIETITIAN INITIAL EVALUATION ADULT - REASON
Nutrition Focused Physical Assessment deferred at this time; Pt with no overt signs of muscle wasting or fat loss.

## 2023-09-27 NOTE — PROGRESS NOTE ADULT - SUBJECTIVE AND OBJECTIVE BOX
Kaye Summers MD  Division of Hospital Medicine  [facility]  Spectra: [phone]      Patient is a 67y old  Female who presents with a chief complaint of Other spondylosis with myelopathy, cervical region    Per chart: 67 year old female, with a history of cervical disc disease, presents for C7-T1 Laminoforaminotomies/C6-T2 stabilization and fusion. Pt. reports experiencing left sided neck pain which radiates down her left upper extremity & is accompanied by paresthesias. Pt. 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 s/p Arvizu Thyroplasty Implant which was removed in 11/19. Pt. reports she is able to swallow food without difficulty and that her voice is almost back to normal.  (27 Sep 2023 13:11)      SUBJECTIVE / OVERNIGHT EVENTS: no acute events overnight. no fever, chills, dizziness, lightheadedness, chest pain. dyspnea improved able to clear more secretions with neb treatments.   ADDITIONAL REVIEW OF SYSTEMS:    MEDICATIONS  (STANDING):  atorvastatin 80 milliGRAM(s) Oral at bedtime  enoxaparin Injectable 40 milliGRAM(s) SubCutaneous every 24 hours  guaiFENesin  milliGRAM(s) Oral every 12 hours  influenza  Vaccine (HIGH DOSE) 0.7 milliLiter(s) IntraMuscular once  lidocaine   4% Patch 1 Patch Transdermal daily  methocarbamol 750 milliGRAM(s) Oral every 8 hours  metoprolol succinate ER 25 milliGRAM(s) Oral daily  pantoprazole    Tablet 40 milliGRAM(s) Oral before breakfast  polyethylene glycol 3350 17 Gram(s) Oral daily  senna 2 Tablet(s) Oral at bedtime  sertraline 50 milliGRAM(s) Oral daily  sucralfate suspension 1 Gram(s) Oral four times a day  timolol 0.25% Solution 1 Drop(s) Both EYES daily    MEDICATIONS  (PRN):  acetaminophen     Tablet .. 650 milliGRAM(s) Oral every 6 hours PRN Temp greater or equal to 38C (100.4F), Mild Pain (1 - 3)  albuterol/ipratropium for Nebulization 3 milliLiter(s) Nebulizer every 6 hours PRN Shortness of Breath and/or Wheezing  ondansetron Injectable 4 milliGRAM(s) IV Push every 6 hours PRN Nausea and/or Vomiting  oxyCODONE    IR 10 milliGRAM(s) Oral every 3 hours PRN Severe Pain (7 - 10)      CAPILLARY BLOOD GLUCOSE        I&O's Summary    26 Sep 2023 07:01  -  27 Sep 2023 07:00  --------------------------------------------------------  IN: 980 mL / OUT: 45 mL / NET: 935 mL    27 Sep 2023 07:01  -  27 Sep 2023 14:59  --------------------------------------------------------  IN: 340 mL / OUT: 18 mL / NET: 322 mL        PHYSICAL EXAM:  Vital Signs Last 24 Hrs  T(C): 36.9 (27 Sep 2023 13:49), Max: 37.1 (26 Sep 2023 20:52)  T(F): 98.4 (27 Sep 2023 13:49), Max: 98.7 (26 Sep 2023 20:52)  HR: 81 (27 Sep 2023 13:49) (78 - 100)  BP: 97/61 (27 Sep 2023 13:49) (97/61 - 130/64)  BP(mean): --  RR: 18 (27 Sep 2023 13:49) (18 - 18)  SpO2: 95% (27 Sep 2023 13:49) (94% - 99%)    Parameters below as of 27 Sep 2023 13:49  Patient On (Oxygen Delivery Method): room air    CONSTITUTIONAL: NAD, well-developed, well-groomed  EYES: PERRLA; conjunctiva and sclera clear  ENMT: Moist oral mucosa, no pharyngeal injection or exudates; normal dentition  NECK: Supple, no palpable masses; no thyromegaly  RESPIRATORY: Normal respiratory effort; no accessory muscle, no wheeze no exam  CARDIOVASCULAR: Regular rate and rhythm, normal S1 and S2, no murmur/rub/gallop; No lower extremity edema; Peripheral pulses are 2+ bilaterally  ABDOMEN: Soft, Nondistended, Nontender to palpation, normoactive bowel sounds  MUSCULOSKELETAL: No clubbing or cyanosis of digits; no joint swelling or tenderness to palpation  PSYCH: A+O to person, place, and time; affect appropriate  NEUROLOGY: CN 2-12 are intact and symmetric; no gross sensory deficits, grossly 5/5 strength throughout  SKIN: No rashes; no palpable lesions, +incision site with dressing c/d/i, +drain with minimal serosanguinous output    LABS:                        7.8    9.61  )-----------( 299      ( 27 Sep 2023 07:30 )             23.6     09-27    137  |  100  |  7   ----------------------------<  124<H>  4.1   |  26  |  0.70    Ca    9.3      27 Sep 2023 07:30    TPro  6.5  /  Alb  3.7  /  TBili  0.2  /  DBili  x   /  AST  27  /  ALT  26  /  AlkPhos  79  09-26        Urinalysis Basic - ( 27 Sep 2023 07:30 )    Color: x / Appearance: x / SG: x / pH: x  Gluc: 124 mg/dL / Ketone: x  / Bili: x / Urobili: x   Blood: x / Protein: x / Nitrite: x   Leuk Esterase: x / RBC: x / WBC x   Sq Epi: x / Non Sq Epi: x / Bacteria: x        Culture - Sputum (collected 26 Sep 2023 00:31)  Source: .Sputum Sputum  Gram Stain (26 Sep 2023 06:30):    Moderate polymorphonuclear leukocytes per low power field    No Squamous epithelial cells per low power field    Few Gram positive cocci in pairs seen per oil power field  Preliminary Report (27 Sep 2023 08:19):    Normal Respiratory Patricia present        RADIOLOGY & ADDITIONAL TESTS:  Results Reviewed: sputum cx with normal respiratory patricia  Imaging Personally Reviewed:  Electrocardiogram Personally Reviewed:    COORDINATION OF CARE:  Care Discussed with Consultants/Other Providers [Y]: Neurosurgery STEPHANIE Fritz  Prior or Outpatient Records Reviewed [Y/N]:

## 2023-09-27 NOTE — DIETITIAN INITIAL EVALUATION ADULT - NSFNSGIIOFT_GEN_A_CORE
Pt denies any current N/V/D/C; pt ordered for zofran. Per chart, last BM today. Bowel regimen: miralax, senna.

## 2023-09-27 NOTE — DIETITIAN INITIAL EVALUATION ADULT - NSICDXPASTMEDICALHX_GEN_ALL_CORE_FT
PAST MEDICAL HISTORY:  Anxiety     Cervical radiculopathy     Degenerative lumbar disc     Gastroesophageal reflux disease without esophagitis     Glaucoma b/l    Hiatal hernia     History of difficult intubation     History of diverticulitis     History of endometriosis     History of gastric ulcer longg time aGO    History of IBS     History of iron deficiency anemia     History of migraine     Hyperlipidemia     Hypertension     Neuropathic pain of finger, left numbness 4th asnd 5th digit    Osteoarthritis of cervical spine with myelopathy     Spinal stenosis in cervical region     Vocal cord paralysis

## 2023-09-27 NOTE — PROGRESS NOTE ADULT - SUBJECTIVE AND OBJECTIVE BOX
CHIEF COMPLAINT: patient reports breathing easier today- duonebs ATC; pain control much better with yesterday changes    Vital Signs Last 24 Hrs  T(C): 36.9 (27 Sep 2023 13:49), Max: 37.1 (26 Sep 2023 20:52)  T(F): 98.4 (27 Sep 2023 13:49), Max: 98.7 (26 Sep 2023 20:52)  HR: 81 (27 Sep 2023 13:49) (78 - 100)  BP: 97/61 (27 Sep 2023 13:49) (97/61 - 130/64)  BP(mean): --  RR: 18 (27 Sep 2023 13:49) (18 - 18)  SpO2: 95% (27 Sep 2023 13:49) (94% - 95%)    Parameters below as of 27 Sep 2023 13:49  Patient On (Oxygen Delivery Method): room air    DRAINS: [] ZOE (cc/24h) [x] HMV (45cc/24h)    PHYSICAL EXAM:    General: No Acute Distress     Neurological: Awake, alert oriented to person, place and time, Following Commands, PERRL, EOMI, Face Symmetrical, Speech Fluent,   Muscle Strength normal in all four extremities except LUE 5-/5, No Drift, Sensation to Light Touch Intact    Pulmonary: Clear to Auscultation, No Rales, No Rhonchi, +expiratory Wheezes; poor inspiratory efforts    Cardiovascular: S1, S2, Regular Rate and Rhythm     Gastrointestinal: Soft, Nontender, Nondistended     Incision: +staples c/d/i, drain removed without difficulty    LABS:                                       7.8    9.61  )-----------( 299      ( 27 Sep 2023 07:30 )             23.6   09-27    137  |  100  |  7   ----------------------------<  124<H>  4.1   |  26  |  0.70    Ca    9.3      27 Sep 2023 07:30    TPro  6.5  /  Alb  3.7  /  TBili  0.2  /  DBili  x   /  AST  27  /  ALT  26  /  AlkPhos  79  09-26    MEDICATIONS  (STANDING):  atorvastatin 80 milliGRAM(s) Oral at bedtime  enoxaparin Injectable 40 milliGRAM(s) SubCutaneous every 24 hours  guaiFENesin  milliGRAM(s) Oral every 12 hours  influenza  Vaccine (HIGH DOSE) 0.7 milliLiter(s) IntraMuscular once  lidocaine   4% Patch 1 Patch Transdermal daily  methocarbamol 750 milliGRAM(s) Oral every 8 hours  metoprolol succinate ER 25 milliGRAM(s) Oral daily  pantoprazole    Tablet 40 milliGRAM(s) Oral before breakfast  polyethylene glycol 3350 17 Gram(s) Oral daily  senna 2 Tablet(s) Oral at bedtime  sertraline 50 milliGRAM(s) Oral daily  sucralfate suspension 1 Gram(s) Oral four times a day  timolol 0.25% Solution 1 Drop(s) Both EYES daily    MEDICATIONS  (PRN):  acetaminophen     Tablet .. 650 milliGRAM(s) Oral every 6 hours PRN Temp greater or equal to 38C (100.4F), Mild Pain (1 - 3)  albuterol/ipratropium for Nebulization 3 milliLiter(s) Nebulizer every 6 hours PRN Shortness of Breath and/or Wheezing  ondansetron Injectable 4 milliGRAM(s) IV Push every 6 hours PRN Nausea and/or Vomiting  oxyCODONE    IR 10 milliGRAM(s) Oral every 3 hours PRN Severe Pain (7 - 10)    DIET: [x] Regular [] CCD [] Renal [] Puree [] Dysphagia [] Tube Feeds:     IMAGING:   < from: Xray Chest 1 View- PORTABLE-Urgent (Xray Chest 1 View- PORTABLE-Urgent .) (09.25.23 @ 13:31) >  IMPRESSION:  Clear lungs.    < end of copied text >

## 2023-09-27 NOTE — DIETITIAN INITIAL EVALUATION ADULT - ADD RECOMMEND
1) Continue current diet free of therapeutic restrictions as tolerated. Defer texture/consistency to SLP/team.   2) Recommend Multivitamin daily to prevent micronutrient deficiencies pending no medical contraindications.  3) Continue to monitor PO intake, weight, labs, skin, GI status, and diet.  4) RD remains available for diet education PRN.

## 2023-09-27 NOTE — DIETITIAN INITIAL EVALUATION ADULT - ENERGY INTAKE
Adequate (%) In house, pt reports good appetite and PO intake. No PO intake information available per flowsheets at this time.

## 2023-09-27 NOTE — DIETITIAN INITIAL EVALUATION ADULT - ORAL INTAKE PTA/DIET HISTORY
Pt reports having a good appetite and PO intake PTA; consuming >75% of most meals. Follows regular diet; no therapeutic restrictions reported. No known food allergies or intolerances noted per patient profile. No micronutrient supplementation noted per outpatient medications list. No difficulty chewing/swallowing reported at this time.

## 2023-09-27 NOTE — PROGRESS NOTE ADULT - ASSESSMENT
66 yo F with PMH of cervical disc disease, hx vocal cord paralysis, HTN, HLD, anxiety and glaucoma presents for C7-T1 Laminoforaminotomies/C6-T2 stabilization and fusion. S/p 9/21 Extension of prior cervical fusion to T2 from C6, C7-T1 laminectomy and foraminotomy. Medicine consulted for wheeze now resolved.

## 2023-09-27 NOTE — DIETITIAN INITIAL EVALUATION ADULT - PERTINENT LABORATORY DATA
09-27    137  |  100  |  7   ----------------------------<  124<H>  4.1   |  26  |  0.70    Ca    9.3      27 Sep 2023 07:30    TPro  6.5  /  Alb  3.7  /  TBili  0.2  /  DBili  x   /  AST  27  /  ALT  26  /  AlkPhos  79  09-26  A1C with Estimated Average Glucose Result: 6.0 % (08-24-23 @ 12:25)

## 2023-09-27 NOTE — DIETITIAN INITIAL EVALUATION ADULT - REASON FOR ADMISSION
Other spondylosis with myelopathy, cervical region    Per chart: 67 year old female, with a history of cervical disc disease, presents for C7-T1 Laminoforaminotomies/C6-T2 stabilization and fusion. Pt. reports experiencing left sided neck pain which radiates down her left upper extremity & is accompanied by paresthesias. Pt. 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 s/p Arvizu Thyroplasty Implant which was removed in 11/19. Pt. reports she is able to swallow food without difficulty and that her voice is almost back to normal.

## 2023-09-27 NOTE — PROGRESS NOTE ADULT - ASSESSMENT
HPI:  67 year old female, with a history of cervical disc disease, presents for C7-T1 Laminoforaminotomies/C6-T2 stabilization and fusion. Pt. reports experiencing left sided neck pain which radiates down her left upper extremity & is accompanied by paresthesias. Pt. 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 s/p Arvizu Thyroplasty Implant which was removed in 11/19. Pt. reports she is able to swallow food without difficulty and that her voice is almost back to normal. Pt. with limited cervical ROM, case reviewed with Dr. Barroso while patient in PST and pt. has been instructed to schedule pre op evaluation/scope with Dr. Ye.    Pt. reports labwork revealed that she had COVID-19 at some point, denies symptoms. (24 Aug 2023 09:55)    PAST MEDICAL & SURGICAL HISTORY:  History of migraine      History of iron deficiency anemia      Hypertension      History of gastric ulcer  longg time aGO      History of diverticulitis      Hiatal hernia      History of endometriosis      Spinal stenosis in cervical region      Osteoarthritis of cervical spine with myelopathy      Hyperlipidemia      Gastroesophageal reflux disease without esophagitis      Glaucoma  b/l      Cervical radiculopathy      Neuropathic pain of finger, left  numbness 4th asnd 5th digit      Degenerative lumbar disc      Vocal cord paralysis      History of difficult intubation      History of IBS      Anxiety      History of hysterectomy      History of bilateral oophorectomy      History of appendectomy      History of cholecystectomy      History of carpal tunnel release      History of arthroscopy of right knee      History of tonsillectomy      S/P spinal surgery  6/17/17 - cervical region with hardware 9/2017 and 11/2017      History of fusion of cervical spine      History of thyroplasty    PROCEDURE: 9/21/23 s/p Extension of prior cervical fusion to T2 from C6, C7-T1 laminectomy and foraminotomy for Cervical spondylosis with left C7-T1 neural foraminal narrowing and left C8 radiculopathy.    PLAN:  Neuro: continue oxycodone 10mg Q3hrs PRN pain, robaxin increased 750mg Q8hrs, reminded her to ask for pain meds; will add lidoderm patch  Respiratory: patient instructed on incentive spirometer- improved today; cont duonebs ATC for now and will switch to HFA on d/c  CV: hold amlodipine, cont losartan, and metoprolol for HTN- hold parameters  Heme/Onc: stable CBC, asymptomatic acute post op blood loss anemia, likely dilutional given IVF boluses        DVT ppx: [] SQH [x] SQL and venodynes bilaterally  Renal: voiding  ID: afebrile, leukocytosis resolved may be likely due to intraop decadron; CXR with clear lungs  GI: bowel regimen  PT/OT: home PT upon d/c- likely in next 24hrs  hospitalist medicine following in consultation    discussed with Dr Mcgovern  [facility] # [id number]

## 2023-09-27 NOTE — DIETITIAN INITIAL EVALUATION ADULT - NSICDXPASTSURGICALHX_GEN_ALL_CORE_FT
PAST SURGICAL HISTORY:  History of appendectomy     History of arthroscopy of right knee     History of bilateral oophorectomy     History of carpal tunnel release     History of cholecystectomy     History of fusion of cervical spine     History of hysterectomy     History of thyroplasty     History of tonsillectomy     S/P spinal surgery 6/17/17 - cervical region with hardware 9/2017 and 11/2017

## 2023-09-27 NOTE — DIETITIAN INITIAL EVALUATION ADULT - PERTINENT MEDS FT
MEDICATIONS  (STANDING):  amLODIPine   Tablet 5 milliGRAM(s) Oral at bedtime  atorvastatin 80 milliGRAM(s) Oral at bedtime  enoxaparin Injectable 40 milliGRAM(s) SubCutaneous every 24 hours  guaiFENesin  milliGRAM(s) Oral every 12 hours  influenza  Vaccine (HIGH DOSE) 0.7 milliLiter(s) IntraMuscular once  lidocaine   4% Patch 1 Patch Transdermal daily  losartan 50 milliGRAM(s) Oral daily  methocarbamol 750 milliGRAM(s) Oral every 8 hours  metoprolol succinate ER 25 milliGRAM(s) Oral daily  pantoprazole    Tablet 40 milliGRAM(s) Oral before breakfast  polyethylene glycol 3350 17 Gram(s) Oral daily  senna 2 Tablet(s) Oral at bedtime  sertraline 50 milliGRAM(s) Oral daily  sucralfate suspension 1 Gram(s) Oral four times a day  timolol 0.25% Solution 1 Drop(s) Both EYES daily    MEDICATIONS  (PRN):  acetaminophen     Tablet .. 650 milliGRAM(s) Oral every 6 hours PRN Temp greater or equal to 38C (100.4F), Mild Pain (1 - 3)  albuterol/ipratropium for Nebulization 3 milliLiter(s) Nebulizer every 6 hours PRN Shortness of Breath and/or Wheezing  ondansetron Injectable 4 milliGRAM(s) IV Push every 6 hours PRN Nausea and/or Vomiting  oxyCODONE    IR 10 milliGRAM(s) Oral every 3 hours PRN Severe Pain (7 - 10)

## 2023-09-27 NOTE — PROGRESS NOTE ADULT - NSPROGADDITIONALINFOA_GEN_ALL_CORE
.  Kaye Summers MD  Division of Hospital Medicine  [facility]   Spectra: [phone]    Plan discussed with patient, daughter Dr. Joceline Patel via phone on 9/26, and neurosurgery STEPHANIE Fritz.  Attempted to call daughter with update today but no answer. Will try again later vs tmrw. .  Kaye Summers MD  Division of Hospital Medicine  [facility]   Spectra: [phone]    Plan discussed with patient, daughter  Joceline Amanda via phone, and neurosurgery STEPHANIE Fritz.

## 2023-09-27 NOTE — DIETITIAN INITIAL EVALUATION ADULT - OTHER INFO
- UBW: unknown.   - Dosing wt: 150.3 pounds (9/21)  - Wt hx per [facility] in pounds: 150 (8/24), 143 (4/25), 143 (12/16/21).   	- Pt denies any recent weight changes; no significant weight changes noted per chart review.   - RD to continue to monitor weight trends as able.   - Nutritionally Pertinent Meds in-house: atorvastatin, protonix.   - Nutritionally Pertinent Labs: high BG; A1c 6.0% (8/24) - pre-DM range.   - S/p Posterior fusion of cervical spine with laminectomy on 9/21.

## 2023-09-28 ENCOUNTER — TRANSCRIPTION ENCOUNTER (OUTPATIENT)
Age: 68
End: 2023-09-28

## 2023-09-28 LAB
CULTURE RESULTS: SIGNIFICANT CHANGE UP
SPECIMEN SOURCE: SIGNIFICANT CHANGE UP

## 2023-09-28 PROCEDURE — 99232 SBSQ HOSP IP/OBS MODERATE 35: CPT

## 2023-09-28 RX ADMIN — Medication 1 GRAM(S): at 11:55

## 2023-09-28 RX ADMIN — POLYETHYLENE GLYCOL 3350 17 GRAM(S): 17 POWDER, FOR SOLUTION ORAL at 11:54

## 2023-09-28 RX ADMIN — LIDOCAINE 1 PATCH: 4 CREAM TOPICAL at 23:45

## 2023-09-28 RX ADMIN — SERTRALINE 50 MILLIGRAM(S): 25 TABLET, FILM COATED ORAL at 11:53

## 2023-09-28 RX ADMIN — METHOCARBAMOL 750 MILLIGRAM(S): 500 TABLET, FILM COATED ORAL at 21:28

## 2023-09-28 RX ADMIN — OXYCODONE HYDROCHLORIDE 10 MILLIGRAM(S): 5 TABLET ORAL at 07:33

## 2023-09-28 RX ADMIN — Medication 3 MILLILITER(S): at 05:39

## 2023-09-28 RX ADMIN — ATORVASTATIN CALCIUM 80 MILLIGRAM(S): 80 TABLET, FILM COATED ORAL at 21:29

## 2023-09-28 RX ADMIN — METHOCARBAMOL 750 MILLIGRAM(S): 500 TABLET, FILM COATED ORAL at 13:19

## 2023-09-28 RX ADMIN — OXYCODONE HYDROCHLORIDE 10 MILLIGRAM(S): 5 TABLET ORAL at 17:00

## 2023-09-28 RX ADMIN — OXYCODONE HYDROCHLORIDE 10 MILLIGRAM(S): 5 TABLET ORAL at 12:55

## 2023-09-28 RX ADMIN — OXYCODONE HYDROCHLORIDE 10 MILLIGRAM(S): 5 TABLET ORAL at 16:00

## 2023-09-28 RX ADMIN — METHOCARBAMOL 750 MILLIGRAM(S): 500 TABLET, FILM COATED ORAL at 05:39

## 2023-09-28 RX ADMIN — OXYCODONE HYDROCHLORIDE 10 MILLIGRAM(S): 5 TABLET ORAL at 08:35

## 2023-09-28 RX ADMIN — OXYCODONE HYDROCHLORIDE 10 MILLIGRAM(S): 5 TABLET ORAL at 11:53

## 2023-09-28 RX ADMIN — Medication 600 MILLIGRAM(S): at 05:39

## 2023-09-28 RX ADMIN — OXYCODONE HYDROCHLORIDE 10 MILLIGRAM(S): 5 TABLET ORAL at 20:45

## 2023-09-28 RX ADMIN — ENOXAPARIN SODIUM 40 MILLIGRAM(S): 100 INJECTION SUBCUTANEOUS at 21:28

## 2023-09-28 RX ADMIN — Medication 1 GRAM(S): at 05:39

## 2023-09-28 RX ADMIN — LIDOCAINE 1 PATCH: 4 CREAM TOPICAL at 11:54

## 2023-09-28 RX ADMIN — LIDOCAINE 1 PATCH: 4 CREAM TOPICAL at 18:30

## 2023-09-28 RX ADMIN — Medication 25 MILLIGRAM(S): at 05:39

## 2023-09-28 RX ADMIN — Medication 3 MILLILITER(S): at 13:20

## 2023-09-28 RX ADMIN — Medication 1 DROP(S): at 11:55

## 2023-09-28 RX ADMIN — OXYCODONE HYDROCHLORIDE 10 MILLIGRAM(S): 5 TABLET ORAL at 21:45

## 2023-09-28 RX ADMIN — Medication 1 GRAM(S): at 17:31

## 2023-09-28 RX ADMIN — OXYCODONE HYDROCHLORIDE 10 MILLIGRAM(S): 5 TABLET ORAL at 02:02

## 2023-09-28 RX ADMIN — OXYCODONE HYDROCHLORIDE 10 MILLIGRAM(S): 5 TABLET ORAL at 03:02

## 2023-09-28 RX ADMIN — Medication 600 MILLIGRAM(S): at 17:32

## 2023-09-28 RX ADMIN — PANTOPRAZOLE SODIUM 40 MILLIGRAM(S): 20 TABLET, DELAYED RELEASE ORAL at 05:39

## 2023-09-28 NOTE — DISCHARGE NOTE NURSING/CASE MANAGEMENT/SOCIAL WORK - PATIENT PORTAL LINK FT
You can access the FollowMyHealth Patient Portal offered by [facility] by registering at the following website: [email or web address]. By joining [employer]’s FollowMyHealth portal, you will also be able to view your health information using other applications (apps) compatible with our system.

## 2023-09-28 NOTE — DISCHARGE NOTE NURSING/CASE MANAGEMENT/SOCIAL WORK - NSDCPEFALRISK_GEN_ALL_CORE
For information on Fall & Injury Prevention, visit: [email or web address] OR  [email or web address] OR  https://www.cdc.gov/steadi/patient.html

## 2023-09-28 NOTE — PROGRESS NOTE ADULT - TIME BILLING
Patient in bed, no distress.  Respiratory issues improving.  Continue PT.  DC planning, possibly home tomorrow.

## 2023-09-28 NOTE — PROGRESS NOTE ADULT - ASSESSMENT
66 yo F with PMH of cervical disc disease, hx vocal cord paralysis, HTN, HLD, anxiety and glaucoma presents for C7-T1 Laminoforaminotomies/C6-T2 stabilization and fusion. 9/21/23 s/p Extension of prior cervical fusion to T2 from C6, C7-T1 laminectomy and foraminotomy for Cervical spondylosis with left C7-T1 neural foraminal narrowing and left C8 radiculopathy.   Medicine consulted for wheeze, Treated w duonebs. BP soft. antihypertensives adjusted     Plan    Neuro stable   BP soft- Losartan discontinued. Continue Toprol 25mg QD. Not orthostatic . Medicine f/u appreciated   Resp wheeze- Duoneds prn Encouraged incentive spirometry  DVT pppx  PT- Home PT. Encouraged OOB.  D/c home in am

## 2023-09-28 NOTE — PROGRESS NOTE ADULT - NSPROGADDITIONALINFOA_GEN_ALL_CORE
.  Kaye Summers MD  Division of Hospital Medicine  [facility]   Spectra: [phone]    Plan discussed with patient, daughter  Joceline Amanda via phone, and neurosurgery NP Katya

## 2023-09-28 NOTE — PROGRESS NOTE ADULT - SUBJECTIVE AND OBJECTIVE BOX
SUBJECTIVE:   OOB to chair. Orthostatics negative. pain better controlled . Left lateral hand numbness . Strength & sensation much better on left side   OVERNIGHT EVENTS: none    Vital Signs Last 24 Hrs  T(C): 37 (28 Sep 2023 12:44), Max: 37 (28 Sep 2023 04:38)  T(F): 98.6 (28 Sep 2023 12:44), Max: 98.6 (28 Sep 2023 04:38)  HR: 88 (28 Sep 2023 12:44) (81 - 99)  BP: 129/79 (28 Sep 2023 12:44) (97/61 - 148/72)  BP(mean): --  RR: 18 (28 Sep 2023 12:44) (18 - 18)  SpO2: 95% (28 Sep 2023 12:44) (94% - 98%)    Parameters below as of 28 Sep 2023 12:44  Patient On (Oxygen Delivery Method): room air        PHYSICAL EXAM:    Constitutional: No Acute Distress     Neurological: Awake alert Ox3, Speech clear Following Commands, Moving all Extremities 5/5 LUE 4+/5 . Sensation intact . Posterior neck staples c/D/I     Pulmonary: Clear to Auscultation,     Cardiovascular: S1, S2, Regular rate and rhythm     Gastrointestinal: Soft, Non-tender, Non-distended     Extremities: No calf tenderness         LABS:                        7.8    9.61  )-----------( 299      ( 27 Sep 2023 07:30 )             23.6    09-27    137  |  100  |  7   ----------------------------<  124<H>  4.1   |  26  |  0.70    Ca    9.3      27 Sep 2023 07:30            IMAGING:         MEDICATIONS:    acetaminophen     Tablet .. 650 milliGRAM(s) Oral every 6 hours PRN Temp greater or equal to 38C (100.4F), Mild Pain (1 - 3)  methocarbamol 750 milliGRAM(s) Oral every 8 hours  ondansetron Injectable 4 milliGRAM(s) IV Push every 6 hours PRN Nausea and/or Vomiting  oxyCODONE    IR 10 milliGRAM(s) Oral every 3 hours PRN Severe Pain (7 - 10)  sertraline 50 milliGRAM(s) Oral daily  metoprolol succinate ER 25 milliGRAM(s) Oral daily  albuterol/ipratropium for Nebulization 3 milliLiter(s) Nebulizer every 6 hours PRN Shortness of Breath and/or Wheezing  guaiFENesin  milliGRAM(s) Oral every 12 hours  pantoprazole    Tablet 40 milliGRAM(s) Oral before breakfast  polyethylene glycol 3350 17 Gram(s) Oral daily  senna 2 Tablet(s) Oral at bedtime  sucralfate suspension 1 Gram(s) Oral four times a day  atorvastatin 80 milliGRAM(s) Oral at bedtime  enoxaparin Injectable 40 milliGRAM(s) SubCutaneous every 24 hours  influenza  Vaccine (HIGH DOSE) 0.7 milliLiter(s) IntraMuscular once  lidocaine   4% Patch 1 Patch Transdermal daily  timolol 0.25% Solution 1 Drop(s) Both EYES daily      DIET:

## 2023-09-28 NOTE — PROGRESS NOTE ADULT - SUBJECTIVE AND OBJECTIVE BOX
Kaye Summers MD  Division of Hospital Medicine  [facility]  Spectra: [phone]      Patient is a 67y old  Female who presents with a chief complaint of 9/21/23 s/p Extension of prior cervical fusion to T2 from C6, C7-T1 laminectomy and foraminotomy for Cervical spondylosis with left C7-T1 neural foraminal narrowing and left C8 radiculopathy. (27 Sep 2023 16:12)      SUBJECTIVE / OVERNIGHT EVENTS: no acute events overnight. BP still marginal but asymptomatic. no dizziness nor lightheadedness, chest pain, nor dyspnea. overall feels well. out of bed in chair today. orthostatics checked which were negative.  ADDITIONAL REVIEW OF SYSTEMS:    MEDICATIONS  (STANDING):  atorvastatin 80 milliGRAM(s) Oral at bedtime  enoxaparin Injectable 40 milliGRAM(s) SubCutaneous every 24 hours  guaiFENesin  milliGRAM(s) Oral every 12 hours  influenza  Vaccine (HIGH DOSE) 0.7 milliLiter(s) IntraMuscular once  lidocaine   4% Patch 1 Patch Transdermal daily  methocarbamol 750 milliGRAM(s) Oral every 8 hours  metoprolol succinate ER 25 milliGRAM(s) Oral daily  pantoprazole    Tablet 40 milliGRAM(s) Oral before breakfast  polyethylene glycol 3350 17 Gram(s) Oral daily  senna 2 Tablet(s) Oral at bedtime  sertraline 50 milliGRAM(s) Oral daily  sucralfate suspension 1 Gram(s) Oral four times a day  timolol 0.25% Solution 1 Drop(s) Both EYES daily    MEDICATIONS  (PRN):  acetaminophen     Tablet .. 650 milliGRAM(s) Oral every 6 hours PRN Temp greater or equal to 38C (100.4F), Mild Pain (1 - 3)  albuterol/ipratropium for Nebulization 3 milliLiter(s) Nebulizer every 6 hours PRN Shortness of Breath and/or Wheezing  ondansetron Injectable 4 milliGRAM(s) IV Push every 6 hours PRN Nausea and/or Vomiting  oxyCODONE    IR 10 milliGRAM(s) Oral every 3 hours PRN Severe Pain (7 - 10)      CAPILLARY BLOOD GLUCOSE        I&O's Summary    27 Sep 2023 07:01  -  28 Sep 2023 07:00  --------------------------------------------------------  IN: 800 mL / OUT: 18 mL / NET: 782 mL    28 Sep 2023 07:01  -  28 Sep 2023 13:06  --------------------------------------------------------  IN: 480 mL / OUT: 0 mL / NET: 480 mL        PHYSICAL EXAM:  Vital Signs Last 24 Hrs  T(C): 37 (28 Sep 2023 12:44), Max: 37 (28 Sep 2023 04:38)  T(F): 98.6 (28 Sep 2023 12:44), Max: 98.6 (28 Sep 2023 04:38)  HR: 88 (28 Sep 2023 12:44) (81 - 99)  BP: 129/79 (28 Sep 2023 12:44) (97/61 - 148/72)  BP(mean): --  RR: 18 (28 Sep 2023 12:44) (18 - 18)  SpO2: 95% (28 Sep 2023 12:44) (94% - 98%)    Parameters below as of 28 Sep 2023 12:44  Patient On (Oxygen Delivery Method): room air    CONSTITUTIONAL: NAD, well-developed, well-groomed  EYES: PERRLA; conjunctiva and sclera clear  ENMT: Moist oral mucosa, no pharyngeal injection or exudates; normal dentition  NECK: Supple, no palpable masses; no thyromegaly  RESPIRATORY: Normal respiratory effort; no accessory muscle, no wheeze no exam  CARDIOVASCULAR: Regular rate and rhythm, normal S1 and S2, no murmur/rub/gallop; No lower extremity edema; Peripheral pulses are 2+ bilaterally  ABDOMEN: Soft, Nondistended, Nontender to palpation, normoactive bowel sounds  MUSCULOSKELETAL: No clubbing or cyanosis of digits; no joint swelling or tenderness to palpation  PSYCH: A+O to person, place, and time; affect appropriate  NEUROLOGY: CN 2-12 are intact and symmetric; no gross sensory deficits, grossly 5/5 strength throughout  SKIN: No rashes; no palpable lesions, +posterior neck incision site with staples c/d/i    LABS:                        7.8    9.61  )-----------( 299      ( 27 Sep 2023 07:30 )             23.6     09-27    137  |  100  |  7   ----------------------------<  124<H>  4.1   |  26  |  0.70    Ca    9.3      27 Sep 2023 07:30      Urinalysis Basic - ( 27 Sep 2023 07:30 )    Color: x / Appearance: x / SG: x / pH: x  Gluc: 124 mg/dL / Ketone: x  / Bili: x / Urobili: x   Blood: x / Protein: x / Nitrite: x   Leuk Esterase: x / RBC: x / WBC x   Sq Epi: x / Non Sq Epi: x / Bacteria: x      Culture - Sputum (collected 26 Sep 2023 00:31)  Source: .Sputum Sputum  Gram Stain (26 Sep 2023 06:30):    Moderate polymorphonuclear leukocytes per low power field    No Squamous epithelial cells per low power field    Few Gram positive cocci in pairs seen per oil power field  Final Report (28 Sep 2023 11:47):    Normal Respiratory Niki present        RADIOLOGY & ADDITIONAL TESTS:  Results Reviewed:   Imaging Personally Reviewed:  Electrocardiogram Personally Reviewed:    COORDINATION OF CARE:  Care Discussed with Consultants/Other Providers [Y]: Neurosurgery NP Raine  Prior or Outpatient Records Reviewed [Y/N]:

## 2023-09-28 NOTE — PROGRESS NOTE ADULT - ASSESSMENT
66 yo F with PMH of cervical disc disease, hx vocal cord paralysis, HTN, HLD, anxiety and glaucoma presents for C7-T1 Laminoforaminotomies/C6-T2 stabilization and fusion. S/p 9/21 Extension of prior cervical fusion to T2 from C6, C7-T1 laminectomy and foraminotomy. Medicine consulted for wheeze now resolved with course c/b marginal BP.

## 2023-09-29 LAB
HCT VFR BLD CALC: 25.7 % — LOW (ref 34.5–45)
HGB BLD-MCNC: 8.4 G/DL — LOW (ref 11.5–15.5)
MCHC RBC-ENTMCNC: 29.6 PG — SIGNIFICANT CHANGE UP (ref 27–34)
MCHC RBC-ENTMCNC: 32.7 GM/DL — SIGNIFICANT CHANGE UP (ref 32–36)
MCV RBC AUTO: 90.5 FL — SIGNIFICANT CHANGE UP (ref 80–100)
NRBC # BLD: 0 /100 WBCS — SIGNIFICANT CHANGE UP (ref 0–0)
PLATELET # BLD AUTO: 378 K/UL — SIGNIFICANT CHANGE UP (ref 150–400)
RBC # BLD: 2.84 M/UL — LOW (ref 3.8–5.2)
RBC # FLD: 13.2 % — SIGNIFICANT CHANGE UP (ref 10.3–14.5)
WBC # BLD: 9.15 K/UL — SIGNIFICANT CHANGE UP (ref 3.8–10.5)
WBC # FLD AUTO: 9.15 K/UL — SIGNIFICANT CHANGE UP (ref 3.8–10.5)

## 2023-09-29 PROCEDURE — 99233 SBSQ HOSP IP/OBS HIGH 50: CPT

## 2023-09-29 PROCEDURE — 71045 X-RAY EXAM CHEST 1 VIEW: CPT | Mod: 26

## 2023-09-29 RX ORDER — AMLODIPINE BESYLATE 2.5 MG/1
1 TABLET ORAL
Refills: 0 | DISCHARGE

## 2023-09-29 RX ORDER — METHOCARBAMOL 500 MG/1
1 TABLET, FILM COATED ORAL
Refills: 0 | DISCHARGE

## 2023-09-29 RX ORDER — IRBESARTAN 75 MG/1
1 TABLET ORAL
Qty: 0 | Refills: 0 | DISCHARGE

## 2023-09-29 RX ORDER — LIDOCAINE 4 G/100G
1 CREAM TOPICAL
Qty: 2 | Refills: 0
Start: 2023-09-29

## 2023-09-29 RX ORDER — ACETAMINOPHEN 500 MG
2 TABLET ORAL
Qty: 0 | Refills: 0 | DISCHARGE
Start: 2023-09-29

## 2023-09-29 RX ORDER — ONDANSETRON 8 MG/1
1 TABLET, FILM COATED ORAL
Qty: 1 | Refills: 0
Start: 2023-09-29

## 2023-09-29 RX ORDER — METHOCARBAMOL 500 MG/1
1 TABLET, FILM COATED ORAL
Qty: 42 | Refills: 0
Start: 2023-09-29 | End: 2023-10-12

## 2023-09-29 RX ORDER — OXYCODONE HYDROCHLORIDE 5 MG/1
1 TABLET ORAL
Qty: 30 | Refills: 0
Start: 2023-09-29

## 2023-09-29 RX ORDER — ASPIRIN/CALCIUM CARB/MAGNESIUM 324 MG
1 TABLET ORAL
Refills: 0 | DISCHARGE

## 2023-09-29 RX ORDER — IPRATROPIUM/ALBUTEROL SULFATE 18-103MCG
3 AEROSOL WITH ADAPTER (GRAM) INHALATION
Qty: 20 | Refills: 0
Start: 2023-09-29

## 2023-09-29 RX ADMIN — LIDOCAINE 1 PATCH: 4 CREAM TOPICAL at 19:26

## 2023-09-29 RX ADMIN — Medication 1 GRAM(S): at 17:39

## 2023-09-29 RX ADMIN — METHOCARBAMOL 750 MILLIGRAM(S): 500 TABLET, FILM COATED ORAL at 21:38

## 2023-09-29 RX ADMIN — OXYCODONE HYDROCHLORIDE 10 MILLIGRAM(S): 5 TABLET ORAL at 01:33

## 2023-09-29 RX ADMIN — Medication 25 MILLIGRAM(S): at 05:14

## 2023-09-29 RX ADMIN — PANTOPRAZOLE SODIUM 40 MILLIGRAM(S): 20 TABLET, DELAYED RELEASE ORAL at 05:14

## 2023-09-29 RX ADMIN — Medication 1 DROP(S): at 11:48

## 2023-09-29 RX ADMIN — ATORVASTATIN CALCIUM 80 MILLIGRAM(S): 80 TABLET, FILM COATED ORAL at 21:38

## 2023-09-29 RX ADMIN — ENOXAPARIN SODIUM 40 MILLIGRAM(S): 100 INJECTION SUBCUTANEOUS at 20:02

## 2023-09-29 RX ADMIN — METHOCARBAMOL 750 MILLIGRAM(S): 500 TABLET, FILM COATED ORAL at 13:46

## 2023-09-29 RX ADMIN — ONDANSETRON 4 MILLIGRAM(S): 8 TABLET, FILM COATED ORAL at 11:41

## 2023-09-29 RX ADMIN — Medication 600 MILLIGRAM(S): at 17:38

## 2023-09-29 RX ADMIN — Medication 1 GRAM(S): at 23:55

## 2023-09-29 RX ADMIN — METHOCARBAMOL 750 MILLIGRAM(S): 500 TABLET, FILM COATED ORAL at 05:14

## 2023-09-29 RX ADMIN — OXYCODONE HYDROCHLORIDE 10 MILLIGRAM(S): 5 TABLET ORAL at 06:13

## 2023-09-29 RX ADMIN — OXYCODONE HYDROCHLORIDE 10 MILLIGRAM(S): 5 TABLET ORAL at 02:33

## 2023-09-29 RX ADMIN — LIDOCAINE 1 PATCH: 4 CREAM TOPICAL at 23:56

## 2023-09-29 RX ADMIN — LIDOCAINE 1 PATCH: 4 CREAM TOPICAL at 11:44

## 2023-09-29 RX ADMIN — Medication 3 MILLILITER(S): at 04:44

## 2023-09-29 RX ADMIN — OXYCODONE HYDROCHLORIDE 10 MILLIGRAM(S): 5 TABLET ORAL at 09:19

## 2023-09-29 RX ADMIN — SERTRALINE 50 MILLIGRAM(S): 25 TABLET, FILM COATED ORAL at 11:43

## 2023-09-29 RX ADMIN — ONDANSETRON 4 MILLIGRAM(S): 8 TABLET, FILM COATED ORAL at 20:01

## 2023-09-29 RX ADMIN — OXYCODONE HYDROCHLORIDE 10 MILLIGRAM(S): 5 TABLET ORAL at 20:31

## 2023-09-29 RX ADMIN — OXYCODONE HYDROCHLORIDE 10 MILLIGRAM(S): 5 TABLET ORAL at 20:01

## 2023-09-29 RX ADMIN — POLYETHYLENE GLYCOL 3350 17 GRAM(S): 17 POWDER, FOR SOLUTION ORAL at 11:42

## 2023-09-29 RX ADMIN — OXYCODONE HYDROCHLORIDE 10 MILLIGRAM(S): 5 TABLET ORAL at 09:49

## 2023-09-29 RX ADMIN — OXYCODONE HYDROCHLORIDE 10 MILLIGRAM(S): 5 TABLET ORAL at 23:46

## 2023-09-29 RX ADMIN — Medication 1 GRAM(S): at 05:14

## 2023-09-29 RX ADMIN — OXYCODONE HYDROCHLORIDE 10 MILLIGRAM(S): 5 TABLET ORAL at 05:13

## 2023-09-29 RX ADMIN — OXYCODONE HYDROCHLORIDE 10 MILLIGRAM(S): 5 TABLET ORAL at 13:50

## 2023-09-29 RX ADMIN — OXYCODONE HYDROCHLORIDE 10 MILLIGRAM(S): 5 TABLET ORAL at 13:20

## 2023-09-29 RX ADMIN — Medication 1 GRAM(S): at 11:41

## 2023-09-29 RX ADMIN — Medication 600 MILLIGRAM(S): at 05:14

## 2023-09-29 NOTE — PROGRESS NOTE ADULT - ASSESSMENT
68 yo F with PMH of cervical disc disease, hx vocal cord paralysis, HTN, HLD, anxiety and glaucoma presents for C7-T1 Laminoforaminotomies/C6-T2 stabilization and fusion. S/p 9/21 Extension of prior cervical fusion to T2 from C6, C7-T1 laminectomy and foraminotomy. Medicine consulted for wheeze now resolved with course c/b marginal BP.
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
## 2023-09-29 NOTE — PROGRESS NOTE ADULT - SUBJECTIVE AND OBJECTIVE BOX
Patient is a 67y old  Female who presents with a chief complaint of 9/21/23 s/p Extension of prior cervical fusion to T2 from C6, C7-T1 laminectomy and foraminotomy for Cervical spondylosis with left C7-T1 neural foraminal narrowing and left C8 radiculopathy.       SUBJECTIVE / OVERNIGHT EVENTS: Intermittent SOB and cough but improved with duonebs. Denies CP, lightheadedness, dizziness, abd pain, dysuria.       MEDICATIONS  (STANDING):  atorvastatin 80 milliGRAM(s) Oral at bedtime  enoxaparin Injectable 40 milliGRAM(s) SubCutaneous every 24 hours  guaiFENesin  milliGRAM(s) Oral every 12 hours  influenza  Vaccine (HIGH DOSE) 0.7 milliLiter(s) IntraMuscular once  lidocaine   4% Patch 1 Patch Transdermal daily  methocarbamol 750 milliGRAM(s) Oral every 8 hours  metoprolol succinate ER 25 milliGRAM(s) Oral daily  pantoprazole    Tablet 40 milliGRAM(s) Oral before breakfast  polyethylene glycol 3350 17 Gram(s) Oral daily  senna 2 Tablet(s) Oral at bedtime  sertraline 50 milliGRAM(s) Oral daily  sucralfate suspension 1 Gram(s) Oral four times a day  timolol 0.25% Solution 1 Drop(s) Both EYES daily    MEDICATIONS  (PRN):  acetaminophen     Tablet .. 650 milliGRAM(s) Oral every 6 hours PRN Temp greater or equal to 38C (100.4F), Mild Pain (1 - 3)  albuterol/ipratropium for Nebulization 3 milliLiter(s) Nebulizer every 6 hours PRN Shortness of Breath and/or Wheezing  ondansetron Injectable 4 milliGRAM(s) IV Push every 6 hours PRN Nausea and/or Vomiting  oxyCODONE    IR 10 milliGRAM(s) Oral every 3 hours PRN Severe Pain (7 - 10)      CAPILLARY BLOOD GLUCOSE        I&O's Summary    27 Sep 2023 07:01  -  28 Sep 2023 07:00  --------------------------------------------------------  IN: 800 mL / OUT: 18 mL / NET: 782 mL    28 Sep 2023 07:01  -  28 Sep 2023 13:06  --------------------------------------------------------  IN: 480 mL / OUT: 0 mL / NET: 480 mL        PHYSICAL EXAM:  Vital Signs Last 24 Hrs  T(C): 37 (28 Sep 2023 12:44), Max: 37 (28 Sep 2023 04:38)  T(F): 98.6 (28 Sep 2023 12:44), Max: 98.6 (28 Sep 2023 04:38)  HR: 88 (28 Sep 2023 12:44) (81 - 99)  BP: 129/79 (28 Sep 2023 12:44) (97/61 - 148/72)  BP(mean): --  RR: 18 (28 Sep 2023 12:44) (18 - 18)  SpO2: 95% (28 Sep 2023 12:44) (94% - 98%)    Parameters below as of 28 Sep 2023 12:44  Patient On (Oxygen Delivery Method): room air    CONSTITUTIONAL: NAD, well-developed, well-groomed  EYES: EOMI; conjunctiva and sclera clear  ENMT: Moist oral mucosa, no pharyngeal injection or exudates  NECK: Supple, trachea midline   RESPIRATORY: On RA. Normal respiratory effort; no accessory muscle, no wheeze no exam  CARDIOVASCULAR: Regular rate and rhythm, normal S1 and S2; No lower extremity edema  ABDOMEN: Soft, Nondistended, Nontender to palpation, normoactive bowel sounds  MUSCULOSKELETAL: moves all extremities   PSYCH: A+O to person, place, and time; affect appropriate  NEUROLOGY: CN 2-12 are intact and symmetric; no gross sensory deficits, grossly 5/5 strength throughout    LABS:                         8.4    9.15  )-----------( 378      ( 29 Sep 2023 06:17 )             25.7

## 2023-09-29 NOTE — PROGRESS NOTE ADULT - PROBLEM SELECTOR PLAN 4
takes rosuvastatin 40mg qHS at home  - c/w atorvastatin 80mg qHS while inpatient  - can resume home rosuvastatin on discharge

## 2023-09-29 NOTE — PROGRESS NOTE ADULT - PROBLEM SELECTOR PLAN 1
dyspnea improved and wheeze resolved on exam.  - satting well on RA  - no signs/symptoms of active of infection  - leukocytosis resolved, procal negative  - CXR with clear lungs  - RVP negative, sputum cx with normal respiratory patricia  - no role for abx or further infectious work-up at this time  - change duonebs to PRN today  - c/w mucinex BID to help expectorate  - would benefit from repeat full PFTs as outpatient
improved. wheeze significantly improved  - satting well on RA  - no signs/symptoms of active of infection  - leukocytosis resolved, procal negative  - CXR with clear lungs  - RVP negative  - no role for abx or further infectious work-up at this time  - c/w duonebs ATC today and change to PRN vs albuterol HFA PRN tomorrow
- post-op care as per neurosurgery team  - monitor drain output
- post-op care as per neurosurgery team  - monitor drain output

## 2023-09-29 NOTE — PROGRESS NOTE ADULT - ASSESSMENT
66 yo F with PMH of cervical disc disease, hx vocal cord paralysis, HTN, HLD, anxiety and glaucoma presents for C7-T1 Laminoforaminotomies/C6-T2 stabilization and fusion. 9/21/23 s/p Extension of prior cervical fusion to T2 from C6, C7-T1 laminectomy and foraminotomy for Cervical spondylosis with left C7-T1 neural foraminal narrowing and left C8 radiculopathy.   Medicine consulted for wheeze, Treated w kehindebs. BP soft. antihypertensives adjusted     Plan    Neuro stable   Vitals stable. Continue Toprol 25mg QD. Not orthostatic . Outpt f/u with Dr Cee to restart BP meds as tolerated   Resp wheeze- Duonebs prn Encouraged incentive spirometry & Out of bed. D/w hospitalist . Cxr reviewed. Recs outpt pulmonary follow up & prn nebs for discharge . (pt has pulmonologist)  DVT ppx  labs acceptable  PT- Home PT. D/c home today  D/w Hospitalist Dr Agrawal & Dr Mcgovern

## 2023-09-29 NOTE — PROGRESS NOTE ADULT - PROBLEM SELECTOR PLAN 6
- c/w home sertraline 50mg qHS

## 2023-09-29 NOTE — PROGRESS NOTE ADULT - PROBLEM SELECTOR PLAN 2
takes amlodipine 5mg daily and avapro 150mg daily at home  - BP has remained marginal with SBP in low 100s - not symptomatic  - checked orthostatics today, which were negative  - continue to hold amlodipine 5mg PO qHS  - did not meet hold parameters for losartan 25mg PO daily so discontinued  - c/w Toprol XL 25mg PO daily  - monitor vitals next 24 hours. if has repeat episode of hypotension with SBP in 90s, give 1L NS bolus over 2 hours  - anticipate will need to be discharged on Toprol only vs. Toprol + low dose ARB, but will be able to better assess tomorrow after reviewing today's BP trends
- post-op care as per neurosurgery team  - monitor ZOE output
- post-op care as per neurosurgery team  - monitor drain output
takes amlodipine 5mg daily and avapro 150mg daily at home  - BP has remained marginal with SBP in low 100s - not symptomatic  - orthostatics negative  - continue to hold amlodipine 5mg PO qHS and avapro  - c/w Toprol XL 25mg PO daily  - D/w daughter over the phone - Will d/c on toprol. Rec continued BP monitoring outpatient. F/u 1 wk from discharge with PCP for BP check and BP medication titration

## 2023-09-29 NOTE — PROGRESS NOTE ADULT - PROBLEM SELECTOR PROBLEM 5
LPRD (laryngopharyngeal reflux disease)

## 2023-09-29 NOTE — PROGRESS NOTE ADULT - PROBLEM SELECTOR PLAN 3
dyspnea improved and wheeze resolved on exam.  - satting well on RA  - no signs/symptoms of active of infection  - leukocytosis resolved, procal negative  - CXR with clear lungs  - RVP negative, sputum cx with normal respiratory patricia  - no role for abx or further infectious work-up at this time  - c/w duonebs PRN - changed to albuterol HFA on d/c  - c/w mucinex BID to help expectorate  - would benefit from repeat full PFTs as outpatient - d/w daughter
takes amlodipine 5mg daily and avapro 150mg daily at home  - BP marginal  - c/w amlodipine 5mg qHS, reordered with hold parameters  - c/w losartan 50mg daily (equivalent of home avapro), reordered with hold parameters  - may need to decrease above regimen if BP continues to be marginal
takes amlodipine 5mg daily and avapro 150mg daily at home  - BP marginal, downtrended today  - stop amlodipine 5mg qHS  - decrease losartan to 25mg PO daily starting 9/28  - c/w Toprol XL 25mg PO daily
dyspnea improved and wheeze resolved on exam  - satting well on RA   - no signs/symptoms of active of infection  - leukocytosis resolved, procal negative  - CXR with clear lungs  - RVP negative, sputum cx with normal respiratory patricia  - no role for abx or further infectious work-up at this time  - c/w duonebs PRN - changed to albuterol HFA on d/c  - c/w mucinex BID to help expectorate  - would benefit from repeat full PFTs as outpatient - d/w daughter

## 2023-09-29 NOTE — PROGRESS NOTE ADULT - PROBLEM SELECTOR PLAN 5
seen on laryngoscopy by ENT  - c/w pantoprazole 40mg daily  - c/w carafate 1g QID
seen on laryngoscopy by ENT  - c/w pantoprazole 40mg daily  - c/w carafate 1g QID  - F/u ENT outpatient

## 2023-09-29 NOTE — PROGRESS NOTE ADULT - SUBJECTIVE AND OBJECTIVE BOX
SUBJECTIVE:   Intermittent cough.   OVERNIGHT EVENTS: none    Vital Signs Last 24 Hrs  T(C): 36.9 (29 Sep 2023 08:47), Max: 37.4 (28 Sep 2023 21:08)  T(F): 98.4 (29 Sep 2023 08:47), Max: 99.4 (28 Sep 2023 21:08)  HR: 88 (29 Sep 2023 08:47) (88 - 99)  BP: 110/60 (29 Sep 2023 08:47) (110/60 - 137/71)  BP(mean): --  RR: 18 (29 Sep 2023 08:47) (18 - 18)  SpO2: 96% (29 Sep 2023 08:47) (93% - 96%)    Parameters below as of 29 Sep 2023 08:47  Patient On (Oxygen Delivery Method): room air        PHYSICAL EXAM:    Constitutional: No Acute Distress     Neurological: Awake alert Ox3, Speech clear Following Commands, Moving all Extremities 5/5 LUE 4+/5 . Sensation intact . Posterior neck staples c/D/I . Left forehead pin site staples     Pulmonary: Clear to Auscultation, upper airway rhonchi clears with cough     Cardiovascular: S1, S2, +     Gastrointestinal: Soft, Non-tender, Non-distended     Extremities: No calf tenderness     LABS:                        8.4    9.15  )-----------( 378      ( 29 Sep 2023 06:17 )             25.7              IMAGIN/29 CXR-     MEDICATIONS:    acetaminophen     Tablet .. 650 milliGRAM(s) Oral every 6 hours PRN Temp greater or equal to 38C (100.4F), Mild Pain (1 - 3)  methocarbamol 750 milliGRAM(s) Oral every 8 hours  ondansetron Injectable 4 milliGRAM(s) IV Push every 6 hours PRN Nausea and/or Vomiting  oxyCODONE    IR 10 milliGRAM(s) Oral every 3 hours PRN Severe Pain (7 - 10)  sertraline 50 milliGRAM(s) Oral daily  metoprolol succinate ER 25 milliGRAM(s) Oral daily  albuterol/ipratropium for Nebulization 3 milliLiter(s) Nebulizer every 6 hours PRN Shortness of Breath and/or Wheezing  guaiFENesin  milliGRAM(s) Oral every 12 hours  pantoprazole    Tablet 40 milliGRAM(s) Oral before breakfast  polyethylene glycol 3350 17 Gram(s) Oral daily  senna 2 Tablet(s) Oral at bedtime  sucralfate suspension 1 Gram(s) Oral four times a day  atorvastatin 80 milliGRAM(s) Oral at bedtime  enoxaparin Injectable 40 milliGRAM(s) SubCutaneous every 24 hours  influenza  Vaccine (HIGH DOSE) 0.7 milliLiter(s) IntraMuscular once  lidocaine   4% Patch 1 Patch Transdermal daily  timolol 0.25% Solution 1 Drop(s) Both EYES daily      DIET:

## 2023-09-30 VITALS
OXYGEN SATURATION: 97 % | TEMPERATURE: 99 F | DIASTOLIC BLOOD PRESSURE: 77 MMHG | RESPIRATION RATE: 18 BRPM | SYSTOLIC BLOOD PRESSURE: 135 MMHG | HEART RATE: 92 BPM

## 2023-09-30 RX ADMIN — Medication 1 GRAM(S): at 05:09

## 2023-09-30 RX ADMIN — PANTOPRAZOLE SODIUM 40 MILLIGRAM(S): 20 TABLET, DELAYED RELEASE ORAL at 05:10

## 2023-09-30 RX ADMIN — OXYCODONE HYDROCHLORIDE 10 MILLIGRAM(S): 5 TABLET ORAL at 04:19

## 2023-09-30 RX ADMIN — ONDANSETRON 4 MILLIGRAM(S): 8 TABLET, FILM COATED ORAL at 03:49

## 2023-09-30 RX ADMIN — Medication 600 MILLIGRAM(S): at 05:10

## 2023-09-30 RX ADMIN — METHOCARBAMOL 750 MILLIGRAM(S): 500 TABLET, FILM COATED ORAL at 05:10

## 2023-09-30 RX ADMIN — OXYCODONE HYDROCHLORIDE 10 MILLIGRAM(S): 5 TABLET ORAL at 11:38

## 2023-09-30 RX ADMIN — OXYCODONE HYDROCHLORIDE 10 MILLIGRAM(S): 5 TABLET ORAL at 03:49

## 2023-09-30 RX ADMIN — Medication 650 MILLIGRAM(S): at 03:32

## 2023-09-30 RX ADMIN — Medication 650 MILLIGRAM(S): at 03:02

## 2023-09-30 RX ADMIN — OXYCODONE HYDROCHLORIDE 10 MILLIGRAM(S): 5 TABLET ORAL at 10:38

## 2023-09-30 RX ADMIN — ONDANSETRON 4 MILLIGRAM(S): 8 TABLET, FILM COATED ORAL at 10:39

## 2023-09-30 RX ADMIN — OXYCODONE HYDROCHLORIDE 10 MILLIGRAM(S): 5 TABLET ORAL at 00:16

## 2023-09-30 RX ADMIN — Medication 25 MILLIGRAM(S): at 05:10

## 2023-09-30 NOTE — PROGRESS NOTE ADULT - THIS PATIENT HAS THE FOLLOWING CONDITION(S)/DIAGNOSES ON THIS ADMISSION:
None
post op blood loss anemia -stable/Acute Blood Loss Anemia

## 2023-09-30 NOTE — PROGRESS NOTE ADULT - ASSESSMENT
HPI:  68 yo F with PMH of cervical disc disease, hx vocal cord paralysis, HTN, HLD, anxiety and glaucoma presents for C7-T1 Laminoforaminotomies/C6-T2 stabilization and fusion. 9/21/23 s/p Extension of prior cervical fusion to T2 from C6, C7-T1 laminectomy and foraminotomy for Cervical spondylosis with left C7-T1 neural foraminal narrowing and left C8 radiculopathy.   Medicine consulted for wheeze, Treated w jose luis. BP soft. antihypertensives adjusted     Plan    Neuro stable   Vitals stable. Continue Toprol 25mg QD. Not orthostatic . Outpt f/u with Dr Cee to restart BP meds as tolerated   Resp wheeze- Duonebs prn Encouraged incentive spirometry & Out of bed. D/w hospitalist . Cxr reviewed. Recs outpt pulmonary follow up & prn nebs for discharge . (pt has pulmonologist)  DVT ppx  labs acceptable  PT- Home PT. D/c home today  D/w Hospitalist Dr Agrawal & Dr Mcgovern

## 2023-09-30 NOTE — PROGRESS NOTE ADULT - PROVIDER SPECIALTY LIST ADULT
Neurosurgery
Hospitalist
Internal Medicine

## 2023-09-30 NOTE — PROGRESS NOTE ADULT - SUBJECTIVE AND OBJECTIVE BOX
SUBJECTIVE:   Patient was seen and evaluated at bedside. Patient is resting in bed and is in no new acute distress .  OVERNIGHT EVENTS:   none   Vital Signs Last 24 Hrs  T(C): 37.1 (30 Sep 2023 08:27), Max: 37.3 (29 Sep 2023 16:09)  T(F): 98.7 (30 Sep 2023 08:27), Max: 99.1 (29 Sep 2023 16:09)  HR: 92 (30 Sep 2023 08:27) (69 - 98)  BP: 135/77 (30 Sep 2023 08:27) (113/65 - 135/77)  BP(mean): --  RR: 18 (30 Sep 2023 08:27) (18 - 18)  SpO2: 97% (30 Sep 2023 08:27) (93% - 98%)    Parameters below as of 30 Sep 2023 08:27  Patient On (Oxygen Delivery Method): room air        PHYSICAL EXAM:    General: No Acute Distress     Neurological: Awake, alert oriented to person, place and time, Following Commands, PERRL, EOMI, Face Symmetrical, Speech Fluent, Moving all extremities, Muscle Strength normal in all four extremities, No Drift, Sensation to Light Touch Intact    Pulmonary: Clear to Auscultation, No Rales, No Rhonchi, No Wheezes     Cardiovascular: S1, S2, Regular Rate and Rhythm     Gastrointestinal: Soft, Nontender, Nondistended     Incision:   clean and dry   LABS:                        8.4    9.15  )-----------( 378      ( 29 Sep 2023 06:17 )             25.7              09-29 @ 07:01  -  09-30 @ 07:00  --------------------------------------------------------  IN: 280 mL / OUT: 200 mL / NET: 80 mL      DRAINS:     MEDICATIONS:  Antibiotics:    Neuro:  acetaminophen     Tablet .. 650 milliGRAM(s) Oral every 6 hours PRN Temp greater or equal to 38C (100.4F), Mild Pain (1 - 3)  methocarbamol 750 milliGRAM(s) Oral every 8 hours  ondansetron Injectable 4 milliGRAM(s) IV Push every 6 hours PRN Nausea and/or Vomiting  oxyCODONE    IR 10 milliGRAM(s) Oral every 3 hours PRN Severe Pain (7 - 10)  sertraline 50 milliGRAM(s) Oral daily    Cardiac:  metoprolol succinate ER 25 milliGRAM(s) Oral daily    Pulm:  albuterol/ipratropium for Nebulization 3 milliLiter(s) Nebulizer every 6 hours PRN Shortness of Breath and/or Wheezing  guaiFENesin  milliGRAM(s) Oral every 12 hours    GI/:  pantoprazole    Tablet 40 milliGRAM(s) Oral before breakfast  polyethylene glycol 3350 17 Gram(s) Oral daily  senna 2 Tablet(s) Oral at bedtime  sucralfate suspension 1 Gram(s) Oral four times a day    Other:   atorvastatin 80 milliGRAM(s) Oral at bedtime  enoxaparin Injectable 40 milliGRAM(s) SubCutaneous every 24 hours  influenza  Vaccine (HIGH DOSE) 0.7 milliLiter(s) IntraMuscular once  lidocaine   4% Patch 1 Patch Transdermal daily  timolol 0.25% Solution 1 Drop(s) Both EYES daily    DIET: [] Regular [] CCD [] Renal [] Puree [] Dysphagia [] Tube Feeds:   regular   IMAGING:   no new imaging today

## 2023-09-30 NOTE — PROGRESS NOTE ADULT - REASON FOR ADMISSION
9/21/23 s/p Extension of prior cervical fusion to T2 from C6, C7-T1 laminectomy and foraminotomy for Cervical spondylosis with left C7-T1 neural foraminal narrowing and left C8 radiculopathy.
Patient was admitted for h/o cervical spondylosis with myelopathy.
9/21/23 s/p Extension of prior cervical fusion to T2 from C6, C7-T1 laminectomy and foraminotomy for Cervical spondylosis with left C7-T1 neural foraminal narrowing and left C8 radiculopathy.
9/21/23 s/p Extension of prior cervical fusion to T2 from C6, C7-T1 laminectomy and foraminotomy for Cervical spondylosis with left C7-T1 neural foraminal narrowing and left C8 radiculopathy.
Cervical Spondylosis with Myelopathy
back pain

## 2023-09-30 NOTE — PROGRESS NOTE ADULT - SUBJECTIVE AND OBJECTIVE BOX
HPI:  67 year old female, with a history of cervical disc disease, presents for C7-T1 Laminoforaminotomies/C6-T2 stabilization and fusion. Pt. reports experiencing left sided neck pain which radiates down her left upper extremity & is accompanied by paresthesias. Pt. 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 s/p Arvizu Thyroplasty Implant which was removed in 11/19. Pt. reports she is able to swallow food without difficulty and that her voice is almost back to normal. Pt. with limited cervical ROM, case reviewed with Dr. Barroso while patient in PST and pt. has been instructed to schedule pre op evaluation/scope with Dr. Ye.    Pt. reports labwork revealed that she had COVID-19 at some point, denies symptoms. (24 Aug 2023 09:55)    OVERNIGHT EVENTS:  Vital Signs Last 24 Hrs  T(C): 37.1 (30 Sep 2023 08:27), Max: 37.3 (29 Sep 2023 16:09)  T(F): 98.7 (30 Sep 2023 08:27), Max: 99.1 (29 Sep 2023 16:09)  HR: 92 (30 Sep 2023 08:27) (69 - 98)  BP: 135/77 (30 Sep 2023 08:27) (107/76 - 135/77)  BP(mean): --  RR: 18 (30 Sep 2023 08:27) (18 - 18)  SpO2: 97% (30 Sep 2023 08:27) (93% - 98%)    Parameters below as of 30 Sep 2023 08:27  Patient On (Oxygen Delivery Method): room air        I&O's Detail    29 Sep 2023 07:01  -  30 Sep 2023 07:00  --------------------------------------------------------  IN:    Oral Fluid: 280 mL  Total IN: 280 mL    OUT:    Voided (mL): 200 mL  Total OUT: 200 mL    Total NET: 80 mL        I&O's Summary    29 Sep 2023 07:01  -  30 Sep 2023 07:00  --------------------------------------------------------  IN: 280 mL / OUT: 200 mL / NET: 80 mL        PHYSICAL EXAM:  Neurological:    Motor exam:         [x] Upper extremity                 D             B          T          WE       WF      HI                                               R         5/5        5/5        5/5       5/5     5/5       5/5                                               L          5/5        5/5        5/5       5/5     5/5       5/5         [x] Lower extremity                Ps          Ha        Quad    EHL        FHL                                               R        5/5        5/5        5/5       5/5         5/5                                               L         5/5        5/5       5/5       5/5          5/5                                                        [] warm well perfused; capillary refill <3 seconds     Sensation: [x] intact to light touch  [] decreased:     Gait Ambulating w/ RW    Cardiovascular:  Respiratory:  Gastrointestinal:  Genitourinary:  Extremities:  Incision/Wound:    TUBES/LINES:  [] CVC  [] A-line  [] Lumbar Drain  [] Ventriculostomy  [] Other    DIET:  [] NPO  [] Mechanical  [] Tube feeds    LABS:                        8.4    9.15  )-----------( 378      ( 29 Sep 2023 06:17 )             25.7                   CAPILLARY BLOOD GLUCOSE              Drug Levels: [] N/A    CSF Analysis: [] N/A      Allergies    No Known Allergies    Intolerances    morphine (Nausea; Vomiting)  Percocet 5/325 (Nausea; Vomiting)    MEDICATIONS:  Antibiotics:    Neuro:  acetaminophen     Tablet .. 650 milliGRAM(s) Oral every 6 hours PRN  methocarbamol 750 milliGRAM(s) Oral every 8 hours  ondansetron Injectable 4 milliGRAM(s) IV Push every 6 hours PRN  oxyCODONE    IR 10 milliGRAM(s) Oral every 3 hours PRN  sertraline 50 milliGRAM(s) Oral daily    Anticoagulation:  enoxaparin Injectable 40 milliGRAM(s) SubCutaneous every 24 hours    OTHER:  albuterol/ipratropium for Nebulization 3 milliLiter(s) Nebulizer every 6 hours PRN  atorvastatin 80 milliGRAM(s) Oral at bedtime  guaiFENesin  milliGRAM(s) Oral every 12 hours  influenza  Vaccine (HIGH DOSE) 0.7 milliLiter(s) IntraMuscular once  lidocaine   4% Patch 1 Patch Transdermal daily  metoprolol succinate ER 25 milliGRAM(s) Oral daily  pantoprazole    Tablet 40 milliGRAM(s) Oral before breakfast  polyethylene glycol 3350 17 Gram(s) Oral daily  senna 2 Tablet(s) Oral at bedtime  sucralfate suspension 1 Gram(s) Oral four times a day  timolol 0.25% Solution 1 Drop(s) Both EYES daily    IVF:    CULTURES:  Culture Results:   Normal Respiratory Niki present (09-26 @ 00:31)    RADIOLOGY & ADDITIONAL TESTS:      ASSESSMENT:  HPI:  67 year old female, with a history of cervical disc disease, presents for C7-T1 Laminoforaminotomies/C6-T2 stabilization and fusion. Pt. reports experiencing left sided neck pain which radiates down her left upper extremity & is accompanied by paresthesias. Pt. 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 s/p Arvizu Thyroplasty Implant which was removed in 11/19. Pt. reports she is able to swallow food without difficulty and that her voice is almost back to normal. Pt. with limited cervical ROM, case reviewed with Dr. Barroso while patient in PST and pt. has been instructed to schedule pre op evaluation/scope with Dr. Ye.    Pt. reports labwork revealed that she had COVID-19 at some point, denies symptoms. (24 Aug 2023 09:55)    67y Female s/p    PLAN:  NEURO:  CARDIOVASCULAR:  PULMONARY:  RENAL:  GI:  HEME:  ID:  ENDO:    DVT PROPHYLAXIS:  [] Venodynes                                [x Heparin/Lovenox    FALL RISK:  [] Low Risk                                    [] Impulsive

## 2023-10-10 PROBLEM — F41.9 ANXIETY DISORDER, UNSPECIFIED: Chronic | Status: ACTIVE | Noted: 2023-08-24

## 2023-10-10 PROBLEM — Z87.19 PERSONAL HISTORY OF OTHER DISEASES OF THE DIGESTIVE SYSTEM: Chronic | Status: ACTIVE | Noted: 2023-08-24

## 2023-10-10 PROBLEM — Z91.89 OTHER SPECIFIED PERSONAL RISK FACTORS, NOT ELSEWHERE CLASSIFIED: Chronic | Status: ACTIVE | Noted: 2023-08-24

## 2023-11-01 ENCOUNTER — APPOINTMENT (OUTPATIENT)
Dept: OTOLARYNGOLOGY | Facility: CLINIC | Age: 68
End: 2023-11-01
Payer: MEDICARE

## 2023-11-01 DIAGNOSIS — R13.12 DYSPHAGIA, OROPHARYNGEAL PHASE: ICD-10-CM

## 2023-11-01 DIAGNOSIS — M54.12 RADICULOPATHY, CERVICAL REGION: ICD-10-CM

## 2023-11-01 DIAGNOSIS — J38.01 PARALYSIS OF VOCAL CORDS AND LARYNX, UNILATERAL: ICD-10-CM

## 2023-11-01 PROCEDURE — 31575 DIAGNOSTIC LARYNGOSCOPY: CPT

## 2023-11-01 PROCEDURE — 99213 OFFICE O/P EST LOW 20 MIN: CPT | Mod: 25

## 2023-11-13 PROCEDURE — 83880 ASSAY OF NATRIURETIC PEPTIDE: CPT

## 2023-11-13 PROCEDURE — 80053 COMPREHEN METABOLIC PANEL: CPT

## 2023-11-13 PROCEDURE — 97535 SELF CARE MNGMENT TRAINING: CPT

## 2023-11-13 PROCEDURE — 85025 COMPLETE CBC W/AUTO DIFF WBC: CPT

## 2023-11-13 PROCEDURE — 84295 ASSAY OF SERUM SODIUM: CPT

## 2023-11-13 PROCEDURE — 36415 COLL VENOUS BLD VENIPUNCTURE: CPT

## 2023-11-13 PROCEDURE — 85014 HEMATOCRIT: CPT

## 2023-11-13 PROCEDURE — 87070 CULTURE OTHR SPECIMN AEROBIC: CPT

## 2023-11-13 PROCEDURE — 86850 RBC ANTIBODY SCREEN: CPT

## 2023-11-13 PROCEDURE — 97116 GAIT TRAINING THERAPY: CPT

## 2023-11-13 PROCEDURE — 93970 EXTREMITY STUDY: CPT

## 2023-11-13 PROCEDURE — 97165 OT EVAL LOW COMPLEX 30 MIN: CPT

## 2023-11-13 PROCEDURE — 97530 THERAPEUTIC ACTIVITIES: CPT

## 2023-11-13 PROCEDURE — 82330 ASSAY OF CALCIUM: CPT

## 2023-11-13 PROCEDURE — 82803 BLOOD GASES ANY COMBINATION: CPT

## 2023-11-13 PROCEDURE — 82947 ASSAY GLUCOSE BLOOD QUANT: CPT

## 2023-11-13 PROCEDURE — 84145 PROCALCITONIN (PCT): CPT

## 2023-11-13 PROCEDURE — 97161 PT EVAL LOW COMPLEX 20 MIN: CPT

## 2023-11-13 PROCEDURE — 85027 COMPLETE CBC AUTOMATED: CPT

## 2023-11-13 PROCEDURE — 83605 ASSAY OF LACTIC ACID: CPT

## 2023-11-13 PROCEDURE — 86901 BLOOD TYPING SEROLOGIC RH(D): CPT

## 2023-11-13 PROCEDURE — 82962 GLUCOSE BLOOD TEST: CPT

## 2023-11-13 PROCEDURE — 94640 AIRWAY INHALATION TREATMENT: CPT

## 2023-11-13 PROCEDURE — 71045 X-RAY EXAM CHEST 1 VIEW: CPT

## 2023-11-13 PROCEDURE — 0225U NFCT DS DNA&RNA 21 SARSCOV2: CPT

## 2023-11-13 PROCEDURE — 86900 BLOOD TYPING SEROLOGIC ABO: CPT

## 2023-11-13 PROCEDURE — 80048 BASIC METABOLIC PNL TOTAL CA: CPT

## 2023-11-13 PROCEDURE — C1713: CPT

## 2023-11-13 PROCEDURE — 84132 ASSAY OF SERUM POTASSIUM: CPT

## 2023-11-13 PROCEDURE — C1889: CPT

## 2023-11-13 PROCEDURE — 85018 HEMOGLOBIN: CPT

## 2023-11-13 PROCEDURE — 76000 FLUOROSCOPY <1 HR PHYS/QHP: CPT

## 2023-11-13 PROCEDURE — 82435 ASSAY OF BLOOD CHLORIDE: CPT

## 2024-02-05 NOTE — H&P PST ADULT - BIRTH SEX
Onpro added to d3 of all cycles  
dr Jm Macdonald MD from nephrology, states in note that pt is requesting medication refill, note put in yellow folder,
Female

## 2024-03-13 NOTE — OCCUPATIONAL THERAPY INITIAL EVALUATION ADULT - PATIENT PROFILE REVIEW, REHAB EVAL
yes
appointments. If no appointment was previously scheduled, appointment scheduling offered: Yes.   Is follow up appointment scheduled within 7 days of discharge? No and CTN offered to schedule and patient declined reporting he spoke with PCP office and they reported no apt needed unless patient had concerns. Per chart review KANNAN Davalos Spoke with: Malachi, Patient had a planned surgery for total left hip replacement and will be following up with Ortho.  TCM not needed at this time.     Follow Up  Future Appointments   Date Time Provider Department Center   3/28/2024 11:00 AM [facility] [place]   3/28/2024 12:45 PM Nehemiah Jones PA-C AND ORTHO [facility]   4/23/2024 11:40 AM Ariella Blandon APRN - CNP [facility]   7/8/2024  9:30 AM Yoni Khoury DO [facility]       Care Transition Nurse provided contact information.  Plan for follow-up call in 5-7 days based on severity of symptoms and risk factors.  Plan for next call: self management-pain incision site     Leda BROOKSN, RN, [facility]  Care Transition Nurse  [phone] mobile

## 2024-06-21 NOTE — ASU PATIENT PROFILE, ADULT - VISION (WITH CORRECTIVE LENSES IF THE PATIENT USUALLY WEARS THEM):
Preoperative Evaluation  [facility]  [address] [address]  Phone: [phone]  Primary Provider: Jovita Bauman PA-C  Pre-op Performing Provider: Jovita Bauman PA-C  Jun 21, 2024 6/17/2024   Surgical Information   What procedure is being done? Carpal Tunnel   Facility or Hospital where procedure/surgery will be performed: [facility]   Who is doing the procedure / surgery? Dr. Milligan   Date of surgery / procedure: June 28th   Time of surgery / procedure: 12:30   Where do you plan to recover after surgery? at home with family      Fax number for surgical facility: [phone]    Assessment & Plan     The proposed surgical procedure is considered INTERMEDIATE risk.    Preop general physical exam    Carpal tunnel syndrome of right wrist  Anticipating surgery    SOB (shortness of breath)  Exertional dyspnea and fatigue progressive over last few months. No acute ischemic changes on EKG today, chest x-ray normal. Hemoglobin has improved over past month so unlikely related to anemia. Prior to surgery, need to evaluate for cardiac etiology with stress echo prior to elective surgery. Stress echo is scheduled for 6/27/24; if stress echo is normal, could proceed with surgery as scheduled on 6/28/24 and follow-up after to evaluate symptoms further, consider sleep medicine or other work up as indicated. Discussed red flag symptoms that would need urgent/emergent evaluation.  - XR Chest 2 Views; Future  - EKG 12-lead complete w/read - Clinics  - Echocardiogram Exercise Stress; Future    Fatigue, unspecified type  As above  - CBC with platelets and differential; Future  - Comprehensive metabolic panel (BMP + Alb, Alk Phos, ALT, AST, Total. Bili, TP); Future  - EKG 12-lead complete w/read - Clinics  - TSH with free T4 reflex; Future  - CBC with platelets and differential  - Comprehensive metabolic panel (BMP + Alb, Alk Phos, ALT, AST, Total.  "Bili, TP)  - TSH with free T4 reflex  - Echocardiogram Exercise Stress; Future    Cough, unspecified type  Normal chest x-ray today. She has h/o GERD which presented in the past with chronic cough. Recommend trial of PPI.  - XR Chest 2 Views; Future    Gastroesophageal reflux disease, unspecified whether esophagitis present  Recent issues with cough. She has h/o GERD which presented in the past with chronic cough. Recommend trial of PPI.    Essential hypertension  Controlled.    Hemorrhagic diathesis (H24)  Hematology consult 5/29/24:  \"Recommendations:  I counseled the patient about my assessment of potential causes for bleeding diathesis as above  Lab testing today for multiple factor levels and ferritin, for suspected ERVIN  Counseled about IV iron if she does have ERVIN  Discussed perioperative plan based on workup today.  If no diagnosis can be made I reassured her that given the low bleeding risk with carpal tunnel surgery I suspect nothing would be required perioperatively.  If bleeding occurs, then would give tranexamic acid 1000mg IV and continue either 1000mg Q8 hours IV or 1300 mg PO every 8 hours.  I will reach out to her by phone with results when they return for a formal perioperative plan and possible IV iron.\"  Per hematology, her results came back essentially normal: \"She previously had low factor levels when she was worked up many years ago at [facility], but this time those all came back normal. I'm hopeful that that means whatever put her at risk for bleeding last time is now resolved.\"     If she were to have anterior cervical discectomy/fusion, he does recommend tranexamic acid 1000mg IV 30-60 minutes prior to the surgery and then continue Q8 hours while hospitalized.    Anemia, unspecified type  No iron deficiency. Hemoglobin improved at 11.2 today.    History of atrial flutter  History of Gabino-Parkinson-White (WPW) syndrome  She has a history of WPW status post surgical ablation of a left lateral " Normal vision: sees adequately in most situations; can see medication labels, newsprint electrical pathway at age 38. She did not have any recurrent palpitations until 2014. At that time, she was seen at [facility] in [place] and had ECG consistent with atrial flutter. This episode of atrial flutter lasted several hours and terminated spontaneously. She saw cardiology after that and plan was for observation, follow-up with cardiology if recurrent symptoms. No indication for Coumadin as she did not have any risk factors for stroke. No issues since that time.     Mild atherosclerosis of carotid artery, bilateral  Asymptomatic. Have discussed that this could put her at higher risk for stroke and cardiovascular disease in the future. Encourage healthy lifestyle, would recommend starting statin medication. She wants to hold off on starting a new medication so she would like to discuss this at a later time. Would plan to repeat carotid artery US annually to evaluate for plaque progression.        UPDATE 6/28/24  Abnormal stress echo  Stress echo completed 6/27/24, results below:  Interpretation Summary  The patient exercised 5:00 min on Praveen protocol.  There was a normal BP response to exercise.  A low to moderate workload was achieved.  Normal resting wall motion and no stress-induced wall motion abnormality.    Stress  The patient exercised 5:00 min on Praveen protocol.  RPP 34530.  This study was stopped as the patient achieved an adequate exercise effort for  a diagnostic study.  There was a normal BP response to exercise.  A low to moderate workload was achieved.  Target Heart Rate was achieved.  The Duke treadmill score was intermediate risk ( -11< Colón score <5).  There was a maximum 0.5mm ST segment depression in the lateral lead(s).  The visual ejection fraction is >70%.  Left ventricular cavity size decreases with exercise.  Global LV systolic function augments with exercise.  Normal resting wall motion and no stress-induced wall motion abnormality.    Due to intermediate Colón treadmill  "score, I would like her to see cardiology for evaluation prior to proceeding with elective surgery. Referral placed. Will have staff call patient to update on plan.     - No identified additional risk factors other than previously addressed    Preoperative Medication Instructions  Antiplatelet or Anticoagulation Medication Instructions   - Patient is on no antiplatelet or anticoagulation medications.    Additional Medication Instructions  Take all scheduled medications on the day of surgery EXCEPT for modifications listed below:  - Do not take Olmesartan on morning of surgery  - Advised to avoid NSAIDS (Motrin, Ibuprofen, Aleve, Naprosyn) for one week prior to surgery. If needed, Tylenol or Acetaminophen are okay to use.  - Advised to avoid supplements for one week prior to surgery.    Recommendation  Patient referred to cardiology for evaluation before surgery. Will need to postpone surgery. Surgery approval pending completion of consultation.     The longitudinal plan of care for the diagnosis(es)/condition(s) as documented were addressed during this visit. Due to the added complexity in care, I will continue to support Criss in the subsequent management and with ongoing continuity of care.      Kyree Kahn is a 74 year old, presenting for the following:  Pre-Op Exam          6/21/2024    10:05 AM   Additional Questions   Roomed by Sita SAXENA     HPI related to upcoming procedure: carpal tunnel right wrist with pain/weakness.     Has also been treated for cervical radiculopathy but arm pain is significantly improved  Stopped gabapentin and celebrex  No increase in pain     Fatigue for past few months, worsening  Always feels tired, but describes feeling \"overwhelming exhaustion\" when she is active  For example, has to stop and rest after pulling weeds for 5 minutes, doing dishes for a short time, or walking 1 block. Her activities were not limited like this in the past. She has associated shortness of " "breath with activity. Exhaustion and shortness of breath improve after she has been resting for a few minutes.  She has not mentioned this level of fatigue or shortness of breath in previous visits  No dizziness/lightheadedness  No palpitations  No nausea, sweating  No chest pain with exertion  Chest/shoulders/neck are tight but not specifically related to exertion - she says it feels like a muscular pain    Wakes up feeling tired   says she does snore  No witnessed apneic episodes  No history of sleep apnea    Chronic dry cough, more bothersome over past few months  History of GERD with symptoms that presented as cough  Previously cough resolved with PPI  She has not tried PPI recently  No wheezing, shortness of breath with cough    Hematology consult 5/29/24:  \"Recommendations:  I counseled the patient about my assessment of potential causes for bleeding diathesis as above  Lab testing today for multiple factor levels and ferritin, for suspected ERVIN  Counseled about IV iron if she does have ERVIN  Discussed perioperative plan based on workup today.  If no diagnosis can be made I reassured her that given the low bleeding risk with carpal tunnel surgery I suspect nothing would be required perioperatively.  If bleeding occurs, then would give tranexamic acid 1000mg IV and continue either 1000mg Q8 hours IV or 1300 mg PO every 8 hours.  I will reach out to her by phone with results when they return for a formal perioperative plan and possible IV iron.\"  Per hematology, her results came back essentially normal: \"She previously had low factor levels when she was worked up many years ago at [place], but this time those all came back normal. I'm hopeful that that means whatever put her at risk for bleeding last time is now resolved.\"     If she were to have anterior cervical discectomy/fusion, he does recommend tranexamic acid 1000mg IV 30-60 minutes prior to the surgery and then continue Q8 hours while hospitalized. "         6/17/2024   Pre-Op Questionnaire   Have you ever had a heart attack or stroke? No   Have you ever had surgery on your heart or blood vessels, such as a stent placement, a coronary artery bypass, or surgery on an artery in your head, neck, heart, or legs? (!) YES WPW syndrome s/p open surgical ablation at the age of 37 years    Do you have chest pain with activity? No   Do you have a history of heart failure? No   Do you currently have a cold, bronchitis or symptoms of other infection? No   Do you have a cough, shortness of breath, or wheezing? No   Do you or anyone in your family have previous history of blood clots? (!) YES - dad had DVT after COVID, no other family history of blood clots. No personal history of blood clots.    Do you or does anyone in your family have a serious bleeding problem such as prolonged bleeding following surgeries or cuts? (!) YES - She has a history of open heart surgery with ablation for her WPW syndrome at the age of 37 years and complete colectomy 16 years ago at [facility] for chronic constipation. During both of her surgeries she had postoperative hemorrhage requiring reexploration.   She had incision and removal of osteoma from forehead 1/2023 without bleeding complications   Have you ever had problems with anemia or been told to take iron pills? (!) YES - hemorrhage after previous heart and colon surgeries as above. Also in the past had post menopausal bleeding, had d&c many years ago and no problems since then. No recent bleeding problems.    Have you had any abnormal blood loss such as black, tarry or bloody stools, or abnormal vaginal bleeding? (!) YES - hemorrhage after previous heart and colon surgeries as above. Also in the past had post menopausal bleeding, had d&c many years ago and no problems since then. No recent bleeding problems.    Have you ever had a blood transfusion? (!) YES - after heart surgery and colon surgery many years ago    Have you ever had a  transfusion reaction? (!) YES - she had problems with blood transfusion. She is blood type A positive, anti-JKa red cell antibody positive.    Are you willing to have a blood transfusion if it is medically needed before, during, or after your surgery? Yes   Have you or any of your relatives ever had problems with anesthesia? No   Do you have sleep apnea, excessive snoring or daytime drowsiness? No   Do you have any artifical heart valves or other implanted medical devices like a pacemaker, defibrillator, or continuous glucose monitor? No   Do you have artificial joints? No   Are you allergic to latex? No        Health Care Directive  Patient does not have a Health Care Directive or Living Will: Patient states has Advance Directive and will bring in a copy to clinic.    Preoperative Review of    reviewed - only rx is for gabapentin which she is no longer taking       Status of Chronic Conditions:  See problem list for active medical problems.  Problems all longstanding and stable, except as noted/documented.  See ROS for pertinent symptoms related to these conditions.    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 At that time, it appears that she was found to have evidence of factor VII deficiency leading to an abnormal prothrombin time. There was no evidence of von Willebrand's or a factor inhibitor at that time. They completed additional labs including INR, PTT, fibrinogen level, and platelet count. If INR normal, she was able to proceed with surgery without further evaluation. I don't see lab results from 2/2014 but they were reportedly normal and she was able to proceed with surgery as scheduled. She did not have any bleeding issues after surgery.    Records from 5/2014 show mildly elevated PT and INR  PT 13.7  (normal 9.2-13.0)  INR 1.3 (normal 0.8-1.2)      History of transfusion reaction 05/09/2024     Priority: Medium     Blood type A positive    Required blood transfusions after postoperative hemorrhages (severe pericardial and pleural bleeding after open cardiac ablation procedure for Gabino-Parkinson-White in 1987; after colectomy in 2002 she developed severe internal bleeding requiring a second operation immediately after her first surgery). 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      Mild atherosclerosis of carotid artery, bilateral 04/04/2024     Priority: Medium     Carotid artery US 4/1/24 showed   1.  RIGHT CAROTID:  Mild atherosclerotic plaque. Peak systolic velocities in the ICA are 88 cm/s which correspond to the <50% stenosis range based on NASCET criteria.  2.  LEFT CAROTID:  Mild atherosclerotic plaque. Peak systolic velocities in the ICA are 103 cm/s which correspond to the <50% stenosis range based on NASCET criteria.  3.  Antegrade flow within the vertebral arteries bilaterally.    The 10-year ASCVD risk score (Jr DK, et al., 2019) is: 13.7%    Values used to calculate the score:      Age: 74 years      Sex: Female      Is Non- : No      Diabetic: No      Tobacco smoker: No      Systolic Blood Pressure: 115 mmHg      Is BP treated: Yes      HDL Cholesterol: 100 mg/dL      Total Cholesterol: 209 mg/dL    Asymptomatic.     4/16/24: Discussed that this could put her at higher risk for stroke and cardiovascular disease in the future. Encourage healthy lifestyle, would recommend starting statin medication. At this time, she wants to hold off on starting a new medication so she would like to discuss this at a later time. Would plan to repeat carotid artery US annually to evaluate for plaque progression.      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  3/9/2021: will do trial of going off or to H2 blocker.      History of colectomy 02/23/2019     Priority: Medium    Hip pain, right 02/19/2019     Priority: Medium      Past Medical History:   Diagnosis Date    Anemia     Anomalous atrioventricular excitation     surg for taylor parkinson age 38    Coagulation disorder (H24)     hx hemorrhage after heart and after colon surg    History of atrial flutter     She has a history of WPW status post surgical ablation of a left lateral electrical pathway at age 38. She did not have any recurrent palpitations until 2014. At that time, she was seen at [facility] in [place] and had ECG consistent with atrial flutter. This episode of atrial flutter lasted several hours and  terminated spontaneously. She saw cardiology after that and plan was for obser    History of blood transfusion     History of Gabino-Parkinson-White (WPW) syndrome     She has a history of WPW status post surgical ablation of a left lateral electrical pathway at age 38.     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 cx with hemorrhage. no cancer. slow transit.(2 surgeries; first for low transit; second due to the bleeding)    DILATION AND CURETTAGE, HYSTEROSCOPY DIAGNOSTIC, COMBINED  2/21/2014    Procedure: COMBINED DILATION AND CURETTAGE, HYSTEROSCOPY DIAGNOSTIC;   DILATION AND CURETTAGE, HYSTEROSCOPY DIAGNOSTIC ;  Surgeon: Willie Osborn MD;  Location: [facility] OR    ENT SURGERY      t and a    EXCISE LESION FACE N/A 1/11/2023    Procedure: incision and removal of osteoma on forehead;  Surgeon: Dl Davenport MD;  Location: [facility] OR     Current Outpatient Medications   Medication Sig Dispense Refill    Calcium-Vitamin D-Vitamin K 500-200-40 MG-UNT-MCG CHEW Takes 3 chewables daily      clotrimazole (LOTRIMIN) 1 % external cream Apply topically 2 times daily to affected areas on lips. Use until resolution, typically 1-3 weeks. 30 g 1    dexAMETHasone (DECADRON) 4 MG/ML injection Use 4 mg or dose determined by provider for iontophoresis. 30 mL 0    levothyroxine (SYNTHROID/LEVOTHROID) 25 MCG tablet TAKE 1 TABLET BY MOUTH EVERY DAY 90 tablet 2    olmesartan (BENICAR) 20 MG tablet Take 0.5 tablets (10 mg) by mouth daily 45 tablet 1    sertraline (ZOLOFT) 25 MG tablet Take 1 tablet (25 mg) by mouth daily 90 tablet 1       Allergies   Allergen Reactions    Benadryl  "[Diphenhydramine]      Given in the hospital and had a difficult time waking as expected.    External Allergen Needs Reconciliation - See Comment      Please reconcile the Patient's allergy reported as LEAD ACETATEMORPHINE SULFATE and update accordingly    Morphine      [facility] Allergy Text Annotation: morphine    Protamine      sob    Tylox [Oxycodone-Acetaminophen]      unknown    Ciprofloxacin Rash    Escitalopram Other (See Comments)     Brain fog, forgetfulness        Social History     Tobacco Use    Smoking status: Never     Passive exposure: Never    Smokeless tobacco: Never   Substance Use Topics    Alcohol use: Yes     Alcohol/week: 7.0 standard drinks of alcohol     Types: 7 Glasses of wine per week     Comment: A glass of wine or beer, usually no more than one     Family History   Problem Relation Age of Onset    Bronchitis Mother         copd    Alcoholism Mother     Varicose Veins Mother     Diabetes Father     Coronary Artery Disease Father     Deep Vein Thrombosis Father         after having COVID    Leukemia Sister         chronic    Impaired Fasting Glucose Sister     Cancer Brother         myelofibrosis; got BMT 2019    Diabetes Type 2  Brother     Impaired Fasting Glucose Brother      History   Drug Use Unknown             Review of Systems  Constitutional, neuro, ENT, endocrine, pulmonary, cardiac, gastrointestinal, genitourinary, musculoskeletal, integument and psychiatric systems are negative, except as otherwise noted.    Objective    /81 (BP Location: Right arm, Patient Position: Sitting, Cuff Size: Adult Regular)   Pulse 64   Temp 97.7  F (36.5  C) (Oral)   Resp 13   Ht 1.6 m (5' 3\")   Wt 61.7 kg (136 lb)   LMP  (LMP Unknown)   SpO2 99%   BMI 24.09 kg/m     Estimated body mass index is 24.09 kg/m  as calculated from the following:    Height as of this encounter: 1.6 m (5' 3\").    Weight as of this encounter: 61.7 kg (136 lb).  Physical Exam  GENERAL: alert and no " distress  EYES: Eyes grossly normal to inspection, PERRL and conjunctivae and sclerae normal  HENT: ear canals and TM's normal, nose and mouth without ulcers or lesions  NECK: no adenopathy, no asymmetry, masses, or scars  RESP: lungs clear to auscultation - no rales, rhonchi or wheezes  CV: regular rate and rhythm, normal S1 S2, no S3 or S4, no murmur, click or rub, no peripheral edema  ABDOMEN: soft, nontender, no hepatosplenomegaly, no masses and bowel sounds normal  MS: no gross musculoskeletal defects noted, no edema  SKIN: no suspicious lesions or rashes  NEURO: Normal strength and tone, mentation intact and speech normal  PSYCH: mentation appears normal, affect normal/bright    Recent Labs   Lab Test 05/29/24  1240 05/07/24  1541 01/08/24  1327 12/12/23  1317 08/04/23  0903   HGB  --  10.1* 11.4*   < >  --    PLT  --  272 241   < >  --    INR 1.24*  --   --   --   --    NA  --  136 139   < > 137   POTASSIUM  --  4.4 4.7   < > 4.1   CR  --  0.99* 0.79   < > 0.85   A1C  --   --   --   --  5.6    < > = values in this interval not displayed.        Diagnostics  Recent Results (from the past 168 hour(s))   Comprehensive metabolic panel (BMP + Alb, Alk Phos, ALT, AST, Total. Bili, TP)    Collection Time: 06/21/24 11:44 AM   Result Value Ref Range    Sodium 138 135 - 145 mmol/L    Potassium 4.1 3.4 - 5.3 mmol/L    Carbon Dioxide (CO2) 25 22 - 29 mmol/L    Anion Gap 11 7 - 15 mmol/L    Urea Nitrogen 16.4 8.0 - 23.0 mg/dL    Creatinine 0.82 0.51 - 0.95 mg/dL    GFR Estimate 75 >60 mL/min/1.73m2    Calcium 9.5 8.8 - 10.2 mg/dL    Chloride 102 98 - 107 mmol/L    Glucose 93 70 - 99 mg/dL    Alkaline Phosphatase 60 40 - 150 U/L    AST 19 0 - 45 U/L    ALT 8 0 - 50 U/L    Protein Total 7.6 6.4 - 8.3 g/dL    Albumin 4.3 3.5 - 5.2 g/dL    Bilirubin Total 0.4 <=1.2 mg/dL   TSH with free T4 reflex    Collection Time: 06/21/24 11:44 AM   Result Value Ref Range    TSH 2.14 0.30 - 4.20 uIU/mL   CBC with platelets and differential     Collection Time: 06/21/24 11:44 AM   Result Value Ref Range    WBC Count 7.3 4.0 - 11.0 10e3/uL    RBC Count 3.79 (L) 3.80 - 5.20 10e6/uL    Hemoglobin 11.2 (L) 11.7 - 15.7 g/dL    Hematocrit 34.8 (L) 35.0 - 47.0 %    MCV 92 78 - 100 fL    MCH 29.6 26.5 - 33.0 pg    MCHC 32.2 31.5 - 36.5 g/dL    RDW 12.1 10.0 - 15.0 %    Platelet Count 251 150 - 450 10e3/uL    % Neutrophils 57 %    % Lymphocytes 31 %    % Monocytes 8 %    % Eosinophils 4 %    % Basophils 1 %    % Immature Granulocytes 0 %    Absolute Neutrophils 4.1 1.6 - 8.3 10e3/uL    Absolute Lymphocytes 2.2 0.8 - 5.3 10e3/uL    Absolute Monocytes 0.6 0.0 - 1.3 10e3/uL    Absolute Eosinophils 0.3 0.0 - 0.7 10e3/uL    Absolute Basophils 0.0 0.0 - 0.2 10e3/uL    Absolute Immature Granulocytes 0.0 <=0.4 10e3/uL      EKG: sinus bradycardia, normal axis, normal intervals, no acute ST/T changes c/w ischemia, no LVH by voltage criteria, no significant change from EKG 5/7/24. Negative T waves in V2 different from EKG 1/2024 however she had negative T waves in V2 on EKG in 2014.      Signed Electronically by: Jovita Bauman PA-C  Copy of this evaluation report is provided to requesting physician.

## 2024-09-05 NOTE — PRE-OP CHECKLIST - ADVANCE DIRECTIVE ADDRESSED/READDRESSED
Detail Level: Detailed Quality 226: Preventive Care And Screening: Tobacco Use: Screening And Cessation Intervention: Patient screened for tobacco use and is an ex/non-smoker done

## 2024-11-10 NOTE — DIETITIAN INITIAL EVALUATION ADULT. - NS FNS WEIGHT CHANGE REASON
Pt reports she usually stays around 150 pounds but went up to 160 pounds before previous surgery and then down to 140 pounds after and believes she is back around 150 pounds now. negative...

## 2025-02-25 NOTE — PHYSICAL THERAPY INITIAL EVALUATION ADULT - MD/RN NOTIFIED
Samples Given: Rhofade apply once on the AM ( if patient likes it advised patient to give us a call so we can send in a script) Render In Strict Bullet Format?: No Detail Level: Zone yes

## 2025-02-25 NOTE — H&P PST ADULT - TOBACCO USE
MEDICATIONS  (PRN):  fluticasone propionate 50 MICROgram(s)/spray Nasal Spray 1 Spray(s) Both Nostrils two times a day PRN nasal congestion  hydrOXYzine hydrochloride 25 milliGRAM(s) Oral every 6 hours PRN anxiety/ sleep disturbances  nicotine  Polacrilex Gum 2 milliGRAM(s) Oral every 4 hours PRN Smoking Cessation  OLANZapine Disintegrating Tablet 5 milliGRAM(s) Oral every 6 hours PRN psychotic agitation  OLANZapine Injectable 5 milliGRAM(s) IntraMuscular Once PRN severe psychotic agitation   Former smoker

## 2025-05-27 NOTE — ED PROCEDURE NOTE - CPROC ED ANATOMIC LOCATION1
Continue meds, no changes  Lab Results   Component Value Date    HGBA1C 5.8 (H) 05/22/2025             finger

## (undated) DEVICE — ELCTR BOVIE PENCIL SMOKE EVACUATION

## (undated) DEVICE — ELCTR 4-DISC 20MM 49" (RED, BLUE, GREEN, BLACK)

## (undated) DEVICE — WOUND IRR SURGIPHOR

## (undated) DEVICE — DRAPE 3/4 SHEET W REINFORCEMENT 56X77"

## (undated) DEVICE — ELCTR PEDICLE SCREW PROBE 3MM BALL 1.8MM X 100MM

## (undated) DEVICE — DRSG AQUACEL 3.5 X 10"

## (undated) DEVICE — LAP PAD 18 X 18"

## (undated) DEVICE — Device

## (undated) DEVICE — SUT VICRYL 2-0 18" CP-2 UNDYED (POP-OFF)

## (undated) DEVICE — STAPLER SKIN VISI-STAT 35 WIDE

## (undated) DEVICE — ELCTR BOVIE TIP BLADE INSULATED 6.5" EDGE

## (undated) DEVICE — GLV 6.5 PROTEXIS (WHITE)

## (undated) DEVICE — GOWN TRIMAX LG

## (undated) DEVICE — ELCTR MONOPOLAR STIMULATOR PROBE FLUSH-TIP

## (undated) DEVICE — ELCTR SUBDERMAL CORKSCREW NDL 1.2MM

## (undated) DEVICE — DRAPE TOWEL BLUE 17" X 24"

## (undated) DEVICE — DRSG TELFA 3 X 8

## (undated) DEVICE — SPECIMEN CONTAINER 100ML

## (undated) DEVICE — MEDICATION LABELS W MARKER

## (undated) DEVICE — PREP CHLORAPREP HI-LITE ORANGE 26ML

## (undated) DEVICE — ELCTR BIPOLAR PROBE

## (undated) DEVICE — PREP BETADINE SPONGE STICKS

## (undated) DEVICE — POSITIONER FOAM EGG CRATE ULNAR 2PCS (PINK)

## (undated) DEVICE — SOL IRR POUR H2O 250ML

## (undated) DEVICE — GLV 8.5 PROTEXIS (WHITE)

## (undated) DEVICE — SPHERE MARKER (5 SPHERES)

## (undated) DEVICE — SUT VLOC 180 0 18" GS-21 GREEN

## (undated) DEVICE — DRAPE LIGHT HANDLE COVER (GREEN)

## (undated) DEVICE — GLV 7.5 PROTEXIS (WHITE)

## (undated) DEVICE — STRYKER BONE MILL BLADE MEDIUM 5.0MM

## (undated) DEVICE — MIDAS REX MR7 LUBRICANT DIFFUSER CARTRIDGE

## (undated) DEVICE — POSITIONER CUSHION INSERT PRONE VIEW LG

## (undated) DEVICE — SUT VICRYL 0 18" OS-6 (POP-OFF)

## (undated) DEVICE — DRAPE BACK TABLE COVER HEAVY DUTY 60"

## (undated) DEVICE — DRSG CURITY GAUZE SPONGE 4 X 4" 12-PLY

## (undated) DEVICE — STRYKER BONE MILL BLADE FINE 3.2MM

## (undated) DEVICE — ELCTR BOVIE TIP BLADE INSULATED 2.75" EDGE

## (undated) DEVICE — ELCTR SUBDERMAL NDL CLASSIC 1.5M X 59" (6 COLOR)

## (undated) DEVICE — SOL IRR POUR NS 0.9% 500ML

## (undated) DEVICE — VENODYNE/SCD SLEEVE CALF MEDIUM

## (undated) DEVICE — GLV 7 PROTEXIS (WHITE)

## (undated) DEVICE — BLADE SCALPEL SAFETYLOCK #11

## (undated) DEVICE — FOLEY TRAY 16FR LF URINE METER SURESTEP

## (undated) DEVICE — DRAPE EQUIPMENT BANDED BAG 30 X 30" (SHOWER CAP)

## (undated) DEVICE — ELCTR SUBDERMAL NDL 27G X 1/2" WITH TWISTED PAIR

## (undated) DEVICE — MARKING PEN W RULER

## (undated) DEVICE — DRSG XEROFORM 1 X 8"

## (undated) DEVICE — PACK LUMBAR LAMI

## (undated) DEVICE — ELCTR BOVIE PENCIL HANDPIECE

## (undated) DEVICE — WARMING BLANKET LOWER ADULT

## (undated) DEVICE — DRAPE MAYO STAND 30"

## (undated) DEVICE — DRAPE 1/2 SHEET 40X57"

## (undated) DEVICE — DRAIN JACKSON PRATT 3 SPRING RESERVOIR W 10FR PVC DRAIN

## (undated) DEVICE — MIDAS REX MR8 MATCH HEAD FLUTED LG BORE 3MM X 14CM

## (undated) DEVICE — GLV 8 PROTEXIS (WHITE)

## (undated) DEVICE — VISITEC 4X4